# Patient Record
Sex: MALE | Race: WHITE | Employment: OTHER | URBAN - METROPOLITAN AREA
[De-identification: names, ages, dates, MRNs, and addresses within clinical notes are randomized per-mention and may not be internally consistent; named-entity substitution may affect disease eponyms.]

---

## 2017-06-15 ENCOUNTER — HOSPITAL ENCOUNTER (EMERGENCY)
Age: 72
Discharge: HOME | End: 2017-06-15
Attending: EMERGENCY MEDICINE
Payer: MEDICARE

## 2017-06-15 VITALS
RESPIRATION RATE: 16 BRPM | WEIGHT: 166 LBS | DIASTOLIC BLOOD PRESSURE: 71 MMHG | OXYGEN SATURATION: 96 % | HEIGHT: 68 IN | BODY MASS INDEX: 25.16 KG/M2 | TEMPERATURE: 98.7 F | SYSTOLIC BLOOD PRESSURE: 140 MMHG

## 2017-06-15 DIAGNOSIS — L03.115 CELLULITIS OF RIGHT LOWER EXTREMITY: ICD-10-CM

## 2017-06-15 DIAGNOSIS — T30.0 BURN: Primary | ICD-10-CM

## 2017-06-15 PROCEDURE — 75810000057 HC BURN CR DRS/DEB SM<5%TBSA

## 2017-06-15 PROCEDURE — 99283 EMERGENCY DEPT VISIT LOW MDM: CPT

## 2017-06-15 PROCEDURE — 74011250637 HC RX REV CODE- 250/637: Performed by: EMERGENCY MEDICINE

## 2017-06-15 RX ORDER — CEPHALEXIN 500 MG/1
500 CAPSULE ORAL 4 TIMES DAILY
Qty: 28 CAP | Refills: 0 | Status: SHIPPED | OUTPATIENT
Start: 2017-06-15 | End: 2017-06-22

## 2017-06-15 RX ORDER — LORATADINE AND PSEUDOEPHEDRINE 10; 240 MG/1; MG/1
1 TABLET, EXTENDED RELEASE ORAL DAILY
COMMUNITY
End: 2022-03-28

## 2017-06-15 RX ORDER — LEVOTHYROXINE SODIUM 75 UG/1
75 TABLET ORAL
COMMUNITY

## 2017-06-15 RX ORDER — CEPHALEXIN 250 MG/1
500 CAPSULE ORAL
Status: COMPLETED | OUTPATIENT
Start: 2017-06-15 | End: 2017-06-15

## 2017-06-15 RX ADMIN — CEPHALEXIN 500 MG: 250 CAPSULE ORAL at 05:33
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
## 2017-06-15 NOTE — DISCHARGE INSTRUCTIONS
Leiva: Care Instructions  Your Care Instructions    Leiva--even minor ones--can be very painful. A minor burn may heal within several days, while a more serious burn may take weeks or even months to heal completely. You may notice that the burned area feels tight and hard while it is healing. It is important to continue to move the area as the burn heals to prevent loss of motion or loss of function in the area. When your skin is damaged by a burn, you have a greater risk of infection. Keep the wound clean and change the bandages regularly to prevent infection and help the burn heal.  Burns can leave permanent scars. Taking good care of the burn as it heals may help prevent bad scars. The doctor has checked you carefully, but problems can develop later. If you notice any problems or new symptoms, get medical treatment right away. Follow-up care is a key part of your treatment and safety. Be sure to make and go to all appointments, and call your doctor if you are having problems. It's also a good idea to know your test results and keep a list of the medicines you take. How can you care for yourself at home? · If your doctor told you how to care for your burn, follow your doctor's instructions. If you did not get instructions, follow this general advice:  ¨ Wash the burn with clean water 2 times a day. Don't use hydrogen peroxide or alcohol, which can slow healing. ¨ Gently pat the burn dry after you wash it. ¨ You may cover the burn with a thin layer of petroleum jelly, such as Vaseline, and a nonstick bandage. ¨ Apply more petroleum jelly and replace the bandage as needed. · Protect your burn while it is healing. Cover your burn if you are going out in the cold or the sun. ¨ Wear long sleeves if the burn is on your hands or arms. ¨ Wear a hat if the burn is on your face. ¨ Wear socks and shoes if the burn is on your feet. · Do not break blisters open. This increases the chance of infection.  If a blister breaks open by itself, blot up the liquid, and leave the skin that covered the blister. This helps protect the new skin. · If your doctor prescribed antibiotics, take them as directed. Do not stop taking them just because you feel better. You need to take the full course of antibiotics. For pain and itching  · Take pain medicines exactly as directed. ¨ If the doctor gave you a prescription medicine for pain, take it as prescribed. ¨ If you are not taking a prescription pain medicine, ask your doctor if you can take an over-the-counter medicine. · If the burn itches, try not to scratch it. Try an over-the-counter antihistamine such as diphenhydramine (Benadryl) or loratadine (Claritin). Read and follow all instructions on the label. When should you call for help? Call your doctor now or seek immediate medical care if:  · Your pain gets worse. · You have symptoms of infection, such as:  ¨ Increased pain, swelling, warmth, or redness near the burn. ¨ Red streaks leading from the burn. ¨ Pus draining from the burn. ¨ A fever. Watch closely for changes in your health, and be sure to contact your doctor if:  · You do not get better as expected. Where can you learn more? Go to http://gloria-cindy.info/. Enter K432 in the search box to learn more about \"Burns: Care Instructions. \"  Current as of: May 27, 2016  Content Version: 11.2  © 1066-3083 [employer]. Care instructions adapted under license by [employer] (which disclaims liability or warranty for this information). If you have questions about a medical condition or this instruction, always ask your healthcare professional. [place] [address] any warranty or liability for your use of this information. Cellulitis: Care Instructions  Your Care Instructions    Cellulitis is a skin infection.  It often occurs after a break in the skin from a scrape, cut, bite, or puncture, or after a rash.  The doctor has checked you carefully, but problems can develop later. If you notice any problems or new symptoms, get medical treatment right away. Follow-up care is a key part of your treatment and safety. Be sure to make and go to all appointments, and call your doctor if you are having problems. It's also a good idea to know your test results and keep a list of the medicines you take. How can you care for yourself at home? · Take your antibiotics as directed. Do not stop taking them just because you feel better. You need to take the full course of antibiotics. · Prop up the infected area on pillows to reduce pain and swelling. Try to keep the area above the level of your heart as often as you can. · If your doctor told you how to care for your wound, follow your doctor's instructions. If you did not get instructions, follow this general advice:  ¨ Wash the wound with clean water 2 times a day. Don't use hydrogen peroxide or alcohol, which can slow healing. ¨ You may cover the wound with a thin layer of petroleum jelly, such as Vaseline, and a nonstick bandage. ¨ Apply more petroleum jelly and replace the bandage as needed. · Be safe with medicines. Take pain medicines exactly as directed. ¨ If the doctor gave you a prescription medicine for pain, take it as prescribed. ¨ If you are not taking a prescription pain medicine, ask your doctor if you can take an over-the-counter medicine. To prevent cellulitis in the future  · Try to prevent cuts, scrapes, or other injuries to your skin. Cellulitis most often occurs where there is a break in the skin. · If you get a scrape, cut, mild burn, or bite, wash the wound with clean water as soon as you can to help avoid infection. Don't use hydrogen peroxide or alcohol, which can slow healing. · If you have swelling in your legs (edema), support stockings and good skin care may help prevent leg sores and cellulitis.   · Take care of your feet, especially if you have diabetes or other conditions that increase the risk of infection. Wear shoes and socks. Do not go barefoot. If you have athlete's foot or other skin problems on your feet, talk to your doctor about how to treat them. When should you call for help? Call your doctor now or seek immediate medical care if:  · You have signs that your infection is getting worse, such as:  ¨ Increased pain, swelling, warmth, or redness. ¨ Red streaks leading from the area. ¨ Pus draining from the area. ¨ A fever. · You get a rash. Watch closely for changes in your health, and be sure to contact your doctor if:  · You are not getting better after 1 day (24 hours). · You do not get better as expected. Where can you learn more? Go to http://gloria-cindy.info/. [place] in the search box to learn more about \"Cellulitis: Care Instructions. \"  Current as of: October 13, 2016  Content Version: 11.2  © 3339-3213 [employer]. Care instructions adapted under license by [employer] (which disclaims liability or warranty for this information). If you have questions about a medical condition or this instruction, always ask your healthcare professional. [place] [address] any warranty or liability for your use of this information. We hope that we have addressed all of your medical concerns. The examination and treatment you received in the Emergency Department were for an emergent problem and were not intended as complete care. It is important that you follow up with your healthcare provider(s) for ongoing care. If your symptoms worsen or do not improve as expected, and you are unable to reach your usual health care provider(s), you should return to the Emergency Department. Today's healthcare is undergoing tremendous change, and patient satisfaction surveys are one of the many tools to assess the quality of medical care.   You may receive a survey from the [facility] regarding your experience in the Emergency Department. I hope that your experience has been completely positive, particularly the medical care that I provided. As such, please participate in the survey; anything less than excellent does not meet my expectations or intentions. [facility] and [facility] participate in nationally recognized quality of care measures. If your blood pressure is greater than 120/80, as reported below, we urge that you seek medical care to address the potential of high blood pressure, commonly known as hypertension. Hypertension can be hereditary or can be caused by certain medical conditions, pain, stress, or \"white coat syndrome. \"       Please make an appointment with your health care provider(s) for follow up of your Emergency Department visit. VITALS:   Patient Vitals for the past 8 hrs:   Temp Resp BP SpO2   06/15/17 0514 98.7 °F (37.1 °C) 16 140/71 96 %          Thank you for allowing us to provide you with medical care today. We realize that you have many choices for your emergency care needs. Please choose us in the future for any continued health care needs. Ghulam Caceres [facility], [address]: [phone]            No results found for this or any previous visit (from the past 24 hour(s)). No results found.

## 2022-03-28 ENCOUNTER — HOSPITAL ENCOUNTER (INPATIENT)
Age: 77
LOS: 1 days | Discharge: ANOTHER HOSPITAL | DRG: 281 | End: 2022-03-28
Attending: STUDENT IN AN ORGANIZED HEALTH CARE EDUCATION/TRAINING PROGRAM | Admitting: INTERNAL MEDICINE
Payer: MEDICARE

## 2022-03-28 ENCOUNTER — APPOINTMENT (OUTPATIENT)
Dept: GENERAL RADIOLOGY | Age: 77
DRG: 281 | End: 2022-03-28
Attending: STUDENT IN AN ORGANIZED HEALTH CARE EDUCATION/TRAINING PROGRAM
Payer: MEDICARE

## 2022-03-28 ENCOUNTER — APPOINTMENT (OUTPATIENT)
Dept: NON INVASIVE DIAGNOSTICS | Age: 77
DRG: 281 | End: 2022-03-28
Attending: INTERNAL MEDICINE
Payer: MEDICARE

## 2022-03-28 VITALS
HEART RATE: 81 BPM | BODY MASS INDEX: 25.31 KG/M2 | WEIGHT: 167 LBS | RESPIRATION RATE: 16 BRPM | DIASTOLIC BLOOD PRESSURE: 93 MMHG | OXYGEN SATURATION: 94 % | HEIGHT: 68 IN | TEMPERATURE: 98.1 F | SYSTOLIC BLOOD PRESSURE: 173 MMHG

## 2022-03-28 DIAGNOSIS — R77.8 ELEVATED TROPONIN: Primary | ICD-10-CM

## 2022-03-28 DIAGNOSIS — R07.9 CHEST PAIN: ICD-10-CM

## 2022-03-28 DIAGNOSIS — I10 HTN (HYPERTENSION), BENIGN: ICD-10-CM

## 2022-03-28 DIAGNOSIS — I21.4 NSTEMI (NON-ST ELEVATED MYOCARDIAL INFARCTION) (HCC): ICD-10-CM

## 2022-03-28 PROBLEM — E03.9 HYPOTHYROIDISM: Status: ACTIVE | Noted: 2022-03-28

## 2022-03-28 PROBLEM — E87.6 HYPOKALEMIA: Status: ACTIVE | Noted: 2022-03-28

## 2022-03-28 LAB
ALBUMIN SERPL-MCNC: 3.7 G/DL (ref 3.5–5)
ALBUMIN/GLOB SERPL: 1.4 {RATIO} (ref 1.1–2.2)
ALP SERPL-CCNC: 96 U/L (ref 45–117)
ALT SERPL-CCNC: 28 U/L (ref 12–78)
ANION GAP SERPL CALC-SCNC: 8 MMOL/L (ref 5–15)
AST SERPL-CCNC: 22 U/L (ref 15–37)
BASOPHILS # BLD: 0.1 K/UL (ref 0–0.1)
BASOPHILS NFR BLD: 1 % (ref 0–1)
BILIRUB SERPL-MCNC: 0.6 MG/DL (ref 0.2–1)
BNP SERPL-MCNC: 158 PG/ML
BUN SERPL-MCNC: 25 MG/DL (ref 6–20)
BUN/CREAT SERPL: 21 (ref 12–20)
CALCIUM SERPL-MCNC: 8.9 MG/DL (ref 8.5–10.1)
CHLORIDE SERPL-SCNC: 104 MMOL/L (ref 97–108)
CHOLEST SERPL-MCNC: 206 MG/DL
CO2 SERPL-SCNC: 27 MMOL/L (ref 21–32)
COMMENT, HOLDF: NORMAL
CREAT SERPL-MCNC: 1.19 MG/DL (ref 0.7–1.3)
DIFFERENTIAL METHOD BLD: ABNORMAL
ECHO AO ASC DIAM: 3.6 CM
ECHO AO ASCENDING AORTA INDEX: 1.9 CM/M2
ECHO AV AREA PEAK VELOCITY: 1.4 CM2
ECHO AV AREA VTI: 1.5 CM2
ECHO AV AREA/BSA PEAK VELOCITY: 0.7 CM2/M2
ECHO AV AREA/BSA VTI: 0.8 CM2/M2
ECHO AV MEAN GRADIENT: 16 MMHG
ECHO AV MEAN VELOCITY: 1.9 M/S
ECHO AV PEAK GRADIENT: 30 MMHG
ECHO AV PEAK VELOCITY: 2.7 M/S
ECHO AV VELOCITY RATIO: 0.41
ECHO AV VTI: 58.8 CM
ECHO LA DIAMETER INDEX: 2.28 CM/M2
ECHO LA DIAMETER: 4.3 CM
ECHO LA VOL 2C: 66 ML (ref 18–58)
ECHO LA VOL 4C: 65 ML (ref 18–58)
ECHO LA VOL BP: 67 ML (ref 18–58)
ECHO LA VOL/BSA BIPLANE: 35 ML/M2 (ref 16–34)
ECHO LA VOLUME AREA LENGTH: 71 ML
ECHO LA VOLUME INDEX A2C: 35 ML/M2 (ref 16–34)
ECHO LA VOLUME INDEX A4C: 34 ML/M2 (ref 16–34)
ECHO LA VOLUME INDEX AREA LENGTH: 38 ML/M2 (ref 16–34)
ECHO LV E' LATERAL VELOCITY: 10 CM/S
ECHO LV E' SEPTAL VELOCITY: 5 CM/S
ECHO LV FRACTIONAL SHORTENING: 40 % (ref 28–44)
ECHO LV INTERNAL DIMENSION DIASTOLE INDEX: 2.8 CM/M2
ECHO LV INTERNAL DIMENSION DIASTOLIC: 5.3 CM (ref 4.2–5.9)
ECHO LV INTERNAL DIMENSION SYSTOLIC INDEX: 1.69 CM/M2
ECHO LV INTERNAL DIMENSION SYSTOLIC: 3.2 CM
ECHO LV IVSD: 1.3 CM (ref 0.6–1)
ECHO LV MASS 2D: 286.9 G (ref 88–224)
ECHO LV MASS INDEX 2D: 151.8 G/M2 (ref 49–115)
ECHO LV POSTERIOR WALL DIASTOLIC: 1.3 CM (ref 0.6–1)
ECHO LV RELATIVE WALL THICKNESS RATIO: 0.49
ECHO LVOT AREA: 3.5 CM2
ECHO LVOT AV VTI INDEX: 0.43
ECHO LVOT DIAM: 2.1 CM
ECHO LVOT MEAN GRADIENT: 3 MMHG
ECHO LVOT PEAK GRADIENT: 5 MMHG
ECHO LVOT PEAK VELOCITY: 1.1 M/S
ECHO LVOT STROKE VOLUME INDEX: 46.5 ML/M2
ECHO LVOT SV: 87.9 ML
ECHO LVOT VTI: 25.4 CM
ECHO MV A VELOCITY: 0.87 M/S
ECHO MV E DECELERATION TIME (DT): 190.3 MS
ECHO MV E VELOCITY: 1 M/S
ECHO MV E/A RATIO: 1.15
ECHO MV E/E' LATERAL: 10
ECHO MV E/E' RATIO (AVERAGED): 15
ECHO MV E/E' SEPTAL: 20
ECHO PV MAX VELOCITY: 1 M/S
ECHO PV PEAK GRADIENT: 4 MMHG
ECHO RV INTERNAL DIMENSION: 4 CM
ECHO RV TAPSE: 1.8 CM (ref 1.5–2)
ECHO RVOT PEAK GRADIENT: 2 MMHG
ECHO RVOT PEAK VELOCITY: 0.6 M/S
ECHO TV REGURGITANT MAX VELOCITY: 3.02 M/S
ECHO TV REGURGITANT PEAK GRADIENT: 37 MMHG
EOSINOPHIL # BLD: 0.1 K/UL (ref 0–0.4)
EOSINOPHIL NFR BLD: 1 % (ref 0–7)
ERYTHROCYTE [DISTWIDTH] IN BLOOD BY AUTOMATED COUNT: 13.6 % (ref 11.5–14.5)
GLOBULIN SER CALC-MCNC: 2.6 G/DL (ref 2–4)
GLUCOSE SERPL-MCNC: 186 MG/DL (ref 65–100)
HCT VFR BLD AUTO: 39.4 % (ref 36.6–50.3)
HDLC SERPL-MCNC: 44 MG/DL
HDLC SERPL: 4.7 {RATIO} (ref 0–5)
HGB BLD-MCNC: 13.3 G/DL (ref 12.1–17)
IMM GRANULOCYTES # BLD AUTO: 0 K/UL (ref 0–0.04)
IMM GRANULOCYTES NFR BLD AUTO: 0 % (ref 0–0.5)
LDLC SERPL CALC-MCNC: 138.2 MG/DL (ref 0–100)
LYMPHOCYTES # BLD: 1.2 K/UL (ref 0.8–3.5)
LYMPHOCYTES NFR BLD: 11 % (ref 12–49)
MAGNESIUM SERPL-MCNC: 2 MG/DL (ref 1.6–2.4)
MCH RBC QN AUTO: 31.1 PG (ref 26–34)
MCHC RBC AUTO-ENTMCNC: 33.8 G/DL (ref 30–36.5)
MCV RBC AUTO: 92.1 FL (ref 80–99)
MONOCYTES # BLD: 0.8 K/UL (ref 0–1)
MONOCYTES NFR BLD: 8 % (ref 5–13)
NEUTS SEG # BLD: 8.8 K/UL (ref 1.8–8)
NEUTS SEG NFR BLD: 79 % (ref 32–75)
NRBC # BLD: 0 K/UL (ref 0–0.01)
NRBC BLD-RTO: 0 PER 100 WBC
PLATELET # BLD AUTO: 194 K/UL (ref 150–400)
PMV BLD AUTO: 11.5 FL (ref 8.9–12.9)
POTASSIUM SERPL-SCNC: 3.3 MMOL/L (ref 3.5–5.1)
PROT SERPL-MCNC: 6.3 G/DL (ref 6.4–8.2)
RBC # BLD AUTO: 4.28 M/UL (ref 4.1–5.7)
SAMPLES BEING HELD,HOLD: NORMAL
SODIUM SERPL-SCNC: 139 MMOL/L (ref 136–145)
TRIGL SERPL-MCNC: 119 MG/DL (ref ?–150)
TROPONIN-HIGH SENSITIVITY: 1734 NG/L (ref 0–76)
TROPONIN-HIGH SENSITIVITY: 243 NG/L (ref 0–76)
TSH SERPL DL<=0.05 MIU/L-ACNC: 4.25 UIU/ML (ref 0.36–3.74)
VLDLC SERPL CALC-MCNC: 23.8 MG/DL
WBC # BLD AUTO: 11 K/UL (ref 4.1–11.1)

## 2022-03-28 PROCEDURE — C1769 GUIDE WIRE: HCPCS | Performed by: INTERNAL MEDICINE

## 2022-03-28 PROCEDURE — 96374 THER/PROPH/DIAG INJ IV PUSH: CPT

## 2022-03-28 PROCEDURE — 93306 TTE W/DOPPLER COMPLETE: CPT

## 2022-03-28 PROCEDURE — 71045 X-RAY EXAM CHEST 1 VIEW: CPT

## 2022-03-28 PROCEDURE — 74011250636 HC RX REV CODE- 250/636: Performed by: INTERNAL MEDICINE

## 2022-03-28 PROCEDURE — 74011000250 HC RX REV CODE- 250: Performed by: INTERNAL MEDICINE

## 2022-03-28 PROCEDURE — C1894 INTRO/SHEATH, NON-LASER: HCPCS | Performed by: INTERNAL MEDICINE

## 2022-03-28 PROCEDURE — 99285 EMERGENCY DEPT VISIT HI MDM: CPT

## 2022-03-28 PROCEDURE — 99152 MOD SED SAME PHYS/QHP 5/>YRS: CPT | Performed by: INTERNAL MEDICINE

## 2022-03-28 PROCEDURE — U0005 INFEC AGEN DETEC AMPLI PROBE: HCPCS

## 2022-03-28 PROCEDURE — 93454 CORONARY ARTERY ANGIO S&I: CPT | Performed by: INTERNAL MEDICINE

## 2022-03-28 PROCEDURE — APPSS30 APP SPLIT SHARED TIME 16-30 MINUTES: Performed by: NURSE PRACTITIONER

## 2022-03-28 PROCEDURE — 77030042151 HC TBNG SPECIAL -B: Performed by: INTERNAL MEDICINE

## 2022-03-28 PROCEDURE — 2709999900 HC NON-CHARGEABLE SUPPLY: Performed by: INTERNAL MEDICINE

## 2022-03-28 PROCEDURE — 36415 COLL VENOUS BLD VENIPUNCTURE: CPT

## 2022-03-28 PROCEDURE — 93005 ELECTROCARDIOGRAM TRACING: CPT

## 2022-03-28 PROCEDURE — 93306 TTE W/DOPPLER COMPLETE: CPT | Performed by: INTERNAL MEDICINE

## 2022-03-28 PROCEDURE — 77030029065 HC DRSG HEMO QCLOT ZMED -B

## 2022-03-28 PROCEDURE — 99153 MOD SED SAME PHYS/QHP EA: CPT | Performed by: INTERNAL MEDICINE

## 2022-03-28 PROCEDURE — 84443 ASSAY THYROID STIM HORMONE: CPT

## 2022-03-28 PROCEDURE — 80053 COMPREHEN METABOLIC PANEL: CPT

## 2022-03-28 PROCEDURE — 83880 ASSAY OF NATRIURETIC PEPTIDE: CPT

## 2022-03-28 PROCEDURE — 77030019569 HC BND COMPR RAD TERU -B: Performed by: INTERNAL MEDICINE

## 2022-03-28 PROCEDURE — 77010033678 HC OXYGEN DAILY

## 2022-03-28 PROCEDURE — B2111ZZ FLUOROSCOPY OF MULTIPLE CORONARY ARTERIES USING LOW OSMOLAR CONTRAST: ICD-10-PCS | Performed by: INTERNAL MEDICINE

## 2022-03-28 PROCEDURE — 80061 LIPID PANEL: CPT

## 2022-03-28 PROCEDURE — 65660000000 HC RM CCU STEPDOWN

## 2022-03-28 PROCEDURE — 85025 COMPLETE CBC W/AUTO DIFF WBC: CPT

## 2022-03-28 PROCEDURE — 84484 ASSAY OF TROPONIN QUANT: CPT

## 2022-03-28 PROCEDURE — 94761 N-INVAS EAR/PLS OXIMETRY MLT: CPT

## 2022-03-28 PROCEDURE — 99999 PR OFFICE/OUTPT VISIT,PROCEDURE ONLY: CPT | Performed by: INTERNAL MEDICINE

## 2022-03-28 PROCEDURE — 74011000636 HC RX REV CODE- 636: Performed by: INTERNAL MEDICINE

## 2022-03-28 PROCEDURE — 4A023N7 MEASUREMENT OF CARDIAC SAMPLING AND PRESSURE, LEFT HEART, PERCUTANEOUS APPROACH: ICD-10-PCS | Performed by: INTERNAL MEDICINE

## 2022-03-28 PROCEDURE — 74011250637 HC RX REV CODE- 250/637: Performed by: INTERNAL MEDICINE

## 2022-03-28 PROCEDURE — 74011250636 HC RX REV CODE- 250/636: Performed by: STUDENT IN AN ORGANIZED HEALTH CARE EDUCATION/TRAINING PROGRAM

## 2022-03-28 PROCEDURE — 74011250637 HC RX REV CODE- 250/637: Performed by: STUDENT IN AN ORGANIZED HEALTH CARE EDUCATION/TRAINING PROGRAM

## 2022-03-28 PROCEDURE — 77030004522 HC CATH ANGI DX EXPO BSC -A: Performed by: INTERNAL MEDICINE

## 2022-03-28 PROCEDURE — 96365 THER/PROPH/DIAG IV INF INIT: CPT

## 2022-03-28 PROCEDURE — 99221 1ST HOSP IP/OBS SF/LOW 40: CPT | Performed by: THORACIC SURGERY (CARDIOTHORACIC VASCULAR SURGERY)

## 2022-03-28 PROCEDURE — 99223 1ST HOSP IP/OBS HIGH 75: CPT | Performed by: INTERNAL MEDICINE

## 2022-03-28 PROCEDURE — 77030004532 HC CATH ANGI DX IMP BSC -A: Performed by: INTERNAL MEDICINE

## 2022-03-28 PROCEDURE — 83735 ASSAY OF MAGNESIUM: CPT

## 2022-03-28 RX ORDER — POTASSIUM CHLORIDE 750 MG/1
40 TABLET, FILM COATED, EXTENDED RELEASE ORAL EVERY 4 HOURS
Status: DISPENSED | OUTPATIENT
Start: 2022-03-28 | End: 2022-03-28

## 2022-03-28 RX ORDER — MIDAZOLAM HYDROCHLORIDE 1 MG/ML
INJECTION, SOLUTION INTRAMUSCULAR; INTRAVENOUS AS NEEDED
Status: DISCONTINUED | OUTPATIENT
Start: 2022-03-28 | End: 2022-03-28 | Stop reason: HOSPADM

## 2022-03-28 RX ORDER — POLYETHYLENE GLYCOL 3350 17 G/17G
17 POWDER, FOR SOLUTION ORAL DAILY PRN
Status: DISCONTINUED | OUTPATIENT
Start: 2022-03-28 | End: 2022-03-29 | Stop reason: HOSPADM

## 2022-03-28 RX ORDER — LIDOCAINE HYDROCHLORIDE 10 MG/ML
INJECTION INFILTRATION; PERINEURAL AS NEEDED
Status: DISCONTINUED | OUTPATIENT
Start: 2022-03-28 | End: 2022-03-28 | Stop reason: HOSPADM

## 2022-03-28 RX ORDER — ONDANSETRON 2 MG/ML
4 INJECTION INTRAMUSCULAR; INTRAVENOUS
Status: COMPLETED | OUTPATIENT
Start: 2022-03-28 | End: 2022-03-28

## 2022-03-28 RX ORDER — FENTANYL CITRATE 50 UG/ML
INJECTION, SOLUTION INTRAMUSCULAR; INTRAVENOUS AS NEEDED
Status: DISCONTINUED | OUTPATIENT
Start: 2022-03-28 | End: 2022-03-28 | Stop reason: HOSPADM

## 2022-03-28 RX ORDER — SODIUM CHLORIDE 0.9 % (FLUSH) 0.9 %
5-40 SYRINGE (ML) INJECTION EVERY 8 HOURS
Status: DISCONTINUED | OUTPATIENT
Start: 2022-03-28 | End: 2022-03-29 | Stop reason: HOSPADM

## 2022-03-28 RX ORDER — MORPHINE SULFATE 2 MG/ML
2 INJECTION, SOLUTION INTRAMUSCULAR; INTRAVENOUS
Status: COMPLETED | OUTPATIENT
Start: 2022-03-28 | End: 2022-03-28

## 2022-03-28 RX ORDER — SENNOSIDES 8.6 MG/1
1 TABLET ORAL DAILY PRN
Status: DISCONTINUED | OUTPATIENT
Start: 2022-03-28 | End: 2022-03-29 | Stop reason: HOSPADM

## 2022-03-28 RX ORDER — SODIUM CHLORIDE 9 MG/ML
75 INJECTION, SOLUTION INTRAVENOUS CONTINUOUS
Status: DISPENSED | OUTPATIENT
Start: 2022-03-28 | End: 2022-03-28

## 2022-03-28 RX ORDER — ACETAMINOPHEN 325 MG/1
650 TABLET ORAL
Status: DISCONTINUED | OUTPATIENT
Start: 2022-03-28 | End: 2022-03-29 | Stop reason: HOSPADM

## 2022-03-28 RX ORDER — ACETAMINOPHEN 650 MG/1
650 SUPPOSITORY RECTAL
Status: DISCONTINUED | OUTPATIENT
Start: 2022-03-28 | End: 2022-03-29 | Stop reason: HOSPADM

## 2022-03-28 RX ORDER — NITROGLYCERIN 0.4 MG/1
0.4 TABLET SUBLINGUAL
Status: DISCONTINUED | OUTPATIENT
Start: 2022-03-28 | End: 2022-03-28

## 2022-03-28 RX ORDER — NITROGLYCERIN 0.4 MG/1
0.4 TABLET SUBLINGUAL
Status: DISCONTINUED | OUTPATIENT
Start: 2022-03-28 | End: 2022-03-29 | Stop reason: HOSPADM

## 2022-03-28 RX ORDER — CARVEDILOL 3.12 MG/1
3.12 TABLET ORAL 2 TIMES DAILY WITH MEALS
Status: DISCONTINUED | OUTPATIENT
Start: 2022-03-28 | End: 2022-03-29 | Stop reason: HOSPADM

## 2022-03-28 RX ORDER — PROMETHAZINE HYDROCHLORIDE 25 MG/1
12.5 TABLET ORAL
Status: DISCONTINUED | OUTPATIENT
Start: 2022-03-28 | End: 2022-03-29 | Stop reason: HOSPADM

## 2022-03-28 RX ORDER — ENOXAPARIN SODIUM 100 MG/ML
1 INJECTION SUBCUTANEOUS EVERY 12 HOURS
Status: DISCONTINUED | OUTPATIENT
Start: 2022-03-28 | End: 2022-03-29 | Stop reason: HOSPADM

## 2022-03-28 RX ORDER — GUAIFENESIN 100 MG/5ML
81 LIQUID (ML) ORAL DAILY
Status: DISCONTINUED | OUTPATIENT
Start: 2022-03-28 | End: 2022-03-29 | Stop reason: HOSPADM

## 2022-03-28 RX ORDER — HEPARIN SODIUM 200 [USP'U]/100ML
INJECTION, SOLUTION INTRAVENOUS
Status: COMPLETED | OUTPATIENT
Start: 2022-03-28 | End: 2022-03-28

## 2022-03-28 RX ORDER — ONDANSETRON 2 MG/ML
4 INJECTION INTRAMUSCULAR; INTRAVENOUS
Status: DISCONTINUED | OUTPATIENT
Start: 2022-03-28 | End: 2022-03-29 | Stop reason: HOSPADM

## 2022-03-28 RX ORDER — SODIUM CHLORIDE 9 MG/ML
125 INJECTION, SOLUTION INTRAVENOUS CONTINUOUS
Status: DISCONTINUED | OUTPATIENT
Start: 2022-03-28 | End: 2022-03-28

## 2022-03-28 RX ORDER — GUAIFENESIN 100 MG/5ML
324 LIQUID (ML) ORAL
Status: ACTIVE | OUTPATIENT
Start: 2022-03-28 | End: 2022-03-28

## 2022-03-28 RX ORDER — HEPARIN SODIUM 1000 [USP'U]/ML
INJECTION, SOLUTION INTRAVENOUS; SUBCUTANEOUS AS NEEDED
Status: DISCONTINUED | OUTPATIENT
Start: 2022-03-28 | End: 2022-03-28 | Stop reason: HOSPADM

## 2022-03-28 RX ORDER — SODIUM CHLORIDE 0.9 % (FLUSH) 0.9 %
5-40 SYRINGE (ML) INJECTION AS NEEDED
Status: DISCONTINUED | OUTPATIENT
Start: 2022-03-28 | End: 2022-03-29 | Stop reason: HOSPADM

## 2022-03-28 RX ORDER — VERAPAMIL HYDROCHLORIDE 2.5 MG/ML
INJECTION, SOLUTION INTRAVENOUS AS NEEDED
Status: DISCONTINUED | OUTPATIENT
Start: 2022-03-28 | End: 2022-03-28 | Stop reason: HOSPADM

## 2022-03-28 RX ORDER — ATORVASTATIN CALCIUM 20 MG/1
40 TABLET, FILM COATED ORAL
Status: DISCONTINUED | OUTPATIENT
Start: 2022-03-28 | End: 2022-03-29 | Stop reason: HOSPADM

## 2022-03-28 RX ORDER — LEVOTHYROXINE SODIUM 75 UG/1
75 TABLET ORAL
Status: DISCONTINUED | OUTPATIENT
Start: 2022-03-29 | End: 2022-03-29 | Stop reason: HOSPADM

## 2022-03-28 RX ORDER — ONDANSETRON 2 MG/ML
INJECTION INTRAMUSCULAR; INTRAVENOUS AS NEEDED
Status: DISCONTINUED | OUTPATIENT
Start: 2022-03-28 | End: 2022-03-28 | Stop reason: HOSPADM

## 2022-03-28 RX ADMIN — POTASSIUM CHLORIDE 40 MEQ: 750 TABLET, EXTENDED RELEASE ORAL at 04:51

## 2022-03-28 RX ADMIN — SODIUM CHLORIDE 500 ML: 9 INJECTION, SOLUTION INTRAVENOUS at 05:07

## 2022-03-28 RX ADMIN — NITROGLYCERIN 0.4 MG: 0.4 TABLET, ORALLY DISINTEGRATING SUBLINGUAL at 20:13

## 2022-03-28 RX ADMIN — ATORVASTATIN CALCIUM 40 MG: 20 TABLET, FILM COATED ORAL at 21:34

## 2022-03-28 RX ADMIN — SODIUM CHLORIDE 125 ML/HR: 9 INJECTION, SOLUTION INTRAVENOUS at 05:30

## 2022-03-28 RX ADMIN — SODIUM CHLORIDE 75 ML/HR: 9 INJECTION, SOLUTION INTRAVENOUS at 13:32

## 2022-03-28 RX ADMIN — NITROGLYCERIN 0.4 MG: 0.4 TABLET, ORALLY DISINTEGRATING SUBLINGUAL at 04:21

## 2022-03-28 RX ADMIN — NITROGLYCERIN 0.4 MG: 0.4 TABLET, ORALLY DISINTEGRATING SUBLINGUAL at 20:07

## 2022-03-28 RX ADMIN — SODIUM CHLORIDE, PRESERVATIVE FREE 10 ML: 5 INJECTION INTRAVENOUS at 13:32

## 2022-03-28 RX ADMIN — SODIUM CHLORIDE, PRESERVATIVE FREE 10 ML: 5 INJECTION INTRAVENOUS at 22:18

## 2022-03-28 RX ADMIN — ONDANSETRON 4 MG: 2 INJECTION INTRAMUSCULAR; INTRAVENOUS at 03:25

## 2022-03-28 RX ADMIN — SODIUM CHLORIDE, PRESERVATIVE FREE 10 ML: 5 INJECTION INTRAVENOUS at 05:49

## 2022-03-28 RX ADMIN — ENOXAPARIN SODIUM 80 MG: 100 INJECTION SUBCUTANEOUS at 04:21

## 2022-03-28 RX ADMIN — NITROGLYCERIN 0.4 MG: 0.4 TABLET, ORALLY DISINTEGRATING SUBLINGUAL at 04:52

## 2022-03-28 RX ADMIN — NITROGLYCERIN 0.4 MG: 0.4 TABLET, ORALLY DISINTEGRATING SUBLINGUAL at 14:48

## 2022-03-28 RX ADMIN — ACETAMINOPHEN 650 MG: 325 TABLET ORAL at 11:27

## 2022-03-28 RX ADMIN — ENOXAPARIN SODIUM 80 MG: 100 INJECTION SUBCUTANEOUS at 16:20

## 2022-03-28 RX ADMIN — SODIUM CHLORIDE, PRESERVATIVE FREE 10 ML: 5 INJECTION INTRAVENOUS at 12:50

## 2022-03-28 RX ADMIN — NITROGLYCERIN 0.4 MG: 0.4 TABLET, ORALLY DISINTEGRATING SUBLINGUAL at 14:19

## 2022-03-28 RX ADMIN — MORPHINE SULFATE 2 MG: 2 INJECTION, SOLUTION INTRAMUSCULAR; INTRAVENOUS at 06:49

## 2022-03-28 RX ADMIN — CARVEDILOL 3.12 MG: 3.12 TABLET, FILM COATED ORAL at 16:20

## 2022-03-28 RX ADMIN — ASPIRIN 81 MG: 81 TABLET, CHEWABLE ORAL at 21:34

## 2022-03-28 RX ADMIN — SODIUM CHLORIDE, PRESERVATIVE FREE 10 ML: 5 INJECTION INTRAVENOUS at 22:19

## 2022-03-28 RX ADMIN — ONDANSETRON 4 MG: 2 INJECTION INTRAMUSCULAR; INTRAVENOUS at 17:55

## 2022-03-28 RX ADMIN — SODIUM CHLORIDE 125 ML/HR: 9 INJECTION, SOLUTION INTRAVENOUS at 10:14

## 2022-03-28 NOTE — Clinical Note
TRANSFER - IN REPORT:     Verbal report received from: [facility]. Report consisted of patient's Situation, Background, Assessment and   Recommendations(SBAR). Opportunity for questions and clarification was provided. Assessment completed upon patient's arrival to unit and care assumed. Patient transported with a Registered Nurse, [facility] / Patient Care Tech and Monitor.

## 2022-03-28 NOTE — Clinical Note
Diagnostic catheter removed over the wire. Catheter used: [facility].

## 2022-03-28 NOTE — ED NOTES
ED visit d/t chest pain, central with no radiation - onset of sxs, 7pm then reappeared and worsened at 1pm with more apparent chest pain - active nause and vomiting in arrival - 324 mg ASA self medicated at home - EMS administered nitro x2 - last nitro received at 0232 - 18 G (L) AC placed by EMS;;    0317 - Portable CXR at bedside for imaging;;    Logan Zhu MD at bedside for admission eval - pt noted to be hypotensive / bradycardia during assessment - MD made aware and will order medications as warranted    MD also made aware of active chest pain, 3-4/10 pain scale - MD will review and will order further medications as warranted;;    0520 - pt resting in bed at this time - remains with chest pain, 2/10 pain scale - sleeping yet easily aroused -     Denies sob / nausea / active vomiting / change in vision blurry vision / weakness, general nor focal / coughing / numbness;;      0438 - pt with worsening CP at this time Latrice Oconnor MD made aware - will contact [facility] STAT for recommendation;; Alisson Smith MD will order IV morphine for pain management;;    4380 Ronnie Madsen MD made aware of up trending trop as expected - MD reports being unable to speak with on call [facility] yet left a \" text \" regarding the pt's case;;    0780 Ronnie Madsen MD called and updated this RN - [facility] made aware and spoken with, will come to eval pt shortly for recommendations and eval;;      0720 - Bedside shift change report given to Anisha Puri RN (oncoming nurse) by Sanjuana Smith (offgoing nurse). Report included the following information SBAR, Kardex, ED Summary, Intake/Output and MAR.

## 2022-03-28 NOTE — PROGRESS NOTES
1900 - Spoke with Bandar Turner w/ the access center. Pt to be transported to [address] [place] [address] 401 report to be called to #[phone]. Transport not set up at this time, primary RN to be notified when complete. 18 - Spoke with Luh white/ the access center.  [facility] to transport pt at 8:15pm.

## 2022-03-28 NOTE — Clinical Note
Dr. Giraldo Cue consulting with Dr. Romina Frias via phone. Films pushed to Dr. Romina Frias for review.

## 2022-03-28 NOTE — PROGRESS NOTES
Cardiology Initial Care Encounter    Patient: Serena Rivera MRN: [id number]     YOB: 1945  Age: 64 y.o. Sex: male      Admit Date: 3/28/2022       Assessment/Plan     1. NSTEMI: for urgent cath this morning since ongoing CP, on lovenox bid. Cont ASA, will start statin. Bradycardic some in ED, re eval to start BB post cath     2. HTN: will start BP meds following cath    3. Murmur: will check TTE    4. HLD: new diagnosis, start statin therapy    5. Hypothyroid: on synthroid    6. Hypokalemia: replete per orders     Pt personally seen and examined. Chart reviewed. Agree with advanced NP's history, exam and  A/P with changes/additons. C/o mild to mod chest tightness    Blood pressure (!) 147/86, pulse 66, temperature 98.3 °F (36.8 °C), resp. rate 19, height 5' 8\" (1.727 m), weight 167 lb 9.6 oz (76 kg), SpO2 98 %. Neck-no JVD  CVS-S1-S2 present, 2/6 systolic murmur present  RS-   CTAB        Abdomen-  soft/NT  LE-   no edema    A/P :  NSTEMI - LHC today. RIBA of procedure explained to pt/son Reyna Cavazos by tel. Pt consents to proceed    Discussed with patient/nursing/family    Padilla Michaels MD, [facility]     Serena Rivera is a 64 y.o.  male  with PMH significant for HTN, hypothyroidism. Pt developed acute onset of CP while at home yesterday evening, associated with N/V. Pain did not radiate, no SOB. Pain was initially better after ASA and nitro but now recurring and worsening. Denies any dizziness, orthopnea, PND or edema. He saw a cardiologist several years ago but unsure what for. Troponin elevated for NSTEMI. ECHO will order    The patient has been referred to cardiology for on going management of NSTEMI.      Review of Symptoms:  Constitutional: negative  ENT: negative   Respiratory: negative  Gastrointestinal: negative  Genitourinary: dysuria, hematuria, frequency   Musculoskeletal:negative  Neurological: negative  Other systems reviewed and negative except as above. Previous cardiac hx  No specialty comments available. Risk factors:     Social History     Tobacco Use    Smoking status: Never Smoker    Smokeless tobacco: Never Used   Substance Use Topics    Alcohol use: Yes     Family History   Family history unknown: Yes       Current Facility-Administered Medications   Medication Dose Route Frequency    aspirin chewable tablet 324 mg  324 mg Oral NOW    enoxaparin (LOVENOX) injection 80 mg  1 mg/kg SubCUTAneous Q12H    sodium chloride (NS) flush 5-40 mL  5-40 mL IntraVENous Q8H    sodium chloride (NS) flush 5-40 mL  5-40 mL IntraVENous PRN    acetaminophen (TYLENOL) tablet 650 mg  650 mg Oral Q6H PRN    Or    acetaminophen (TYLENOL) suppository 650 mg  650 mg Rectal Q6H PRN    polyethylene glycol (MIRALAX) packet 17 g  17 g Oral DAILY PRN    senna (SENOKOT) tablet 8.6 mg  1 Tablet Oral DAILY PRN    promethazine (PHENERGAN) tablet 12.5 mg  12.5 mg Oral Q6H PRN    Or    ondansetron (ZOFRAN) injection 4 mg  4 mg IntraVENous Q6H PRN    aspirin chewable tablet 81 mg  81 mg Oral DAILY    potassium chloride SR (KLOR-CON 10) tablet 40 mEq  40 mEq Oral Q4H    0.9% sodium chloride infusion  125 mL/hr IntraVENous CONTINUOUS     Current Outpatient Medications   Medication Sig    levothyroxine (SYNTHROID) 75 mcg tablet Take 75 mcg by mouth Daily (before breakfast). Objective:     Vitals:    03/28/22 0531 03/28/22 0541 03/28/22 0601 03/28/22 0706   BP: 131/76 (!) 137/93 (!) 143/86 (!) 147/86   Pulse: (!) 59 65 61 66   Resp: 22 16 19 19   Temp:   97.9 °F (36.6 °C) 98.3 °F (36.8 °C)   SpO2: 96% (!) 89% 97% 98%   Weight:       Height:            Intake and Output:  Current Shift: No intake/output data recorded.   Last three shifts: 03/26 1901 - 03/28 0700  In: 500 [I.V.:500]  Out: -           Gen: Well-developed, well-nourished, in no acute distress  Neck: Supple,No JVD, No Carotid Bruit,   Resp: No accessory muscle use, Clear breath sounds, No rales or rhonchi  Card: Regular Rate,Rythm,Normal S1, S2, 3/6 murmurs, no rubs or gallop. No thrills. Abd:  Soft, non-tender, non-distended,BS+,   MSK: No cyanosis  Skin: No rashes    Neuro: moving all four extremities , follows commands appropriately  Psych:  Good insight, oriented to person, place , alert, Nml Affect  LE: No edema    EKG: sinus bradycardia. TELEMETRY SR/SB    Lab/Data Review: All lab results for the last 24 hours reviewed.      Signed By: Nicolas Garcia NP     March 28, 2022

## 2022-03-28 NOTE — CONSULTS
Noe Shafer MD., [facility]    [address] [place], [address] [facility]    Office [phone],[facility] [phone]           3/28/2022     Admit Date: 3/28/2022      Samantha Workman is a 64 y.o. male admitted for NSTEMI (non-ST elevated myocardial infarction) ([facility] [address] [I21.4]. Consult requested by Hanna Jean-Baptiste MD       Assessment/Plan:    NSTEMI  HTN  Hypothyroidism      Plan:  ASA/Heparin/Coreg/Statin  Correct K  [facility] - RIBA of procedure d/w pt and son Maribeth Kendall [place]). Pt consents to proceeding with [facility]               Please do not hesitate to contact us with questions or concerns. See note below for details. Noe Shafer MD      Cardiac Testing/Procedures: A. Cardiac Cath/PCI:    B.ECHO/LAWSON:    C.StressNuclear/Stress ECHO/Stress test:    D.Vascular:    E. EP:    F. Miscellaneous:    History:     Patient  is a 64 y.o. male admitted for chest pain patient started with chest pain yesterday evening at around 6:00. It occurred at rest.  Associated with nausea/vomiting. No diaphoresis/dyspnea. He called EMS. He has been having intermittent chest pain since then. In the ED nitroglycerin was given which dropped  his blood pressure. He continues to have mild to moderate retrosternal chest tightness. History of heart murmur. No history of CAD. History of hypertension. Has not been taking his medications because he lost his wife recently and has been under  significant amount of stress. Troponin  143, 1734    EKG - SR/IMI au/ Ant lat MI    PMH/PSH/FH/Soc Hx:     Past Medical History:   Diagnosis Date    Hypertension     Ill-defined condition     hypothyroid      Past Surgical History:   Procedure Laterality Date    HX APPENDECTOMY       No Known Allergies  Family History   Family history unknown: Yes      Social History     Tobacco Use    Smoking status: Never Smoker    Smokeless tobacco: Never Used   Substance Use Topics    Alcohol use: Yes    Drug use:  No Medications:       Current Facility-Administered Medications   Medication Dose Route Frequency    aspirin chewable tablet 324 mg  324 mg Oral NOW    enoxaparin (LOVENOX) injection 80 mg  1 mg/kg SubCUTAneous Q12H    sodium chloride (NS) flush 5-40 mL  5-40 mL IntraVENous Q8H    sodium chloride (NS) flush 5-40 mL  5-40 mL IntraVENous PRN    acetaminophen (TYLENOL) tablet 650 mg  650 mg Oral Q6H PRN    Or    acetaminophen (TYLENOL) suppository 650 mg  650 mg Rectal Q6H PRN    polyethylene glycol (MIRALAX) packet 17 g  17 g Oral DAILY PRN    senna (SENOKOT) tablet 8.6 mg  1 Tablet Oral DAILY PRN    promethazine (PHENERGAN) tablet 12.5 mg  12.5 mg Oral Q6H PRN    Or    ondansetron (ZOFRAN) injection 4 mg  4 mg IntraVENous Q6H PRN    aspirin chewable tablet 81 mg  81 mg Oral DAILY    potassium chloride SR (KLOR-CON 10) tablet 40 mEq  40 mEq Oral Q4H    0.9% sodium chloride infusion  125 mL/hr IntraVENous CONTINUOUS     Current Outpatient Medications   Medication Sig    levothyroxine (SYNTHROID) 75 mcg tablet Take 75 mcg by mouth Daily (before breakfast).        Review of Systems:     As in HPI - all other ROS negative    Physical Exam:       Visit Vitals  BP (!) 147/86 (BP 1 Location: Right arm, BP Patient Position: At rest)   Pulse 66   Temp 98.3 °F (36.8 °C)   Resp 19   Ht 5' 8\" (1.727 m)   Wt 167 lb 9.6 oz (76 kg)   SpO2 98%   BMI 25.48 kg/m²         Telemetry: normal sinus rhythm    Gen: Well-developed, well-nourished, in no acute distress  Neck: Supple,No JVD, No Carotid Bruit,   Resp: No accessory muscle use, Clear breath sounds, No rales or rhonchi  Card: Regular Rate,Rythm,Normal S1, S2, 2/6 ESM murmur+   Abd:  Soft,BS+,   MSK: No cyanosis  Skin: No rashes    Neuro: moving all four extremities , follows commands appropriately  Psych:  Good insight, oriented to person, place , alert, Nml Affect  LE: No edema    EKG:        Cxray: Reviewed     LABS: Reviewed      Care Plan discussed with: Patient and Nursing Staff      Total time:      mins     Mike Vargas MD

## 2022-03-28 NOTE — ED PROVIDER NOTES
Patient is a 64-year-old male presenting ED with acute chest pain started at 6 PM.  Patient called EMS for continued pain at 1 AM.  Patient took 325 of aspirin. EMS gave 2 nitros with drop in patient's pain to 1/10 but also got patient's blood pressure somewhat. Patient had some nausea with nitro. Patient has history of hypertension but no coronary history. The history is provided by the patient, medical records and the EMS personnel. Chest Pain (Angina)   This is a new problem. The current episode started 6 to 12 hours ago. The problem has been gradually improving. The problem occurs constantly. The pain is associated with normal activity. The pain is present in the substernal region. The pain is at a severity of 8/10. The pain is moderate. The quality of the pain is described as pressure-like and sharp. The pain does not radiate. Exacerbated by: Nothing. Associated symptoms include malaise/fatigue and nausea. Pertinent negatives include no diaphoresis. He has tried aspirin and nitroglycerin for the symptoms. The treatment provided moderate relief. Risk factors include male gender and hypertension. His past medical history is significant for HTN. Pertinent negatives include no cardiac catheterization and no cardiac stents. Past Medical History:   Diagnosis Date    Hypertension     Ill-defined condition     hypothyroid       Past Surgical History:   Procedure Laterality Date    HX APPENDECTOMY           No family history on file. Social History     Socioeconomic History    Marital status:      Spouse name: Not on file    Number of children: Not on file    Years of education: Not on file    Highest education level: Not on file   Occupational History    Not on file   Tobacco Use    Smoking status: Never Smoker    Smokeless tobacco: Not on file   Substance and Sexual Activity    Alcohol use:  Yes    Drug use: No    Sexual activity: Not on file   Other Topics Concern    Not on file Problem: Nutrition/Hydration-ADULT  Goal: Nutrient/Hydration intake appropriate for improving, restoring or maintaining nutritional needs  Monitor and assess patient's nutrition/hydration status for malnutrition (ex- brittle hair, bruises, dry skin, pale skin and conjunctiva, muscle wasting, smooth red tongue, and disorientation)  Collaborate with interdisciplinary team and initiate plan and interventions as ordered  Monitor patient's weight and dietary intake as ordered or per policy  Utilize nutrition screening tool and intervene per policy  Determine patient's food preferences and provide high-protein, high-caloric foods as appropriate       INTERVENTIONS:  - Monitor oral intake, urinary output, labs, and treatment plans  - Assess nutrition and hydration status and recommend course of action  - Evaluate amount of meals eaten  - Assist patient with eating if necessary   - Allow adequate time for meals  - Recommend/ encourage appropriate diets, oral nutritional supplements, and vitamin/mineral supplements  - Order, calculate, and assess calorie counts as needed  - Recommend, monitor, and adjust tube feedings and TPN/PPN based on assessed needs  - Assess need for intravenous fluids  - Provide specific nutrition/hydration education as appropriate  - Include patient/family/caregiver in decisions related to nutrition   Outcome: Completed Date Met: 12/14/18 Social History Narrative    Not on file     Social Determinants of Health     Financial Resource Strain:     Difficulty of Paying Living Expenses: Not on file   Food Insecurity:     Worried About Running Out of Food in the Last Year: Not on file    [place] of Food in the Last Year: Not on file   Transportation Needs:     Lack of Transportation (Medical): Not on file    Lack of Transportation (Non-Medical): Not on file   Physical Activity:     Days of Exercise per Week: Not on file    Minutes of Exercise per Session: Not on file   Stress:     Feeling of Stress : Not on file   Social Connections:     Frequency of Communication with Friends and Family: Not on file    Frequency of Social Gatherings with Friends and Family: Not on file    Attends [employer] Services: Not on file    Active Member of [address] [place] [address] [employer] or Organizations: Not on file    Attends Club or Organization Meetings: Not on file    Marital Status: Not on file   Intimate Partner Violence:     Fear of Current or Ex-Partner: Not on file    Emotionally Abused: Not on file    Physically Abused: Not on file    Sexually Abused: Not on file   Housing Stability:     Unable to Pay for Housing in the Last Year: Not on file    Number of [place] in the Last Year: Not on file    Unstable Housing in the Last Year: Not on file         ALLERGIES: Patient has no known allergies. Review of Systems   Constitutional: Positive for activity change, appetite change and malaise/fatigue. Negative for diaphoresis. HENT: Negative. Eyes: Negative. Respiratory: Negative. Cardiovascular: Positive for chest pain. Gastrointestinal: Positive for nausea. Endocrine: Negative. Genitourinary: Negative. Musculoskeletal: Negative. Skin: Negative. Allergic/Immunologic: Negative. Neurological: Negative. Hematological: Negative. Psychiatric/Behavioral: Negative.         Vitals:    03/28/22 0253 03/28/22 0307 03/28/22 0326   BP: 117/85 Pulse: 71 61 67   Resp: 18 18 18   Temp: 98.7 °F (37.1 °C)     SpO2: 91% 94% 97%   Weight: 76 kg (167 lb 9.6 oz)     Height: 5' 8\" (1.727 m)              Physical Exam  Vitals and nursing note reviewed. Constitutional:       General: He is not in acute distress. Appearance: Normal appearance. HENT:      Head: Normocephalic and atraumatic. Right Ear: External ear normal.      Left Ear: External ear normal.      Nose: Nose normal.   Eyes:      Extraocular Movements: Extraocular movements intact. Conjunctiva/sclera: Conjunctivae normal.   Cardiovascular:      Rate and Rhythm: Normal rate. Pulses: Normal pulses. Radial pulses are 2+ on the right side and 2+ on the left side. Heart sounds: Normal heart sounds. Pulmonary:      Effort: Pulmonary effort is normal.      Breath sounds: Normal breath sounds. Chest:      Chest wall: No deformity or tenderness. Abdominal:      General: Abdomen is flat. There is no distension. Tenderness: There is no abdominal tenderness. Musculoskeletal:         General: No deformity or signs of injury. Normal range of motion. Cervical back: Normal range of motion and neck supple. No tenderness. Skin:     General: Skin is warm and dry. Capillary Refill: Capillary refill takes less than 2 seconds. Neurological:      General: No focal deficit present. Mental Status: He is alert and oriented to person, place, and time. Motor: Tremor present. Psychiatric:         Attention and Perception: Attention normal.         Mood and Affect: Mood normal.         Behavior: Behavior normal.          [facility]  ED Course as of 03/28/22 0854   Mon Mar 28, 2022   0258 EKG interpretation:   Rhythm: normal sinus rhythm and PAC's; and regular . Rate (approx.): 63; Axis: left axis deviation; Intervals: normal ; ST/T wave: non-specific changes; EKG documented and interpreted by Bonnie Valdez.  Rupa Mckenzie MD, Emergency Medicine.   [AL]   6495 EKG interpretation:   Rhythm: sinus bradycardia; and regular . Rate (approx.): 53; Axis: left axis deviation; Intervals: normal ; ST/T wave: non-specific changes; EKG documented and interpreted by Rupal Berumen. Julio Espinal MD, Emergency Medicine.   [AL]      ED Course User Index  [AL] Jonas Mehta MD     LABORATORY RESULTS:  Labs Reviewed   METABOLIC PANEL, COMPREHENSIVE - Abnormal; Notable for the following components:       Result Value    Potassium 3.3 (*)     Glucose 186 (*)     BUN 25 (*)     BUN/Creatinine ratio 21 (*)     GFR est non-AA 59 (*)     Protein, total 6.3 (*)     All other components within normal limits   TROPONIN-HIGH SENSITIVITY - Abnormal; Notable for the following components:    Troponin-High Sensitivity 243 (*)     All other components within normal limits   CBC WITH AUTOMATED DIFF - Abnormal; Notable for the following components:    NEUTROPHILS 79 (*)     LYMPHOCYTES 11 (*)     ABS. NEUTROPHILS 8.8 (*)     All other components within normal limits   NT-PRO BNP   MAGNESIUM   SAMPLES BEING HELD   TROPONIN-HIGH SENSITIVITY       IMAGING RESULTS:  XR CHEST PORT   Final Result   No acute process. MEDICATIONS GIVEN:  Medications   aspirin chewable tablet 324 mg (0 mg Oral Held 3/28/22 0248)   ondansetron (ZOFRAN) injection 4 mg (4 mg IntraVENous Given 3/28/22 0325)       Differential diagnosis: ACS, noncardiac chest pain, tremor, hypertensive emergency    ED physician interpretation of imaging: Chest x-ray without widened mediastinum  ED physician interpretation of EKG: No STEMI. See my interpretation EKG and ED course above. ED physician interpretation of laboratory results: Initial Trope elevated at 243 concerning for NSTEMI versus hypertensive emergency/urgency.   However patient's blood pressure much better in the ED after nitroglycerin by EMS making hypertensive urgency unlikely    MDM: Patient is a 73-year-old male presenting to the ED with chest pain found to have elevated troponin concerning for NSTEMI requiring hospitalization for further treatment evaluation. Patient already give himself aspirin. Nitroglycerin. Patient's pain provided by EMS. Lovenox given. Patient's pain 1/10 when patient admitted to hospital service for further treatment evaluation. Patient was the ED and pain worsened and was given nitroglycerin however after 2 doses he had mild hypotension and fluid boluses started. Repeat EKG shows no STEMI. DISPOSITION: Admitted    [address] [place] [address] for Admission  4:03 AM    ED Room Number: TE66/27  Patient Name and age:  Cam See 64 y.o.  male  Working Diagnosis:   1. Elevated troponin    2. NSTEMI (non-ST elevated myocardial infarction) ([facility] [address]        COVID-19 Suspicion:  no  Sepsis present:  no  Reassessment needed: no  Code Status:  Full Code  Readmission: no  Isolation Requirements:  no  Recommended Level of Care:  telemetry  Department:  [facility] ED - [phone]    Other: Chest pain started at 6 PM.  Elevated troponin 243. Would you like heparin or Lovenox? Pain now 1/10. Pablo Hood.  Gail Russo MD        Procedures

## 2022-03-28 NOTE — PROGRESS NOTES
1930 Bedside shift change report given to Bairon (oncoming nurse) by Tierra Pierce (offgoing nurse). Report included the following information SBAR, Kardex, Intake/Output, MAR, Recent Results, Med Rec Status and Cardiac Rhythm NSR.

## 2022-03-28 NOTE — PROGRESS NOTES
Progress Note  Date:3/28/2022       Room:317/01  \"Mr. Ruthy Guzman is a 64 y.o. male who is being admitted for a suspected NSTEMI (non-ST elevated myocardial infarction). Mr. Ruthy Guzman presented to our Emergency Department today complaining of moderately severe, mid chest pressure pain, no radiation and associated with nausea and vomiting. Better after Asprin and NTG to a 1/10 but seems to be recurring now at a 4/10. No cough or fever. No SOB. Initial troponin was 243. EKG was non ischemic. He will be admitted for further management. \"      Subjective    Subjective:  Symptoms:  Stable. Diet:  NPO. Pain:  He complains of pain that is mild. He reports pain is unchanged. Pain is well controlled. Review of Systems  Objective         Vitals Last 24 Hours:  TEMPERATURE:  Temp  Av.3 °F (36.8 °C)  Min: 97.9 °F (36.6 °C)  Max: 98.7 °F (37.1 °C)  RESPIRATIONS RANGE: Resp  Av.6  Min: 16  Max: 22  PULSE OXIMETRY RANGE: SpO2  Av.9 %  Min: 89 %  Max: 100 %  PULSE RANGE: Pulse  Av.1  Min: 49  Max: 84  BLOOD PRESSURE RANGE: Systolic (73OHN), STQ:667 , Min:65 , SWY:811   ; Diastolic (31VFQ), XJE:98, Min:52, Max:109    I/O (24Hr): Intake/Output Summary (Last 24 hours) at 3/28/2022 1254  Last data filed at 3/28/2022 1013  Gross per 24 hour   Intake 500 ml   Output 350 ml   Net 150 ml     Objective:  Vital signs: (most recent): Blood pressure (!) 168/95, pulse 81, temperature 98 °F (36.7 °C), resp. rate 16, height 5' 8\" (1.727 m), weight 75.8 kg (167 lb), SpO2 97 %. Labs/Imaging/Diagnostics    Labs:  CBC:  Recent Labs     22   WBC 11.0   RBC 4.28   HGB 13.3   HCT 39.4   MCV 92.1   RDW 13.6        CHEMISTRIES:  Recent Labs     22      K 3.3*      CO2 27   BUN 25*   CA 8.9   MG 2.0   PT/INR:No results for input(s): INR, INREXT in the last 72 hours. No lab exists for component: PROTIME  APTT:No results for input(s):  APTT in the last 72 hours. LIVER PROFILE:  Recent Labs     03/28/22  0302   AST 22   ALT 28     Lab Results   Component Value Date/Time    ALT (SGPT) 28 03/28/2022 03:02 AM    AST (SGOT) 22 03/28/2022 03:02 AM    Alk. phosphatase 96 03/28/2022 03:02 AM    Bilirubin, direct 0.3 07/20/2009 02:10 PM    Bilirubin, total 0.6 03/28/2022 03:02 AM       Imaging Last 24 Hours:  XR CHEST PORT    Result Date: 3/28/2022  INDICATION: chest pain EXAM:  AP CHEST RADIOGRAPH COMPARISON: July 24, 2009 FINDINGS: AP portable view of the chest demonstrates a normal cardiomediastinal silhouette. There is no edema, effusion, consolidation, or pneumothorax. Severe degenerative changes right shoulder. No acute process. ECHO ADULT COMPLETE    Result Date: 3/28/2022    Left Ventricle: Left ventricle size is normal. Moderately increased wall thickness. See diagram for wall motion findings. Moderately reduced left ventricular systolic function with a visually estimated EF of 35 - 40%.   Aortic Valve: Valve structure is normal. Moderately calcified cusp. Sclerosis of the aortic valve cusp.   Mitral Valve: Mild annular calcification of the mitral valve.   Left Atrium: Left atrium is mildly dilated.   Aorta: Normal sized ascending aorta. Mildly dilated aortic root. CARDIAC PROCEDURE    Result Date: 3/28/2022  R Radial - 6 F sheath LCA - JL4; RCA - 3DRC Sig R SCV tortuousity   L Main: Large; Distal 40%   LAD: Med; Mid 100% - L to L collaterals present; D1 - small - ostial/prx 80%; D2 - Small ostial/prox 80%   LCflex: Med; Mid 40%; OM1 - med; Prox 30%   RCA: Subselective inj - Prox 100% ; L to R collaterals filling distal vessel noted   LVEDP: valve not crossed   LVEF: not assessed   PCI: none       Specimens Removed : None   Devices implanted : None   Complications: None   Closure Device: R Radial - TR band    Assessment//Plan   · NSTEMI  · Chest pain  · Hypothyroidism  · Hypokalemia  · Hypertension     Mr. Jeffrey Plata presented to our Emergency Department today complaining of moderately severe, mid chest pressure pain, no radiation and associated with nausea and vomiting. Better after Asprin and NTG to a 1/10 but seems to be recurring now at a 4/10. No cough or fever. No SOB. Initial troponin was 243. EKG was non ischemic    Patient was admitted to telemetry, started on antiplatelets and statins, cardiology was consulted, patient was taken to cardiac cath and cath was started by Tania Lane MD (please refer to operative findings) patient was deemed to require either cardiac bypass or  of LAD, as per Dr. Marcos Manzano you discussed the case with Dr Frarah Torres who recommended for patient to be transferred to [address] [place] [address] under hospitalist team and Dr. Farrah Torres consulted. [address] [place] [address] and talk to hospitalist Dr. Neda Ahumada who graciously accepted the patient.       Electronically signed by Ramses Santiago MD on 3/28/2022 at 12:54 PM

## 2022-03-28 NOTE — H&P
[facility]  [address]  [phone]    Admission History and Physical      NAME:       Vignesh Doss   :       1945   MRN:      [id number]     PCP:      None     Date of service:   3/28/2022     Chief  Complaint: Chest pain    History Of Presenting Illness:       Mr. Eldon Nieves is a 64 y.o. male who is being admitted for a suspected NSTEMI (non-ST elevated myocardial infarction). Mr. Eldon Nieves presented to our Emergency Department today complaining of moderately severe, mid chest pressure pain, no radiation and associated with nausea and vomiting. Better after Asprin and NTG to a 1/10 but seems to be recurring now at a 4/10. No cough or fever. No SOB. Initial troponin was 243. EKG was non ischemic. He will be admitted for further management. No Known Allergies    Prior to Admission medications    Medication Sig Start Date End Date Taking? Authorizing Provider   levothyroxine (SYNTHROID) 75 mcg tablet Take 75 mcg by mouth Daily (before breakfast). Other, MD Ana   loratadine-pseudoephedrine (CLARITIN-D 24 HOUR)  mg per tablet Take 1 Tab by mouth daily. Other, MD Ana   mupirocin (BACTROBAN) 2 % ointment Apply  to affected area three (3) times daily. Apply to L nostril for 10 days 14   Roselyn Langford PA-C   HYDROcodone-acetaminophen [facility]) 5-325 mg per tablet Take 1 tablet by mouth every six (6) hours as needed for Pain. 14   Dev Medrano PA-C   ibuprofen (MOTRIN) 800 mg tablet Take 1 tablet by mouth every eight (8) hours as needed for Pain.  14   Roselyn Langford PA-C       Past Medical History:   Diagnosis Date    Hypertension     Ill-defined condition     hypothyroid        Past Surgical History:   Procedure Laterality Date    HX APPENDECTOMY         Social History     Tobacco Use    Smoking status: Never Smoker    Smokeless tobacco: Never Used   Substance Use Topics    Alcohol use: Yes        Family History   Family history: CAD in the father        Review of Systems:    Constitutional ROS: no fever, chills, rigors or night sweats  Respiratory ROS: no cough, sputum, hemoptysis, dyspnea or pleuritic pain. Cardiovascular ROS: no palpitations, orthopnea, PND or syncope  Endocrine ROS: no polydispsia, polyuria, heat or cold intolerance or major weight change. Gastrointestinal ROS: no dysphagia, abdominal pain, nausea, vomiting or diarrhea    Genito-Urinary ROS: no dysuria, frequency, hematuria, retention or flank pain  Musculoskeletal ROS: no joint pain, swelling but muscular cramping   Neurological ROS: no headache, confusion, focal weakness or any other neurological symptoms  Psychiatric ROS: no depression, anxiety, mood swings  Dermatological ROS: no rash, pruritis, or urticaria  Heme-Lymph ROS: no swollen glands, bleeding    Examination:    Constitutional:    Visit Vitals  /63 (BP 1 Location: Right arm, BP Patient Position: At rest)   Pulse 63   Temp 98.7 °F (37.1 °C)   Resp 18   Ht 5' 8\" (1.727 m)   Wt 76 kg (167 lb 9.6 oz)   SpO2 96%   BMI 25.48 kg/m²         General:  Weak and ill looking patient in no acute distress  Eyes: Pink conjunctivae, PERRLA with no discharge. Normal eye movements  Ear, Nose, Mouth & Throat: No ottorrhea, rhinorrhea, non tender sinuses, dry mucous membranes  Respiratory:  No accessory muscle use, clear breath sounds without crackles or wheezes  Cardiovascular:  No JVD or murmurs, regular and normal S1, S2 without thrills, bruits or peripheral edema. GI & :  Soft abdomen, non-tender, non-distended, normoactive bowel sounds   Heme:  No cervical or axillary adenopathy.    Musculoskeletal:  No cyanosis, clubbing, atrophy or deformities  Skin:  No rashes, bruising or ulcers   Neurological: Awake and alert, speech is clear, CNs 2-12 are grossly intact and otherwise non focal  Psychiatric:  Has little insight to his illness ________________________________________________________________________    Data Review:    Labs:    Recent Labs     03/28/22  0302   WBC 11.0   HGB 13.3   HCT 39.4        Recent Labs     03/28/22  0302      K 3.3*      CO2 27   *   BUN 25*   CREA 1.19   CA 8.9   MG 2.0   ALB 3.7   ALT 28     No components found for: GLPOC  No results for input(s): PH, PCO2, PO2, HCO3, FIO2 in the last 72 hours. No results for input(s): INR, INREXT, INREXT in the last 72 hours. Imaging Studies:      Chest X-ray - normal. No mediastinal enlargement    Personally reviewed 12 lead EKG - sinus. No acute ischemic changes     I have also reviewed available old medical records. Assessment & Impression:     Mr. Ruthy Guzman is a 64 y.o. male being evaluated for:     Principal Problem:    NSTEMI (non-ST elevated myocardial infarction) ([facility] [address] (3/28/2022)    Active Problems:    Chest pain (3/28/2022)      Hypothyroidism (3/28/2022)      Hypokalemia (3/28/2022)      HTN (hypertension), benign (3/28/2022)         Plan of management:    ?NSTEMI (non-ST elevated myocardial infarction) ([facility] [address] (3/28/2022) / Chest pain (3/28/2022) POA: concerning for ACS. Admit to hospital. Serial troponin. Echocardiogram. Lipid panel. Start Asprin, therapeutic enoxaparin. IV fluids. Keep NPO and Consult cardiology for a possible cardiac cath. Repeat EKG    Hypothyroidism (3/28/2022) POA: check TSH. Resume Levothyroxine    Hypokalemia (3/28/2022) POA: Mag is OK. replete K+    HTN (hypertension), benign (3/28/2022) POA: BP stable. Monitor for now.  Will need a BB    Code Status:  Full    Surrogate decision maker: Family    Risk of deterioration: high      Total time spent for the care of the patient: [address] discussed with: Patient, Nursing Staff and ED physician    Discussed:  Code Status, Care Plan and D/C Planning    Prophylaxis: Lovenox    Probable Disposition:  Home w/Family           ___________________________________________________    Attending Physician: Светлана Prabhakar MD

## 2022-03-28 NOTE — PROGRESS NOTES
CM follow up:    Patient transferring to [facility] for possible CABG.     Rand Alejandro, RN, MSN/Care manager  [phone]

## 2022-03-28 NOTE — Clinical Note
TRANSFER - OUT REPORT:     Verbal report given to: [facility]. Report consisted of patient's Situation, Background, Assessment and   Recommendations(SBAR). Opportunity for questions and clarification was provided. Patient transported with a Registered Nurse and [address] [place] [address] / Patient [facility]. Patient transported to: [place] [address].

## 2022-03-28 NOTE — CARDIO/PULMONARY
[facility]: 63 yo male admitted with Chest Pain & elevated troponin. Per Dr Marcos Manzano, dx includes: NSTEMI. S/P cath with % & %; has collateralization (3/28/22). LVEF 35-40% by echo (3/28/22). Patient resides in [place]. CAD risk factors: HTN, HLD, age & gender. He is a nonsmoker. Per Dr Marcos Manzano, tx plan to be determined, CABG vs  of LAD. Plan to transfer pt to [facility] for further treatment. [facility] Cardiac [facility] staff will follow up with patient following discharge from [facility]. However, cardiac rehab program in [place] may be closer to pt's home in [place]. ,

## 2022-03-28 NOTE — ROUTINE PROCESS
0920  TRANSFER - IN REPORT:    Verbal report received from ed, rn.(name) on Hanh Roberts  being received from cath lab(unit) for routine progression of care      Report consisted of patients Situation, Background, Assessment and   Recommendations(SBAR). Information from the following report(s) Procedure Summary was reviewed with the receiving nurse. Opportunity for questions and clarification was provided. Assessment completed upon patients arrival to unit and care assumed. 10:23 AM  2 ml air released from TR Band. No bleeding or hematoma noted. Radial and Ulnar pulse on r wrist palpable. Pt tolerated well. Will continue to monitor. 10:29 AM  3 ml air released from TR Band. No bleeding or hematoma noted. Radial and Ulnar pulse on r wrist palpable. Pt tolerated well. Will continue to monitor. 10:34 AM  4 ml air released from TR Band. No bleeding or hematoma noted. Radial and Ulnar pulse on r wrist palpable. Pt tolerated well. Will continue to monitor. Air release completed. TR Band removed from r wrist. No bleeding or  Hematoma. Dressing applied. Wrist immobilizer in place. Radial and ulnar pulse remain palpable on affected extremity. Pt tolerated well. Instructions given to pt regarding movement and activity restrictions. Pt voiced understanding. 12:07 PM  Called 30 min report to [facility].    12:18 PM  Report given via phone to [place], rn.    12:54 PM  Bedside transfer of care done with patient. Radial cath site visualized with nurse and I. No hematoma, CDI.

## 2022-03-28 NOTE — PROCEDURES
BRIEF PROCEDURE NOTE    Date of Procedure: 3/28/2022   Preoperative Diagnosis: NSTEMI  Postoperative Diagnosis:Multivessel CAD    Procedure: Left heart cath, LV angiography, coronary angiography  Interventional Cardiologist: Shimon Spain MD  Assistant : none  Anesthesia: local + IV sedation  Estimated Blood Loss: Minimal  Findings:   R Radial - 6 F sheath  LCA - JL4; RCA - 3DRC  Sig R SCV tortuousity    L Main: Large; Distal 40%    LAD: Med; Mid 100% - L to L collaterals present    LCflex: Med; Mid 40%; OM1 - med; MLI    RCA: Subselective inj - Prox 100% ; L to R collaterals filling distal vessel noted    LVEDP: valve not crossed    LVEF: not assessed    PCI: none        Specimens Removed : None    Devices implanted : None    Complications: None    Closure Device: R Radial - TR band    Rec: CABG vs  of LAD. D/w Dr Ferrell Boeck - transfer to [facility] to hospitalist service and he will consult and decide on revascularisation options      See full cath note.     Complications: none    Shimon Spain MD     D/w son Ravi fischer and daughter Lb Julio

## 2022-03-28 NOTE — PROGRESS NOTES
TRANSFER - IN REPORT:    Verbal report received from [address] RN(name) on Christiano Landers  being received from Cath Lab(unit) for routine progression of care      Report consisted of patients Situation, Background, Assessment and   Recommendations(SBAR). Information from the following report(s) SBAR, Kardex, ED Summary, Intake/Output, MAR and Recent Results was reviewed with the receiving nurse. Opportunity for questions and clarification was provided. Assessment completed upon patients arrival to unit and care assumed. 1323 Asked MD if he wanted IV fluids restarted at 100 ml/hr. MD tucker stated to start pt on NS at 75ml/hr. Bedside shift change report given to Rochelle5 S Kaitlynn Lebron (oncoming nurse) by Deya Shafer RN (offgoing nurse). Report included the following information SBAR, Kardex, ED Summary, Intake/Output, MAR and Recent Results.

## 2022-03-29 ENCOUNTER — APPOINTMENT (OUTPATIENT)
Dept: GENERAL RADIOLOGY | Age: 77
DRG: 235 | End: 2022-03-29
Attending: THORACIC SURGERY (CARDIOTHORACIC VASCULAR SURGERY)
Payer: MEDICARE

## 2022-03-29 ENCOUNTER — APPOINTMENT (OUTPATIENT)
Dept: VASCULAR SURGERY | Age: 77
DRG: 235 | End: 2022-03-29
Attending: THORACIC SURGERY (CARDIOTHORACIC VASCULAR SURGERY)
Payer: MEDICARE

## 2022-03-29 ENCOUNTER — HOSPITAL ENCOUNTER (INPATIENT)
Age: 77
LOS: 22 days | Discharge: HOME | DRG: 235 | End: 2022-04-20
Attending: STUDENT IN AN ORGANIZED HEALTH CARE EDUCATION/TRAINING PROGRAM | Admitting: THORACIC SURGERY (CARDIOTHORACIC VASCULAR SURGERY)
Payer: MEDICARE

## 2022-03-29 DIAGNOSIS — I24.0 RCA OCCLUSION (HCC): ICD-10-CM

## 2022-03-29 DIAGNOSIS — I21.3 ST ELEVATION MYOCARDIAL INFARCTION (STEMI), UNSPECIFIED ARTERY (HCC): ICD-10-CM

## 2022-03-29 DIAGNOSIS — I21.09 ST ELEVATION MYOCARDIAL INFARCTION (STEMI) OF ANTERIOR WALL (HCC): Primary | ICD-10-CM

## 2022-03-29 DIAGNOSIS — I25.10 STENOSIS OF LEFT ANTERIOR DESCENDING (LAD) ARTERY: ICD-10-CM

## 2022-03-29 DIAGNOSIS — R73.9 TRANSIENT HYPERGLYCEMIA POST PROCEDURE: ICD-10-CM

## 2022-03-29 DIAGNOSIS — Z95.1 S/P CABG X 3: ICD-10-CM

## 2022-03-29 LAB
ALBUMIN SERPL-MCNC: 3.2 G/DL (ref 3.5–5)
ALBUMIN SERPL-MCNC: 3.4 G/DL (ref 3.5–5)
ALBUMIN/GLOB SERPL: 1.1 {RATIO} (ref 1.1–2.2)
ALBUMIN/GLOB SERPL: 1.1 {RATIO} (ref 1.1–2.2)
ALP SERPL-CCNC: 103 U/L (ref 45–117)
ALP SERPL-CCNC: 90 U/L (ref 45–117)
ALT SERPL-CCNC: 67 U/L (ref 12–78)
ALT SERPL-CCNC: 69 U/L (ref 12–78)
ANION GAP SERPL CALC-SCNC: 4 MMOL/L (ref 5–15)
ANION GAP SERPL CALC-SCNC: 5 MMOL/L (ref 5–15)
APPEARANCE UR: CLEAR
APTT PPP: 27.5 SEC (ref 22.1–31)
APTT PPP: 30.7 SEC (ref 22.1–31)
APTT PPP: 51.2 SEC (ref 22.1–31)
ARTERIAL PATENCY WRIST A: POSITIVE
AST SERPL-CCNC: 307 U/L (ref 15–37)
AST SERPL-CCNC: 369 U/L (ref 15–37)
ATRIAL RATE: 53 BPM
ATRIAL RATE: 63 BPM
ATRIAL RATE: 71 BPM
ATRIAL RATE: 82 BPM
BACTERIA URNS QL MICRO: NEGATIVE /HPF
BASE EXCESS BLD CALC-SCNC: 0 MMOL/L
BASOPHILS # BLD: 0 K/UL (ref 0–0.1)
BASOPHILS # BLD: 0 K/UL (ref 0–0.1)
BASOPHILS NFR BLD: 0 % (ref 0–1)
BASOPHILS NFR BLD: 0 % (ref 0–1)
BDY SITE: ABNORMAL
BILIRUB SERPL-MCNC: 0.6 MG/DL (ref 0.2–1)
BILIRUB SERPL-MCNC: 0.6 MG/DL (ref 0.2–1)
BILIRUB UR QL: NEGATIVE
BUN SERPL-MCNC: 20 MG/DL (ref 6–20)
BUN SERPL-MCNC: 21 MG/DL (ref 6–20)
BUN/CREAT SERPL: 21 (ref 12–20)
BUN/CREAT SERPL: 24 (ref 12–20)
CALCIUM SERPL-MCNC: 8.6 MG/DL (ref 8.5–10.1)
CALCIUM SERPL-MCNC: 8.7 MG/DL (ref 8.5–10.1)
CALCULATED P AXIS, ECG09: 14 DEGREES
CALCULATED P AXIS, ECG09: 46 DEGREES
CALCULATED P AXIS, ECG09: 50 DEGREES
CALCULATED P AXIS, ECG09: 52 DEGREES
CALCULATED R AXIS, ECG10: -39 DEGREES
CALCULATED R AXIS, ECG10: -44 DEGREES
CALCULATED R AXIS, ECG10: -47 DEGREES
CALCULATED R AXIS, ECG10: -48 DEGREES
CALCULATED T AXIS, ECG11: 103 DEGREES
CALCULATED T AXIS, ECG11: 108 DEGREES
CALCULATED T AXIS, ECG11: 84 DEGREES
CALCULATED T AXIS, ECG11: 98 DEGREES
CHLORIDE SERPL-SCNC: 105 MMOL/L (ref 97–108)
CHLORIDE SERPL-SCNC: 105 MMOL/L (ref 97–108)
CO2 SERPL-SCNC: 23 MMOL/L (ref 21–32)
CO2 SERPL-SCNC: 26 MMOL/L (ref 21–32)
COLOR UR: ABNORMAL
COMMENT, HOLDF: NORMAL
COMMENT, HOLDF: NORMAL
CREAT SERPL-MCNC: 0.86 MG/DL (ref 0.7–1.3)
CREAT SERPL-MCNC: 0.97 MG/DL (ref 0.7–1.3)
DIAGNOSIS, 93000: NORMAL
DIFFERENTIAL METHOD BLD: ABNORMAL
DIFFERENTIAL METHOD BLD: ABNORMAL
EOSINOPHIL # BLD: 0 K/UL (ref 0–0.4)
EOSINOPHIL # BLD: 0.1 K/UL (ref 0–0.4)
EOSINOPHIL NFR BLD: 0 % (ref 0–7)
EOSINOPHIL NFR BLD: 1 % (ref 0–7)
EPITH CASTS URNS QL MICRO: ABNORMAL /LPF
ERYTHROCYTE [DISTWIDTH] IN BLOOD BY AUTOMATED COUNT: 13.9 % (ref 11.5–14.5)
ERYTHROCYTE [DISTWIDTH] IN BLOOD BY AUTOMATED COUNT: 13.9 % (ref 11.5–14.5)
EST. AVERAGE GLUCOSE BLD GHB EST-MCNC: 108 MG/DL
GAS FLOW.O2 O2 DELIVERY SYS: ABNORMAL L/MIN
GLOBULIN SER CALC-MCNC: 2.8 G/DL (ref 2–4)
GLOBULIN SER CALC-MCNC: 3.2 G/DL (ref 2–4)
GLUCOSE SERPL-MCNC: 114 MG/DL (ref 65–100)
GLUCOSE SERPL-MCNC: 129 MG/DL (ref 65–100)
GLUCOSE UR STRIP.AUTO-MCNC: NEGATIVE MG/DL
HBA1C MFR BLD: 5.4 % (ref 4–5.6)
HCO3 BLD-SCNC: 23.9 MMOL/L (ref 22–26)
HCT VFR BLD AUTO: 36.9 % (ref 36.6–50.3)
HCT VFR BLD AUTO: 37.4 % (ref 36.6–50.3)
HGB BLD-MCNC: 12.3 G/DL (ref 12.1–17)
HGB BLD-MCNC: 12.6 G/DL (ref 12.1–17)
HGB UR QL STRIP: NEGATIVE
HISTORY CHECKED?,CKHIST: NORMAL
HYALINE CASTS URNS QL MICRO: ABNORMAL /LPF (ref 0–5)
IMM GRANULOCYTES # BLD AUTO: 0 K/UL
IMM GRANULOCYTES # BLD AUTO: 0.1 K/UL (ref 0–0.04)
IMM GRANULOCYTES NFR BLD AUTO: 0 %
IMM GRANULOCYTES NFR BLD AUTO: 1 % (ref 0–0.5)
INR PPP: 1.1 (ref 0.9–1.1)
KETONES UR QL STRIP.AUTO: NEGATIVE MG/DL
LEUKOCYTE ESTERASE UR QL STRIP.AUTO: NEGATIVE
LYMPHOCYTES # BLD: 0.7 K/UL (ref 0.8–3.5)
LYMPHOCYTES # BLD: 0.9 K/UL (ref 0.8–3.5)
LYMPHOCYTES NFR BLD: 6 % (ref 12–49)
LYMPHOCYTES NFR BLD: 7 % (ref 12–49)
MAGNESIUM SERPL-MCNC: 2 MG/DL (ref 1.6–2.4)
MCH RBC QN AUTO: 30.4 PG (ref 26–34)
MCH RBC QN AUTO: 30.6 PG (ref 26–34)
MCHC RBC AUTO-ENTMCNC: 33.3 G/DL (ref 30–36.5)
MCHC RBC AUTO-ENTMCNC: 33.7 G/DL (ref 30–36.5)
MCV RBC AUTO: 90.8 FL (ref 80–99)
MCV RBC AUTO: 91.3 FL (ref 80–99)
MONOCYTES # BLD: 1.3 K/UL (ref 0–1)
MONOCYTES # BLD: 1.4 K/UL (ref 0–1)
MONOCYTES NFR BLD: 11 % (ref 5–13)
MONOCYTES NFR BLD: 12 % (ref 5–13)
NEUTS BAND NFR BLD MANUAL: 4 % (ref 0–6)
NEUTS SEG # BLD: 10.6 K/UL (ref 1.8–8)
NEUTS SEG # BLD: 8.8 K/UL (ref 1.8–8)
NEUTS SEG NFR BLD: 77 % (ref 32–75)
NEUTS SEG NFR BLD: 81 % (ref 32–75)
NITRITE UR QL STRIP.AUTO: NEGATIVE
NRBC # BLD: 0 K/UL (ref 0–0.01)
NRBC # BLD: 0 K/UL (ref 0–0.01)
NRBC BLD-RTO: 0 PER 100 WBC
NRBC BLD-RTO: 0 PER 100 WBC
O2/TOTAL GAS SETTING VFR VENT: 1 %
P-R INTERVAL, ECG05: 144 MS
P-R INTERVAL, ECG05: 150 MS
P-R INTERVAL, ECG05: 154 MS
P-R INTERVAL, ECG05: 160 MS
PCO2 BLD: 35.8 MMHG (ref 35–45)
PH BLD: 7.43 [PH] (ref 7.35–7.45)
PH UR STRIP: 6 [PH] (ref 5–8)
PLATELET # BLD AUTO: 131 K/UL (ref 150–400)
PLATELET # BLD AUTO: 162 K/UL (ref 150–400)
PMV BLD AUTO: 11.3 FL (ref 8.9–12.9)
PMV BLD AUTO: 12.3 FL (ref 8.9–12.9)
PO2 BLD: 64 MMHG (ref 80–100)
POTASSIUM SERPL-SCNC: 3.6 MMOL/L (ref 3.5–5.1)
POTASSIUM SERPL-SCNC: 3.7 MMOL/L (ref 3.5–5.1)
PROT SERPL-MCNC: 6 G/DL (ref 6.4–8.2)
PROT SERPL-MCNC: 6.6 G/DL (ref 6.4–8.2)
PROT UR STRIP-MCNC: ABNORMAL MG/DL
PROTHROMBIN TIME: 11.2 SEC (ref 9–11.1)
Q-T INTERVAL, ECG07: 366 MS
Q-T INTERVAL, ECG07: 400 MS
Q-T INTERVAL, ECG07: 446 MS
Q-T INTERVAL, ECG07: 478 MS
QRS DURATION, ECG06: 94 MS
QRS DURATION, ECG06: 96 MS
QRS DURATION, ECG06: 98 MS
QRS DURATION, ECG06: 98 MS
QTC CALCULATION (BEZET), ECG08: 397 MS
QTC CALCULATION (BEZET), ECG08: 448 MS
QTC CALCULATION (BEZET), ECG08: 456 MS
QTC CALCULATION (BEZET), ECG08: 467 MS
RBC # BLD AUTO: 4.04 M/UL (ref 4.1–5.7)
RBC # BLD AUTO: 4.12 M/UL (ref 4.1–5.7)
RBC #/AREA URNS HPF: ABNORMAL /HPF (ref 0–5)
RBC MORPH BLD: ABNORMAL
RBC MORPH BLD: ABNORMAL
SAMPLES BEING HELD,HOLD: NORMAL
SAMPLES BEING HELD,HOLD: NORMAL
SAO2 % BLD: 93 % (ref 92–97)
SARS-COV-2, XPLCVT: NOT DETECTED
SODIUM SERPL-SCNC: 133 MMOL/L (ref 136–145)
SODIUM SERPL-SCNC: 135 MMOL/L (ref 136–145)
SOURCE, COVRS: NORMAL
SP GR UR REFRACTOMETRY: 1.01
SPECIMEN TYPE: ABNORMAL
THERAPEUTIC RANGE,PTTT: ABNORMAL SECS (ref 58–77)
THERAPEUTIC RANGE,PTTT: NORMAL SECS (ref 58–77)
THERAPEUTIC RANGE,PTTT: NORMAL SECS (ref 58–77)
TROPONIN-HIGH SENSITIVITY: ABNORMAL NG/L (ref 0–76)
TROPONIN-HIGH SENSITIVITY: ABNORMAL NG/L (ref 0–76)
TSH SERPL DL<=0.05 MIU/L-ACNC: 1.1 UIU/ML (ref 0.36–3.74)
UA: UC IF INDICATED,UAUC: ABNORMAL
UROBILINOGEN UR QL STRIP.AUTO: 1 EU/DL (ref 0.2–1)
VENTRICULAR RATE, ECG03: 53 BPM
VENTRICULAR RATE, ECG03: 63 BPM
VENTRICULAR RATE, ECG03: 71 BPM
VENTRICULAR RATE, ECG03: 82 BPM
WBC # BLD AUTO: 10.9 K/UL (ref 4.1–11.1)
WBC # BLD AUTO: 13 K/UL (ref 4.1–11.1)
WBC MORPH BLD: ABNORMAL
WBC URNS QL MICRO: ABNORMAL /HPF (ref 0–4)

## 2022-03-29 PROCEDURE — 93970 EXTREMITY STUDY: CPT

## 2022-03-29 PROCEDURE — 74011000250 HC RX REV CODE- 250: Performed by: SPECIALIST

## 2022-03-29 PROCEDURE — L1830 KO IMMOB CANVAS LONG PRE OTS: HCPCS

## 2022-03-29 PROCEDURE — 93880 EXTRACRANIAL BILAT STUDY: CPT

## 2022-03-29 PROCEDURE — 77030012468 HC VLV BLEEDBK CNTRL ABBT -B: Performed by: SPECIALIST

## 2022-03-29 PROCEDURE — 93454 CORONARY ARTERY ANGIO S&I: CPT | Performed by: SPECIALIST

## 2022-03-29 PROCEDURE — 36600 WITHDRAWAL OF ARTERIAL BLOOD: CPT

## 2022-03-29 PROCEDURE — 83036 HEMOGLOBIN GLYCOSYLATED A1C: CPT

## 2022-03-29 PROCEDURE — 84484 ASSAY OF TROPONIN QUANT: CPT

## 2022-03-29 PROCEDURE — 86923 COMPATIBILITY TEST ELECTRIC: CPT

## 2022-03-29 PROCEDURE — 77030013715 HC INFL SYS MRTM -B: Performed by: SPECIALIST

## 2022-03-29 PROCEDURE — C1887 CATHETER, GUIDING: HCPCS | Performed by: SPECIALIST

## 2022-03-29 PROCEDURE — 77030013744: Performed by: SPECIALIST

## 2022-03-29 PROCEDURE — 74011250637 HC RX REV CODE- 250/637: Performed by: NURSE PRACTITIONER

## 2022-03-29 PROCEDURE — 80053 COMPREHEN METABOLIC PANEL: CPT

## 2022-03-29 PROCEDURE — 74011000250 HC RX REV CODE- 250: Performed by: STUDENT IN AN ORGANIZED HEALTH CARE EDUCATION/TRAINING PROGRAM

## 2022-03-29 PROCEDURE — 85730 THROMBOPLASTIN TIME PARTIAL: CPT

## 2022-03-29 PROCEDURE — 99152 MOD SED SAME PHYS/QHP 5/>YRS: CPT | Performed by: SPECIALIST

## 2022-03-29 PROCEDURE — APPSS30 APP SPLIT SHARED TIME 16-30 MINUTES: Performed by: NURSE PRACTITIONER

## 2022-03-29 PROCEDURE — 74011000250 HC RX REV CODE- 250: Performed by: THORACIC SURGERY (CARDIOTHORACIC VASCULAR SURGERY)

## 2022-03-29 PROCEDURE — 85025 COMPLETE CBC W/AUTO DIFF WBC: CPT

## 2022-03-29 PROCEDURE — 77030040934 HC CATH DIAG DXTERITY MEDT -A: Performed by: SPECIALIST

## 2022-03-29 PROCEDURE — 65620000000 HC RM CCU GENERAL

## 2022-03-29 PROCEDURE — 71045 X-RAY EXAM CHEST 1 VIEW: CPT

## 2022-03-29 PROCEDURE — 74011250636 HC RX REV CODE- 250/636: Performed by: THORACIC SURGERY (CARDIOTHORACIC VASCULAR SURGERY)

## 2022-03-29 PROCEDURE — 84443 ASSAY THYROID STIM HORMONE: CPT

## 2022-03-29 PROCEDURE — 77030041063 HC CATH DX ANGI DXTRTY MEDT -A: Performed by: SPECIALIST

## 2022-03-29 PROCEDURE — 93922 UPR/L XTREMITY ART 2 LEVELS: CPT

## 2022-03-29 PROCEDURE — 93005 ELECTROCARDIOGRAM TRACING: CPT

## 2022-03-29 PROCEDURE — 74011000636 HC RX REV CODE- 636: Performed by: SPECIALIST

## 2022-03-29 PROCEDURE — 74011250636 HC RX REV CODE- 250/636: Performed by: SPECIALIST

## 2022-03-29 PROCEDURE — 86901 BLOOD TYPING SEROLOGIC RH(D): CPT

## 2022-03-29 PROCEDURE — 77030027138 HC INCENT SPIROMETER -A

## 2022-03-29 PROCEDURE — 36415 COLL VENOUS BLD VENIPUNCTURE: CPT

## 2022-03-29 PROCEDURE — 74011000250 HC RX REV CODE- 250: Performed by: NURSE PRACTITIONER

## 2022-03-29 PROCEDURE — 74011250637 HC RX REV CODE- 250/637: Performed by: THORACIC SURGERY (CARDIOTHORACIC VASCULAR SURGERY)

## 2022-03-29 PROCEDURE — 74011250636 HC RX REV CODE- 250/636: Performed by: NURSE PRACTITIONER

## 2022-03-29 PROCEDURE — 81001 URINALYSIS AUTO W/SCOPE: CPT

## 2022-03-29 PROCEDURE — 85610 PROTHROMBIN TIME: CPT

## 2022-03-29 PROCEDURE — 82803 BLOOD GASES ANY COMBINATION: CPT

## 2022-03-29 PROCEDURE — C1894 INTRO/SHEATH, NON-LASER: HCPCS | Performed by: SPECIALIST

## 2022-03-29 PROCEDURE — 77030013797 HC KT TRNSDUC PRSSR EDWD -A: Performed by: SPECIALIST

## 2022-03-29 PROCEDURE — 83735 ASSAY OF MAGNESIUM: CPT

## 2022-03-29 RX ORDER — GUAIFENESIN 100 MG/5ML
81 LIQUID (ML) ORAL DAILY
Status: DISCONTINUED | OUTPATIENT
Start: 2022-03-29 | End: 2022-03-30

## 2022-03-29 RX ORDER — QUETIAPINE FUMARATE 25 MG/1
25 TABLET, FILM COATED ORAL
Status: DISCONTINUED | OUTPATIENT
Start: 2022-03-29 | End: 2022-03-31

## 2022-03-29 RX ORDER — MORPHINE SULFATE 4 MG/ML
4 INJECTION INTRAVENOUS
Status: ACTIVE | OUTPATIENT
Start: 2022-03-29 | End: 2022-03-29

## 2022-03-29 RX ORDER — ATORVASTATIN CALCIUM 40 MG/1
40 TABLET, FILM COATED ORAL
Status: DISCONTINUED | OUTPATIENT
Start: 2022-03-29 | End: 2022-04-20 | Stop reason: HOSPADM

## 2022-03-29 RX ORDER — HEPARIN SODIUM 10000 [USP'U]/100ML
12-25 INJECTION, SOLUTION INTRAVENOUS
Status: DISCONTINUED | OUTPATIENT
Start: 2022-03-29 | End: 2022-03-29 | Stop reason: CLARIF

## 2022-03-29 RX ORDER — HEPARIN SODIUM 200 [USP'U]/100ML
INJECTION, SOLUTION INTRAVENOUS
Status: COMPLETED | OUTPATIENT
Start: 2022-03-29 | End: 2022-03-29

## 2022-03-29 RX ORDER — LIDOCAINE HYDROCHLORIDE 10 MG/ML
INJECTION INFILTRATION; PERINEURAL AS NEEDED
Status: DISCONTINUED | OUTPATIENT
Start: 2022-03-29 | End: 2022-03-29 | Stop reason: HOSPADM

## 2022-03-29 RX ORDER — AMIODARONE HYDROCHLORIDE 200 MG/1
400 TABLET ORAL EVERY 12 HOURS
Status: DISCONTINUED | OUTPATIENT
Start: 2022-03-29 | End: 2022-03-30 | Stop reason: HOSPADM

## 2022-03-29 RX ORDER — HYDROCORTISONE SODIUM SUCCINATE 100 MG/2ML
100 INJECTION, POWDER, FOR SOLUTION INTRAMUSCULAR; INTRAVENOUS
Status: DISCONTINUED | OUTPATIENT
Start: 2022-03-29 | End: 2022-03-29

## 2022-03-29 RX ORDER — LANOLIN ALCOHOL/MO/W.PET/CERES
6 CREAM (GRAM) TOPICAL
Status: DISCONTINUED | OUTPATIENT
Start: 2022-03-29 | End: 2022-03-30

## 2022-03-29 RX ORDER — FENTANYL CITRATE 50 UG/ML
INJECTION, SOLUTION INTRAMUSCULAR; INTRAVENOUS AS NEEDED
Status: DISCONTINUED | OUTPATIENT
Start: 2022-03-29 | End: 2022-03-29 | Stop reason: HOSPADM

## 2022-03-29 RX ORDER — CHLORHEXIDINE GLUCONATE 1.2 MG/ML
15 RINSE ORAL EVERY 12 HOURS
Status: DISCONTINUED | OUTPATIENT
Start: 2022-03-29 | End: 2022-03-30

## 2022-03-29 RX ORDER — MIDAZOLAM HYDROCHLORIDE 1 MG/ML
INJECTION, SOLUTION INTRAMUSCULAR; INTRAVENOUS AS NEEDED
Status: DISCONTINUED | OUTPATIENT
Start: 2022-03-29 | End: 2022-03-29 | Stop reason: HOSPADM

## 2022-03-29 RX ORDER — MUPIROCIN 20 MG/G
OINTMENT TOPICAL 2 TIMES DAILY
Status: DISCONTINUED | OUTPATIENT
Start: 2022-03-29 | End: 2022-03-30

## 2022-03-29 RX ORDER — HALOPERIDOL 5 MG/ML
3 INJECTION INTRAMUSCULAR
Status: DISCONTINUED | OUTPATIENT
Start: 2022-03-29 | End: 2022-03-30

## 2022-03-29 RX ORDER — ACETAMINOPHEN 325 MG/1
650 TABLET ORAL
Status: DISCONTINUED | OUTPATIENT
Start: 2022-03-29 | End: 2022-03-30

## 2022-03-29 RX ORDER — SODIUM CHLORIDE 9 MG/ML
75 INJECTION, SOLUTION INTRAVENOUS CONTINUOUS
Status: DISPENSED | OUTPATIENT
Start: 2022-03-29 | End: 2022-03-29

## 2022-03-29 RX ORDER — METOPROLOL TARTRATE 25 MG/1
12.5 TABLET, FILM COATED ORAL EVERY 12 HOURS
Status: DISCONTINUED | OUTPATIENT
Start: 2022-03-29 | End: 2022-03-30

## 2022-03-29 RX ORDER — SODIUM CHLORIDE 0.9 % (FLUSH) 0.9 %
5-40 SYRINGE (ML) INJECTION AS NEEDED
Status: DISCONTINUED | OUTPATIENT
Start: 2022-03-29 | End: 2022-03-30

## 2022-03-29 RX ORDER — HEPARIN SODIUM 1000 [USP'U]/ML
4000 INJECTION, SOLUTION INTRAVENOUS; SUBCUTANEOUS ONCE
Status: COMPLETED | OUTPATIENT
Start: 2022-03-29 | End: 2022-03-29

## 2022-03-29 RX ORDER — SODIUM CHLORIDE 0.9 % (FLUSH) 0.9 %
5-40 SYRINGE (ML) INJECTION AS NEEDED
Status: DISCONTINUED | OUTPATIENT
Start: 2022-03-29 | End: 2022-03-30 | Stop reason: HOSPADM

## 2022-03-29 RX ORDER — NITROGLYCERIN 20 MG/100ML
0-200 INJECTION INTRAVENOUS
Status: DISCONTINUED | OUTPATIENT
Start: 2022-03-29 | End: 2022-03-30

## 2022-03-29 RX ORDER — SODIUM CHLORIDE 0.9 % (FLUSH) 0.9 %
5-40 SYRINGE (ML) INJECTION EVERY 8 HOURS
Status: DISCONTINUED | OUTPATIENT
Start: 2022-03-29 | End: 2022-03-30 | Stop reason: HOSPADM

## 2022-03-29 RX ORDER — SODIUM CHLORIDE 9 MG/ML
INJECTION, SOLUTION INTRAVENOUS
Status: COMPLETED | OUTPATIENT
Start: 2022-03-29 | End: 2022-03-29

## 2022-03-29 RX ORDER — DIPHENHYDRAMINE HYDROCHLORIDE 50 MG/ML
25 INJECTION, SOLUTION INTRAMUSCULAR; INTRAVENOUS
Status: DISCONTINUED | OUTPATIENT
Start: 2022-03-29 | End: 2022-03-29

## 2022-03-29 RX ORDER — SODIUM CHLORIDE 0.9 % (FLUSH) 0.9 %
5-40 SYRINGE (ML) INJECTION EVERY 8 HOURS
Status: DISCONTINUED | OUTPATIENT
Start: 2022-03-29 | End: 2022-03-30

## 2022-03-29 RX ORDER — DEXMEDETOMIDINE HYDROCHLORIDE 4 UG/ML
.1-1.5 INJECTION, SOLUTION INTRAVENOUS
Status: DISCONTINUED | OUTPATIENT
Start: 2022-03-29 | End: 2022-03-30

## 2022-03-29 RX ADMIN — HALOPERIDOL LACTATE 3 MG: 5 INJECTION, SOLUTION INTRAMUSCULAR at 19:30

## 2022-03-29 RX ADMIN — SODIUM CHLORIDE, PRESERVATIVE FREE 10 ML: 5 INJECTION INTRAVENOUS at 13:23

## 2022-03-29 RX ADMIN — METOPROLOL TARTRATE 12.5 MG: 25 TABLET, FILM COATED ORAL at 11:15

## 2022-03-29 RX ADMIN — HALOPERIDOL LACTATE 3 MG: 5 INJECTION, SOLUTION INTRAMUSCULAR at 21:58

## 2022-03-29 RX ADMIN — DEXMEDETOMIDINE HYDROCHLORIDE 0.4 MCG/KG/HR: 4 INJECTION, SOLUTION INTRAVENOUS at 20:30

## 2022-03-29 RX ADMIN — QUETIAPINE FUMARATE 25 MG: 25 TABLET ORAL at 19:12

## 2022-03-29 RX ADMIN — NITROGLYCERIN 10 MCG/MIN: 20 INJECTION INTRAVENOUS at 00:45

## 2022-03-29 RX ADMIN — SODIUM CHLORIDE, PRESERVATIVE FREE 10 ML: 5 INJECTION INTRAVENOUS at 22:00

## 2022-03-29 RX ADMIN — CHLORHEXIDINE GLUCONATE 15 ML: 1.2 RINSE ORAL at 13:21

## 2022-03-29 RX ADMIN — AMIODARONE HYDROCHLORIDE 400 MG: 200 TABLET ORAL at 11:15

## 2022-03-29 RX ADMIN — MUPIROCIN: 20 OINTMENT TOPICAL at 11:15

## 2022-03-29 RX ADMIN — ASPIRIN 81 MG 81 MG: 81 TABLET ORAL at 11:15

## 2022-03-29 RX ADMIN — SODIUM CHLORIDE 75 ML/HR: 9 INJECTION, SOLUTION INTRAVENOUS at 01:54

## 2022-03-29 RX ADMIN — HEPARIN SODIUM 4000 UNITS: 1000 INJECTION INTRAVENOUS; SUBCUTANEOUS at 12:55

## 2022-03-29 RX ADMIN — HEPARIN SODIUM 12 UNITS/KG/HR: 1000 INJECTION, SOLUTION INTRAVENOUS; SUBCUTANEOUS at 05:27

## 2022-03-29 NOTE — CARDIO/PULMONARY
Cardiac Rehab:  CABG educational folder to the bedside of Faith Alonso. Met with the pt. To discuss the location of Cardiac Rehab after discharge. Michael Echavarria RN Cardiac Surgery coordinator is with the pt as well. Educated using teach back method. Assessed patient's understanding of diagnosis and upcoming surgery. .  Encouraged patient's consideration of participation in a Cardiac Rehab Program, after discharge, to assist with their risk modification and management. Faith Alonso verbalized understanding with questions answered. He lives out of the area and at this time is undecided about where to do Cardiac Rehab- [facility] vs [address] [place] [address]. Faith Alonso is concerned about the recent death of his wife and being able to plan her .For now will need to forgo a decision on the location as he will discuss with his family. Will continue to follow.  Aj Villaseñor RN

## 2022-03-29 NOTE — PROGRESS NOTES
TRANSFER - OUT REPORT:    Verbal report given to Marcos Mullins RN(name) on Mile Most  being transferred to CCU (unit) for routine progression of care       Report consisted of patients Situation, Background, Assessment and   Recommendations(SBAR). Information from the following report(s) SBAR, Procedure Summary and MAR was reviewed with the receiving nurse. Lines:   Peripheral IV 03/28/22 Left Antecubital (Active)        Opportunity for questions and clarification was provided.       Patient transported with:   Monitor  Registered Nurse

## 2022-03-29 NOTE — PROCEDURES
Cath:  Obstructive 2VD:     LM 40     LAD m100; D1 ost90     LCx p30     RCA p100  RFA manual    Likely needs CABG.   NTG, heparin

## 2022-03-29 NOTE — DISCHARGE SUMMARY
Date:3/28/2022       Room:317/01  \"Mr. Kaplan is a 64 y. o. male who is being admitted for a suspected NSTEMI (non-ST elevated myocardial infarction). Mr. Kaplan presented to our Emergency Department today complaining of moderately severe, mid chest pressure pain, no radiation and associated with nausea and vomiting. Better after Asprin and NTG to a 1/10 but seems to be recurring now at a 4/10. No cough or fever. No SOB. Initial troponin was 243. EKG was non ischemic. He will be admitted for further management. \"        Subjective     Subjective:  Symptoms:  Stable. Diet:  NPO. Pain:  He complains of pain that is mild. He reports pain is unchanged. Pain is well controlled. Review of Systems  Objective          Vitals Last 24 Hours:  TEMPERATURE:  Temp  Av.3 °F (36.8 °C)  Min: 97.9 °F (36.6 °C)  Max: 98.7 °F (37.1 °C)  RESPIRATIONS RANGE: Resp  Av.6  Min: 16  Max: 22  PULSE OXIMETRY RANGE: SpO2  Av.9 %  Min: 89 %  Max: 100 %  PULSE RANGE: Pulse  Av.1  Min: 49  Max: 84  BLOOD PRESSURE RANGE: Systolic (24CWB), EYS:775 , Min:65 , YBC:228   ; Diastolic (53PHJ), CKC:79, Min:52, Max:109     I/O (24Hr):     Intake/Output Summary (Last 24 hours) at 3/28/2022 1254  Last data filed at 3/28/2022 1013      Gross per 24 hour   Intake 500 ml   Output 350 ml   Net 150 ml      Objective:  Vital signs: (most recent): Blood pressure (!) 168/95, pulse 81, temperature 98 °F (36.7 °C), resp. rate 16, height 5' 8\" (1.727 m), weight 75.8 kg (167 lb), SpO2 97 %.       Labs/Imaging/Diagnostics     Labs:  CBC:      Recent Labs     22   WBC 11.0   RBC 4.28   HGB 13.3   HCT 39.4   MCV 92.1   RDW 13.6         CHEMISTRIES:      Recent Labs     22      K 3.3*      CO2 27   BUN 25*   CA 8.9   MG 2.0   PT/INR:No results for input(s): INR, INREXT in the last 72 hours.     No lab exists for component: PROTIME  APTT:No results for input(s): APTT in the last 72 hours.   LIVER PROFILE:      Recent Labs     03/28/22  0302   AST 22   ALT 28            Lab Results   Component Value Date/Time     ALT (SGPT) 28 03/28/2022 03:02 AM     AST (SGOT) 22 03/28/2022 03:02 AM     Alk. phosphatase 96 03/28/2022 03:02 AM     Bilirubin, direct 0.3 07/20/2009 02:10 PM     Bilirubin, total 0.6 03/28/2022 03:02 AM         Imaging Last 24 Hours:  XR CHEST PORT     Result Date: 3/28/2022  INDICATION: chest pain EXAM:  AP CHEST RADIOGRAPH COMPARISON: July 24, 2009 FINDINGS: AP portable view of the chest demonstrates a normal cardiomediastinal silhouette. There is no edema, effusion, consolidation, or pneumothorax. Severe degenerative changes right shoulder.      No acute process.     ECHO ADULT COMPLETE     Result Date: 3/28/2022    Left Ventricle: Left ventricle size is normal. Moderately increased wall thickness. See diagram for wall motion findings. Moderately reduced left ventricular systolic function with a visually estimated EF of 35 - 40%.   Aortic Valve: Valve structure is normal. Moderately calcified cusp. Sclerosis of the aortic valve cusp.   Mitral Valve: Mild annular calcification of the mitral valve.   Left Atrium: Left atrium is mildly dilated.   Aorta: Normal sized ascending aorta. Mildly dilated aortic root.      CARDIAC PROCEDURE     Result Date: 3/28/2022  R Radial - 6 F sheath LCA - JL4; RCA - 3DRC Sig R SCV tortuousity   L Main: Large; Distal 40%   LAD: Med; Mid 100% - L to L collaterals present; D1 - small - ostial/prx 80%; D2 - Small ostial/prox 80%   LCflex: Med; Mid 40%;  OM1 - med; Prox 30%   RCA: Subselective inj - Prox 100% ; L to R collaterals filling distal vessel noted   LVEDP: valve not crossed   LVEF: not assessed   PCI: none       Specimens Removed : None   Devices implanted : None   Complications: None   Closure Device: R Radial - TR band     Assessment//Plan ·   NSTEMI  · Chest pain  · Hypothyroidism  · Hypokalemia  · Hypertension      Mr. Kaplan presented to our Emergency Department today complaining of moderately severe, mid chest pressure pain, no radiation and associated with nausea and vomiting. Better after Asprin and NTG to a 1/10 but seems to be recurring now at a 4/10. No cough or fever. No SOB. Initial troponin was 243.  EKG was non ischemic     Patient was admitted to telemetry, started on antiplatelets and statins, cardiology was consulted, patient was taken to cardiac cath and cath was started by Mary Pereyra MD (please refer to operative findings) patient was deemed to require either cardiac bypass or  of LAD, as per Dr. Manley Section you discussed the case with Dr Astrid Nascimento who recommended for patient to be transferred to [address] [place] [address] under hospitalist team and Dr. Astrid Nascimento consulted.  Teri Anguiano and talk to hospitalist Dr. Femi Reynolds who graciously accepted the patient.        Electronically signed by Alva Torre MD on 3/28/2022 at 12:54 PM

## 2022-03-29 NOTE — PROGRESS NOTES
Transitions of Care Plan   RUR: 12% - low   Clinical Update: preop CABG - scheduled for tomorrow   Consults: Multiple   Baseline: independent without DME; [place] property as son, Isidro Mortimer Barriers to Discharge: medical   Disposition: anticipate home health after CABG recovery   Estimated Discharge Date: 4/5/22    Reason for Admission:  CABG                   RUR Score:          12% - low           Plan for utilizing home health:     Yes - will follow up after surgery for referrals - anticipate [facility] based on patient's location     PCP: First and Last name:  Bertha Nath MD     Name of Practice: [address] [place] [address]   Are you a current patient: Yes/No: Yes   Approximate date of last visit: \"recently\"   Can you participate in a virtual visit with your PCP: No                    Current Advanced Directive/Advance Care Plan: Full Code      Healthcare Decision Maker:   Click here to complete [address] including selection of the [address] Relationship (ie \"Primary\")           Patient has 4 adult children - Peyton Terry, Bailee Garcia, and Beatriz Moeist; Mehrdad or Bailee Radha will be points of contact                  Transition of Care Plan:              CM spoke with patient regarding patient's initial baseline and disposition plan:    Baseline Assessment:  ADLs: independent without DME  Self-Care: independent without DME  Social Background: retired; enjoys \"staying busy\" and very active lifestyle; 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 Patient states he is always \"up and active\"  Pharmacy: [facility]: Confirmed  HF Patient for Dispatch Health: N/A    Disposition Plan:  [facility] [place] - will follow up after surgery; anticipate [facility] due to patient's location    CM will continue to follow.     Blake Nicole, MPH  Care Manager l [employer] [facility]  Available via [address] [place] [address] or      Care Management Interventions  PCP Verified by CM: Yes  Palliative Care Criteria Met (RRAT>21 & CHF Dx)?: No  Mode of Transport at Discharge:  Other (see comment) (Family)  Transition of Care Consult (CM Consult): Discharge [address]: No  Reason Outside [place]: Out of service area  [address]: No  Discharge Durable Medical Equipment: No  Health Maintenance Reviewed: Yes  Physical Therapy Consult: Yes  Occupational Therapy Consult: Yes  Speech Therapy Consult: No  Support Systems: Child(jeffery)  Confirm Follow Up Transport: Family  The Plan for Transition of Care is Related to the Following Treatment Goals : Home Health  The Patient and/or Patient Representative was Provided with a Choice of Provider and Agrees with the Discharge Plan?: Yes  Name of the Patient Representative Who was Provided with a Choice of Provider and Agrees with the Discharge Plan: Estelle Bailey  Discharge Location  Patient Expects to be Discharged to[de-identified] Home with home health

## 2022-03-29 NOTE — PROGRESS NOTES
Cardiac Cath Lab Procedure Area Arrival Note:    Osvaldo Ma arrived to Cardiac Cath Lab, Procedure Area. Patient identifiers verified with NAME and DATE OF BIRTH. Procedure verified with patient. Consent forms verified. Allergies verified. Patient informed of procedure and plan of care. Questions answered with review. Patient voiced understanding of procedure and plan of care. Patient on cardiac monitor, non-invasive blood pressure, SPO2 monitor. On O2 @ 2 lpm via  NC.  IV of NS on pump at 45 ml/hr. Patient is CODE STEMI. Patient is A&Ox 3. Patient reports CP 1/10 and no SOB. Patient medicated during procedure with orders obtained and verified by Dr. Neville Shows. Refer to patients Cardiac Cath Lab PROCEDURE REPORT for vital signs, assessment, status, and response during procedure, printed at end of case. Printed report on chart or scanned into chart.

## 2022-03-29 NOTE — Clinical Note
Defibrillation pads were applied. Cheek-To-Nose Interpolation Flap Text: A decision was made to reconstruct the defect utilizing an interpolation axial flap and a staged reconstruction.  A telfa template was made of the defect.  This telfa template was then used to outline the Cheek-To-Nose Interpolation flap.  The donor area for the pedicle flap was then injected with anesthesia.  The flap was excised through the skin and subcutaneous tissue down to the layer of the underlying musculature.  The interpolation flap was carefully excised within this deep plane to maintain its blood supply.  The edges of the donor site were undermined.   The donor site was closed in a primary fashion.  The pedicle was then rotated into position and sutured.  Once the tube was sutured into place, adequate blood supply was confirmed with blanching and refill.  The pedicle was then wrapped with xeroform gauze and dressed appropriately with a telfa and gauze bandage to ensure continued blood supply and protect the attached pedicle.

## 2022-03-29 NOTE — ROUTINE PROCESS
1900 - Bedside shift change report given to OSKAR Marroquin (oncoming nurse) by Shemar Del Rosario RN (offgoing nurse). Report included the following information SBAR, Kardex, Intake/Output, MAR, Accordion and Recent Results. 65 - RN attempted to call [facility] to give report on patient transferring to room #401. [facility] to call RN back. 2007 - Patient complains of chest pain. Nitroglycerin tablet administered after taking patients BP.    2012 - RN reassessed pain and administered another nitro tablet for continued complaints of chest pain. 2019 - RN reassessed chest pain. Patient denies needing another nitroglycerin tablet. 2135 - TRANSFER - OUT REPORT:    Verbal report given to Cleopatra Mehta RN (name) on Richard Galvin  being transferred to [facility] Bed #401 (unit) for change in patient condition(Possible CABG)       Report consisted of patients Situation, Background, Assessment and   Recommendations(SBAR). Information from the following report(s) SBAR, Kardex, Intake/Output, MAR, Accordion and Recent Results was reviewed with the receiving nurse. Lines:   Peripheral IV 03/28/22 Left Antecubital (Active)   Site Assessment Clean, dry, & intact 03/28/22 1249   Phlebitis Assessment 0 03/28/22 1249   Infiltration Assessment 0 03/28/22 1249   Dressing Status Clean, dry, & intact 03/28/22 1249   Dressing Type Tape;Transparent 03/28/22 1249   Hub Color/Line Status Pink;Flushed;Capped 03/28/22 1249   Action Taken Open ports on tubing capped 03/28/22 1249   Alcohol Cap Used Yes 03/28/22 1249        Opportunity for questions and clarification was provided.       Patient transported with:   RANDY

## 2022-03-29 NOTE — CONSULTS
CSS   History and Physical    Subjective:      Osvaldo Ma is a 64 y.o. male who was referred for cardiac evaluation of coronary artery disease, referred by Dr. Kelin Bobo. Pt's PMH includes hypothyroidism and HTN. Pt presented to ER yesterday with chief complaint of mid-sternal chest pressure. Denies associative symptoms or radiation of pain. He reports he has felt this similar pain before years ago but wasn't as severe and resolved quickly. He reports this pain was more severe and do not go away. He called EMS and was brought to ER at [facility]. Ulysses Mormon. Initial EKG did not show any ST changes but troponin was ++. LHC was completed, results below. He was unable to be admitted to [address] [place] [address] so he was transferred to [facility]. During transport to [facility] pt started having active chest pain and EKG showed new changes consistent with STEMI. Pt was given nitroglycerin and CODE STEMI was called at [facility]. He was taken back to cath lab for PCI, but unable to perform PCI. Cardiac Surgery is consulted for further surgical evaluation and treatment. 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 He wife recently passed away (in last month). Pt's son lives nearby. He is former tobacco smoker, quit many years ago. He drinks alcohol occasionally, and denies drug use. He participates in martial arts, bicycling. He denies use of assistive devices. No known family history of heart problems. Of note, on further discussion w/ pt's daughter Christophe Li) and son Taryn Wyman) they have concerns about reports of forgetfulness, varying moods including some outbursts that were violent. Cardiac Testing    Cardiac catheterization: 3/29/22    LM 40     LAD m100; D1 ost90     LCx p30     RCA p100  RFA manual    ECHO:3/28/22   Left Ventricle Left ventricle size is normal. Moderately increased wall thickness. See diagram for wall motion findings.  Moderately reduced left ventricular systolic function with a visually estimated EF of 35 - 40%. Indeterminate diastolic function. Left Atrium Left atrium is mildly dilated. Interatrial Septum No interatrial shunt visualized on color Doppler. Right Ventricle Right ventricle size is normal. Normal wall thickness. Normal systolic function. Right Atrium Right atrium size is normal.   Aortic Valve Valve structure is normal. Moderately calcified cusp. Sclerosis of the aortic valve cusp. No transvalvular regurgitation. Mild stenosis of the aortic valve. AV mean gradient is 16 mmHg. Mitral Valve Mild annular calcification of the mitral valve. No transvalvular regurgitation. No stenosis noted. Tricuspid Valve Valve structure is normal. Mild transvalvular regurgitation. No stenosis noted. Pulmonic Valve The pulmonic valve was not well visualized. No transvalvular regurgitation. No stenosis noted. Aorta Normal sized ascending aorta. Mildly dilated aortic root. IVC/Hepatic Veins IVC diameter is less than or equal to 21 mm and decreases greater than 50% during inspiration; therefore the estimated right atrial pressure is normal (~3 mmHg). IVC size is normal.   Pericardium No pericardial effusion. Past Medical History:   Diagnosis Date    Hypertension     Ill-defined condition     hypothyroid     Past Surgical History:   Procedure Laterality Date    HX APPENDECTOMY        Social History     Tobacco Use    Smoking status: Never Smoker    Smokeless tobacco: Never Used   Substance Use Topics    Alcohol use: Yes      Family History   Family history unknown: Yes     Prior to Admission medications    Medication Sig Start Date End Date Taking? Authorizing Provider   levothyroxine (SYNTHROID) 75 mcg tablet Take 75 mcg by mouth Daily (before breakfast). Other, MD Ana       No Known Allergies      Review of Systems:  Pertinent positives in HPI  Consititutional: Denies fever or chills. Eyes:  Denies use of glasses or vision problems(cataracts).   ENT:  Denies hearing or swallowing difficulty. CV: Denies CP, claudication, HTN. Resp: Denies dyspnea, productive cough. : Denies dialysis or kidney problems. GI: Denies ulcers, esophageal strictures, liver problems. M/S: Denies joint or bone problems, or implanted artificial hardware. Skin: Denies varicose veins, edema. Neuro: Denies strokes, or TIAs. Psych: Denies anxiety or depression. Endocrine: Denies thyroid problems or diabetes. Heme/Lymphatic: Denies easy bruising or lymphedema. Objective:     VS:   Visit Vitals  /83   Pulse 75   Temp 99.1 °F (37.3 °C)   Resp 25   Ht 5' 8\" (1.727 m)   Wt 167 lb 1.7 oz (75.8 kg)   SpO2 92%   BMI 25.41 kg/m²         Physical Exam:    General appearance: alert, cooperative, no distress  Head: normocephalic, without obvious abnormality; atraumatic  Eyes: conjunctivae/corneas clear; EOM's intact. Nose: nares normal; no drainage. Neck: no carotid bruit and no JVD  Lungs: clear to auscultation bilaterally  Heart: regular rate and rhythm; no murmur  Abdomen: soft, non-tender; bowel sounds normal  Extremities: moves all extremities; no weakness. Skin: Skin color normal; + varicose veins on R   Neurologic: Grossly normal      Labs:   Recent Labs     03/29/22  0005   WBC 13.0*   HGB 12.3   HCT 36.9      *   K 3.7   BUN 20   CREA 0.97   *       Diagnostics:   CXR:   CXR Results  (Last 48 hours)               03/28/22 0320  XR CHEST PORT Final result    Impression:  No acute process. Narrative:  INDICATION: chest pain       EXAM:  AP CHEST RADIOGRAPH       COMPARISON: July 24, 2009       FINDINGS:       AP portable view of the chest demonstrates a normal cardiomediastinal   silhouette. There is no edema, effusion, consolidation, or pneumothorax. Severe   degenerative changes right shoulder.                Carotid doppler:  Pending    ABIs: pending    LE mapping:  Pending     PFTS-FEV1:  N/a     EKG: SR   Assessment:     Active Problems:    CAD (coronary artery disease) (3/29/2022)        Plan:   The risk and benefit of surgery were reviewed with patient and family and all questions answered and the patient wishes to proceed. Risk include infection, bleeding, stroke, heart attack, irregular heart rhythm, kidney failure and death. .      STS Risk Calculator V2.81 - Discussed by surgeon with patient. Treatment Plan:    1. CAD/STEMI:  Needs, CABG planned for 3/30 w/ Dr. Brett Holm. Will follow 1st case. On asa, statin, BB, preop amio. Preop workup and education started. On heparin gtt per protocol (stop at 2am on 3/30 per Blade), on nitroglycerin for CP. 2. HTN:  Add BB, on ntiroglycerin for CP. 3. Hypothyroid: On synthroid  4. Acute systolic CHF: LV EF 61% on LHC. Unknown prior baseline. GDMT as tolerated. 5. Chronic dementia:  Per reports from pt's children, some agitation/outbursts noted at times. Untreated. Did discuss at length, this could complicate postop recovery. Discussed w/ Blade, no further intervention at this time.      Signed By: Selvin Shannon NP     March 29, 2022

## 2022-03-29 NOTE — PROGRESS NOTES
1930: Bedside report received from [facility] and Cat RN. 1940: Pt agitated, yelling and threatening sitter stating he needs to go outside, PRN haldol given. 2015: Pt attempting to throw chairs and kick staff, precedex drip ordered per Debby Bird NP.    2145: Pt agitated, kicking, yelling, threatening staff. Pt kicked sitter across room and into table, supervisors notified. Dr. Adilson Islas at bedside, orders for restraints, to give extra does of haldol and to max precedex received. 2200: Pt violent and refusing medications, educated on the importanced of pre-op med to no avail. 0000: Pt hypotensive and bradycardic, precedex stopped and  notified. 0100: MAP remaining in 50's,  notified, levo started. 0300: Pt agitated, precedex restarted. 0600: Levo stopped. 0730: Bedside shift change report given to Lev Casillas (oncoming nurse) by Anton Paz (offgoing nurse). Report included the following information SBAR, Kardex, ED Summary, Procedure Summary, Intake/Output and Recent Results.

## 2022-03-29 NOTE — CONSULTS
[address]    Name:  Patricia Madden  MR#:  [id number]  :  1945  ACCOUNT #:  [de-identified]  DATE OF SERVICE:  2022    HISTORY OF PRESENT ILLNESS:  I was asked to evaluate the patient, is a candidate for coronary bypass surgery by Dr. June Marin. The patient had no prior history of coronary disease when he presented with a chief complaint of chest pain and underwent cardiac catheterization at [facility].  Because of the extent of disease, he was required to be transferred to [facility], and while en route to [facility] Emergency Room, a code STEMI was called and he was taken emergently to the cath lab by Dr. June Marin which again demonstrated severe multivessel disease. His chest pain abated and he has been comfortable since that catheterization on nitroglycerin and heparin. The results of his cardiac catheterization are detailed below. He is currently an active gentleman lives independently. His wife passed away recently. He has no risk factors for coronary disease other than a family history. His cardiac catheterization reveals moderate left main disease of multivessel diffuse coronary disease including subtotal occlusion of the left anterior descending and high-grade lesion of the circumflex. The right coronary artery is a nondominant vessel. Review of his echocardiogram reveals moderate reduction of left ventricular systolic function. The left atrium is dilated. There is a mean gradient across his aortic valve of 16 with mild sclerosis and no other valvular abnormalities. PAST MEDICAL HISTORY:  Significant for,  1. Hypothyroidism. 2.  Hypertension. PREVIOUS SURGERY:  Includes an appendectomy. MEDICATIONS:  Reviewed. REVIEW OF SYSTEMS:  He denies any fever, chills. No difficulty swallowing, currently pain free without any shortness of breath or palpitations. He denies any abdominal pain.   No history of peripheral vascular disease. No previous TIA or stroke. PHYSICAL EXAMINATION:  GENERAL:  He is alert and comfortable. VITAL SIGNS:  His blood pressure is 120/80, his pulse is 70 and regular. He is afebrile. HEAD AND NECK:  Exam is unremarkable. There are no cervical bruits or jugular venous distention. LUNGS:  His lung fields are clear bilaterally. HEART:  In a regular rhythm with no murmur. ABDOMEN:  Soft and nontender. EXTREMITIES:  His distal extremities are without edema. There are right varicosities superficially on the right leg. LABORATORY DATA:  His laboratory values were reviewed. IMPRESSION:  1. Coronary artery disease status post ST-elevation myocardial infarction with troponin increase. 2.  Hypertension. 3.  Hypothyroidism. 4.  Systolic congestive heart failure with depressed ejection fraction. I discussed the findings with him in detail, reviewed the risks, indications and alternatives to coronary artery bypass surgery and discussed the postoperative course. He agrees to proceed with surgical revascularization.       Sary Larkin MD      [id number]  D:  03/29/2022 11:58  T:  03/29/2022 15:36  JOB #:  [id number]

## 2022-03-29 NOTE — PROGRESS NOTES
Cardiac Surgery Care Coordinator-  Met with Brittney Carlton, Introduced role of the Cardiac Surgery Care Coordinator. Reviewed plan of care and began pre-op education. Discussed day of surgery expectations for the pt and family. Instructed pt on the proper use of the incentive spirometer, he is able to pull 1500cc with good effort. Reviewed material in the CABG educational folder including Cardiac Surgery Pathway. Mr Jose David Worthy closing his eyes during education. Reinforced sternal precautions and keeping your move in the tube. Lev Ronald, Mr Kaplan's daughter at the bedside, discussed plan of care with her as well. Encouraged Brittney Carlton and his family to verbalize and offered emotional support.  [place] RN

## 2022-03-29 NOTE — H&P
ICU TEAM History and Physical    Name: Selene Barlow   : 1945   MRN: [id number]   Date: 3/29/2022      Assessment:   Reason for ICU Admission: STEMI    HPI: 63 yo M with h/o CAD, HTN, presented to the ED for continued evaluation of chest pain with STEMI. Pt was previously seen at [facility] yesterday for chest pain - cath showed 100% occlusion of LAD and RCA. Transferred to [facility] for evaluation and management. Cath with CAD lesions not amenable to PCI. Decision made to proceed with CABG tomorrow (3/30/2022). POD:  Day of Surgery    S/P:   Procedure(s):  LEFT HEART CATH / CORONARY ANGIOGRAPHY    ECHO:3/28/22   Left Ventricle Left ventricle size is normal. Moderately increased wall thickness. See diagram for wall motion findings. Moderately reduced left ventricular systolic function with a visually estimated EF of 35 - 40%. Indeterminate diastolic function. Left Atrium Left atrium is mildly dilated. Interatrial Septum No interatrial shunt visualized on color Doppler. Right Ventricle Right ventricle size is normal. Normal wall thickness. Normal systolic function. Right Atrium Right atrium size is normal.   Aortic Valve Valve structure is normal. Moderately calcified cusp. Sclerosis of the aortic valve cusp. No transvalvular regurgitation. Mild stenosis of the aortic valve. AV mean gradient is 16 mmHg. Mitral Valve Mild annular calcification of the mitral valve. No transvalvular regurgitation. No stenosis noted. Tricuspid Valve Valve structure is normal. Mild transvalvular regurgitation. No stenosis noted. Pulmonic Valve The pulmonic valve was not well visualized. No transvalvular regurgitation. No stenosis noted. Aorta Normal sized ascending aorta. Mildly dilated aortic root. IVC/Hepatic Veins IVC diameter is less than or equal to 21 mm and decreases greater than 50% during inspiration; therefore the estimated right atrial pressure is normal (~3 mmHg).  IVC size is normal. Pericardium No pericardial effusion. Plan:     Neuro: A&O.   - Pain management PRN. Pulm:   - Titrate supplemental O2 to keep SpO2>93%. Cardiac:   - CABG tomorrow (3/30/22)  - Con't heparin infusion  - NTG infusion PRN for CP and SBP <160. - Beta-blocker.  - GDMT. Renal:   - Monitor UOP. - Replace e-lytes PRN. GI:   - NPO after midnight. ID:   - Trend WBC. Heme:   - Check H/H.  - Transfuse to keep Hb>8.0    Endo:   - Hypothyroidism on Synthroid    MSK:  - OOB. F - Feeding:  Yes   A - Analgesia: Oxycodone and Acetaminophen  S - Sedation: None  T - DVT Prophylaxis: SCD's or Sequential Compression Device and Heparin   C - Code Status: Full Code  H - Head of Bed: > 30 Degrees  U - Ulcer Prophylaxis: Not at this time   G - Glycemic Control: Insulin  S - Spontaneous Breathing Trial: N/A  B - Bowel Regimen: None needed at this time  I - Indwelling Catheter:   Tubes: None  Lines: Peripheral IV  Drains: None  D - De-escalation of Antibiotics:       Active Problem List:     Problem List  Never Reviewed          Codes Class    CAD (coronary artery disease) ICD-10-CM: I25.10  ICD-9-CM: 414.00         NSTEMI (non-ST elevated myocardial infarction) (HCC) ICD-10-CM: I21.4  ICD-9-CM: 410.70         Chest pain ICD-10-CM: R07.9  ICD-9-CM: 786.50         Hypothyroidism ICD-10-CM: E03.9  ICD-9-CM: 244.9         Hypokalemia ICD-10-CM: E87.6  ICD-9-CM: 276.8         HTN (hypertension), benign ICD-10-CM: I10  ICD-9-CM: 401.1               Past Medical History:    has a past medical history of Hypertension and Ill-defined condition. Past Surgical History:    has a past surgical history that includes hx appendectomy. Home Medications:     Prior to Admission medications    Medication Sig Start Date End Date Taking? Authorizing Provider   levothyroxine (SYNTHROID) 75 mcg tablet Take 75 mcg by mouth Daily (before breakfast).     Other, MD Ana       Allergies/Social/Family History:   No Known Allergies Social History     Tobacco Use    Smoking status: Never Smoker    Smokeless tobacco: Never Used   Substance Use Topics    Alcohol use: Yes      Family History   Family history unknown: Yes       Review of Systems:   A comprehensive review of systems was negative except for that written in the HPI. Objective:     Visit Vitals  BP (!) 142/84 (BP 1 Location: Right arm)   Pulse 87   Temp 99.1 °F (37.3 °C)   Resp 20   Ht 5' 8\" (1.727 m)   Wt 75.8 kg (167 lb 1.7 oz)   SpO2 92%   BMI 25.41 kg/m²    O2 Flow Rate (L/min): 2 l/min O2 Device: Nasal cannula Temp (24hrs), Av.8 °F (36.6 °C), Min:96.9 °F (36.1 °C), Max:99.1 °F (37.3 °C)         Intake/Output:     Intake/Output Summary (Last 24 hours) at 3/29/2022 1103  Last data filed at 3/29/2022 0800  Gross per 24 hour   Intake 1008.96 ml   Output 400 ml   Net 608.96 ml       Physical Exam:  General:  Alert, cooperative, well noursished, well developed, appears stated age   Eyes:  Sclera anicteric. Pupils equally round and reactive to light. Mouth/Throat: Mucous membranes normal, oral pharynx clear   Neck: Supple   Lungs:   Clear to auscultation bilaterally, good effort   CV:  Regular rate and rhythm,no murmur, click, rub or gallop   Abdomen:   Soft, non-tender. bowel sounds normal. non-distended   Extremities: No cyanosis or edema   Skin: Skin color, texture, turgor normal. no acute rash or lesions   Lymph nodes: Cervical and supraclavicular normal   Musculoskeletal: No swelling or deformity   Lines/Devices:  Intact, no erythema, drainage or tenderness   Psych: Alert and oriented, normal mood affect given the setting       Labs & Data: Reviewed    Medications: Reviewed    Chest X-Ray:    TTE:    Multidisciplinary Rounds Completed:   Yes    ABCDEF Bundle/Checklist Completed: Yes    SPECIAL EQUIPMENT: None    DISPOSITION: Stay in ICU    CRITICAL CARE CONSULTANT NOTE  I had a face to face encounter with the patient, reviewed and interpreted patient data including clinical events, labs, images, vital signs, I/O's, and examined patient. I have discussed the case and the plan and management of the patient's care with the consulting services, the bedside nurses and the respiratory therapist.      NOTE OF PERSONAL INVOLVEMENT IN CARE   This patient has a high probability of imminent, clinically significant deterioration, which requires the highest level of preparedness to intervene urgently. I participated in the decision-making and personally managed or directed the management of the following life and organ supporting interventions that required my frequent assessment to treat or prevent imminent deterioration. I personally spent 35 minutes of critical care time. This is time spent at this critically ill patient's bedside actively involved in patient care as well as the coordination of care and discussions with the patient's family. This does not include any procedural time which has been billed separately.     Florencio Araujo MD  Staff Intensivist/Anesthesiologist  [facility]  3/29/2022

## 2022-03-29 NOTE — Clinical Note
TRANSFER - OUT REPORT:     Verbal report given to: Anitra Karimi. Report consisted of patient's Situation, Background, Assessment and   Recommendations(SBAR). Opportunity for questions and clarification was provided. Patient transported with a Registered Nurse. Patient transported to: CCU, 20.

## 2022-03-29 NOTE — PROGRESS NOTES
0130: TRANSFER - IN REPORT:    Verbal report received from Eric Anderson RN(name) on Rossana Coats  being received from Cath lab(unit) for routine post - op      Report consisted of patients Situation, Background, Assessment and   Recommendations(SBAR). Information from the following report(s) SBAR, Kardex, ED Summary, Procedure Summary, Intake/Output, MAR and Recent Results was reviewed with the receiving nurse. Opportunity for questions and clarification was provided. Assessment completed upon patients arrival to unit and care assumed. Primary Nurse Maria Eugenia Chow RN and Verna Blankenship RN performed a dual skin assessment on this patient No impairment noted    Current Bed:     WANG Vincent    Asim score is 16    0145: Sheath in place on pt arival to the floor, on call cardiologist paged. RN spoke with Dr. Kenney Neely, cardiology, orders to remove sheath and start heparin gtt 2 hours post removal received. 0200: Pt /87, nitro titrated per order. Will pull sheath when SBP <160.      0325: sheath pulled from R groin, manual pressure held, hemostasis achieved, pulses palpable. 0530: Heparin gtt started    0700: Nitro stopped    0730: Bedside shift change report given to Merlinda Bogaert and Mary RNs (oncoming nurse) by Betzaida Singer RN (offgoing nurse). Report included the following information SBAR, Kardex, ED Summary, Procedure Summary, Intake/Output, MAR and Recent Results.

## 2022-03-29 NOTE — ED TRIAGE NOTES
Pt arrives as a transfer from [facility] for chest pain. Pt was admitted to cardiology here for NSTEMI, but enroute to hospital the transfer crew (RANDY) performed 12-lead and noted septal ST elevation. EKG repeated on arrival. Pt c/o mild, 1/10 chest pain and no difficulty breathing.  A&Ox4 on arrival.

## 2022-03-29 NOTE — ED PROVIDER NOTES
Patient is a 73-year-old male presenting to emergency department for chest pain, STEMI. Patient was seen at [address] [place] [address] yesterday morning for chest pain had a cardiac catheterization performed showing a Rich percent occlusion of the proximal RCA, mid LAD had a percent occlusion. Patient was unable to have stents placed was admitted to [address] [place] [address] transferred to [facility] to be evaluated for CABG, . [facility] was transporting patient from [address] [place] [address] to [facility] when repeat EKG showed STEMI patient having active chest pain rating it 6-8 had received nitroglycerin at [address] [place] [address]. Code STEMI called here due to new EKG changes. Past Medical History:   Diagnosis Date    Hypertension     Ill-defined condition     hypothyroid       Past Surgical History:   Procedure Laterality Date    HX APPENDECTOMY           Family History:   Family history unknown: Yes       Social History     Socioeconomic History    Marital status:      Spouse name: Not on file    Number of children: Not on file    Years of education: Not on file    Highest education level: Not on file   Occupational History    Not on file   Tobacco Use    Smoking status: Never Smoker    Smokeless tobacco: Never Used   Substance and Sexual Activity    Alcohol use: Yes    Drug use: No    Sexual activity: Not on file   Other Topics Concern    Not on file   Social History Narrative    Not on file     Social Determinants of Health     Financial Resource Strain:     Difficulty of Paying Living Expenses: Not on file   Food Insecurity:     Worried About Running Out of Food in the Last Year: Not on file    [place] of Food in the Last Year: Not on file   Transportation Needs:     Lack of Transportation (Medical): Not on file    Lack of Transportation (Non-Medical):  Not on file   Physical Activity:     Days of Exercise per Week: Not on file    Minutes of Exercise per Session: Not on file   Stress:     Feeling of Stress : Not on file   Social Connections:     Frequency of Communication with Friends and Family: Not on file    Frequency of Social Gatherings with Friends and Family: Not on file    Attends [employer] Services: Not on file    Active Member of Clubs or Organizations: Not on file    Attends Club or Organization Meetings: Not on file    Marital Status: Not on file   Intimate Partner Violence:     Fear of Current or Ex-Partner: Not on file    Emotionally Abused: Not on file    Physically Abused: Not on file    Sexually Abused: Not on file   Housing Stability:     Unable to Pay for Housing in the Last Year: Not on file    Number of [place] in the Last Year: Not on file    Unstable Housing in the Last Year: Not on file         ALLERGIES: Patient has no known allergies. Review of Systems   Respiratory: Positive for chest tightness. Cardiovascular: Positive for chest pain. All other systems reviewed and are negative. Vitals:    03/29/22 0005 03/29/22 0013 03/29/22 0018 03/29/22 0045   BP: (!) 170/91  (!) 167/99 (!) 161/94   Pulse: 72  74 69   Resp: 18  15    Temp: 98.2 °F (36.8 °C)      SpO2: 95% 94% 93%             Physical Exam  Vitals and nursing note reviewed. Constitutional:       Appearance: He is well-developed. HENT:      Head: Normocephalic and atraumatic. Eyes:      Extraocular Movements: Extraocular movements intact. Pupils: Pupils are equal, round, and reactive to light. Cardiovascular:      Rate and Rhythm: Normal rate and regular rhythm. Heart sounds: Normal heart sounds. Pulmonary:      Effort: Pulmonary effort is normal.      Breath sounds: Normal breath sounds. Abdominal:      General: Bowel sounds are normal.      Palpations: Abdomen is soft. Musculoskeletal:         General: Normal range of motion. Skin:     General: Skin is warm and dry. Neurological:      General: No focal deficit present.       Mental Status: He is alert and oriented to person, place, and time. Psychiatric:         Mood and Affect: Mood normal.         Behavior: Behavior normal.          MDM  Number of Diagnoses or Management Options  RCA occlusion (HCC)  ST elevation myocardial infarction (STEMI) of anterior wall (HCC)  Stenosis of left anterior descending (LAD) artery  Diagnosis management comments: A/P: STEMI. 47-year-old male presented emergency department after having chest pain, cardiac catheterization performed at [facility] and 9 AM admitted on transfer patient developed chest pain EKG showed STEMI STEMI was called spoke to Dr. Haydee Ruelas, eventual cardiology to come back to the Cath Lab for PCI. Procedures      EKG shows a sinus rhythm with a rate of 71 ST elevation in V1, V2, V3, V4, aVF. Acute MI/STEMI. Total critical care time spent exclusive of procedures:  44 min.

## 2022-03-29 NOTE — PROGRESS NOTES
730 Bedside shift change report given to Mary Alexis and Anabelle Anderson (oncoming nurse) by Manuela Massey RNs (offgoing nurse). Report included the following information SBAR, Kardex, ED Summary, Intake/Output, MAR, Recent Results and Cardiac Rhythm NSR.     1000 Lorri ARANDA at bedside. Orders received. Plan on second case CABG tomorrow. Family and patient updated. [address] and sent. RT at bedside. ABG drawn. [address] NP at bedside. Family and patient educated. Procedural consent obtained. 1500 Pt becoming increasingly confused and needs continuous reinforcement to stay in the bed. Karolny Zuñiga NP notified. 1515 Xray at bedside. [address] NP at bedside to evaluate patient's mentation. Orders received for Seroquel and Melatonin at bedtime and PRN Haldol for agitation. 1645 Ultrasound at bedside to perform ankle-brachial index exam.    1745 Vascular at bedside for carotid doppler exam.    1900 PTT drawn. 1930 Bedside shift change report given to Hyacinth Sanford and [employer] RNs (oncoming nurse) by Mary Alexis and Cat RNs (offgoing nurse). Report included the following information SBAR, Kardex, ED Summary, Intake/Output, MAR, Med Rec Status and Cardiac Rhythm NSR.

## 2022-03-30 ENCOUNTER — HOSPITAL ENCOUNTER (OUTPATIENT)
Dept: NON INVASIVE DIAGNOSTICS | Age: 77
Discharge: HOME | End: 2022-03-30
Attending: THORACIC SURGERY (CARDIOTHORACIC VASCULAR SURGERY)

## 2022-03-30 ENCOUNTER — APPOINTMENT (OUTPATIENT)
Dept: GENERAL RADIOLOGY | Age: 77
DRG: 235 | End: 2022-03-30
Attending: PHYSICIAN ASSISTANT
Payer: MEDICARE

## 2022-03-30 ENCOUNTER — ANESTHESIA EVENT (OUTPATIENT)
Dept: CARDIOTHORACIC SURGERY | Age: 77
DRG: 235 | End: 2022-03-30
Payer: MEDICARE

## 2022-03-30 ENCOUNTER — ANESTHESIA (OUTPATIENT)
Dept: CARDIOTHORACIC SURGERY | Age: 77
DRG: 235 | End: 2022-03-30
Payer: MEDICARE

## 2022-03-30 ENCOUNTER — TELEPHONE (OUTPATIENT)
Dept: CARDIOLOGY CLINIC | Age: 77
End: 2022-03-30

## 2022-03-30 PROBLEM — Z95.1 S/P CABG X 3: Status: ACTIVE | Noted: 2022-03-30

## 2022-03-30 LAB
ABO + RH BLD: NORMAL
ADMINISTERED INITIALS, ADMINIT: NORMAL
ALBUMIN SERPL-MCNC: 3.2 G/DL (ref 3.5–5)
ALBUMIN SERPL-MCNC: 3.4 G/DL (ref 3.5–5)
ALBUMIN SERPL-MCNC: 3.5 G/DL (ref 3.5–5)
ALBUMIN/GLOB SERPL: 1.4 {RATIO} (ref 1.1–2.2)
ALBUMIN/GLOB SERPL: 1.4 {RATIO} (ref 1.1–2.2)
ALBUMIN/GLOB SERPL: 1.8 {RATIO} (ref 1.1–2.2)
ALP SERPL-CCNC: 52 U/L (ref 45–117)
ALP SERPL-CCNC: 64 U/L (ref 45–117)
ALP SERPL-CCNC: 92 U/L (ref 45–117)
ALT SERPL-CCNC: 41 U/L (ref 12–78)
ALT SERPL-CCNC: 46 U/L (ref 12–78)
ALT SERPL-CCNC: 62 U/L (ref 12–78)
ANION GAP SERPL CALC-SCNC: 10 MMOL/L (ref 5–15)
ANION GAP SERPL CALC-SCNC: 5 MMOL/L (ref 5–15)
ANION GAP SERPL CALC-SCNC: 7 MMOL/L (ref 5–15)
APTT PPP: 30.7 SEC (ref 22.1–31)
APTT PPP: 34.7 SEC (ref 22.1–31)
ARTERIAL PATENCY WRIST A: NORMAL
AST SERPL-CCNC: 109 U/L (ref 15–37)
AST SERPL-CCNC: 134 U/L (ref 15–37)
AST SERPL-CCNC: 188 U/L (ref 15–37)
BASE DEFICIT BLD-SCNC: 3.1 MMOL/L
BASE DEFICIT BLDA-SCNC: 3 MMOL/L
BASE DEFICIT BLDV-SCNC: 3.4 MMOL/L
BASOPHILS # BLD: 0 K/UL (ref 0–0.1)
BASOPHILS # BLD: 0 K/UL (ref 0–0.1)
BASOPHILS NFR BLD: 0 % (ref 0–1)
BASOPHILS NFR BLD: 0 % (ref 0–1)
BDY SITE: ABNORMAL
BDY SITE: ABNORMAL
BDY SITE: NORMAL
BILIRUB SERPL-MCNC: 0.4 MG/DL (ref 0.2–1)
BILIRUB SERPL-MCNC: 0.5 MG/DL (ref 0.2–1)
BILIRUB SERPL-MCNC: 0.7 MG/DL (ref 0.2–1)
BLD PROD TYP BPU: NORMAL
BLD PROD TYP BPU: NORMAL
BLOOD GROUP ANTIBODIES SERPL: NORMAL
BPU ID: NORMAL
BPU ID: NORMAL
BUN SERPL-MCNC: 31 MG/DL (ref 6–20)
BUN SERPL-MCNC: 36 MG/DL (ref 6–20)
BUN SERPL-MCNC: 36 MG/DL (ref 6–20)
BUN/CREAT SERPL: 23 (ref 12–20)
BUN/CREAT SERPL: 26 (ref 12–20)
BUN/CREAT SERPL: 28 (ref 12–20)
CA-I BLD-SCNC: 1.23 MMOL/L (ref 1.13–1.32)
CALCIUM SERPL-MCNC: 8.4 MG/DL (ref 8.5–10.1)
CALCIUM SERPL-MCNC: 8.7 MG/DL (ref 8.5–10.1)
CALCIUM SERPL-MCNC: 9 MG/DL (ref 8.5–10.1)
CHLORIDE SERPL-SCNC: 105 MMOL/L (ref 97–108)
CHLORIDE SERPL-SCNC: 110 MMOL/L (ref 97–108)
CHLORIDE SERPL-SCNC: 112 MMOL/L (ref 97–108)
CO2 SERPL-SCNC: 22 MMOL/L (ref 21–32)
CO2 SERPL-SCNC: 23 MMOL/L (ref 21–32)
CO2 SERPL-SCNC: 23 MMOL/L (ref 21–32)
CREAT SERPL-MCNC: 1.28 MG/DL (ref 0.7–1.3)
CREAT SERPL-MCNC: 1.35 MG/DL (ref 0.7–1.3)
CREAT SERPL-MCNC: 1.4 MG/DL (ref 0.7–1.3)
CROSSMATCH RESULT,%XM: NORMAL
CROSSMATCH RESULT,%XM: NORMAL
D50 ADMINISTERED, D50ADM: 0 ML
D50 ORDER, D50ORD: 0 ML
DIFFERENTIAL METHOD BLD: ABNORMAL
DIFFERENTIAL METHOD BLD: ABNORMAL
EOSINOPHIL # BLD: 0 K/UL (ref 0–0.4)
EOSINOPHIL # BLD: 0 K/UL (ref 0–0.4)
EOSINOPHIL NFR BLD: 0 % (ref 0–7)
EOSINOPHIL NFR BLD: 0 % (ref 0–7)
ERYTHROCYTE [DISTWIDTH] IN BLOOD BY AUTOMATED COUNT: 14.1 % (ref 11.5–14.5)
ERYTHROCYTE [DISTWIDTH] IN BLOOD BY AUTOMATED COUNT: 14.1 % (ref 11.5–14.5)
FIO2 ON VENT: 80 %
FIO2 ON VENT: 80 %
GAS FLOW.O2 O2 DELIVERY SYS: ABNORMAL L/MIN
GAS FLOW.O2 SETTING OXYMISER: 16 L/MIN
GAS FLOW.O2 SETTING OXYMISER: 16 L/MIN
GLOBULIN SER CALC-MCNC: 2 G/DL (ref 2–4)
GLOBULIN SER CALC-MCNC: 2.3 G/DL (ref 2–4)
GLOBULIN SER CALC-MCNC: 2.5 G/DL (ref 2–4)
GLSCOM COMMENTS: NORMAL
GLUCOSE BLD STRIP.AUTO-MCNC: 100 MG/DL (ref 65–117)
GLUCOSE BLD STRIP.AUTO-MCNC: 116 MG/DL (ref 65–117)
GLUCOSE BLD STRIP.AUTO-MCNC: 117 MG/DL (ref 65–117)
GLUCOSE BLD STRIP.AUTO-MCNC: 89 MG/DL (ref 65–117)
GLUCOSE BLD STRIP.AUTO-MCNC: 90 MG/DL (ref 65–117)
GLUCOSE BLD STRIP.AUTO-MCNC: 94 MG/DL (ref 65–117)
GLUCOSE BLD STRIP.AUTO-MCNC: 97 MG/DL (ref 65–117)
GLUCOSE SERPL-MCNC: 101 MG/DL (ref 65–100)
GLUCOSE SERPL-MCNC: 126 MG/DL (ref 65–100)
GLUCOSE SERPL-MCNC: 92 MG/DL (ref 65–100)
GLUCOSE, GLC: 100 MG/DL
GLUCOSE, GLC: 116 MG/DL
GLUCOSE, GLC: 116 MG/DL
GLUCOSE, GLC: 117 MG/DL
GLUCOSE, GLC: 122 MG/DL
GLUCOSE, GLC: 122 MG/DL
GLUCOSE, GLC: 89 MG/DL
GLUCOSE, GLC: 90 MG/DL
GLUCOSE, GLC: 94 MG/DL
GLUCOSE, GLC: 97 MG/DL
HCO3 BLD-SCNC: 21.9 MMOL/L (ref 22–26)
HCO3 BLDA-SCNC: 23 MMOL/L (ref 22–26)
HCO3 BLDV-SCNC: 22 MMOL/L (ref 23–28)
HCT VFR BLD AUTO: 30.8 % (ref 36.6–50.3)
HCT VFR BLD AUTO: 35.8 % (ref 36.6–50.3)
HCT VFR BLD AUTO: 38.3 % (ref 36.6–50.3)
HGB BLD-MCNC: 10.1 G/DL (ref 12.1–17)
HGB BLD-MCNC: 11.8 G/DL (ref 12.1–17)
HGB BLD-MCNC: 12.9 G/DL (ref 12.1–17)
HIGH TARGET, HITG: 130 MG/DL
HIGH TARGET, HITG: 140 MG/DL
IMM GRANULOCYTES # BLD AUTO: 0.1 K/UL (ref 0–0.04)
IMM GRANULOCYTES # BLD AUTO: 0.2 K/UL (ref 0–0.04)
IMM GRANULOCYTES NFR BLD AUTO: 1 % (ref 0–0.5)
IMM GRANULOCYTES NFR BLD AUTO: 1 % (ref 0–0.5)
INR PPP: 1.1 (ref 0.9–1.1)
INSULIN ADMINSTERED, INSADM: 0 UNITS/HOUR
INSULIN ADMINSTERED, INSADM: 0 UNITS/HOUR
INSULIN ADMINSTERED, INSADM: 0.3 UNITS/HOUR
INSULIN ADMINSTERED, INSADM: 0.7 UNITS/HOUR
INSULIN ADMINSTERED, INSADM: 0.7 UNITS/HOUR
INSULIN ADMINSTERED, INSADM: 1.7 UNITS/HOUR
INSULIN ADMINSTERED, INSADM: 1.9 UNITS/HOUR
INSULIN ADMINSTERED, INSADM: 1.9 UNITS/HOUR
INSULIN ORDER, INSORD: 0 UNITS/HOUR
INSULIN ORDER, INSORD: 0 UNITS/HOUR
INSULIN ORDER, INSORD: 0.3 UNITS/HOUR
INSULIN ORDER, INSORD: 0.7 UNITS/HOUR
INSULIN ORDER, INSORD: 0.7 UNITS/HOUR
INSULIN ORDER, INSORD: 1.7 UNITS/HOUR
INSULIN ORDER, INSORD: 1.9 UNITS/HOUR
INSULIN ORDER, INSORD: 1.9 UNITS/HOUR
LEFT ABI: 1.08
LEFT ARM BP: 131 MMHG
LEFT CCA DIST DIAS: 18.3 CM/S
LEFT CCA DIST SYS: 66.2 CM/S
LEFT CCA PROX DIAS: 14.6 CM/S
LEFT CCA PROX SYS: 77.3 CM/S
LEFT ECA DIAS: 7.1 CM/S
LEFT ECA SYS: 93 CM/S
LEFT GSV ANKLE DIAM: 0.13 CM
LEFT GSV AT KNEE DIAM: 0.25 CM
LEFT GSV BK PROX DIAM: 0.21 CM
LEFT GSV THIGH DIST DIAM: 0.27 CM
LEFT GSV THIGH MID DIAM: 0.29 CM
LEFT GSV THIGH PROX DIAM: 0.35 CM
LEFT ICA DIST DIAS: 14.5 CM/S
LEFT ICA DIST SYS: 32.8 CM/S
LEFT ICA MID DIAS: 20.2 CM/S
LEFT ICA MID SYS: 54.5 CM/S
LEFT ICA PROX DIAS: 18.2 CM/S
LEFT ICA PROX SYS: 50.3 CM/S
LEFT ICA/CCA SYS: 0.8 NO UNITS
LEFT POSTERIOR TIBIAL: 145 MMHG
LEFT SSV DIST DIAM: 0.28 CM
LEFT SSV PROX DIAM: 0.25 CM
LEFT VERTEBRAL DIAS: 23 CM/S
LEFT VERTEBRAL SYS: 60.3 CM/S
LOW TARGET, LOT: 100 MG/DL
LOW TARGET, LOT: 95 MG/DL
LYMPHOCYTES # BLD: 0.3 K/UL (ref 0.8–3.5)
LYMPHOCYTES # BLD: 0.9 K/UL (ref 0.8–3.5)
LYMPHOCYTES NFR BLD: 2 % (ref 12–49)
LYMPHOCYTES NFR BLD: 8 % (ref 12–49)
MAGNESIUM SERPL-MCNC: 2.5 MG/DL (ref 1.6–2.4)
MAGNESIUM SERPL-MCNC: 2.5 MG/DL (ref 1.6–2.4)
MCH RBC QN AUTO: 30.8 PG (ref 26–34)
MCH RBC QN AUTO: 31.1 PG (ref 26–34)
MCHC RBC AUTO-ENTMCNC: 33 G/DL (ref 30–36.5)
MCHC RBC AUTO-ENTMCNC: 33.7 G/DL (ref 30–36.5)
MCV RBC AUTO: 91.4 FL (ref 80–99)
MCV RBC AUTO: 94.2 FL (ref 80–99)
MINUTES UNTIL NEXT BG, NBG: 60 MIN
MONOCYTES # BLD: 1.1 K/UL (ref 0–1)
MONOCYTES # BLD: 1.5 K/UL (ref 0–1)
MONOCYTES NFR BLD: 14 % (ref 5–13)
MONOCYTES NFR BLD: 7 % (ref 5–13)
MULTIPLIER, MUL: 0
MULTIPLIER, MUL: 0
MULTIPLIER, MUL: 0.01
MULTIPLIER, MUL: 0.02
MULTIPLIER, MUL: 0.02
MULTIPLIER, MUL: 0.03
NEUTS SEG # BLD: 13.7 K/UL (ref 1.8–8)
NEUTS SEG # BLD: 8.2 K/UL (ref 1.8–8)
NEUTS SEG NFR BLD: 77 % (ref 32–75)
NEUTS SEG NFR BLD: 90 % (ref 32–75)
NRBC # BLD: 0 K/UL (ref 0–0.01)
NRBC # BLD: 0 K/UL (ref 0–0.01)
NRBC BLD-RTO: 0 PER 100 WBC
NRBC BLD-RTO: 0 PER 100 WBC
O2/TOTAL GAS SETTING VFR VENT: 40 %
ORDER INITIALS, ORDINIT: NORMAL
PCO2 BLD: 38.2 MMHG (ref 35–45)
PCO2 BLDA: 42 MMHG (ref 35–45)
PCO2 BLDV: 43.3 MMHG (ref 41–51)
PEEP RESPIRATORY: 5 CMH2O
PEEP RESPIRATORY: 5 CM[H2O]
PEEP RESPIRATORY: 5 CM[H2O]
PH BLD: 7.37 [PH] (ref 7.35–7.45)
PH BLDA: 7.35 [PH] (ref 7.35–7.45)
PH BLDV: 7.33 [PH] (ref 7.32–7.42)
PLATELET # BLD AUTO: 130 K/UL (ref 150–400)
PLATELET # BLD AUTO: 135 K/UL (ref 150–400)
PMV BLD AUTO: 12 FL (ref 8.9–12.9)
PMV BLD AUTO: 12.7 FL (ref 8.9–12.9)
PO2 BLD: 72 MMHG (ref 80–100)
PO2 BLDA: 98 MMHG (ref 80–100)
PO2 BLDV: 37 MMHG (ref 25–40)
POTASSIUM SERPL-SCNC: 3.7 MMOL/L (ref 3.5–5.1)
POTASSIUM SERPL-SCNC: 3.8 MMOL/L (ref 3.5–5.1)
POTASSIUM SERPL-SCNC: 4.7 MMOL/L (ref 3.5–5.1)
PRESSURE SUPPORT SETTING VENT: 5 CMH2O
PRESSURE SUPPORT SETTING VENT: 5 CM[H2O]
PRESSURE SUPPORT SETTING VENT: 5 CM[H2O]
PROT SERPL-MCNC: 5.5 G/DL (ref 6.4–8.2)
PROT SERPL-MCNC: 5.5 G/DL (ref 6.4–8.2)
PROT SERPL-MCNC: 5.9 G/DL (ref 6.4–8.2)
PROTHROMBIN TIME: 11.9 SEC (ref 9–11.1)
RBC # BLD AUTO: 3.8 M/UL (ref 4.1–5.7)
RBC # BLD AUTO: 4.19 M/UL (ref 4.1–5.7)
RBC MORPH BLD: ABNORMAL
RIGHT ABI: 0.99
RIGHT ARM BP: 145 MMHG
RIGHT CCA DIST DIAS: 13.1 CM/S
RIGHT CCA DIST SYS: 49.3 CM/S
RIGHT CCA PROX DIAS: 10.9 CM/S
RIGHT CCA PROX SYS: 58.1 CM/S
RIGHT ECA DIAS: 9.7 CM/S
RIGHT ECA SYS: 95.7 CM/S
RIGHT GSV ANKLE DIAM: 0.15 CM
RIGHT GSV AT KNEE DIAM: 0.33 CM
RIGHT GSV BK PROX DIAM: 0.14 CM
RIGHT GSV THIGH DIST DIAM: 0.33 CM
RIGHT GSV THIGH MID DIAM: 0.42 CM
RIGHT GSV THIGH PROX DIAM: 0.37 CM
RIGHT ICA DIST DIAS: 17 CM/S
RIGHT ICA DIST SYS: 44.6 CM/S
RIGHT ICA MID DIAS: 14.5 CM/S
RIGHT ICA MID SYS: 42.8 CM/S
RIGHT ICA PROX DIAS: 25.7 CM/S
RIGHT ICA PROX SYS: 60.1 CM/S
RIGHT ICA/CCA SYS: 1.2 NO UNITS
RIGHT POSTERIOR TIBIAL: 143 MMHG
RIGHT SSV DIST DIAM: 0.12 CM
RIGHT SSV PROX DIAM: 0.17 CM
RIGHT VERTEBRAL DIAS: 8.6 CM/S
RIGHT VERTEBRAL SYS: 31.3 CM/S
SAO2 % BLD: 93.7 % (ref 92–97)
SAO2 % BLD: 97 % (ref 92–97)
SAO2% DEVICE SAO2% SENSOR NAME: ABNORMAL
SAO2% DEVICE SAO2% SENSOR NAME: NORMAL
SERVICE CMNT-IMP: NORMAL
SODIUM SERPL-SCNC: 137 MMOL/L (ref 136–145)
SODIUM SERPL-SCNC: 140 MMOL/L (ref 136–145)
SODIUM SERPL-SCNC: 140 MMOL/L (ref 136–145)
SPECIMEN EXP DATE BLD: NORMAL
SPECIMEN SITE: ABNORMAL
SPECIMEN SITE: NORMAL
SPECIMEN TYPE: ABNORMAL
STATUS OF UNIT,%ST: NORMAL
STATUS OF UNIT,%ST: NORMAL
THERAPEUTIC RANGE,PTTT: ABNORMAL SECS (ref 58–77)
THERAPEUTIC RANGE,PTTT: NORMAL SECS (ref 58–77)
UNIT DIVISION, %UDIV: 0
UNIT DIVISION, %UDIV: 0
VAS LEFT DORSALIS PEDIS BP: 156 MMHG
VAS RIGHT DORSALIS PEDIS BP: 140 MMHG
VENTILATION MODE VENT: ABNORMAL
VENTILATION MODE VENT: NORMAL
VT SETTING VENT: 490 ML
VT SETTING VENT: 490 ML
WBC # BLD AUTO: 10.6 K/UL (ref 4.1–11.1)
WBC # BLD AUTO: 15.3 K/UL (ref 4.1–11.1)

## 2022-03-30 PROCEDURE — 74011000258 HC RX REV CODE- 258: Performed by: NURSE ANESTHETIST, CERTIFIED REGISTERED

## 2022-03-30 PROCEDURE — 77030005513 HC CATH URETH FOL11 MDII -B: Performed by: THORACIC SURGERY (CARDIOTHORACIC VASCULAR SURGERY)

## 2022-03-30 PROCEDURE — 77030012390 HC DRN CHST BTL GTNG -B: Performed by: THORACIC SURGERY (CARDIOTHORACIC VASCULAR SURGERY)

## 2022-03-30 PROCEDURE — 74011250636 HC RX REV CODE- 250/636: Performed by: THORACIC SURGERY (CARDIOTHORACIC VASCULAR SURGERY)

## 2022-03-30 PROCEDURE — 93355 ECHO TRANSESOPHAGEAL (TEE): CPT | Performed by: THORACIC SURGERY (CARDIOTHORACIC VASCULAR SURGERY)

## 2022-03-30 PROCEDURE — 33518 CABG ARTERY-VEIN TWO: CPT | Performed by: PHYSICIAN ASSISTANT

## 2022-03-30 PROCEDURE — 77030002986 HC SUT PROL J&J -A: Performed by: THORACIC SURGERY (CARDIOTHORACIC VASCULAR SURGERY)

## 2022-03-30 PROCEDURE — 77030008684 HC TU ET CUF COVD -B: Performed by: ANESTHESIOLOGY

## 2022-03-30 PROCEDURE — 77030041238 HC TBNG INSUF MDII -A: Performed by: THORACIC SURGERY (CARDIOTHORACIC VASCULAR SURGERY)

## 2022-03-30 PROCEDURE — 77030022199 HC SYS HARV VESL GTNG -G1: Performed by: THORACIC SURGERY (CARDIOTHORACIC VASCULAR SURGERY)

## 2022-03-30 PROCEDURE — 74011000250 HC RX REV CODE- 250: Performed by: NURSE ANESTHETIST, CERTIFIED REGISTERED

## 2022-03-30 PROCEDURE — 74011250636 HC RX REV CODE- 250/636: Performed by: NURSE ANESTHETIST, CERTIFIED REGISTERED

## 2022-03-30 PROCEDURE — 85018 HEMOGLOBIN: CPT

## 2022-03-30 PROCEDURE — 5A1221Z PERFORMANCE OF CARDIAC OUTPUT, CONTINUOUS: ICD-10-PCS | Performed by: THORACIC SURGERY (CARDIOTHORACIC VASCULAR SURGERY)

## 2022-03-30 PROCEDURE — 65610000003 HC RM ICU SURGICAL

## 2022-03-30 PROCEDURE — 82962 GLUCOSE BLOOD TEST: CPT

## 2022-03-30 PROCEDURE — 74011000250 HC RX REV CODE- 250: Performed by: PHYSICIAN ASSISTANT

## 2022-03-30 PROCEDURE — 33533 CABG ARTERIAL SINGLE: CPT | Performed by: PHYSICIAN ASSISTANT

## 2022-03-30 PROCEDURE — 77030026438 HC STYL ET INTUB CARD -A: Performed by: ANESTHESIOLOGY

## 2022-03-30 PROCEDURE — 94010 BREATHING CAPACITY TEST: CPT

## 2022-03-30 PROCEDURE — 77030018316 HC SEAL FBRN TISSL 4ML BAXT -C: Performed by: THORACIC SURGERY (CARDIOTHORACIC VASCULAR SURGERY)

## 2022-03-30 PROCEDURE — 77030003029 HC SUT VCRL J&J -B: Performed by: THORACIC SURGERY (CARDIOTHORACIC VASCULAR SURGERY)

## 2022-03-30 PROCEDURE — 80053 COMPREHEN METABOLIC PANEL: CPT

## 2022-03-30 PROCEDURE — 77030041244 HC CBL PACE EXT TEMP REMG -B: Performed by: THORACIC SURGERY (CARDIOTHORACIC VASCULAR SURGERY)

## 2022-03-30 PROCEDURE — 82803 BLOOD GASES ANY COMBINATION: CPT

## 2022-03-30 PROCEDURE — 85610 PROTHROMBIN TIME: CPT

## 2022-03-30 PROCEDURE — 85730 THROMBOPLASTIN TIME PARTIAL: CPT

## 2022-03-30 PROCEDURE — 83735 ASSAY OF MAGNESIUM: CPT

## 2022-03-30 PROCEDURE — 77030002987 HC SUT PROL J&J -B: Performed by: THORACIC SURGERY (CARDIOTHORACIC VASCULAR SURGERY)

## 2022-03-30 PROCEDURE — 06BP4ZZ EXCISION OF RIGHT SAPHENOUS VEIN, PERCUTANEOUS ENDOSCOPIC APPROACH: ICD-10-PCS | Performed by: THORACIC SURGERY (CARDIOTHORACIC VASCULAR SURGERY)

## 2022-03-30 PROCEDURE — 77030020061 HC IV BLD WRMR ADMIN SET 3M -B: Performed by: ANESTHESIOLOGY

## 2022-03-30 PROCEDURE — 36600 WITHDRAWAL OF ARTERIAL BLOOD: CPT

## 2022-03-30 PROCEDURE — 77030002996 HC SUT SLK J&J -A: Performed by: THORACIC SURGERY (CARDIOTHORACIC VASCULAR SURGERY)

## 2022-03-30 PROCEDURE — 74011000250 HC RX REV CODE- 250: Performed by: THORACIC SURGERY (CARDIOTHORACIC VASCULAR SURGERY)

## 2022-03-30 PROCEDURE — 2709999900 HC NON-CHARGEABLE SUPPLY: Performed by: THORACIC SURGERY (CARDIOTHORACIC VASCULAR SURGERY)

## 2022-03-30 PROCEDURE — 021109W BYPASS CORONARY ARTERY, TWO ARTERIES FROM AORTA WITH AUTOLOGOUS VENOUS TISSUE, OPEN APPROACH: ICD-10-PCS | Performed by: THORACIC SURGERY (CARDIOTHORACIC VASCULAR SURGERY)

## 2022-03-30 PROCEDURE — P9045 ALBUMIN (HUMAN), 5%, 250 ML: HCPCS | Performed by: PHYSICIAN ASSISTANT

## 2022-03-30 PROCEDURE — 71045 X-RAY EXAM CHEST 1 VIEW: CPT

## 2022-03-30 PROCEDURE — 74011636637 HC RX REV CODE- 636/637: Performed by: NURSE ANESTHETIST, CERTIFIED REGISTERED

## 2022-03-30 PROCEDURE — 74011250637 HC RX REV CODE- 250/637: Performed by: PHYSICIAN ASSISTANT

## 2022-03-30 PROCEDURE — C1751 CATH, INF, PER/CENT/MIDLINE: HCPCS | Performed by: ANESTHESIOLOGY

## 2022-03-30 PROCEDURE — 74011000258 HC RX REV CODE- 258: Performed by: STUDENT IN AN ORGANIZED HEALTH CARE EDUCATION/TRAINING PROGRAM

## 2022-03-30 PROCEDURE — 33508 ENDOSCOPIC VEIN HARVEST: CPT | Performed by: THORACIC SURGERY (CARDIOTHORACIC VASCULAR SURGERY)

## 2022-03-30 PROCEDURE — 77030010605 HC BLWR MR MAL MEDT -B: Performed by: THORACIC SURGERY (CARDIOTHORACIC VASCULAR SURGERY)

## 2022-03-30 PROCEDURE — 74011250636 HC RX REV CODE- 250/636: Performed by: PHYSICIAN ASSISTANT

## 2022-03-30 PROCEDURE — 85025 COMPLETE CBC W/AUTO DIFF WBC: CPT

## 2022-03-30 PROCEDURE — 94002 VENT MGMT INPAT INIT DAY: CPT

## 2022-03-30 PROCEDURE — 76060000041 HC ANESTHESIA 5 TO 5.5 HR: Performed by: THORACIC SURGERY (CARDIOTHORACIC VASCULAR SURGERY)

## 2022-03-30 PROCEDURE — 77030037878 HC DRSG MEPILEX >48IN BORD MOLN -B: Performed by: THORACIC SURGERY (CARDIOTHORACIC VASCULAR SURGERY)

## 2022-03-30 PROCEDURE — 77030013079 HC BLNKT BAIR HGGR 3M -A: Performed by: ANESTHESIOLOGY

## 2022-03-30 PROCEDURE — P9045 ALBUMIN (HUMAN), 5%, 250 ML: HCPCS | Performed by: NURSE ANESTHETIST, CERTIFIED REGISTERED

## 2022-03-30 PROCEDURE — 77030006689 HC BLD OPHTH BVR BD -A: Performed by: THORACIC SURGERY (CARDIOTHORACIC VASCULAR SURGERY)

## 2022-03-30 PROCEDURE — 74011000250 HC RX REV CODE- 250: Performed by: STUDENT IN AN ORGANIZED HEALTH CARE EDUCATION/TRAINING PROGRAM

## 2022-03-30 PROCEDURE — 33533 CABG ARTERIAL SINGLE: CPT | Performed by: THORACIC SURGERY (CARDIOTHORACIC VASCULAR SURGERY)

## 2022-03-30 PROCEDURE — 77030010507 HC ADH SKN DERMBND J&J -B: Performed by: THORACIC SURGERY (CARDIOTHORACIC VASCULAR SURGERY)

## 2022-03-30 PROCEDURE — 77030008771 HC TU NG SALEM SUMP -A: Performed by: ANESTHESIOLOGY

## 2022-03-30 PROCEDURE — 76010000116 HC CV SURG 5 TO 5.5 HR: Performed by: THORACIC SURGERY (CARDIOTHORACIC VASCULAR SURGERY)

## 2022-03-30 PROCEDURE — 77030041076 HC DRSG AG OPTICELL MDII -A: Performed by: THORACIC SURGERY (CARDIOTHORACIC VASCULAR SURGERY)

## 2022-03-30 PROCEDURE — 36415 COLL VENOUS BLD VENIPUNCTURE: CPT

## 2022-03-30 PROCEDURE — 77030006994: Performed by: THORACIC SURGERY (CARDIOTHORACIC VASCULAR SURGERY)

## 2022-03-30 PROCEDURE — 77030003010 HC SUT SURG STL J&J -B: Performed by: THORACIC SURGERY (CARDIOTHORACIC VASCULAR SURGERY)

## 2022-03-30 PROCEDURE — 02100Z9 BYPASS CORONARY ARTERY, ONE ARTERY FROM LEFT INTERNAL MAMMARY, OPEN APPROACH: ICD-10-PCS | Performed by: THORACIC SURGERY (CARDIOTHORACIC VASCULAR SURGERY)

## 2022-03-30 PROCEDURE — 77030040504 HC DRN WND MDII -B: Performed by: THORACIC SURGERY (CARDIOTHORACIC VASCULAR SURGERY)

## 2022-03-30 PROCEDURE — 74011636637 HC RX REV CODE- 636/637: Performed by: THORACIC SURGERY (CARDIOTHORACIC VASCULAR SURGERY)

## 2022-03-30 PROCEDURE — 77030002933 HC SUT MCRYL J&J -A: Performed by: THORACIC SURGERY (CARDIOTHORACIC VASCULAR SURGERY)

## 2022-03-30 PROCEDURE — 77030021177: Performed by: THORACIC SURGERY (CARDIOTHORACIC VASCULAR SURGERY)

## 2022-03-30 PROCEDURE — 77030002973 HC SUT PLEDG CV SFT OVL TELE -B: Performed by: THORACIC SURGERY (CARDIOTHORACIC VASCULAR SURGERY)

## 2022-03-30 PROCEDURE — 77030013798 HC KT TRNSDUC PRSSR EDWD -B: Performed by: THORACIC SURGERY (CARDIOTHORACIC VASCULAR SURGERY)

## 2022-03-30 PROCEDURE — 77030005401 HC CATH RAD ARRO -A: Performed by: ANESTHESIOLOGY

## 2022-03-30 PROCEDURE — 77030019702 HC WRP THER MENM -C: Performed by: THORACIC SURGERY (CARDIOTHORACIC VASCULAR SURGERY)

## 2022-03-30 PROCEDURE — 77030019908 HC STETH ESOPH SIMS -A: Performed by: ANESTHESIOLOGY

## 2022-03-30 PROCEDURE — 33508 ENDOSCOPIC VEIN HARVEST: CPT | Performed by: PHYSICIAN ASSISTANT

## 2022-03-30 PROCEDURE — 33518 CABG ARTERY-VEIN TWO: CPT | Performed by: THORACIC SURGERY (CARDIOTHORACIC VASCULAR SURGERY)

## 2022-03-30 PROCEDURE — B24BZZ4 ULTRASONOGRAPHY OF HEART WITH AORTA, TRANSESOPHAGEAL: ICD-10-PCS | Performed by: ANESTHESIOLOGY

## 2022-03-30 RX ORDER — ALBUTEROL SULFATE 0.83 MG/ML
2.5 SOLUTION RESPIRATORY (INHALATION)
Status: DISCONTINUED | OUTPATIENT
Start: 2022-03-30 | End: 2022-04-20 | Stop reason: HOSPADM

## 2022-03-30 RX ORDER — VECURONIUM BROMIDE FOR INJECTION 1 MG/ML
INJECTION, POWDER, LYOPHILIZED, FOR SOLUTION INTRAVENOUS AS NEEDED
Status: DISCONTINUED | OUTPATIENT
Start: 2022-03-30 | End: 2022-03-30 | Stop reason: HOSPADM

## 2022-03-30 RX ORDER — MORPHINE SULFATE 2 MG/ML
2-4 INJECTION, SOLUTION INTRAMUSCULAR; INTRAVENOUS
Status: DISCONTINUED | OUTPATIENT
Start: 2022-03-30 | End: 2022-04-01

## 2022-03-30 RX ORDER — NITROGLYCERIN 20 MG/100ML
16.5 INJECTION INTRAVENOUS CONTINUOUS
Status: DISCONTINUED | OUTPATIENT
Start: 2022-03-30 | End: 2022-03-30

## 2022-03-30 RX ORDER — NITROGLYCERIN 20 MG/100ML
16.5 INJECTION INTRAVENOUS CONTINUOUS
Status: CANCELLED | OUTPATIENT
Start: 2022-03-30

## 2022-03-30 RX ORDER — ALBUMIN HUMAN 50 G/1000ML
SOLUTION INTRAVENOUS AS NEEDED
Status: DISCONTINUED | OUTPATIENT
Start: 2022-03-30 | End: 2022-03-30 | Stop reason: HOSPADM

## 2022-03-30 RX ORDER — FAMOTIDINE 20 MG/1
20 TABLET, FILM COATED ORAL EVERY 24 HOURS
Status: DISCONTINUED | OUTPATIENT
Start: 2022-03-31 | End: 2022-04-03

## 2022-03-30 RX ORDER — OXYCODONE HYDROCHLORIDE 5 MG/1
10 TABLET ORAL
Status: DISCONTINUED | OUTPATIENT
Start: 2022-03-30 | End: 2022-04-05

## 2022-03-30 RX ORDER — SUFENTANIL CITRATE 50 UG/ML
INJECTION EPIDURAL; INTRAVENOUS AS NEEDED
Status: DISCONTINUED | OUTPATIENT
Start: 2022-03-30 | End: 2022-03-30 | Stop reason: HOSPADM

## 2022-03-30 RX ORDER — SODIUM CHLORIDE 450 MG/100ML
10 INJECTION, SOLUTION INTRAVENOUS CONTINUOUS
Status: DISCONTINUED | OUTPATIENT
Start: 2022-03-30 | End: 2022-04-20 | Stop reason: HOSPADM

## 2022-03-30 RX ORDER — SODIUM CHLORIDE 9 MG/ML
9 INJECTION, SOLUTION INTRAVENOUS CONTINUOUS
Status: DISCONTINUED | OUTPATIENT
Start: 2022-03-30 | End: 2022-04-20 | Stop reason: HOSPADM

## 2022-03-30 RX ORDER — ALBUMIN HUMAN 50 G/1000ML
25 SOLUTION INTRAVENOUS ONCE
Status: CANCELLED | OUTPATIENT
Start: 2022-03-30 | End: 2022-03-30

## 2022-03-30 RX ORDER — MIDAZOLAM HYDROCHLORIDE 1 MG/ML
1 INJECTION, SOLUTION INTRAMUSCULAR; INTRAVENOUS
Status: DISCONTINUED | OUTPATIENT
Start: 2022-03-30 | End: 2022-03-31

## 2022-03-30 RX ORDER — ACETAMINOPHEN 500 MG
1000 TABLET ORAL EVERY 6 HOURS
Status: DISPENSED | OUTPATIENT
Start: 2022-03-30 | End: 2022-04-01

## 2022-03-30 RX ORDER — PROTAMINE SULFATE 10 MG/ML
500 INJECTION, SOLUTION INTRAVENOUS ONCE
Status: CANCELLED | OUTPATIENT
Start: 2022-03-30 | End: 2022-03-30

## 2022-03-30 RX ORDER — OXYCODONE HYDROCHLORIDE 5 MG/1
5 TABLET ORAL
Status: DISCONTINUED | OUTPATIENT
Start: 2022-03-30 | End: 2022-04-05

## 2022-03-30 RX ORDER — MAGNESIUM SULFATE 1 G/100ML
1 INJECTION INTRAVENOUS AS NEEDED
Status: DISCONTINUED | OUTPATIENT
Start: 2022-03-30 | End: 2022-04-20 | Stop reason: HOSPADM

## 2022-03-30 RX ORDER — ALBUMIN HUMAN 50 G/1000ML
12.5 SOLUTION INTRAVENOUS
Status: COMPLETED | OUTPATIENT
Start: 2022-03-30 | End: 2022-03-30

## 2022-03-30 RX ORDER — ESMOLOL HYDROCHLORIDE 10 MG/ML
INJECTION INTRAVENOUS AS NEEDED
Status: DISCONTINUED | OUTPATIENT
Start: 2022-03-30 | End: 2022-03-30 | Stop reason: HOSPADM

## 2022-03-30 RX ORDER — POTASSIUM CHLORIDE 29.8 MG/ML
20 INJECTION INTRAVENOUS ONCE
Status: CANCELLED | OUTPATIENT
Start: 2022-03-30 | End: 2022-03-30

## 2022-03-30 RX ORDER — PROTAMINE SULFATE 10 MG/ML
500 INJECTION, SOLUTION INTRAVENOUS ONCE
Status: CANCELLED | OUTPATIENT
Start: 2022-03-31 | End: 2022-03-31

## 2022-03-30 RX ORDER — INSULIN LISPRO 100 [IU]/ML
INJECTION, SOLUTION INTRAVENOUS; SUBCUTANEOUS
Status: DISCONTINUED | OUTPATIENT
Start: 2022-03-30 | End: 2022-04-02

## 2022-03-30 RX ORDER — GUAIFENESIN 100 MG/5ML
81 LIQUID (ML) ORAL DAILY
Status: DISCONTINUED | OUTPATIENT
Start: 2022-03-31 | End: 2022-04-20 | Stop reason: HOSPADM

## 2022-03-30 RX ORDER — MUPIROCIN 20 MG/G
OINTMENT TOPICAL 2 TIMES DAILY
Status: COMPLETED | OUTPATIENT
Start: 2022-03-30 | End: 2022-04-04

## 2022-03-30 RX ORDER — POLYETHYLENE GLYCOL 3350 17 G/17G
17 POWDER, FOR SOLUTION ORAL DAILY
Status: DISCONTINUED | OUTPATIENT
Start: 2022-03-31 | End: 2022-04-20 | Stop reason: HOSPADM

## 2022-03-30 RX ORDER — HEPARIN SOD,PORCINE/0.9 % NACL 30K/1000ML
50-1000 INTRAVENOUS SOLUTION INTRAVENOUS AS NEEDED
Status: DISCONTINUED | OUTPATIENT
Start: 2022-03-30 | End: 2022-03-30 | Stop reason: HOSPADM

## 2022-03-30 RX ORDER — PROPOFOL 10 MG/ML
INJECTION, EMULSION INTRAVENOUS AS NEEDED
Status: DISCONTINUED | OUTPATIENT
Start: 2022-03-30 | End: 2022-03-30 | Stop reason: HOSPADM

## 2022-03-30 RX ORDER — HEPARIN SODIUM 1000 [USP'U]/ML
INJECTION, SOLUTION INTRAVENOUS; SUBCUTANEOUS AS NEEDED
Status: DISCONTINUED | OUTPATIENT
Start: 2022-03-30 | End: 2022-03-30 | Stop reason: HOSPADM

## 2022-03-30 RX ORDER — DEXMEDETOMIDINE HYDROCHLORIDE 4 UG/ML
.1-1.5 INJECTION, SOLUTION INTRAVENOUS
Status: DISCONTINUED | OUTPATIENT
Start: 2022-03-30 | End: 2022-04-02

## 2022-03-30 RX ORDER — DEXMEDETOMIDINE HYDROCHLORIDE 4 UG/ML
.1-1.5 INJECTION, SOLUTION INTRAVENOUS
Status: CANCELLED | OUTPATIENT
Start: 2022-03-30

## 2022-03-30 RX ORDER — MAGNESIUM SULFATE 100 %
4 CRYSTALS MISCELLANEOUS AS NEEDED
Status: DISCONTINUED | OUTPATIENT
Start: 2022-03-30 | End: 2022-04-20 | Stop reason: HOSPADM

## 2022-03-30 RX ORDER — GLYCOPYRROLATE 0.2 MG/ML
INJECTION INTRAMUSCULAR; INTRAVENOUS AS NEEDED
Status: DISCONTINUED | OUTPATIENT
Start: 2022-03-30 | End: 2022-03-30 | Stop reason: HOSPADM

## 2022-03-30 RX ORDER — ACETAMINOPHEN 325 MG/1
650 TABLET ORAL
Status: DISCONTINUED | OUTPATIENT
Start: 2022-03-30 | End: 2022-04-20 | Stop reason: HOSPADM

## 2022-03-30 RX ORDER — SODIUM CHLORIDE 9 MG/ML
INJECTION, SOLUTION INTRAVENOUS
Status: DISCONTINUED | OUTPATIENT
Start: 2022-03-30 | End: 2022-03-30 | Stop reason: HOSPADM

## 2022-03-30 RX ORDER — LANOLIN ALCOHOL/MO/W.PET/CERES
400 CREAM (GRAM) TOPICAL 2 TIMES DAILY
Status: DISCONTINUED | OUTPATIENT
Start: 2022-03-31 | End: 2022-04-20 | Stop reason: HOSPADM

## 2022-03-30 RX ORDER — NALOXONE HYDROCHLORIDE 0.4 MG/ML
0.4 INJECTION, SOLUTION INTRAMUSCULAR; INTRAVENOUS; SUBCUTANEOUS AS NEEDED
Status: DISCONTINUED | OUTPATIENT
Start: 2022-03-30 | End: 2022-04-20 | Stop reason: HOSPADM

## 2022-03-30 RX ORDER — PHENYLEPHRINE HCL IN 0.9% NACL 0.4MG/10ML
SYRINGE (ML) INTRAVENOUS AS NEEDED
Status: DISCONTINUED | OUTPATIENT
Start: 2022-03-30 | End: 2022-03-30 | Stop reason: HOSPADM

## 2022-03-30 RX ORDER — PROTAMINE SULFATE 10 MG/ML
INJECTION, SOLUTION INTRAVENOUS AS NEEDED
Status: DISCONTINUED | OUTPATIENT
Start: 2022-03-30 | End: 2022-03-30 | Stop reason: HOSPADM

## 2022-03-30 RX ORDER — ONDANSETRON 2 MG/ML
4 INJECTION INTRAMUSCULAR; INTRAVENOUS
Status: DISCONTINUED | OUTPATIENT
Start: 2022-03-30 | End: 2022-04-20 | Stop reason: HOSPADM

## 2022-03-30 RX ORDER — FACIAL-BODY WIPES
10 EACH TOPICAL DAILY PRN
Status: DISCONTINUED | OUTPATIENT
Start: 2022-03-30 | End: 2022-04-20 | Stop reason: HOSPADM

## 2022-03-30 RX ORDER — AMOXICILLIN 250 MG
1 CAPSULE ORAL 2 TIMES DAILY
Status: DISCONTINUED | OUTPATIENT
Start: 2022-03-31 | End: 2022-04-20 | Stop reason: HOSPADM

## 2022-03-30 RX ORDER — NOREPINEPHRINE BITARTRATE/D5W 8 MG/250ML
.5-16 PLASTIC BAG, INJECTION (ML) INTRAVENOUS
Status: DISCONTINUED | OUTPATIENT
Start: 2022-03-30 | End: 2022-03-30

## 2022-03-30 RX ORDER — DOBUTAMINE HYDROCHLORIDE 200 MG/100ML
INJECTION INTRAVENOUS
Status: DISCONTINUED | OUTPATIENT
Start: 2022-03-30 | End: 2022-03-30 | Stop reason: HOSPADM

## 2022-03-30 RX ORDER — AMIODARONE HYDROCHLORIDE 200 MG/1
400 TABLET ORAL EVERY 12 HOURS
Status: DISCONTINUED | OUTPATIENT
Start: 2022-03-31 | End: 2022-04-11

## 2022-03-30 RX ORDER — LIDOCAINE HYDROCHLORIDE 20 MG/ML
INJECTION, SOLUTION EPIDURAL; INFILTRATION; INTRACAUDAL; PERINEURAL AS NEEDED
Status: DISCONTINUED | OUTPATIENT
Start: 2022-03-30 | End: 2022-03-30 | Stop reason: HOSPADM

## 2022-03-30 RX ORDER — DOBUTAMINE HYDROCHLORIDE 200 MG/100ML
0-10 INJECTION INTRAVENOUS
Status: CANCELLED | OUTPATIENT
Start: 2022-03-30

## 2022-03-30 RX ORDER — SUFENTANIL CITRATE 50 UG/ML
INJECTION EPIDURAL; INTRAVENOUS
Status: DISCONTINUED | OUTPATIENT
Start: 2022-03-30 | End: 2022-03-30 | Stop reason: HOSPADM

## 2022-03-30 RX ORDER — HEPARIN SOD,PORCINE/0.9 % NACL 30K/1000ML
50-1000 INTRAVENOUS SOLUTION INTRAVENOUS AS NEEDED
Status: CANCELLED | OUTPATIENT
Start: 2022-03-30

## 2022-03-30 RX ORDER — EPHEDRINE SULFATE/0.9% NACL/PF 50 MG/5 ML
SYRINGE (ML) INTRAVENOUS AS NEEDED
Status: DISCONTINUED | OUTPATIENT
Start: 2022-03-30 | End: 2022-03-30 | Stop reason: HOSPADM

## 2022-03-30 RX ORDER — MAGNESIUM SULFATE HEPTAHYDRATE 40 MG/ML
2 INJECTION, SOLUTION INTRAVENOUS ONCE
Status: CANCELLED | OUTPATIENT
Start: 2022-03-30 | End: 2022-03-30

## 2022-03-30 RX ORDER — MAGNESIUM SULFATE HEPTAHYDRATE 40 MG/ML
2 INJECTION, SOLUTION INTRAVENOUS ONCE
Status: DISCONTINUED | OUTPATIENT
Start: 2022-03-30 | End: 2022-03-30 | Stop reason: HOSPADM

## 2022-03-30 RX ORDER — PROTAMINE SULFATE 10 MG/ML
500 INJECTION, SOLUTION INTRAVENOUS ONCE
Status: DISCONTINUED | OUTPATIENT
Start: 2022-03-30 | End: 2022-03-30 | Stop reason: HOSPADM

## 2022-03-30 RX ORDER — CHLORHEXIDINE GLUCONATE 1.2 MG/ML
10 RINSE ORAL EVERY 12 HOURS
Status: DISCONTINUED | OUTPATIENT
Start: 2022-03-30 | End: 2022-04-20 | Stop reason: HOSPADM

## 2022-03-30 RX ORDER — POTASSIUM CHLORIDE 29.8 MG/ML
20 INJECTION INTRAVENOUS ONCE
Status: DISCONTINUED | OUTPATIENT
Start: 2022-03-30 | End: 2022-03-30 | Stop reason: HOSPADM

## 2022-03-30 RX ORDER — CEFAZOLIN SODIUM 1 G/3ML
INJECTION, POWDER, FOR SOLUTION INTRAMUSCULAR; INTRAVENOUS AS NEEDED
Status: DISCONTINUED | OUTPATIENT
Start: 2022-03-30 | End: 2022-03-30 | Stop reason: HOSPADM

## 2022-03-30 RX ORDER — INSULIN GLARGINE 100 [IU]/ML
1-50 INJECTION, SOLUTION SUBCUTANEOUS
Status: DISCONTINUED | OUTPATIENT
Start: 2022-03-30 | End: 2022-04-02

## 2022-03-30 RX ORDER — PAPAVERINE HYDROCHLORIDE 30 MG/ML
INJECTION INTRAMUSCULAR; INTRAVENOUS AS NEEDED
Status: DISCONTINUED | OUTPATIENT
Start: 2022-03-30 | End: 2022-03-30 | Stop reason: HOSPADM

## 2022-03-30 RX ORDER — SODIUM CHLORIDE, SODIUM LACTATE, POTASSIUM CHLORIDE, CALCIUM CHLORIDE 600; 310; 30; 20 MG/100ML; MG/100ML; MG/100ML; MG/100ML
INJECTION, SOLUTION INTRAVENOUS
Status: DISCONTINUED | OUTPATIENT
Start: 2022-03-30 | End: 2022-03-30 | Stop reason: HOSPADM

## 2022-03-30 RX ORDER — ALBUMIN HUMAN 50 G/1000ML
25 SOLUTION INTRAVENOUS ONCE
Status: DISCONTINUED | OUTPATIENT
Start: 2022-03-30 | End: 2022-03-30 | Stop reason: HOSPADM

## 2022-03-30 RX ORDER — SODIUM CHLORIDE 0.9 % (FLUSH) 0.9 %
5-40 SYRINGE (ML) INJECTION AS NEEDED
Status: DISCONTINUED | OUTPATIENT
Start: 2022-03-30 | End: 2022-04-20 | Stop reason: HOSPADM

## 2022-03-30 RX ORDER — SODIUM CHLORIDE 0.9 % (FLUSH) 0.9 %
5-40 SYRINGE (ML) INJECTION EVERY 8 HOURS
Status: DISCONTINUED | OUTPATIENT
Start: 2022-03-30 | End: 2022-04-20 | Stop reason: HOSPADM

## 2022-03-30 RX ORDER — DOBUTAMINE HYDROCHLORIDE 200 MG/100ML
0-10 INJECTION INTRAVENOUS
Status: DISCONTINUED | OUTPATIENT
Start: 2022-03-30 | End: 2022-03-31

## 2022-03-30 RX ORDER — POTASSIUM CHLORIDE 29.8 MG/ML
20 INJECTION INTRAVENOUS
Status: COMPLETED | OUTPATIENT
Start: 2022-03-30 | End: 2022-03-30

## 2022-03-30 RX ADMIN — Medication 400 MCG: at 11:35

## 2022-03-30 RX ADMIN — ALBUMIN (HUMAN) 12.5 G: 12.5 INJECTION, SOLUTION INTRAVENOUS at 18:33

## 2022-03-30 RX ADMIN — Medication 80 MCG: at 11:21

## 2022-03-30 RX ADMIN — DOBUTAMINE IN DEXTROSE 5 MCG/KG/MIN: 200 INJECTION, SOLUTION INTRAVENOUS at 14:08

## 2022-03-30 RX ADMIN — POTASSIUM CHLORIDE 20 MEQ: 400 INJECTION, SOLUTION INTRAVENOUS at 19:13

## 2022-03-30 RX ADMIN — ALBUMIN (HUMAN) 12.5 G: 12.5 INJECTION, SOLUTION INTRAVENOUS at 17:43

## 2022-03-30 RX ADMIN — MUPIROCIN: 20 OINTMENT TOPICAL at 18:07

## 2022-03-30 RX ADMIN — CEFAZOLIN 2 G: 330 INJECTION, POWDER, FOR SOLUTION INTRAMUSCULAR; INTRAVENOUS at 11:35

## 2022-03-30 RX ADMIN — SODIUM CHLORIDE 40 MCG/MIN: 9 INJECTION, SOLUTION INTRAVENOUS at 11:29

## 2022-03-30 RX ADMIN — POTASSIUM CHLORIDE 20 MEQ: 400 INJECTION, SOLUTION INTRAVENOUS at 18:07

## 2022-03-30 RX ADMIN — SODIUM CHLORIDE 10 ML/HR: 4.5 INJECTION, SOLUTION INTRAVENOUS at 17:00

## 2022-03-30 RX ADMIN — Medication 1.9 UNITS/HR: at 14:04

## 2022-03-30 RX ADMIN — VECURONIUM BROMIDE 14 MG: 10 INJECTION, POWDER, LYOPHILIZED, FOR SOLUTION INTRAVENOUS at 11:21

## 2022-03-30 RX ADMIN — HEPARIN SODIUM 25000 UNITS: 1000 INJECTION, SOLUTION INTRAVENOUS; SUBCUTANEOUS at 12:37

## 2022-03-30 RX ADMIN — DEXMEDETOMIDINE HYDROCHLORIDE 0.4 MCG/KG/HR: 100 INJECTION, SOLUTION, CONCENTRATE INTRAVENOUS at 14:25

## 2022-03-30 RX ADMIN — DESMOPRESSIN ACETATE 21.88 MCG: 4 SOLUTION INTRAVENOUS at 14:27

## 2022-03-30 RX ADMIN — CEFAZOLIN SODIUM 2 G: 1 INJECTION, POWDER, FOR SOLUTION INTRAMUSCULAR; INTRAVENOUS at 19:56

## 2022-03-30 RX ADMIN — DEXMEDETOMIDINE HYDROCHLORIDE 0.6 MCG/KG/HR: 4 INJECTION, SOLUTION INTRAVENOUS at 20:14

## 2022-03-30 RX ADMIN — GLYCOPYRROLATE 0.2 MG: 0.2 INJECTION, SOLUTION INTRAMUSCULAR; INTRAVENOUS at 11:35

## 2022-03-30 RX ADMIN — CHLORHEXIDINE GLUCONATE 15 ML: 1.2 RINSE ORAL at 08:05

## 2022-03-30 RX ADMIN — ACETAMINOPHEN 1000 MG: 500 TABLET ORAL at 23:56

## 2022-03-30 RX ADMIN — SODIUM CHLORIDE, POTASSIUM CHLORIDE, SODIUM LACTATE AND CALCIUM CHLORIDE: 600; 310; 30; 20 INJECTION, SOLUTION INTRAVENOUS at 11:11

## 2022-03-30 RX ADMIN — CEFAZOLIN 2 G: 330 INJECTION, POWDER, FOR SOLUTION INTRAMUSCULAR; INTRAVENOUS at 14:25

## 2022-03-30 RX ADMIN — PROPOFOL 80 MG: 10 INJECTION, EMULSION INTRAVENOUS at 11:21

## 2022-03-30 RX ADMIN — AMINOCAPROIC ACID 10 G: 250 INJECTION, SOLUTION INTRAVENOUS at 12:00

## 2022-03-30 RX ADMIN — LIDOCAINE HYDROCHLORIDE 100 MG: 20 INJECTION, SOLUTION EPIDURAL; INFILTRATION; INTRACAUDAL; PERINEURAL at 11:21

## 2022-03-30 RX ADMIN — ESMOLOL HYDROCHLORIDE 10 MG: 10 INJECTION, SOLUTION INTRAVENOUS at 11:17

## 2022-03-30 RX ADMIN — MORPHINE SULFATE 2 MG: 2 INJECTION, SOLUTION INTRAMUSCULAR; INTRAVENOUS at 17:26

## 2022-03-30 RX ADMIN — Medication 80 MCG: at 11:30

## 2022-03-30 RX ADMIN — SODIUM CHLORIDE 10 MG/HR: 9 INJECTION, SOLUTION INTRAVENOUS at 14:13

## 2022-03-30 RX ADMIN — SODIUM CHLORIDE 1 G/HR: 9 INJECTION, SOLUTION INTRAVENOUS at 13:01

## 2022-03-30 RX ADMIN — DEXMEDETOMIDINE HYDROCHLORIDE 0.2 MCG/KG/HR: 4 INJECTION, SOLUTION INTRAVENOUS at 05:55

## 2022-03-30 RX ADMIN — SODIUM CHLORIDE: 900 INJECTION, SOLUTION INTRAVENOUS at 11:11

## 2022-03-30 RX ADMIN — EPINEPHRINE 1 MCG/MIN: 1 INJECTION INTRAMUSCULAR; INTRAVENOUS; SUBCUTANEOUS at 17:42

## 2022-03-30 RX ADMIN — FAMOTIDINE 20 MG: 10 INJECTION INTRAVENOUS at 20:10

## 2022-03-30 RX ADMIN — SODIUM CHLORIDE 1 MCG/MIN: 9 INJECTION, SOLUTION INTRAVENOUS at 14:08

## 2022-03-30 RX ADMIN — MIDAZOLAM 1 MG: 1 INJECTION INTRAMUSCULAR; INTRAVENOUS at 16:38

## 2022-03-30 RX ADMIN — SUFENTANIL CITRATE 0.2 MCG/KG/HR: 50 INJECTION EPIDURAL; INTRAVENOUS at 11:54

## 2022-03-30 RX ADMIN — ALBUMIN (HUMAN) 12.5 G: 12.5 INJECTION, SOLUTION INTRAVENOUS at 19:01

## 2022-03-30 RX ADMIN — HEPARIN SODIUM 1000 UNITS: 1000 INJECTION, SOLUTION INTRAVENOUS; SUBCUTANEOUS at 12:03

## 2022-03-30 RX ADMIN — SUFENTANIL CITRATE 10 MCG: 50 INJECTION EPIDURAL; INTRAVENOUS at 12:01

## 2022-03-30 RX ADMIN — SUGAMMADEX 200 MG: 100 INJECTION, SOLUTION INTRAVENOUS at 16:35

## 2022-03-30 RX ADMIN — MUPIROCIN: 20 OINTMENT TOPICAL at 08:05

## 2022-03-30 RX ADMIN — CHLORHEXIDINE GLUCONATE 10 ML: 1.2 RINSE ORAL at 20:10

## 2022-03-30 RX ADMIN — ALBUMIN (HUMAN) 12.5 G: 12.5 INJECTION, SOLUTION INTRAVENOUS at 15:58

## 2022-03-30 RX ADMIN — NOREPINEPHRINE BITARTRATE 4 MCG/MIN: 1 INJECTION, SOLUTION, CONCENTRATE INTRAVENOUS at 00:30

## 2022-03-30 RX ADMIN — SUFENTANIL CITRATE 10 MCG: 50 INJECTION EPIDURAL; INTRAVENOUS at 12:12

## 2022-03-30 RX ADMIN — SUFENTANIL CITRATE 20 MCG: 50 INJECTION EPIDURAL; INTRAVENOUS at 11:21

## 2022-03-30 RX ADMIN — PROTAMINE SULFATE 150 MG: 10 INJECTION, SOLUTION INTRAVENOUS at 14:14

## 2022-03-30 RX ADMIN — OXYCODONE 5 MG: 5 TABLET ORAL at 23:56

## 2022-03-30 NOTE — PERIOP NOTES
Report called to Ashwin Arroyo RN. Information given to includes patient's name, age, allergies, height, weight, PMH, invasive lines, Procedure, Anesthesia drips.

## 2022-03-30 NOTE — BRIEF OP NOTE
BRIEF OP NOTE  Pre-Op Diagnosis: CAD, AS    Post-Op Diagnosis: CAD, AS    Procedure:    On pump CORONARY ARTERY BYPASS GRAFT (CABG) X3, LIMA to LAD, RSVH to PDA, RSVG to Ramus  R GSV EVH    Surgeon: Elsy Batista MD    Assistant(s): Kristie Santana PA-C    Anesthesia: General     Infusions: precedex, insulin, epi    Estimated Blood Loss:  1005ml    Cell Saver:  725ml    Drains and pacing wires: 2 atrial wires, 1 bipolar ventricular wire, 3 khalida drains    Complications: None    Findings: CAD, AS

## 2022-03-30 NOTE — ANESTHESIA PROCEDURE NOTES
Central Line and Pulmonary Artery Catheter Placement    Start time: 3/30/2022 11:27 AM  End time: 3/30/2022 11:36 AM  Performed by: Peace Fernandes MD  Authorized by: Peace Fernandes MD     Indications: vascular access, central pressure monitoring and need for vasopressors  Preanesthetic Checklist: patient identified, risks and benefits discussed, anesthesia consent, site marked, patient being monitored and timeout performed      Pre-procedure: All elements of maximal sterile barrier technique followed?  Yes    2% Chlorhexidine for cutaneous antisepsis, Hand hygiene performed prior to catheter insertion and Ultrasound guidance    Sterile Ultrasound Technique followed?: Yes            Procedure:   Prep:  Chlorhexidine  Location: internal jugular  Orientation:  Right  Patient position:  Trendelenburg  Catheter type:  Double lumen  Catheter size:  9 Fr  Catheter length:  12 cm  Number of attempts:  1  Successful placement: Yes      Assessment:   Post-procedure:  Catheter secured and sterile dressing with CHG applied  Assessment:  Blood return through all ports  Insertion:  Uncomplicated  Patient tolerance:  Patient tolerated the procedure well with no immediate complications  9 Fr MAC and 8 Fr CCO PA Catheter

## 2022-03-30 NOTE — PROGRESS NOTES
Cardiac Surgery Care Coordinator- Met with Lemuel Lira and his family at the bedside. Reviewed plan of care and encouraged them to verbalize. Mr Brenna Dumont is drowsy, but awakens to voice. He is calm and conversing easily. Will continue to follow. 1100- Received yellow colored ring from [address] [place] [address] Road,  Delivered ring in specimen cup to Kemar Garciakell the daughter of Lemuel Lira in the main lobby. Provided update and encouraged her to verbalize. Will continue to follow. 1230- Met with the family of Lemuel Lira, provided update. They will be going out for lunch. 1430- Placed update call to Kemar Neville, no answer. Left voicemail. 1530- Met with Angelika Cameron Eusebio's family and Dr. Steven Nguyen. Update given, Encouraged family to verbalize and offered emotional support. Provided tour of the CVICU along with visiting instructions  Family will be going home to rest, will call later in the evening. Exchanged contact information and provided them with the CVICU brochure.  Reyes Friends, RN

## 2022-03-30 NOTE — PROGRESS NOTES
ICU TEAM History and Physical    Name: Michelle Nixon   : 1945   MRN: [id number]   Date: 3/30/2022      Assessment:   Reason for ICU Admission: STEMI    HPI: 65 yo M with h/o CAD, HTN, presented to the ED for continued evaluation of chest pain with STEMI. Pt was previously seen at [facility] yesterday for chest pain - cath showed 100% occlusion of LAD and RCA. Transferred to [facility] for evaluation and management. Cath with CAD lesions not amenable to PCI. Decision made to proceed with CABG (3/30/2022). ECHO:3/28/22   Left Ventricle Left ventricle size is normal. Moderately increased wall thickness. See diagram for wall motion findings. Moderately reduced left ventricular systolic function with a visually estimated EF of 35 - 40%. Indeterminate diastolic function. Left Atrium Left atrium is mildly dilated. Interatrial Septum No interatrial shunt visualized on color Doppler. Right Ventricle Right ventricle size is normal. Normal wall thickness. Normal systolic function. Right Atrium Right atrium size is normal.   Aortic Valve Valve structure is normal. Moderately calcified cusp. Sclerosis of the aortic valve cusp. No transvalvular regurgitation. Mild stenosis of the aortic valve. AV mean gradient is 16 mmHg. Mitral Valve Mild annular calcification of the mitral valve. No transvalvular regurgitation. No stenosis noted. Tricuspid Valve Valve structure is normal. Mild transvalvular regurgitation. No stenosis noted. Pulmonic Valve The pulmonic valve was not well visualized. No transvalvular regurgitation. No stenosis noted. Aorta Normal sized ascending aorta. Mildly dilated aortic root. IVC/Hepatic Veins IVC diameter is less than or equal to 21 mm and decreases greater than 50% during inspiration; therefore the estimated right atrial pressure is normal (~3 mmHg). IVC size is normal.   Pericardium No pericardial effusion. Plan:     Neuro: A&O.   - Pain management PRN.   - Delirium with agitation: Melatonin, Precedex, Haldol, Seroquel    Pulm:   - Titrate supplemental O2 to keep SpO2>93%. Cardiac:   - CABG today (3/30/22)  - Con't heparin infusion  - NTG infusion PRN for CP and SBP <160. - Beta-blocker.  - GDMT. Renal:   - Monitor UOP. - Replace e-lytes PRN. GI:   - NPO. ID:   - Trend WBC. Heme:   - Check H/H.  - Transfuse to keep Hb>8.0    Endo:   - Hypothyroidism on Synthroid    MSK:  - OOB. F - Feeding:  No   A - Analgesia: Oxycodone and Acetaminophen  S - Sedation: None  T - DVT Prophylaxis: SCD's or Sequential Compression Device and Heparin   C - Code Status: Full Code  H - Head of Bed: > 30 Degrees  U - Ulcer Prophylaxis: Not at this time   G - Glycemic Control: Insulin  S - Spontaneous Breathing Trial: N/A  B - Bowel Regimen: None needed at this time  I - Indwelling Catheter:   Tubes: None  Lines: Peripheral IV  Drains: None  D - De-escalation of Antibiotics:       Active Problem List:     Problem List  Never Reviewed          Codes Class    CAD (coronary artery disease) ICD-10-CM: I25.10  ICD-9-CM: 414.00         NSTEMI (non-ST elevated myocardial infarction) (HCC) ICD-10-CM: I21.4  ICD-9-CM: 410.70         Chest pain ICD-10-CM: R07.9  ICD-9-CM: 786.50         Hypothyroidism ICD-10-CM: E03.9  ICD-9-CM: 244.9         Hypokalemia ICD-10-CM: E87.6  ICD-9-CM: 276.8         HTN (hypertension), benign ICD-10-CM: I10  ICD-9-CM: 401.1               Past Medical History:    has a past medical history of Hypertension and Ill-defined condition. Past Surgical History:    has a past surgical history that includes hx appendectomy. Home Medications:     Prior to Admission medications    Medication Sig Start Date End Date Taking? Authorizing Provider   levothyroxine (SYNTHROID) 75 mcg tablet Take 75 mcg by mouth Daily (before breakfast).     Other, MD Ana       Allergies/Social/Family History:   No Known Allergies   Social History     Tobacco Use    Smoking status: Never Smoker    Smokeless tobacco: Never Used   Substance Use Topics    Alcohol use: Yes      Family History   Family history unknown: Yes       Review of Systems:   A comprehensive review of systems was negative except for that written in the HPI. Objective:     Visit Vitals  /62   Pulse (!) 59   Temp 98.2 °F (36.8 °C)   Resp 17   Ht 5' 8\" (1.727 m)   Wt 72.9 kg (160 lb 11.5 oz)   SpO2 98%   BMI 24.44 kg/m²    O2 Flow Rate (L/min): 2 l/min O2 Device: None (Room air) Temp (24hrs), Av.2 °F (36.8 °C), Min:98 °F (36.7 °C), Max:98.3 °F (36.8 °C)         Intake/Output:     Intake/Output Summary (Last 24 hours) at 3/30/2022 1115  Last data filed at 3/30/2022 1000  Gross per 24 hour   Intake 278.6 ml   Output 1025 ml   Net -746.4 ml       Physical Exam:  General:  Alert, cooperative, well noursished, well developed, appears stated age   Eyes:  Sclera anicteric. Pupils equally round and reactive to light. Mouth/Throat: Mucous membranes normal, oral pharynx clear   Neck: Supple   Lungs:   Clear to auscultation bilaterally, good effort   CV:  Regular rate and rhythm,no murmur, click, rub or gallop   Abdomen:   Soft, non-tender. bowel sounds normal. non-distended   Extremities: No cyanosis or edema   Skin: Skin color, texture, turgor normal. no acute rash or lesions   Lymph nodes: Cervical and supraclavicular normal   Musculoskeletal: No swelling or deformity   Lines/Devices:  Intact, no erythema, drainage or tenderness   Psych: Alert and oriented, normal mood affect given the setting       Labs & Data: Reviewed    Medications: Reviewed    Chest X-Ray:    TTE:    Multidisciplinary Rounds Completed: Yes    ABCDEF Bundle/Checklist Completed: Yes    SPECIAL EQUIPMENT: None    DISPOSITION: Stay in ICU    CRITICAL CARE CONSULTANT NOTE  I had a face to face encounter with the patient, reviewed and interpreted patient data including clinical events, labs, images, vital signs, I/O's, and examined patient.   I have discussed the case and the plan and management of the patient's care with the consulting services, the bedside nurses and the respiratory therapist.      NOTE OF PERSONAL INVOLVEMENT IN CARE   This patient has a high probability of imminent, clinically significant deterioration, which requires the highest level of preparedness to intervene urgently. I participated in the decision-making and personally managed or directed the management of the following life and organ supporting interventions that required my frequent assessment to treat or prevent imminent deterioration. I personally spent 45 minutes of critical care time. This is time spent at this critically ill patient's bedside actively involved in patient care as well as the coordination of care and discussions with the patient's family. This does not include any procedural time which has been billed separately.     Lexi Hernandez MD  Staff Intensivist/Anesthesiologist  [facility] Critical Care  3/30/2022

## 2022-03-30 NOTE — ANESTHESIA PREPROCEDURE EVALUATION
Relevant Problems   CARDIOVASCULAR   (+) CAD (coronary artery disease)   (+) HTN (hypertension), benign   (+) NSTEMI (non-ST elevated myocardial infarction) (HCC)      ENDOCRINE   (+) Hypothyroidism       Anesthetic History   No history of anesthetic complications            Review of Systems / Medical History  Patient summary reviewed, nursing notes reviewed and pertinent labs reviewed    Pulmonary  Within defined limits                 Neuro/Psych         Dementia     Cardiovascular    Hypertension    Angina      Past MI and CAD    Exercise tolerance: <4 METS  Comments: NSTEMI, EF 35-40, mild AS   GI/Hepatic/Renal         Renal disease: CRI       Endo/Other      Hypothyroidism: well controlled       Other Findings              Physical Exam    Airway  Mallampati: II  TM Distance: 4 - 6 cm  Neck ROM: normal range of motion   Mouth opening: Normal     Cardiovascular    Rhythm: regular  Rate: normal         Dental    Dentition: Poor dentition and Implants     Pulmonary  Breath sounds clear to auscultation               Abdominal  GI exam deferred       Other Findings            Anesthetic Plan    ASA: 4  Anesthesia type: general    Monitoring Plan: Arterial line, BIS, CVP, [place] and [place]    Post procedure ventilation   Induction: Intravenous  Anesthetic plan and risks discussed with: Patient

## 2022-03-30 NOTE — TELEPHONE ENCOUNTER
Called to let family know he is now in CVICU. Daughter is at home and not waiting at hospital.  She will call before heading in to check on status of her father.

## 2022-03-30 NOTE — PROGRESS NOTES
Spiritual Care Assessment/Progress Note  [facility]      NAME: Juana Mancia      MRN: [id number]  AGE: 64 y.o. SEX: male  Christian Affiliation: Other   Language: English     3/30/2022     Total Time (in minutes): 26     Spiritual Assessment begun in [facility] through conversation with:         []Patient        [x] Family    [] Friend(s)        Reason for Consult: Initial/Spiritual assessment, critical care     Spiritual beliefs: (Please include comment if needed)     [] Identifies with a anita tradition:         [] Supported by a anita community:            [x] Claims no spiritual orientation:           [] Seeking spiritual identity:                [] Adheres to an individual form of spirituality:           [] Not able to assess:                           Identified resources for coping:      [] Prayer                               [] Music                  [] Guided Imagery     [x] Family/friends                 [] Pet visits     [] Devotional reading                         [] Unknown     [] Other:                                              Interventions offered during this visit: (See comments for more details)          Family/Friend(s):  Affirmation of emotions/emotional suffering,Coping skills reviewed/reinforced,Initial Assessment     Plan of Care:     [x] Support spiritual and/or cultural needs    [] Support AMD and/or advance care planning process      [] Support grieving process   [] Coordinate Rites and/or Rituals    [] Coordination with community clergy   [] No spiritual needs identified at this time   [] Detailed Plan of Care below (See Comments)  [] Make referral to Music Therapy  [] Make referral to Pet Therapy     [] Make referral to Addiction services  [] Make referral to [facility]  [] Make referral to Spiritual Care Partner  [] No future visits requested        [] Contact Spiritual Care for further referrals     Visit for spiritual assessment in Room 8762.  Patient appeared to be resting. Being prepped for surgery. His daughter Madeleine Gant and her spouse were present during the visit. Madeleine Ugarter shared that they were not affiliated with any anita group. She was struggling about her father pending surgery and appeared very tearful and emotional. Provided anxiety containment through relaxation breathing, empathic listening, prayer as requested and cultivated a relationship of trust, care, and support. Advised of  availability. Visited by: Beata Jaramillo.  Uday Garza, [address] [place] [address] paging Service [phone])

## 2022-03-30 NOTE — ANESTHESIA PROCEDURE NOTES
Arterial Line Placement    Start time: 3/30/2022 9:44 AM  End time: 3/30/2022 9:51 AM  Performed by: Theresa Saeed MD  Authorized by:  Theresa Saeed MD     Pre-Procedure  Indications:  Arterial pressure monitoring  Preanesthetic Checklist: patient identified, risks and benefits discussed, anesthesia consent, site marked, patient being monitored, timeout performed and patient being monitored      Procedure:   Prep:  Chlorhexidine  Seldinger Technique?: Yes    Orientation:  Left  Location:  Radial artery  Catheter size:  20 G  Number of attempts:  1  Cont Cardiac Output Sensor: No      Assessment:   Post-procedure:  Line secured and sterile dressing applied  Patient Tolerance:  Patient tolerated the procedure well with no immediate complications

## 2022-03-30 NOTE — ANESTHESIA PROCEDURE NOTES
[facility]  Date/Time: 3/30/2022 2:42 PM      Procedure Details: probe placement, image aquisition & interpretation    Risks and benefits discussed with the patient and plans are to proceed    Procedure Note    Performed by: Diann Novak MD  Authorized by:  Diann Novak MD       Indications: assessment of ascending aorta and assessment of surgical repair  Modalities: 2D, CF, CWD, PWD  Probe Type: multiplane and epiaortic  Insertion: atraumatic  Patient Status: intubated and sedated    Echocardiographic and Doppler Measurements   Aorta  Size  Diam(cm)  Dissection PlaqueThick(mm)  Plaque Mobile    Ascending normal  No 0-3 No    Arch normal  No >3 No    Descending normal  No >3 No          Valves  Annulus  Stenosis  Area/Grad  Regurg  Leaflet   Morph  Leaflet   Motion    Aortic calcified moderate JUAN=1, 41/25, calcified restricted NCC 1+ calcified restricted    Mitral normal none  1+ thickened normal    Tricuspid normal none  1+ normal normal          Atria  Size  SEC (smoke)  Thrombus  Tumor  Device    Rt Atrium normal No No No No    Lt Atrium dilated No No No No     Interatrial Septum Morphology: normal    Interventricular Septum Morphology: normal    Ventricle  Cavity Size  Cavity Dimension Hypertrophy  Thrombus  Gloal FXN  EF    RV dilated  No no normal     LV normal   No mildly impaired 45-50       Regional Function  (1 = normal, 2 = mildly hypokinetic, 3 = severely hypokinetic, 4 = akinetic, 5 = dyskinetic) LAV - Long [place]   ME LAV = 0  ME LAV = 90  ME LAV = 130   Basal Sept:1 Basal Ant:1 Basal Post:1   Mid Sept:1 Mid Ant:1 Mid Post:1   Apical Sept:2 Apical Ant:3 Basal Ant Sept:1   Basal Lat:1 Basal Inf:1 Mid Ant Sept:3   Mid Lat:1 Mid Inf:1    Apical Lat:2 Apical Inf:2        Pericardium: normal    Post Intervention Follow-up Study  Ventricular Global Function: unchanged  Ventricular Regional Function: unchanged     Valve  Function  Regurgitation  Area    Aortic no change      Mitral  2+     Tricuspid no change      Prosthetic        Complications: None  Comments: Epiaortic: cannulation and clamp sites free from plaque

## 2022-03-30 NOTE — PROGRESS NOTES
1625- arrived from [facility] on monitor, vent, IV medications (insulin, precedex, epi, doutamine, remington). Report from [address] and Sharita/Uriel EVANS. Orders from [facility] 4 no amiodarone for bradycardia 40's. 1636- patient woke abruptly and not following commands. Precedex increased and versed given. 1642- remington stopped; MAP 97.    1725- patient again waking after attempting to wean precedex. He will squeeze hands and thrash feet, but no other command following at this time. RR 40's, TV 0-600. Medicated with morphine and left precedex at 0.7 mcg. 1730- epi stopped; MAP 82, .    1742- epi restarted; MAP 61    1825- patient waking up and not command following. Continuously thrashing extremities and shaking his head back and forward. Precedex increased to 0.9 mcg (he was on this pre-op while on room air). RR 18-24.    1847- RT at bedside to half rate vent. Set at 8, patient maintaining RR 15-16, -560.    1905- dobutamine decreased to 1 mcg; CI 3.4    1912- RT at bedside to place patient on SPONT. Patient is calm on precedex 0.9 mcg, maintaining RR 16, -552.    1937- patient suctioned and extubated to 4 L nasal cannula. Patient able to cough and grunt. Following commands to squeeze hands, move feet, open eyes, and lift head. Precedex decreased at this time, will leave restraints on for safety. Dobutamine and epi stopped during this time for MAP's in high 90's. 2008- tried to call marisel Garcia back to update her. Phone ringing busy and does not prompt to leave a voicemail. 2014- precedex decreased to 0.6 mcg. 2030- patient loudly telling staff to stop moving him and touching him. He then asked \"get the (intelligble)\". 2115- marisel Garcia called for update. Questions and concerns addressed. 2215- Bedside and Verbal shift change report given to Ofe Juárez RN (oncoming nurse) by Oliver Rodriguez RN (offgoing nurse).  Report included the following information SBAR, Recent Results and Cardiac Rhythm SR.

## 2022-03-30 NOTE — PROGRESS NOTES
0730 Bedside and Verbal shift change report given to OSKAR Merrill (oncoming nurse) by Yuniel Saravia (offgoing nurse). Report included the following information SBAR, Kardex, Intake/Output, MAR and Cardiac Rhythm Sinus [place]one Appl. 0800 Dr. Celestino Clark and Maday Ayala NP at bedside. Plan for 2nd case CABG.    0900 Precedex turned off. Patient drowsy but pleasant. Report given to Pre-Op Holding, Rafaela Chatman, [facility] Per Kalen Mei RN in [address] [place] [address], anesthesia to come to bedside to place a-line rather than transfer to pre-op holding. 1000 Dr. Skylar Velasco at bedside for [facility] placement.

## 2022-03-31 ENCOUNTER — APPOINTMENT (OUTPATIENT)
Dept: GENERAL RADIOLOGY | Age: 77
DRG: 235 | End: 2022-03-31
Attending: PHYSICIAN ASSISTANT
Payer: MEDICARE

## 2022-03-31 ENCOUNTER — TRANSCRIBE ORDER (OUTPATIENT)
Dept: CARDIAC REHAB | Age: 77
End: 2022-03-31

## 2022-03-31 DIAGNOSIS — Z95.1 POSTSURGICAL AORTOCORONARY BYPASS STATUS: Primary | ICD-10-CM

## 2022-03-31 LAB
ADMINISTERED INITIALS, ADMINIT: NORMAL
ALBUMIN SERPL-MCNC: 3.1 G/DL (ref 3.5–5)
ALBUMIN/GLOB SERPL: 1.4 {RATIO} (ref 1.1–2.2)
ALP SERPL-CCNC: 51 U/L (ref 45–117)
ALT SERPL-CCNC: 33 U/L (ref 12–78)
ANION GAP SERPL CALC-SCNC: 6 MMOL/L (ref 5–15)
AST SERPL-CCNC: 92 U/L (ref 15–37)
ATRIAL RATE: 63 BPM
BILIRUB SERPL-MCNC: 0.5 MG/DL (ref 0.2–1)
BUN SERPL-MCNC: 43 MG/DL (ref 6–20)
BUN/CREAT SERPL: 32 (ref 12–20)
CALCIUM SERPL-MCNC: 8.3 MG/DL (ref 8.5–10.1)
CALCULATED P AXIS, ECG09: 49 DEGREES
CALCULATED R AXIS, ECG10: -34 DEGREES
CALCULATED T AXIS, ECG11: 39 DEGREES
CHLORIDE SERPL-SCNC: 111 MMOL/L (ref 97–108)
CO2 SERPL-SCNC: 21 MMOL/L (ref 21–32)
CREAT SERPL-MCNC: 1.33 MG/DL (ref 0.7–1.3)
D50 ADMINISTERED, D50ADM: 0 ML
D50 ORDER, D50ORD: 0 ML
DIAGNOSIS, 93000: NORMAL
ERYTHROCYTE [DISTWIDTH] IN BLOOD BY AUTOMATED COUNT: 14.2 % (ref 11.5–14.5)
GLOBULIN SER CALC-MCNC: 2.2 G/DL (ref 2–4)
GLSCOM COMMENTS: NORMAL
GLUCOSE BLD STRIP.AUTO-MCNC: 101 MG/DL (ref 65–117)
GLUCOSE BLD STRIP.AUTO-MCNC: 106 MG/DL (ref 65–117)
GLUCOSE BLD STRIP.AUTO-MCNC: 110 MG/DL (ref 65–117)
GLUCOSE BLD STRIP.AUTO-MCNC: 113 MG/DL (ref 65–117)
GLUCOSE BLD STRIP.AUTO-MCNC: 115 MG/DL (ref 65–117)
GLUCOSE BLD STRIP.AUTO-MCNC: 141 MG/DL (ref 65–117)
GLUCOSE BLD STRIP.AUTO-MCNC: 98 MG/DL (ref 65–117)
GLUCOSE SERPL-MCNC: 104 MG/DL (ref 65–100)
GLUCOSE, GLC: 101 MG/DL
GLUCOSE, GLC: 106 MG/DL
GLUCOSE, GLC: 110 MG/DL
GLUCOSE, GLC: 113 MG/DL
GLUCOSE, GLC: 115 MG/DL
GLUCOSE, GLC: 98 MG/DL
HCT VFR BLD AUTO: 29.8 % (ref 36.6–50.3)
HGB BLD-MCNC: 9.8 G/DL (ref 12.1–17)
HIGH TARGET, HITG: 130 MG/DL
INSULIN ADMINSTERED, INSADM: 0 UNITS/HOUR
INSULIN ORDER, INSORD: 0 UNITS/HOUR
LOW TARGET, LOT: 95 MG/DL
MAGNESIUM SERPL-MCNC: 2.5 MG/DL (ref 1.6–2.4)
MCH RBC QN AUTO: 31 PG (ref 26–34)
MCHC RBC AUTO-ENTMCNC: 32.9 G/DL (ref 30–36.5)
MCV RBC AUTO: 94.3 FL (ref 80–99)
MINUTES UNTIL NEXT BG, NBG: 120 MIN
MINUTES UNTIL NEXT BG, NBG: 60 MIN
MULTIPLIER, MUL: 0
NRBC # BLD: 0 K/UL (ref 0–0.01)
NRBC BLD-RTO: 0 PER 100 WBC
ORDER INITIALS, ORDINIT: NORMAL
P-R INTERVAL, ECG05: 170 MS
PLATELET # BLD AUTO: 86 K/UL (ref 150–400)
PMV BLD AUTO: 12.5 FL (ref 8.9–12.9)
POTASSIUM SERPL-SCNC: 4.4 MMOL/L (ref 3.5–5.1)
PROT SERPL-MCNC: 5.3 G/DL (ref 6.4–8.2)
Q-T INTERVAL, ECG07: 420 MS
QRS DURATION, ECG06: 98 MS
QTC CALCULATION (BEZET), ECG08: 429 MS
RBC # BLD AUTO: 3.16 M/UL (ref 4.1–5.7)
SERVICE CMNT-IMP: ABNORMAL
SERVICE CMNT-IMP: NORMAL
SODIUM SERPL-SCNC: 138 MMOL/L (ref 136–145)
VENTRICULAR RATE, ECG03: 63 BPM
WBC # BLD AUTO: 8.7 K/UL (ref 4.1–11.1)

## 2022-03-31 PROCEDURE — 93005 ELECTROCARDIOGRAM TRACING: CPT

## 2022-03-31 PROCEDURE — C1751 CATH, INF, PER/CENT/MIDLINE: HCPCS

## 2022-03-31 PROCEDURE — 36415 COLL VENOUS BLD VENIPUNCTURE: CPT

## 2022-03-31 PROCEDURE — APPNB30 APP NON BILLABLE TIME 0-30 MINS: Performed by: NURSE PRACTITIONER

## 2022-03-31 PROCEDURE — 74011250636 HC RX REV CODE- 250/636: Performed by: PHYSICIAN ASSISTANT

## 2022-03-31 PROCEDURE — 97165 OT EVAL LOW COMPLEX 30 MIN: CPT

## 2022-03-31 PROCEDURE — 65610000003 HC RM ICU SURGICAL

## 2022-03-31 PROCEDURE — 82962 GLUCOSE BLOOD TEST: CPT

## 2022-03-31 PROCEDURE — 74011250637 HC RX REV CODE- 250/637: Performed by: NURSE PRACTITIONER

## 2022-03-31 PROCEDURE — 97530 THERAPEUTIC ACTIVITIES: CPT

## 2022-03-31 PROCEDURE — 97161 PT EVAL LOW COMPLEX 20 MIN: CPT

## 2022-03-31 PROCEDURE — 80053 COMPREHEN METABOLIC PANEL: CPT

## 2022-03-31 PROCEDURE — 74011250637 HC RX REV CODE- 250/637: Performed by: PHYSICIAN ASSISTANT

## 2022-03-31 PROCEDURE — 83735 ASSAY OF MAGNESIUM: CPT

## 2022-03-31 PROCEDURE — 74011250636 HC RX REV CODE- 250/636: Performed by: THORACIC SURGERY (CARDIOTHORACIC VASCULAR SURGERY)

## 2022-03-31 PROCEDURE — P9045 ALBUMIN (HUMAN), 5%, 250 ML: HCPCS | Performed by: NURSE PRACTITIONER

## 2022-03-31 PROCEDURE — 74011000250 HC RX REV CODE- 250: Performed by: PHYSICIAN ASSISTANT

## 2022-03-31 PROCEDURE — 74011250636 HC RX REV CODE- 250/636: Performed by: NURSE PRACTITIONER

## 2022-03-31 PROCEDURE — 85027 COMPLETE CBC AUTOMATED: CPT

## 2022-03-31 PROCEDURE — 77010033678 HC OXYGEN DAILY

## 2022-03-31 PROCEDURE — 99231 SBSQ HOSP IP/OBS SF/LOW 25: CPT | Performed by: CLINICAL NURSE SPECIALIST

## 2022-03-31 PROCEDURE — 97535 SELF CARE MNGMENT TRAINING: CPT

## 2022-03-31 PROCEDURE — 71045 X-RAY EXAM CHEST 1 VIEW: CPT

## 2022-03-31 RX ORDER — ALBUMIN HUMAN 50 G/1000ML
12.5 SOLUTION INTRAVENOUS ONCE
Status: COMPLETED | OUTPATIENT
Start: 2022-03-31 | End: 2022-03-31

## 2022-03-31 RX ORDER — LANOLIN ALCOHOL/MO/W.PET/CERES
6 CREAM (GRAM) TOPICAL
Status: DISCONTINUED | OUTPATIENT
Start: 2022-03-31 | End: 2022-04-04

## 2022-03-31 RX ORDER — SODIUM CHLORIDE, SODIUM LACTATE, POTASSIUM CHLORIDE, CALCIUM CHLORIDE 600; 310; 30; 20 MG/100ML; MG/100ML; MG/100ML; MG/100ML
50 INJECTION, SOLUTION INTRAVENOUS CONTINUOUS
Status: DISCONTINUED | OUTPATIENT
Start: 2022-03-31 | End: 2022-04-04

## 2022-03-31 RX ORDER — QUETIAPINE FUMARATE 25 MG/1
25 TABLET, FILM COATED ORAL 2 TIMES DAILY
Status: DISCONTINUED | OUTPATIENT
Start: 2022-03-31 | End: 2022-03-31

## 2022-03-31 RX ORDER — HALOPERIDOL 5 MG/ML
2 INJECTION INTRAMUSCULAR ONCE
Status: COMPLETED | OUTPATIENT
Start: 2022-03-31 | End: 2022-03-31

## 2022-03-31 RX ORDER — HALOPERIDOL 5 MG/ML
3 INJECTION INTRAMUSCULAR
Status: DISCONTINUED | OUTPATIENT
Start: 2022-03-31 | End: 2022-04-20 | Stop reason: HOSPADM

## 2022-03-31 RX ORDER — QUETIAPINE FUMARATE 25 MG/1
50 TABLET, FILM COATED ORAL 2 TIMES DAILY
Status: DISCONTINUED | OUTPATIENT
Start: 2022-03-31 | End: 2022-04-03

## 2022-03-31 RX ORDER — HALOPERIDOL 5 MG/ML
3 INJECTION INTRAMUSCULAR
Status: DISCONTINUED | OUTPATIENT
Start: 2022-03-31 | End: 2022-03-31

## 2022-03-31 RX ORDER — QUETIAPINE FUMARATE 25 MG/1
25 TABLET, FILM COATED ORAL DAILY
Status: DISCONTINUED | OUTPATIENT
Start: 2022-04-01 | End: 2022-04-01

## 2022-03-31 RX ADMIN — ACETAMINOPHEN 1000 MG: 500 TABLET ORAL at 12:15

## 2022-03-31 RX ADMIN — SODIUM CHLORIDE, POTASSIUM CHLORIDE, SODIUM LACTATE AND CALCIUM CHLORIDE 75 ML/HR: 600; 310; 30; 20 INJECTION, SOLUTION INTRAVENOUS at 17:14

## 2022-03-31 RX ADMIN — CEFAZOLIN SODIUM 2 G: 1 INJECTION, POWDER, FOR SOLUTION INTRAMUSCULAR; INTRAVENOUS at 02:19

## 2022-03-31 RX ADMIN — CEFAZOLIN SODIUM 2 G: 1 INJECTION, POWDER, FOR SOLUTION INTRAMUSCULAR; INTRAVENOUS at 16:43

## 2022-03-31 RX ADMIN — MUPIROCIN: 20 OINTMENT TOPICAL at 17:38

## 2022-03-31 RX ADMIN — HALOPERIDOL LACTATE 2 MG: 5 INJECTION, SOLUTION INTRAMUSCULAR at 16:21

## 2022-03-31 RX ADMIN — DEXMEDETOMIDINE HYDROCHLORIDE 0.4 MCG/KG/HR: 4 INJECTION, SOLUTION INTRAVENOUS at 16:57

## 2022-03-31 RX ADMIN — DEXMEDETOMIDINE HYDROCHLORIDE 0.7 MCG/KG/HR: 4 INJECTION, SOLUTION INTRAVENOUS at 22:07

## 2022-03-31 RX ADMIN — CHLORHEXIDINE GLUCONATE 10 ML: 1.2 RINSE ORAL at 08:57

## 2022-03-31 RX ADMIN — QUETIAPINE FUMARATE 50 MG: 25 TABLET ORAL at 17:41

## 2022-03-31 RX ADMIN — AMIODARONE HYDROCHLORIDE 400 MG: 200 TABLET ORAL at 17:44

## 2022-03-31 RX ADMIN — QUETIAPINE FUMARATE 25 MG: 25 TABLET ORAL at 12:15

## 2022-03-31 RX ADMIN — HALOPERIDOL LACTATE 3 MG: 5 INJECTION, SOLUTION INTRAMUSCULAR at 23:24

## 2022-03-31 RX ADMIN — HALOPERIDOL LACTATE 3 MG: 5 INJECTION, SOLUTION INTRAMUSCULAR at 13:34

## 2022-03-31 RX ADMIN — ATORVASTATIN CALCIUM 40 MG: 40 TABLET, FILM COATED ORAL at 21:52

## 2022-03-31 RX ADMIN — ACETAMINOPHEN 1000 MG: 500 TABLET ORAL at 07:05

## 2022-03-31 RX ADMIN — FAMOTIDINE 20 MG: 20 TABLET, FILM COATED ORAL at 21:52

## 2022-03-31 RX ADMIN — ALBUMIN (HUMAN) 12.5 G: 12.5 INJECTION, SOLUTION INTRAVENOUS at 15:00

## 2022-03-31 RX ADMIN — MUPIROCIN: 20 OINTMENT TOPICAL at 08:57

## 2022-03-31 RX ADMIN — Medication 6 MG: at 21:54

## 2022-03-31 RX ADMIN — POLYETHYLENE GLYCOL 3350 17 G: 17 POWDER, FOR SOLUTION ORAL at 08:56

## 2022-03-31 RX ADMIN — CHLORHEXIDINE GLUCONATE 10 ML: 1.2 RINSE ORAL at 22:03

## 2022-03-31 RX ADMIN — SODIUM CHLORIDE, PRESERVATIVE FREE 10 ML: 5 INJECTION INTRAVENOUS at 07:01

## 2022-03-31 RX ADMIN — SENNOSIDES AND DOCUSATE SODIUM 1 TABLET: 50; 8.6 TABLET ORAL at 08:57

## 2022-03-31 RX ADMIN — SODIUM CHLORIDE, PRESERVATIVE FREE 10 ML: 5 INJECTION INTRAVENOUS at 22:03

## 2022-03-31 RX ADMIN — OXYCODONE 10 MG: 5 TABLET ORAL at 04:24

## 2022-03-31 RX ADMIN — DEXMEDETOMIDINE HYDROCHLORIDE 0.8 MCG/KG/HR: 4 INJECTION, SOLUTION INTRAVENOUS at 17:37

## 2022-03-31 RX ADMIN — ASPIRIN 81 MG CHEWABLE TABLET 81 MG: 81 TABLET CHEWABLE at 08:57

## 2022-03-31 RX ADMIN — CEFAZOLIN SODIUM 2 G: 1 INJECTION, POWDER, FOR SOLUTION INTRAMUSCULAR; INTRAVENOUS at 07:40

## 2022-03-31 NOTE — PROGRESS NOTES
Bedside shift change report given to Owen Noonan (oncoming nurse) by Reginaldo Bentley (offgoing nurse). Report included the following information SBAR, Intake/Output, MAR, Recent Results and Cardiac Rhythm NSR.     1030- Pt asking where his cell phone is. Called pt's Son Diana Rodrigues who states his sister Clifford Cortes has his phone and wedding band. Both will be coming to visit pt today. Provided pt with an update. 1310- Pt trying to get out of the chair and attempting to pull his chest tubes out. Pt pulled his PIV and was not following nursing staff commands. Pt attempted to bite nursing staff. PRN Haldol given. 5- MD Lorri at bedside to evaluate the pt. No orders received. 1411- Pt now hypotensive sys 60's. Pt in recliner, laid pt back in chair and elevated his feet. BP improving. Message sent to ELI Murrieta. Will wait for orders. 1420- NP Juventino Mcdowell at bedside to evaluate pt. Pt's BP returned to normal range at this time. No orders received. Will continue to monitor the pt. 1430- Lift team,PT/OT and nursing staff transferred pt from recliner to bed. Verbals to give pt an Albumin. 1620- RN came to pt's room and found pt trying to pull monitoring equipment and chest tubes out. Tried to redirect pt without success. ELI Mcdowell gave verbal order for one time 2mg haldol. 1630- NP Lucy Murrieta at bedside to evaluate the pt. States if pt becomes agitated again to start Precedex. 1657- Precedex started at 0.4. Pt still very agitated. 1810- Verbal order from Lucy Murrieta NP for a sitter. Placed and charge RN notified. 1900- Pt became hypotensive. Elevated pt's feet and turned dex down to 0.8. Message sent ICU MD. No verbal orders given    2000- Bedside shift change report given to [address] [facility] Stella Pruett (oncoming nurse) by Owen Noonan (offgoing nurse). Report included the following information SBAR, Procedure Summary, Intake/Output, MAR, Recent Results and Cardiac Rhythm NSR.

## 2022-03-31 NOTE — DIABETES MGMT
[facility]    CLINICAL NURSE SPECIALIST CONSULT     Initial Presentation   Michelle Nixon is a 64 y.o. male admitted from [facility] s/p cath which showed 100% occlusion of LAD and RCA. Had presented to [facility] ED with persistent chest pain. Transferred to [facility] 3/30/22 for surgical intervention and CABG. HX:   Past Medical History:   Diagnosis Date    Hypertension     Ill-defined condition     hypothyroid        INITIAL DX:   CAD (coronary artery disease) [I25.10]     Current Treatment     TX: CABG x 3    Consulted by Provider for advanced diabetes nursing assessment and care for:   [x] Transitioning off Wilene Pen   [x] Inpatient management strategy    Hospital Course   Clinical progress has been uncomplicated by thus far with exception of noted confusion to place/time/situation post op. Diabetes History   A1C 5.4%- NO diabetes     Diabetes-related Medical History  Macrovascular disease  CAD  Other associated conditions     HTN/hypothyroid    Diabetes Medication History  Key Antihyperglycemic Medications     Patient is on no antihyperglycemic meds. Subjective   Nursing working with patient - appears confused and need re-directing from staff to re-orient. Objective   Physical exam  General Normal weight male in no acute distress. Conversant and cooperative  Neuro  Alert, to self/ had to be re-oriented to place/time  Vital Signs   Visit Vitals  /61   Pulse 91   Temp 98.5 °F (36.9 °C)   Resp 25   Ht 5' 8\" (1.727 m)   Wt 74.5 kg (164 lb 3.9 oz)   SpO2 98%   BMI 24.97 kg/m²     Skin  Warm and dry. Acanthosis noted along neckline. No lipohypertrophy or lipoatrophy noted at injection sites   Heart   Regular rate and rhythm.  No murmurs, rubs or gallops  Lungs  Clear to auscultation without rales or rhonchi  Extremities No foot wounds        Laboratory  Recent Labs     03/31/22  0452 03/30/22  2049 03/30/22  1646 03/30/22  0307 03/30/22  0307   * 92 126*   < > 101*   AGAP 6 5 7   < > 10   WBC 8.7  --  15.3*  --  10.6   CREA 1.33* 1.28 1.40*   < > 1.35*   GFRNA 52* 54* 49*   < > 51*   AST 92* 109* 134*   < > 188*   ALT 33 41 46   < > 62    < > = values in this interval not displayed. Factors impacting BG management  Factor Dose Comments   Nutrition:  Standard meals- clears post op         Drugs:  Vasopressor load     Weaned off pressors POD1     Affects insulin delivery     Pain Post op    Infection NA    Other:   Kidney function  Liver function     GFR 52  /134/109/92   ALT WNL      Blood glucose pattern      Significant diabetes-related events over the past 24-72 hours  NO diabetes, A1C 5.4%  Requiring <10 unit s from insulin infusion since post op ,   Noted pre op lab     Assessment and Plan   Nursing Diagnosis Risk for unstable blood glucose pattern   Nursing Intervention Domain 5250 Decision-making Support   Nursing Interventions Examined current inpatient diabetes/blood glucose control   Explored factors facilitating and impeding inpatient management  Explored corrective strategies with patient and responsible inpatient provider   Informed patient of rational for insulin strategy while hospitalized     Nursing Diagnosis 00687 Ineffective Health Management   Nursing Intervention Domain 5250 Decision-makingSupport   Nursing Interventions Identified diabetes self-management practices impeding diabetes control  Discussed diabetes survival skills related to  1. Healthy Plate eating plan; given handouts  2. Role of physical activity in improving insulin sensitivity and action  3. Procedure for blood glucose monitoring & options for low-cost products available from [facility]   4. Medications plan at discharge     Evaluation   Carmen Cea is a 66yo  male who is s/p CABG x3 after presenting with persistent chest pain , subsequent STEMI with cath revealing 100% occlusion to LAD and RCA. No PMH of diabetes and A1C 5.4%.   Since patient is NOT diabetic/ pre-diabetic. Anticipate patient will not require additional insulin other than correctional after insulin infusion discontinued. Recommendations   1. Insulin infusion per post-operative protocol    2. Give Lantus 2 hrs prior transitioning off insulin gtt    3. Blood glucose monitoring ACHS once off insulin infusion. If glucose within goal, ok to trend on am labs and d/c correctional insulin. Billing Code(s)   13873    Before making these care recommendations, I personally reviewed the hospitalization record, including notes, laboratory & diagnostic data and current medications, and examined the patient at the bedside (circumstances permitting) before making care recommendations. More than fifty (50) percent of the time was spent in patient counseling and/or care coordination.   Total minutes: 7400 JEEVAN Dodd  Diabetes Clinical Nurse Specialist  [facility]  Access via [employer]

## 2022-03-31 NOTE — PROGRESS NOTES
34 Problem: Self Care Deficits Care Plan (Adult)  Goal: *Acute Goals and Plan of Care (Insert Text)  Description: FUNCTIONAL STATUS PRIOR TO ADMISSION: Patient was independent and active without use of DME. 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 Patient with mild cognitive impairments and unclear if he was still doing these activities. 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 Recently patient's wife passed. Occupational Therapy Goals  Initiated 3/31/2022  1. Patient will perform ADLs standing 5 mins without fatigue or LOB with supervision/set-up within 5 day(s). 2.  Patient will perform lower body ADLs with supervision/set-up within 5 day(s). 3.  Patient will perform gathering ADL items high and low 2/2 with supervision/set-up within 5 day(s). 4.  Patient will perform toilet transfers with supervision/set-up within 5 day(s). 5.  Patient will perform all aspects of toileting with supervision/set-up within 5 day(s). 6.  Patient will participate in cardiac/sternal upper extremity therapeutic exercise/activities to increase independence with ADLs with supervision/set-up for 5 minutes within 5 day(s). Outcome: Not Met   OCCUPATIONAL THERAPY EVALUATION  Patient: Dutch Bello (23 y.o. male)  Date: 3/31/2022  Primary Diagnosis: CAD (coronary artery disease) [I25.10]  Procedure(s) (LRB):  CORONARY ARTERY BYPASS GRAFT (CABG) X3, LIMA. RSVH VIA EVH. ECC. LAWSON AND EPI AORTIC US BY DR Jovon Meza (N/A) 1 Day Post-Op   Precautions:  Fall,Sternal,Other (comment) (move in the tube)    ASSESSMENT  Based on the objective data described below, the patient presents with impaired strength, cognition, and ability to complete ADL/iADL at baseline. Patient received reclined in chair, amenable to session. Patient orientedx3 however insisting he had surgery yesterday and will have one today.  Educated on condition, patient verbalized understanding however continued to benefit from orientation to situation throughout session. Patient has fair strength throughout, able to complete sit to stand transfer without UE support or assist. Reported having prior knee issues however unable to provide further information (noted knee immobilizer at bedside, per son later after session was likely given at [facility] as he did not have it at home). Able to complete half cardiac UE exercises seated and half standing with VSS throughout. Patient left seated in bedside chair, all needs in reach, NAD. Patient will likely benefit from [place] upon discharge pending further progress. Patient is not verbalizing and is not demonstrating understanding of mindful-based movements (\"move in the tube\") principles of keeping UEs proximal to ribcage to prevent lateral pull on the sternum during load-bearing activities with verbal cues required for compliance. Current Level of Function Impacting Discharge (ADLs/self-care): up to mod A ADL    Functional Outcome Measure: The patient scored 65/100 on the Barthel Index outcome measure. Other factors to consider for discharge: below baseline, new sternotomy, ? historian     Patient will benefit from skilled therapy intervention to address the above noted impairments. PLAN :  Recommendations and Planned Interventions: self care training, functional mobility training, therapeutic exercise, balance training, therapeutic activities, cognitive retraining, endurance activities, patient education, home safety training, and family training/education    Frequency/Duration: Patient will be followed by occupational therapy 5 times a week to address goals.     Recommendation for discharge: (in order for the patient to meet his/her long term goals)  To be determined:  vs rehab pending progress    This discharge recommendation:  Has not yet been discussed the attending provider and/or case management    IF patient discharges home will need the following DME: TBD pending progress       SUBJECTIVE:   Patient stated Elsie Montana had one of these belts, it was for martial arts though.     OBJECTIVE DATA SUMMARY:   HISTORY:   Past Medical History:   Diagnosis Date    Hypertension     Ill-defined condition     hypothyroid     Past Surgical History:   Procedure Laterality Date    HX APPENDECTOMY         Expanded or extensive additional review of patient history:     Home Situation  Home Environment: Private residence  # Steps to Enter: 10  Rails to Enter: Yes  One/Two Story Residence: Two story  # of Interior Steps: 16  Living Alone: Yes (son lives close/ same property)  Support Systems: Child(jeffery)  Patient Expects to be Discharged to[de-identified] Home with home health  Current DME Used/Available at Home: None    Hand dominance: Right    EXAMINATION OF PERFORMANCE DEFICITS:  Cognitive/Behavioral Status:  Neurologic State: Alert  Orientation Level: Disoriented to situation;Oriented to time;Oriented to person;Oriented to place  Cognition: Follows commands; Impulsive  Perception: Appears intact  Perseveration: No perseveration noted  Safety/Judgement: Decreased insight into deficits; Decreased awareness of need for assistance;Decreased awareness of need for safety    Skin: Surgical dressing C/D/I    Edema: none noted    Hearing: Auditory  Auditory Impairment: None    Vision/Perceptual:                           Acuity: Impaired near vision; Impaired far vision    Corrective Lenses: Glasses    Range of Motion:  AROM: Generally decreased, functional                         Strength:  Strength: Generally decreased, functional                Coordination:  Coordination: Generally decreased, functional  Fine Motor Skills-Upper: Left Intact; Right Intact    Gross Motor Skills-Upper: Left Intact; Right Intact    Tone & Sensation:  Tone: Normal                         Balance:  Sitting: Intact; Without support  Standing: Impaired; Without support  Standing - Static: Fair;Occasional  Standing - Dynamic : Fair;Occasional    Functional Mobility and Transfers for ADLs:  Bed Mobility:       Transfers:  Sit to Stand: Contact guard assistance;Assist x1;Minimum assistance  Stand to Sit: Contact guard assistance;Assist x1;Minimum assistance    ADL Assessment:  Feeding: Independent    Oral Facial Hygiene/Grooming: Supervision    Bathing: Minimum assistance    Upper Body Dressing: Minimum assistance    Lower Body Dressing: Moderate assistance    Toileting: Moderate assistance                ADL Intervention and task modifications:  Feeding  Food to Mouth: Independent  Drink to Mouth: Independent                   Upper [address]: Minimum  assistance    Lower Body Dressing Assistance  Socks: Minimum assistance  Leg Crossed Method Used: Yes  Position Performed: Seated in chair         Cognitive Retraining  Safety/Judgement: Decreased insight into deficits; Decreased awareness of need for assistance;Decreased awareness of need for safety    Patient instructed and educated on mindful movement principles based on Move in The Tube concept to include maintaining bilateral elbows close to rib cage when performing any load-bearing activity such as getting in/out of bed, pushing up from a chair, opening a door, or lifting a box. Patient was given a handout with diagrams of each correct/incorrect method of performing each of the above tasks. Patient instructed on the ability to utilize upper extremities outside the tube when doing any non-load bearing activity such as washing hair/body, brushing teeth, retrieving clothing items, or scratching your back. Patient encouraged to also perform upper extremity exercises \"outside of the tube\" to prevent scar tissue formation around sternal incision site. Patient instructed in detail about activities to heed with caution, allowing pain to be the guide.  These activities include but are not limited to: mowing the lawn, riding a bike, walking a dog, lifting a child, workshop hobbies, golfing, sexual activity, vacuuming, fishing, scrubbing the floors, and moving furniture. Patient was given the [address] in the [place] handout to describe each of these activities in detail. Patient instructed no asymmetrical reaching over head to ensure B UEs when shoulders >90* i.e. reaching in cabinets and dressing. Instruction on upper body dressing techniques of over head, then arms through to decrease pain and unilateral shoulder flexion >90*. Instruction on the benefits of utilizing B UEs during functional tasks i.e. opening the fridge, stepping into the tub. Instruction if continued pain at home with shoulder IR for BM hygiene can use wet wipes and toilet tongs PRN. Avoid valsalva maneuvers. Increase activity tolerance for home, work, and sexual intercourse by pacing self with increasing the arm exercises, sitting duration, frequency OOB, walking, standing, and ADLs. Instructed and indicated understanding of s/s of too much activity, how to respond to s/s safely. Therapeutic Exercises:   Patient instructed on the benefits and demonstrated cardiac exercises while seated/standing with Minimum assistance. Instructed and indicated understanding on how to progress reps, sets against gravity, pacing through progressive muscle strengthening standing based on surgeon clearance for more weight in prep for basic and instrumental ADLs. Instruction on the use of household items in place of weights as needed.     CARDIAC   EXERCISE    Sets    Reps    Active  Active Assist    Passive  Self ROM    Comments    Shoulder flexion  1  5   [x]                            []                             []                             []                                Shoulder abduction  1  5  [x]                             []                             []                             []                                Scapular elevation  1  5  [x] []                              []                             []                                Scapular retraction  1  5  [x]                             []                             []                             []                                Trunk rotation  1  5  [x]                             []                             []                             []                                Trunk sidebending  1  5  [x]                             []                              []                             []                                        Functional Measure:    Barthel Index:  Bathin  Bladder: 10  Bowels: 10  Groomin  Dressin  Feedin  Mobility: 10  Stairs: 5  Toilet Use: 5  Transfer (Bed to Chair and Back): 10  Total: 65/100      The Barthel ADL Index: Guidelines  1. The index should be used as a record of what a patient does, not as a record of what a patient could do. 2. The main aim is to establish degree of independence from any help, physical or verbal, however minor and for whatever reason. 3. The need for supervision renders the patient not independent. 4. A patient's performance should be established using the best available evidence. Asking the patient, friends/relatives and nurses are the usual sources, but direct observation and common sense are also important. However direct testing is not needed. 5. Usually the patient's performance over the preceding 24-48 hours is important, but occasionally longer periods will be relevant. 6. Middle categories imply that the patient supplies over 50 per cent of the effort. 7. Use of aids to be independent is allowed. Score Interpretation (from [address] [address])    Independent   60-79 Minimally independent   40-59 Partially dependent   20-39 Very dependent   <20 Totally dependent     -Astrid Roblero., Barthel, D.W. (1965). Functional evaluation: the Barthel Index.  [address] [facility] [facility] [address], TIMOTHY (1997). The Barthel activities of daily living index: self-reporting versus actual performance in the old (> or = 75 years). Journal [address]), [facility], GAVIN, Padilla Núñez., Tati Cano. (1999). Measuring the change in disability after inpatient rehabilitation; comparison of the responsiveness of the Barthel Index and Functional [place] Measure. Journal of Neurology, Neurosurgery, and Psychiatry, 66(4), 248-187. SHASTA Austin, KIP Srinivasan, & Jeniffer Maguire M.A. (2004) Assessment of post-stroke quality of life in cost-effectiveness studies: The usefulness of the Barthel Index and the EuroQoL-5D. Quality of Life Research, 15, 146-76       Occupational Therapy Evaluation Charge Determination   History Examination Decision-Making   LOW Complexity : Brief history review  LOW Complexity : 1-3 performance deficits relating to physical, cognitive , or psychosocial skils that result in activity limitations and / or participation restrictions  LOW Complexity : No comorbidities that affect functional and no verbal or physical assistance needed to complete eval tasks       Based on the above components, the patient evaluation is determined to be of the following complexity level: LOW   Pain Rating:  None reported    Activity Tolerance:   Fair and requires rest breaks    After treatment patient left in no apparent distress:    Sitting in chair and Call bell within reach    COMMUNICATION/EDUCATION:   The patients plan of care was discussed with: Physical therapist and Registered nurse. Home safety education was provided and the patient/caregiver indicated understanding., Patient/family have participated as able in goal setting and plan of care. , and Patient/family agree to work toward stated goals and plan of care. This patients plan of care is appropriate for delegation to [facility].     Thank you for this referral.  May Patient, OT  Time Calculation: 24 mins

## 2022-03-31 NOTE — PROGRESS NOTES
ICU TEAM History and Physical    Name: Lino Fox   : 1945   MRN: [id number]   Date: 3/31/2022      Assessment:   Reason for ICU Admission: STEMI    HPI: 63 yo M with h/o CAD, HTN, presented to the ED for continued evaluation of chest pain with STEMI. Pt was previously [facility] at [facility] yesterday for chest pain - cath showed 100% occlusion of LAD and RCA. Transferred to [address] [place] [place] [address] for evaluation and management. Cath with CAD lesions not amenable to PCI. Decision made to proceed with CABG (3/30/2022). ECHO:3/28/22   Left Ventricle Left ventricle size is normal. Moderately increased wall thickness. See diagram for wall motion findings. Moderately reduced left ventricular systolic function with a visually estimated EF of 35 - 40%. Indeterminate diastolic function. Left Atrium Left atrium is mildly dilated. Interatrial Septum No interatrial shunt visualized on color Doppler. Right Ventricle Right ventricle size is normal. Normal wall thickness. Normal systolic function. Right Atrium Right atrium size is normal.   Aortic Valve Valve structure is normal. Moderately calcified cusp. Sclerosis of the aortic valve cusp. No transvalvular regurgitation. Mild stenosis of the aortic valve. AV mean gradient is 16 mmHg. Mitral Valve Mild annular calcification of the mitral valve. No transvalvular regurgitation. No stenosis noted. Tricuspid Valve Valve structure is normal. Mild transvalvular regurgitation. No stenosis noted. Pulmonic Valve The pulmonic valve was not well visualized. No transvalvular regurgitation. No stenosis noted. Aorta Normal sized ascending aorta. Mildly dilated aortic root. IVC/Hepatic Veins IVC diameter is less than or equal to 21 mm and decreases greater than 50% during inspiration; therefore the estimated right atrial pressure is normal (~3 mmHg). IVC size is normal.   Pericardium No pericardial effusion. Plan:     Neuro: A&O.   - Pain management PRN.   - Delirium with agitation: Melatonin, Precedex, Haldol, Seroquel    Pulm:   - Titrate supplemental O2 to keep SpO2>93%. Cardiac:   - CABG today (3/30/22)  - Con't heparin infusion  - NTG infusion PRN for CP and SBP <160. - Beta-blocker.  - GDMT. Renal:   - Monitor UOP. - Replace e-lytes PRN. GI:   - NPO. ID:   - Trend WBC. Heme:   - Check H/H.  - Transfuse to keep Hb>8.0    Endo:   - Hypothyroidism on Synthroid    MSK:  - OOB. F - Feeding:  No   A - Analgesia: Oxycodone and Acetaminophen  S - Sedation: None  T - DVT Prophylaxis: SCD's or Sequential Compression Device and Heparin   C - Code Status: Full Code  H - Head of Bed: > 30 Degrees  U - Ulcer Prophylaxis: Not at this time   G - Glycemic Control: Insulin  S - Spontaneous Breathing Trial: N/A  B - Bowel Regimen: None needed at this time  I - Indwelling Catheter:   Tubes: None  Lines: Peripheral IV  Drains: None  D - De-escalation of Antibiotics:       Active Problem List:     Problem List  Never Reviewed          Codes Class    S/P CABG x 3 ICD-10-CM: Z95.1  ICD-9-CM: V45.81     Overview Signed 3/30/2022  3:45 PM by LUIS Gonzalez     On pump CORONARY ARTERY BYPASS GRAFT (CABG) X3, LIMA to LAD, RSVH to PDA, RSVG to Ramus  R GSV EVH               CAD (coronary artery disease) ICD-10-CM: I25.10  ICD-9-CM: 414.00         NSTEMI (non-ST elevated myocardial infarction) ([facility] [address] ICD-10-CM: I21.4  ICD-9-CM: 410.70         Chest pain ICD-10-CM: R07.9  ICD-9-CM: 786.50         Hypothyroidism ICD-10-CM: E03.9  ICD-9-CM: 244.9         Hypokalemia ICD-10-CM: E87.6  ICD-9-CM: 276.8         HTN (hypertension), benign ICD-10-CM: I10  ICD-9-CM: 401.1               Past Medical History:    has a past medical history of Hypertension and Ill-defined condition. Past Surgical History:    has a past surgical history that includes hx appendectomy. Home Medications:     Prior to Admission medications    Medication Sig Start Date End Date Taking?  Authorizing Provider   levothyroxine (SYNTHROID) 75 mcg tablet Take 75 mcg by mouth Daily (before breakfast). Other, MD Ana       Allergies/Social/Family History:   No Known Allergies   Social History     Tobacco Use    Smoking status: Never Smoker    Smokeless tobacco: Never Used   Substance Use Topics    Alcohol use: Yes      Family History   Family history unknown: Yes       Review of Systems:   A comprehensive review of systems was negative except for that written in the HPI. Objective:     Visit Vitals  /73   Pulse 66   Temp 98.7 °F (37.1 °C)   Resp 16   Ht 5' 8\" (1.727 m)   Wt 74.5 kg (164 lb 3.9 oz)   SpO2 96%   BMI 24.97 kg/m²    O2 Flow Rate (L/min): 4 l/min O2 Device: Nasal cannula Temp (24hrs), Av.6 °F (37.6 °C), Min:97.8 °F (36.6 °C), Max:100.6 °F (38.1 °C)    CVP (mmHg): 14 mmHg (22 0600)    Intake/Output:     Intake/Output Summary (Last 24 hours) at 3/31/2022 0803  Last data filed at 3/31/2022 0700  Gross per 24 hour   Intake 1744.12 ml   Output 2650 ml   Net -905.88 ml       Physical Exam:  General:  Alert, cooperative, well noursished, well developed, appears stated age   Eyes:  Sclera anicteric. Pupils equally round and reactive to light. Mouth/Throat: Mucous membranes normal, oral pharynx clear   Neck: Supple   Lungs:   Clear to auscultation bilaterally, good effort   CV:  Regular rate and rhythm,no murmur, click, rub or gallop   Abdomen:   Soft, non-tender. bowel sounds normal. non-distended   Extremities: No cyanosis or edema   Skin: Skin color, texture, turgor normal. no acute rash or lesions   Lymph nodes: Cervical and supraclavicular normal   Musculoskeletal: No swelling or deformity   Lines/Devices:  Intact, no erythema, drainage or tenderness   Psych: Alert and oriented, normal mood affect given the setting       Labs & Data: Reviewed    Medications: Reviewed    Chest X-Ray:    TTE:    Multidisciplinary Rounds Completed:   Yes    ABCDEF Bundle/Checklist Completed: Yes    SPECIAL EQUIPMENT: None    DISPOSITION: Stay in ICU    CRITICAL CARE CONSULTANT NOTE  I had a face to face encounter with the patient, reviewed and interpreted patient data including clinical events, labs, images, vital signs, I/O's, and examined patient. I have discussed the case and the plan and management of the patient's care with the consulting services, the bedside nurses and the respiratory therapist.      NOTE OF PERSONAL INVOLVEMENT IN CARE   This patient has a high probability of imminent, clinically significant deterioration, which requires the highest level of preparedness to intervene urgently. I participated in the decision-making and personally managed or directed the management of the following life and organ supporting interventions that required my frequent assessment to treat or prevent imminent deterioration. I personally spent 45 minutes of critical care time. This is time spent at this critically ill patient's bedside actively involved in patient care as well as the coordination of care and discussions with the patient's family. This does not include any procedural time which has been billed separately.     Lelo Stokes DO  Staff Intensivist/Anesthesiologist  [facility]  3/31/2022

## 2022-03-31 NOTE — CARDIO/PULMONARY
Cardiac Rehab: Met with Cinthia Morocho and his son. Discussed recent events and he is still shocked at his recent health issues. Discussed the cardiac rehab program, format, and benefits. Encouraged enrollment. His son works at [employer] so it took some convincing that cardiac rehab would be the first step, before exercising at a gym. He did confirm that [facility] cardiac rehab would be his preference so their contact information is on the AVS. Referral initiated. Red reminder bracelet placed on right wrist. Discussed purpose and duration of wear. Cinthia Morocho has no additional questions at this time.  Igor Powell RN

## 2022-03-31 NOTE — PROGRESS NOTES
Physician Progress Note      Malissa Culp  CSN #:                  [id number]  :                       1945  ADMIT DATE:       3/29/2022 12:07 AM  DISCH DATE:  RESPONDING  PROVIDER #:        Yasemin Lang NP          QUERY TEXT:    Pt admitted with stemi  Pt noted to have AMS If possible, please document in the progress notes and discharge summary if you are evaluating and / or treating any of the following: The medical record reflects the following:  Risk Factors: post op  Clinical Indicators:  3/29 nurse note  : Pt agitated, yelling and threatening sitter stating he needs to go outside, PRN haldol given. : Pt attempting to throw chairs and kick staff, precedex drip ordered per Zaida Garcia NP.  : Pt agitated, kicking, yelling, threatening staff. Pt kicked sitter across room and into table, supervisors notified. Dr. Te Figueroa at bedside, orders for restraints, to give extra does of haldol and to max precedex received. : Pt violent and refusing medications, educated on the importanced of pre-op med to no avail. 0000: Pt hypotensive and bradycardic, precedex stopped and  notified. 0100: MAP remaining in 50's,  notified, levo started. 0300: Pt agitated, precedex restarted. 0600: Levo stopped. Treatment: haldol, restraights, high fall risk precautions  Thank you,  Mulu Garza RN, CDI, [facility], operated by [facility], [address]  Certified Clinical   [phone]  Options provided:  -- Drug-induced encephalopathy due to anesthesia superimposed upon dementia  -- Metabolic encephalopathy  superimposed upon dementia  -- Delirium due to, Please specify cause.   -- dementia only  -- Other - I will add my own diagnosis  -- Disagree - Not applicable / Not valid  -- Disagree - Clinically unable to determine / Unknown  -- Refer to Clinical Documentation Reviewer    PROVIDER RESPONSE TEXT:    Patient has delirium due to Acute delirium on presumed chronic dementia    Query created by: Edwar Espinal on 3/31/2022 12:20 PM      Electronically signed by:  Waldo Villaseñor NP 3/31/2022 12:53 PM

## 2022-03-31 NOTE — ANESTHESIA POSTPROCEDURE EVALUATION
Procedure(s):  CORONARY ARTERY BYPASS GRAFT (CABG) X3, LIMA. RSVH VIA EVH. ECC. LAWSON AND EPI AORTIC US BY DR Katrina Olivas. general    Anesthesia Post Evaluation        Patient location during evaluation: PACU  Note status: Adequate. Level of consciousness: responsive to verbal stimuli and sleepy but conscious  Pain management: satisfactory to patient  Airway patency: patent  Anesthetic complications: no  Cardiovascular status: acceptable  Respiratory status: acceptable  Hydration status: acceptable  Comments: +Post-Anesthesia Evaluation and Assessment    Patient: Malik Baires MRN: [id number]  SSN: [id number]   YOB: 1945  Age: 64 y.o. Sex: male          Cardiovascular Function/Vital Signs    /61   Pulse 73   Temp 37 °C (98.6 °F)   Resp 19   Ht 5' 8\" (1.727 m)   Wt 74.5 kg (164 lb 3.9 oz)   SpO2 98%   BMI 24.97 kg/m²     Patient is status post Procedure(s):  CORONARY ARTERY BYPASS GRAFT (CABG) X3, LIMA. RSVH VIA EVH. ECC. LAWSON AND EPI AORTIC US BY DR Katrina Olivas. Nausea/Vomiting: Controlled. Postoperative hydration reviewed and adequate. Pain:  Pain Scale 1: Numeric (0 - 10) (03/31/22 0800)  Pain Intensity 1: 2 (03/31/22 0800)   Managed. Neurological Status: At baseline. Mental Status and Level of Consciousness: Arousable. Pulmonary Status:   O2 Device: Nasal cannula (03/31/22 0400)   Adequate oxygenation and airway patent. Complications related to anesthesia: None    Post-anesthesia assessment completed. No concerns. I have evaluated the patient and the patient is stable and ready to be discharged from PACU . Signed By: Chung Thakkar MD    3/31/2022        INITIAL Post-op Vital signs:   Vitals Value Taken Time   /61 03/31/22 0900   Temp 37 °C (98.6 °F) 03/31/22 0800   Pulse 89 03/31/22 1025   Resp 27 03/31/22 1025   SpO2 99 % 03/31/22 0945   Vitals shown include unvalidated device data.

## 2022-03-31 NOTE — PROGRESS NOTES
Follow up visit in Room 4337. Patient was lying in bed awake, but appeared to be slightly confused. His son Mehrdad was present during the visit. Mehrdad shared his happiness that surgery went well. No spiritual needs noted. Charted review. Provided empathic listening, encourage self-care, and cultivated a relationship of support. Advised of  availability. Visited by: Elizabeth Roque.  Sharon Perez, [address] [place] [address] paging Service [phone] ([phone])

## 2022-03-31 NOTE — PROGRESS NOTES
[facility] ICU Progress Note    Admit Date: 3/29/2022  POD:  1 Day Post-Op    Procedure:  Procedure(s):  CORONARY ARTERY BYPASS GRAFT (CABG) X3, LIMA. RSVH VIA EVH. ECC. LAWSON AND EPI AORTIC US BY DR Bart Novak        Subjective:   Pt seen with Dr. Darcy Abbott. Off gtts except Precedex,  On 4L NC. Pleasant but not oriented to events     Objective:   Vitals:  Blood pressure 113/73, pulse 66, temperature 98.7 °F (37.1 °C), resp. rate 16, height 5' 8\" (1.727 m), weight 164 lb 3.9 oz (74.5 kg), SpO2 96 %. Temp (24hrs), Av.6 °F (37.6 °C), Min:97.8 °F (36.6 °C), Max:100.6 °F (38.1 °C)    Hemodynamics:   CO: CO (l/min): 5 l/min   CI: CI (l/min/m2): 2.7 l/min/m2   CVP: CVP (mmHg): 14 mmHg (22 06)   SVR: SVR (dyne*sec)/cm5: 945 (dyne*sec)/cm5 (22 7179)   PAP Systolic: PAP Systolic: 42 (67/73/46 7060)   PAP Diastolic: PAP Diastolic: 16 (24/ 0630)   PVR:     SV02: SVO2 (%): 61 % (22 06)   SCV02:      EKG/Rhythm:   SR    Extubation Date / Time: 3/30 at 1937    CT Output: 410 ml     Ventilator:  Ventilator Volumes  Vt Set (ml): 490 ml (22 162)  Vt Exhaled (Machine Breath) (ml): 527 ml (22)  Ve Observed (l/min): 7.03 l/min (22)        Oxygen Therapy:  Oxygen Therapy  O2 Sat (%): 96 % (22 0700)  Pulse via Oximetry: 91 beats per minute (22)  O2 Device: Nasal cannula (22)  Skin Assessment: Clean, dry, & intact (03/31/22 0400)  Skin Protection for O2 Device: No (22 0400)  O2 Flow Rate (L/min): 4 l/min (22 0400)  O2 Temperature: 80 °F (26.7 °C) (22 1631)  FIO2 (%): 80 % (22 1629)    CXR:   CXR Results  (Last 48 hours)               22 0457  XR CHEST PORT Final result    Impression:      Slightly increased mild pulmonary edema. Stable left basilar atelectasis. Narrative:  EXAM:  XR CHEST PORT       INDICATION: Left basilar atelectasis.        COMPARISON: 3/30/2022 at 1638 hours       TECHNIQUE: Portable AP semiupright chest view at 0442 hours       FINDINGS: The endotracheal tube and enteric tube have been removed. The right IJ   pulmonary artery catheter, mediastinal drains, and left chest tube are stable. The cardiomediastinal contours are stable. Mild diffuse interstitial opacities are slightly increased. Left basilar opacity   is unchanged. There is no pleural effusion or thorax. The bones and upper   abdomen are stable. 03/30/22 1643  XR CHEST PORT Final result    Impression:  Postsurgical changes with life support lines and tubes as described. Left   basilar atelectasis. No pneumothorax. Narrative:  INDICATION: postop heart Postop, upon arrival       EXAMINATION:  AP CHEST, PORTABLE       COMPARISON: 3/29/2022       FINDINGS: Single AP portable view of the chest demonstrates interval median   sternotomy. Endotracheal tube tip in satisfactory position at the martinez. Nasogastric tube tip over the stomach. There are pleural and mediastinal drains. Right IJ [place] catheter tip over the pulmonary outflow tract. The   cardiomediastinal silhouette is unchanged. Left basilar atelectasis. No   pneumothorax.           03/29/22 1510  XR CHEST PORT Final result    Impression:  No acute abnormality. Narrative: Indication: Preoperative examination prior to CABG, history of coronary artery   disease, hypertension       Comparison to 3/28/2022. Portable exam obtained at [id number] demonstrates little   change in the lung fields compared to prior examination. No acute ab normality   is identified. Chronic degenerative changes are again seen in the shoulder   joints bilaterally. Admission Weight: Last Weight   Weight: 167 lb 1.7 oz (75.8 kg) Weight: 164 lb 3.9 oz (74.5 kg)     Intake / Output / Drain:  Current Shift: No intake/output data recorded.   Last 24 hrs.:     Intake/Output Summary (Last 24 hours) at 3/31/2022 0857  Last data filed at 3/31/2022 0700  Gross per 24 hour   Intake 1744.12 ml Output 2650 ml   Net -905.88 ml       EXAM:  General:   No obvious distress                                                                                           Lungs:   Clear to auscultation bilaterally. Incision:  No erythema, drainage or swelling. Heart:  Regular rate and rhythm, S1, S2 normal, no murmur, click, rub or gallop. Abdomen:   Soft, non-tender. Bowel sounds hypoactive. No masses,  No organomegaly. Extremities:  No edema. PPP. Neurologic:  Gross motor and sensory apparatus intact. Oriented to self, date, but not events      Labs:   Recent Labs     22  0658 22  0452 22  0421 22  1748 22  1646   WBC  --  8.7  --    < > 15.3*   HGB  --  9.8*  --    < > 11.8*   HCT  --  29.8*  --    < > 35.8*   PLT  --  86*  --    < > 130*   NA  --  138  --    < > 140   K  --  4.4  --    < > 3.7   BUN  --  43*  --    < > 36*   CREA  --  1.33*  --    < > 1.40*   GLU  --  104*  --    < > 126*   GLUCPOC 98  --    < >   < > 116   INR  --   --   --   --  1.1    < > = values in this interval not displayed. Assessment:     Active Problems:    CAD (coronary artery disease) (3/29/2022)      S/P CABG x 3 (3/30/2022)      Overview: On pump CORONARY ARTERY BYPASS GRAFT (CABG) X3, LIMA to LAD, RSVH to PDA,       RSVG to Ramus      R GSV EVH               Plan/Recommendations/Medical Decision Makin. CAD/STEMI s/p CABG:  On asa, statin,  Start BB once BP will tolerate. 2. HTN:  resume meds as BP tolerates. 3. Hypothyroid: On synthroid  4. Acute systolic CHF: LV EF 09% on LHC, intraop LV EF 45-50%. Unknown prior baseline. GDMT as tolerated. Will need TTE prior to discharge. 5. Probable Chronic dementia:  Per reports from pt's children, some agitation/outbursts noted at times. Untreated. Did discuss at length, this could complicate postop recovery. Add qhs melatonin, seroquel BID. PRN haldol for severe agitation and precedex.     6. Postop anemia s/t acute blood loss: monitor H/H and CT output  7. Thrombocytopenia:  Down to 86k, cont asa. Monitor   8. Renal insuff: Cr 1.33, within baseline. Monitor UOP  9. Mod Aortic stenosis/2+ MR: noted intraop. Monitor   10. Nutrition: advance PO as tolerated  11. GI/DVT px:  Pepcid, SCDS for today. Dispo: PT/OT. Remain in CVI for today.   dispo TBD    Signed By: Verna Badillo NP

## 2022-03-31 NOTE — PROGRESS NOTES
Renal Dosing/Monitoring  Medication: cefazolin   Current regimen:  2 gm IV every 6 hr  Recent Labs     03/31/22  0452 03/30/22  2049 03/30/22  1646   CREA 1.33* 1.28 1.40*   BUN 43* 36* 36*     Estimated CrCl:  44 ml/min  Plan: Dose adjusted to 2 gm IV q8h for crcl 35-54 per renal dosing protocol.

## 2022-03-31 NOTE — PROGRESS NOTES
Problem: Mobility Impaired (Adult and Pediatric)  Goal: *Acute Goals and Plan of Care (Insert Text)  Description: FUNCTIONAL STATUS PRIOR TO ADMISSION: Patient was independent and active without use of DME.     HOME SUPPORT PRIOR TO ADMISSION: The patient lived alone with son (lives on the property) available to provide assistance. Pt reports his wife recently passed away. Physical Therapy Goals  Initiated 3/31/2022  1. Patient will move from supine to sit and sit to supine  in bed with modified independence within 5 days. 2.  Patient will perform sit to/from stand with modified independence within 5 days. 3.  Patient will ambulate 300 feet with least restrictive assistive device and modified independence within 5 days. 4.  Patient will ascend/descend 12 stairs with handrail(s) with independence within 5 days. 5.  Patient will perform cardiac exercises per protocol with modified independence within 5 days. 6.  Patient will verbally recall and functionally demonstrate mindful-based movements (\"move in the tube\") principles without cues within 5 days. Outcome: Not Met     PHYSICAL THERAPY EVALUATION  Patient: Rossana Artis (66 y.o. male)  Date: 3/31/2022  Primary Diagnosis: CAD (coronary artery disease) [I25.10]  Procedure(s) (LRB):  CORONARY ARTERY BYPASS GRAFT (CABG) X3, LIMA. RSVH VIA EVH. ECC. LAWSON AND EPI AORTIC US BY DR Felicitas Valerio (N/A) 1 Day Post-Op   Precautions:  Fall,Sternal,Other (comment) (move in the tube)      ASSESSMENT  Based on the objective data described below, the patient presents with impaired functional independence s/p CABG x3, POD1. Currently pt's functional mobility is limited by newly enforced sternal precautions (move in the tube), increased post op sternal pain, confusion, impaired pulmonary tolerance (on 4L NCO2), and generally decreased strength, ROM, and activity tolerance.      Reviewed mindful-based movement principles for load-bearing tasks, use of chest splinting when coughing, role of acute PT and mobility progression. Patient is not verbalizing understanding of mindful-based movements (\"move in the tube\") principles of keeping UEs proximal to ribcage to prevent lateral pull on the sternum during load-bearing activities with visual, verbal, and tactile cues required for compliance. He corrects when cued. Received pt sitting up in the chair. He utilized rocking momentum for sit<->stand w/ cues required for mindful based movement principles with each attempt though able to stand and return to sit w/o relying on UE's to push up or control descent. Pt ambulated a short distance with CGA, no loss of balance, mildly antalgic gait. Observed knee immobilizer in pt's room. Pt reporting he did not use the immobilizer prior to admission. Son arrived after this session, believes the immobilizer was put on pt at [facility] (likely after cardiac cath), pt did not have at home. Anticipate steady functional gains and home with family assisting and HHPT. Current Level of Function Impacting Discharge (mobility/balance): Up to Min A primarily for safety and mindful based movement during transfers. Ambulated 30 ft with CGA. Functional Outcome Measure: The patient scored 18/28 on the Tinetti outcome measure which is indicative of high fall risk. Other factors to consider for discharge: lives alone, children available to assist     Patient will benefit from skilled therapy intervention to address the above noted impairments. PLAN :  Recommendations and Planned Interventions: bed mobility training, transfer training, gait training, therapeutic exercises, patient and family training/education, and therapeutic activities      Frequency/Duration: Patient will be followed by physical therapy:  daily to address goals.     Recommendation for discharge: (in order for the patient to meet his/her long term goals)  Physical therapy at least 2 days/week in the home AND ensure assist and/or supervision for safety with mindful based movement w/ functional mobility    This discharge recommendation:  Has not yet been discussed the attending provider and/or case management    IF patient discharges home will need the following DME: none anticipated         SUBJECTIVE:   Patient stated that's not how I do it\"  Referring to sit<->stand w/o pushing up from the chair. OBJECTIVE DATA SUMMARY:   Patient mobilized on continuous portable monitor/telemetry. HISTORY:    Past Medical History:   Diagnosis Date    Hypertension     Ill-defined condition     hypothyroid     Past Surgical History:   Procedure Laterality Date    HX APPENDECTOMY         Personal factors and/or comorbidities impacting plan of care: PMH    Home Situation  Home Environment: Private residence  # Steps to Enter: 10  Rails to Enter: Yes  One/Two Story Residence: Two story  # of Interior Steps: 16  Living Alone: Yes (son lives close/ same property)  Support Systems: Child(jeffery)  Patient Expects to be Discharged to[de-identified] Home with home health  Current DME Used/Available at Home: None    EXAMINATION/PRESENTATION/DECISION MAKING:     Critical Behavior:  Neurologic State: Alert  Orientation Level: Oriented to person,Oriented to place  Cognition: Follows commands,Impulsive,Impaired decision making     Hearing: Auditory  Auditory Impairment: None  Skin:  Sternal incision  Edema:   Range Of Motion:  AROM: Generally decreased, functional                       Strength:    Strength: Generally decreased, functional                    Tone & Sensation:   Tone: Normal                              Coordination:  Coordination: Generally decreased, functional    Functional Mobility:  Bed Mobility:  N/t* received/ remained sitting up in the chair           Transfers:  Sit to Stand: Contact guard assistance/ Minimum assistance;Assist x1, attempts to push up using arm rests. Able to achieve standing with three rocks and w/o UE push off.    Stand to Sit: Contact guard assistance/ Minimum assistance;Assist x1, attempts to use arm rests - corrects with cues and able to descend to chair w/o UE support  Tx: Instructed in mindful based movement with sit<->stand. Cues required for compliance. Balance:   Sitting: Intact; Without support  Standing: Impaired; Without support  Standing - Static: Fair;Occasional  Standing - Dynamic : Fair;Occasional  Ambulation/Gait Training:  Distance (ft): 30 Feet (ft)  Assistive Device: Gait belt  Ambulation - Level of Assistance: Contact guard assistance;Assist x1;Additional time     Gait Description (WDL): Exceptions to WDL                 Speed/Davina: Slow;Shuffled  Step Length: Right shortened;Left shortened        Stairs:              Cardiac diagnosis intervention:  Patient instructed and educated on mindful movement principles based on Move in The Tube concept to include maintaining bilateral elbows close to rib cage when performing any load-bearing activity such as pushing up from a chair. Patient has the handout with diagrams of each correct/incorrect method of performing each of the above tasks. Therapeutic Exercises:   Patient instructed on the benefits and demonstrated cardiac exercises while sitting and standing with Contact guard assistance during standing exs. Instructed in exercise technique and reps against gravity.       CARDIAC   EXERCISE    Sets    Reps    Active  Active Assist    Passive  Self ROM    Comments    Shoulder flexion  1  5   [x]                            []                             []                             []                             sitting   Shoulder abduction  1  5  [x]                             []                             []                             []                             standing   Scapular elevation  1  5  [x]                             []                              []                             []                             sitting Scapular retraction  1  5  [x]                             []                             []                             []                             standing   Trunk rotation  1  5  [x]                             []                             []                             []                             standing   Trunk sidebending  1  5  [x]                             []                              []                             []                                   standing       Functional Measure:  Tinetti test:    Sitting Balance: 1  Arises: 1  Attempts to Rise: 1  Immediate Standing Balance: 2  Standing Balance: 1  Nudged: 2 (inferred)  Eyes Closed: 0 (inferred)  Turn 360 Degrees - Continuous/Discontinuous: 1  Turn 360 Degrees - Steady/Unsteady: 1  Sitting Down: 1  Balance Score: 11 Balance total score  Indication of Gait: 0  R Step Length/Height: 1  L Step Length/Height: 1  R Foot Clearance: 1  L Foot Clearance: 1  Step Symmetry: 1  Step Continuity: 1  Path: 1  Trunk: 0  Walking Time: 0  Gait Score: 7 Gait total score  Total Score: 18/28 Overall total score         Tinetti Tool Score Risk of Falls  <19 = High Fall Risk  19-24 = Moderate Fall Risk  25-28 = Low Fall Risk  Tinetti ME. Performance-Oriented Assessment of Mobility Problems in Elderly Patients. Blakely 64; [id number].  (Scoring Description: PT Bulletin Feb. 10, 1993)    Older adults: Aimee Cameron et al, 2009; n = 1000 [facility] elderly evaluated with ABC, TOMAS, ADL, and IADL)  · Mean TOMAS score for males aged 69-68 years = 26.21(3.40)  · Mean TOMAS score for females age 69-68 years = 25.16(4.30)  · Mean TOMAS score for males over 80 years = 23.29(6.02)  · Mean TOMAS score for females over 80 years = 17.20(8.32)            Physical Therapy Evaluation Charge Determination   History Examination Presentation Decision-Making   LOW Complexity : Zero comorbidities / personal factors that will impact the outcome / POC LOW Complexity : 1-2 Standardized tests and measures addressing body structure, function, activity limitation and / or participation in recreation  LOW Complexity : Stable, uncomplicated  Other outcome measures Tinetti 18/28  HIGH       Based on the above components, the patient evaluation is determined to be of the following complexity level: LOW     Pain Rating:  None indicated    Activity Tolerance:   Fair and requires rest breaks    After treatment patient left in no apparent distress:   Sitting in chair, Call bell within reach, and eating breakfast    COMMUNICATION/EDUCATION:   The patients plan of care was discussed with: Occupational therapist and Registered nurse. Fall prevention education was provided and the patient/caregiver indicated understanding., Patient/family have participated as able in goal setting and plan of care. , and Patient/family agree to work toward stated goals and plan of care.     Thank you for this referral.  Carmen Burgos, PT   Time Calculation: 24 mins

## 2022-04-01 ENCOUNTER — APPOINTMENT (OUTPATIENT)
Dept: GENERAL RADIOLOGY | Age: 77
DRG: 235 | End: 2022-04-01
Attending: PHYSICIAN ASSISTANT
Payer: MEDICARE

## 2022-04-01 ENCOUNTER — TRANSCRIBE ORDER (OUTPATIENT)
Dept: CARDIAC REHAB | Age: 77
End: 2022-04-01

## 2022-04-01 DIAGNOSIS — I21.4 ACUTE MYOCARDIAL INFARCTION, SUBENDOCARDIAL INFARCTION, INITIAL EPISODE OF CARE (HCC): ICD-10-CM

## 2022-04-01 DIAGNOSIS — Z95.1 POSTSURGICAL AORTOCORONARY BYPASS STATUS: Primary | ICD-10-CM

## 2022-04-01 LAB
ALBUMIN SERPL-MCNC: 2.9 G/DL (ref 3.5–5)
ALBUMIN/GLOB SERPL: 1.1 {RATIO} (ref 1.1–2.2)
ALP SERPL-CCNC: 58 U/L (ref 45–117)
ALT SERPL-CCNC: 24 U/L (ref 12–78)
ANION GAP SERPL CALC-SCNC: 4 MMOL/L (ref 5–15)
ANION GAP SERPL CALC-SCNC: 6 MMOL/L (ref 5–15)
AST SERPL-CCNC: 51 U/L (ref 15–37)
BILIRUB SERPL-MCNC: 0.5 MG/DL (ref 0.2–1)
BUN SERPL-MCNC: 57 MG/DL (ref 6–20)
BUN SERPL-MCNC: 58 MG/DL (ref 6–20)
BUN/CREAT SERPL: 36 (ref 12–20)
BUN/CREAT SERPL: 42 (ref 12–20)
CALCIUM SERPL-MCNC: 8.5 MG/DL (ref 8.5–10.1)
CALCIUM SERPL-MCNC: 8.7 MG/DL (ref 8.5–10.1)
CHLORIDE SERPL-SCNC: 107 MMOL/L (ref 97–108)
CHLORIDE SERPL-SCNC: 107 MMOL/L (ref 97–108)
CO2 SERPL-SCNC: 23 MMOL/L (ref 21–32)
CO2 SERPL-SCNC: 26 MMOL/L (ref 21–32)
CREAT SERPL-MCNC: 1.37 MG/DL (ref 0.7–1.3)
CREAT SERPL-MCNC: 1.57 MG/DL (ref 0.7–1.3)
ERYTHROCYTE [DISTWIDTH] IN BLOOD BY AUTOMATED COUNT: 14.6 % (ref 11.5–14.5)
GLOBULIN SER CALC-MCNC: 2.7 G/DL (ref 2–4)
GLUCOSE BLD STRIP.AUTO-MCNC: 102 MG/DL (ref 65–117)
GLUCOSE BLD STRIP.AUTO-MCNC: 117 MG/DL (ref 65–117)
GLUCOSE BLD STRIP.AUTO-MCNC: 119 MG/DL (ref 65–117)
GLUCOSE BLD STRIP.AUTO-MCNC: 133 MG/DL (ref 65–117)
GLUCOSE SERPL-MCNC: 122 MG/DL (ref 65–100)
GLUCOSE SERPL-MCNC: 128 MG/DL (ref 65–100)
HCT VFR BLD AUTO: 31.8 % (ref 36.6–50.3)
HGB BLD-MCNC: 10.1 G/DL (ref 12.1–17)
MAGNESIUM SERPL-MCNC: 2.7 MG/DL (ref 1.6–2.4)
MCH RBC QN AUTO: 30.7 PG (ref 26–34)
MCHC RBC AUTO-ENTMCNC: 31.8 G/DL (ref 30–36.5)
MCV RBC AUTO: 96.7 FL (ref 80–99)
NRBC # BLD: 0 K/UL (ref 0–0.01)
NRBC BLD-RTO: 0 PER 100 WBC
PLATELET # BLD AUTO: 89 K/UL (ref 150–400)
PMV BLD AUTO: 12.2 FL (ref 8.9–12.9)
POTASSIUM SERPL-SCNC: 4.1 MMOL/L (ref 3.5–5.1)
POTASSIUM SERPL-SCNC: 4.3 MMOL/L (ref 3.5–5.1)
PROT SERPL-MCNC: 5.6 G/DL (ref 6.4–8.2)
RBC # BLD AUTO: 3.29 M/UL (ref 4.1–5.7)
SERVICE CMNT-IMP: ABNORMAL
SERVICE CMNT-IMP: ABNORMAL
SERVICE CMNT-IMP: NORMAL
SERVICE CMNT-IMP: NORMAL
SODIUM SERPL-SCNC: 136 MMOL/L (ref 136–145)
SODIUM SERPL-SCNC: 137 MMOL/L (ref 136–145)
WBC # BLD AUTO: 10.3 K/UL (ref 4.1–11.1)

## 2022-04-01 PROCEDURE — 36415 COLL VENOUS BLD VENIPUNCTURE: CPT

## 2022-04-01 PROCEDURE — 85027 COMPLETE CBC AUTOMATED: CPT

## 2022-04-01 PROCEDURE — 71045 X-RAY EXAM CHEST 1 VIEW: CPT

## 2022-04-01 PROCEDURE — 74011000250 HC RX REV CODE- 250: Performed by: PHYSICIAN ASSISTANT

## 2022-04-01 PROCEDURE — 74011250637 HC RX REV CODE- 250/637: Performed by: NURSE PRACTITIONER

## 2022-04-01 PROCEDURE — 51798 US URINE CAPACITY MEASURE: CPT

## 2022-04-01 PROCEDURE — 74011250636 HC RX REV CODE- 250/636: Performed by: NURSE PRACTITIONER

## 2022-04-01 PROCEDURE — 80053 COMPREHEN METABOLIC PANEL: CPT

## 2022-04-01 PROCEDURE — 74011250637 HC RX REV CODE- 250/637: Performed by: PHYSICIAN ASSISTANT

## 2022-04-01 PROCEDURE — 82962 GLUCOSE BLOOD TEST: CPT

## 2022-04-01 PROCEDURE — 74011250636 HC RX REV CODE- 250/636: Performed by: PHYSICIAN ASSISTANT

## 2022-04-01 PROCEDURE — 77010033678 HC OXYGEN DAILY

## 2022-04-01 PROCEDURE — 83735 ASSAY OF MAGNESIUM: CPT

## 2022-04-01 PROCEDURE — 65610000003 HC RM ICU SURGICAL

## 2022-04-01 RX ADMIN — ASPIRIN 81 MG CHEWABLE TABLET 81 MG: 81 TABLET CHEWABLE at 08:48

## 2022-04-01 RX ADMIN — DEXMEDETOMIDINE HYDROCHLORIDE 1 MCG/KG/HR: 4 INJECTION, SOLUTION INTRAVENOUS at 08:18

## 2022-04-01 RX ADMIN — MUPIROCIN: 20 OINTMENT TOPICAL at 08:50

## 2022-04-01 RX ADMIN — SODIUM CHLORIDE, POTASSIUM CHLORIDE, SODIUM LACTATE AND CALCIUM CHLORIDE 500 ML: 600; 310; 30; 20 INJECTION, SOLUTION INTRAVENOUS at 10:05

## 2022-04-01 RX ADMIN — FAMOTIDINE 20 MG: 20 TABLET, FILM COATED ORAL at 21:15

## 2022-04-01 RX ADMIN — OXYCODONE 10 MG: 5 TABLET ORAL at 20:39

## 2022-04-01 RX ADMIN — HALOPERIDOL LACTATE 3 MG: 5 INJECTION, SOLUTION INTRAMUSCULAR at 03:40

## 2022-04-01 RX ADMIN — QUETIAPINE FUMARATE 50 MG: 25 TABLET ORAL at 17:40

## 2022-04-01 RX ADMIN — ATORVASTATIN CALCIUM 40 MG: 40 TABLET, FILM COATED ORAL at 21:15

## 2022-04-01 RX ADMIN — CEFAZOLIN SODIUM 2 G: 1 INJECTION, POWDER, FOR SOLUTION INTRAMUSCULAR; INTRAVENOUS at 00:25

## 2022-04-01 RX ADMIN — SENNOSIDES AND DOCUSATE SODIUM 1 TABLET: 50; 8.6 TABLET ORAL at 08:48

## 2022-04-01 RX ADMIN — ACETAMINOPHEN 650 MG: 325 TABLET ORAL at 20:39

## 2022-04-01 RX ADMIN — QUETIAPINE FUMARATE 25 MG: 25 TABLET ORAL at 13:54

## 2022-04-01 RX ADMIN — Medication 6 MG: at 21:15

## 2022-04-01 RX ADMIN — SODIUM CHLORIDE, PRESERVATIVE FREE 10 ML: 5 INJECTION INTRAVENOUS at 21:24

## 2022-04-01 RX ADMIN — AMIODARONE HYDROCHLORIDE 400 MG: 200 TABLET ORAL at 21:15

## 2022-04-01 RX ADMIN — CHLORHEXIDINE GLUCONATE 10 ML: 1.2 RINSE ORAL at 08:49

## 2022-04-01 RX ADMIN — DEXMEDETOMIDINE HYDROCHLORIDE 1 MCG/KG/HR: 4 INJECTION, SOLUTION INTRAVENOUS at 03:15

## 2022-04-01 RX ADMIN — POLYETHYLENE GLYCOL 3350 17 G: 17 POWDER, FOR SOLUTION ORAL at 08:48

## 2022-04-01 RX ADMIN — SODIUM CHLORIDE, PRESERVATIVE FREE 10 ML: 5 INJECTION INTRAVENOUS at 13:58

## 2022-04-01 RX ADMIN — QUETIAPINE FUMARATE 50 MG: 25 TABLET ORAL at 08:49

## 2022-04-01 RX ADMIN — CHLORHEXIDINE GLUCONATE 10 ML: 1.2 RINSE ORAL at 21:16

## 2022-04-01 RX ADMIN — SENNOSIDES AND DOCUSATE SODIUM 1 TABLET: 50; 8.6 TABLET ORAL at 17:40

## 2022-04-01 RX ADMIN — SODIUM CHLORIDE, PRESERVATIVE FREE 10 ML: 5 INJECTION INTRAVENOUS at 07:38

## 2022-04-01 RX ADMIN — AMIODARONE HYDROCHLORIDE 400 MG: 200 TABLET ORAL at 08:48

## 2022-04-01 RX ADMIN — Medication 400 MG: at 17:40

## 2022-04-01 RX ADMIN — MUPIROCIN: 20 OINTMENT TOPICAL at 17:50

## 2022-04-01 RX ADMIN — Medication 400 MG: at 08:48

## 2022-04-01 RX ADMIN — SODIUM CHLORIDE, POTASSIUM CHLORIDE, SODIUM LACTATE AND CALCIUM CHLORIDE 100 ML/HR: 600; 310; 30; 20 INJECTION, SOLUTION INTRAVENOUS at 14:42

## 2022-04-01 RX ADMIN — OXYCODONE 5 MG: 5 TABLET ORAL at 15:05

## 2022-04-01 NOTE — OP NOTES
Coronary Artery Bypass Graft Procedure Note      Pre-operative Diagnosis:                                              Disease of the native coronary arteries                                               NSTEMI                                              Acute systolic CHF EF 19%                                              Moderate Aortic stenosis     Post-operative Diagnosis: same    Surgeon: Dennys Vitale MD    Assistant: LUIS Villareal     Anesthesia: General endotracheal anesthesia    EBL:  See perfusion record    Specimen none    Implants none    Complications none        Operation: Coronary Bypass Grafting Procedure(s):  CORONARY ARTERY BYPASS GRAFT (CABG) X3, LIMA. RSVH VIA EVH. ECC. LAWSON AND EPI AORTIC US BY DR Pippa Jamil    Operative Findings: At the time of the procedure there was moderate depression ov LV function . The aortic valve was calcified with moderate AS The coronary arteries were diffusely diseased The PAMELA was good quality with good flow. The vein was of good quality. Indications:  See pre op diagnosis     Procedure Details     After informed consent was obtained for the above mentioned procedure, the patient was then taken to the operating room. Appropriate monitoring lines were placed by the Anesthesia Department. A complete surgical timeout was completed. The LAWSON report was reviewed with anesthesia. After administration of general endotracheal anesthesia, the patient was prepped and draped in the usual sterile fashion. The chest was then entered through a standard mediastinotomy incision while simultaneously harvesting of peripheral conduit was undertaken using endoscopic technique following a bolus of heparin. After harvesting the conduit, it was inspected and noted to be adequate. The epiaortic ultrasound was performed and reviewed. The left internal mammary harvested was then undertaken in pedicle distal pedicle was incised and noted to bleed briskly.   PAMELA was prepared with papaverine. The pericardium was then opened and tacked up into a pericardial well. The patient was heparinized. Pursestring sutures were then placed into the aorta, the right atrial appendage. The patient was then cannulated through these pursestrings at which point, cardiopulmonary bypass was started. A retrograde coronary sinus canula was placed for cardioplegia administration. A cross-clamp was then applied. Cardioplegia was administered initially and then given intermittently throughout the case. Initial attention was directed at the circumflex vessels. SVG to the OM . Attention was then directed to the PDA. SVG to the PDA. The LIMA was used to graft the LAD. The proximal anastomosis of the vein graphs constructed to the ascending aorta. A warm dose of cardioplegia administered. The patient was weaned from bypass. Protamine was administered. The aortic cannula and venous cannula removed. The sites were reinforced. Ventricular and atrial pacing wires were then placed upon the heart, brought out through stab wounds inferiorly, and affixed to the skin. Mediastinal and L pleural chest tubes placed. . Hemostasis was assured, the sternum was reapproximated with heavy gauge stainless steel wire. The midline fascia was subsequently closed separately. Vicryl was then used in a running fashion for subcutaneous tissue and skin. Dressings were then applied to all wounds. The patient was then transported with monitors to the intensive care unit, intubated and ventilated. Physician Assistant  (PA) assistance was required due to the difficulty of the procedure. The PA assisted in the dissection of tissues, identification and exposure of pertinent anatomy including endoscopic harvest of the greater saphenous vein, and sternal closure in the operation to assure optimal patient outcome and safe conduct of the operation.                   Maki Velasco MD

## 2022-04-01 NOTE — PROGRESS NOTES
[facility] ICU Progress Note    Admit Date: 3/29/2022  POD:  2 Days Post-Op    Procedure:  Procedure(s):  CORONARY ARTERY BYPASS GRAFT (CABG) X3, LIMA. RSVH VIA EVH. ECC. LAWSON AND EPI AORTIC US BY DR Tina Marcus        Subjective:   Pt seen with Dr. Ayden Phillips weaned off. Sedated with Seroquel. Decreased urine output. Creatinine increased to 1.57. Bolus with 500 cc RL. Increased IVF to 100cc/hr. Reinsert Mcmahan. Repeat BMP this afternoon. Objective:   Vitals:  Blood pressure 107/71, pulse (!) 59, temperature 98.2 °F (36.8 °C), resp. rate 18, height 5' 8\" (1.727 m), weight 164 lb 3.9 oz (74.5 kg), SpO2 99 %. Temp (24hrs), Av.1 °F (36.7 °C), Min:97.6 °F (36.4 °C), Max:98.5 °F (36.9 °C)    Hemodynamics:   CO: CO (l/min): 5 l/min   CI: CI (l/min/m2): 2.7 l/min/m2   CVP: CVP (mmHg): 14 mmHg (22)   SVR: SVR (dyne*sec)/cm5: 945 (dyne*sec)/cm5 (22 1381)   PAP Systolic: PAP Systolic: 42 ( 3496)   PAP Diastolic: PAP Diastolic: 16 (59 9946)   PVR:     SV02: SVO2 (%): 61 % (22)   SCV02:      EKG/Rhythm:  SR-SB    CT Output: 520/150     Oxygen Therapy:  Oxygen Therapy  O2 Sat (%): 99 % (22)  Pulse via Oximetry: 91 beats per minute (22 1955)  O2 Device: Nasal cannula (22)  Skin Assessment: Clean, dry, & intact (22)  Skin Protection for O2 Device: No (22)  O2 Flow Rate (L/min): 4 l/min (22)  O2 Temperature: 80 °F (26.7 °C) (22 1631)  FIO2 (%): 80 % (22 1629)    CXR:The right IJ pulmonary artery catheter has been removed. The  mediastinal drains and left chest tube are stable. The cardiomediastinal  contours are stable.     There are decreased mild diffuse interstitial opacities. Left basilar opacity is  unchanged. There is no pleural effusion or pneumothorax. The bones and upper  abdomen are stable.     IMPRESSION     Decreased mild pulmonary edema. Stable left basilar atelectasis.      Admission Weight: Last Weight   Weight: 167 lb 1.7 oz (75.8 kg) Weight: 164 lb 3.9 oz (74.5 kg)     Intake / Output / Drain: inaccurate urine output. Current Shift: chest tubes: 150  Last 24 hrs.: chest tubes: 520     Intake/Output Summary (Last 24 hours) at 2022 0755  Last data filed at 2022 0600  Gross per 24 hour   Intake 1450.09 ml   Output 670 ml   Net 780.09 ml       EXAM:  General:   WDWN man in NAD lying in bed. Sedated on Dexmedetomidine. Lungs:   Unlabored at rest. o n 4LNC.  CTA. Incision:  No erythema, drainage or swelling. Heart:  Regular rate and rhythm. + murmur across precordium; loudest at left axillary line 4th ICS 4/6 SHREE. Abdomen:   Soft, non-tender. Bowel sounds normal. No masses,  No organomegaly. Extremities:  No edema. PPP. Neurologic:  Sleeping most of the day. Answers questions. Was agitated early this am when physical exam attempted. This afternoon no agitation. Labs:   Recent Labs     22  0741 22  0509 22  0329 22  2247 22  0452 22  1748 22  1646   WBC  --   --  10.3  --   --    < > 15.3*   HGB  --   --  10.1*  --   --    < > 11.8*   HCT  --   --  31.8*  --   --    < > 35.8*   PLT  --   --  89*  --   --    < > 130*   NA  --  136  --   --    < >   < > 140   K  --  4.1  --   --    < >   < > 3.7   BUN  --  57*  --   --    < >   < > 36*   CREA  --  1.57*  --   --    < >   < > 1.40*   GLU  --  122*  --   --    < >   < > 126*   GLUCPOC 133*  --   --    < >  --    < > 116   INR  --   --   --   --   --   --  1.1    < > = values in this interval not displayed. Assessment:     Active Problems:    CAD (coronary artery disease) (3/29/2022)      S/P CABG x 3 (3/30/2022)      Overview:  On pump CORONARY ARTERY BYPASS GRAFT (CABG) X3, LIMA to LAD, RSVH to PDA,       RSVG to Ramus      R GSV [facility]               Plan/Recommendations/Medical Decision Makin yo man with CAD, STEMI, hypothyroidism, HTN, dementia. He had violent outbursts and forgetfulness prior to surgery. He is POD # 2 and has been having agitation and violent outbursts postop as well. 1. CAD/STEMI:    On asa, statin. No bb due to bradycardia. 2. HTN:  inactive. When BP is robust, start ACE or ARB. No htn meds PTA. 3. Hypothyroid:  synthroid  4. Acute systolic CHF: LV EF 06% on LHC. EF improved by LAWSON in OR to 45%-50%. Repeat TTE prior to discharge. 5. Chronic dementia/acute delirium:  Per reports from pt's children, some agitation/outbursts noted at times. Untreated prior to surgery. Now on dex at 1 and intermittent Haldol. Seroquel bid. Was agitated and combative last night again. 6. Renal insufficiency: Creatinine 1.57, baseline 1.3. Decreased urine output. On IV fluids LR 75/h. Increase to 100. Pt is inct of urine. Thus I&O is not accurate. Reinsert Mcmahan to closely monitor I&O.   7. Nutrition: po diet. 8. Prolonged QTc: this am QTc is 440ms. Continue to monitor. No need to adjust medications. 9. Chest tubes drained large amount. Keep in. Keep epicardial wires. 10. Thrombocytopenia: Platelets 89. Continue asa. Monitor daily  11. Keep in ICU as pt is a danger to himself and others. Sitter at bedside. Continue sedation for safety.      Signed By: Sheyla Young NP

## 2022-04-01 NOTE — PROGRESS NOTES
Occupational Therapy:    Chart reviewed and attempted to see for OT session, however patient with bilateral wrist restraints and Precedex, patient minimally responsive to verbal, tactile, or painful stimuli. Session aborted and RN aware. Will continue to follow up and attempt as able.      Melisa Frazier, OT

## 2022-04-01 NOTE — PROGRESS NOTES
Physical Therapy   Chart reviewed, RN cleared pt for therapy. Received pt sleeping w/ a sitter in the room and unable to wake despite cues provided (verbal, tactile, painful stimuli). Session aborted. Will follow for therapy as pt is able to participate.

## 2022-04-01 NOTE — PROGRESS NOTES
2000: Bedside and Verbal shift change report given to Montse Tapia RN (oncoming nurse) by Eliana Darby RN (offgoing nurse). Report included the following information SBAR, Kardex, Intake/Output, MAR, Accordion, Recent Results, Med Rec Status, Cardiac Rhythm NSR and Alarm Parameters . 2152: Pt up following commands. Confused but cooperative. Able to take PO meds. 2354: Pt woke up extremely agitated; attempting to jump out of bed, pulling at Chest Tubes and IV lines, as well as kicking staff. Dr. Lisa Montanez notified. Bilateral restraints ordered. Dex increased to 1. PRN Haldol given. 0210: Pt up kicking and attempting to jump out of bed; able to redirect     0329: Labs drawn     0340: Pt becoming increasingly agitated while kicking staff. PRN haldol given    0400: Full Bath complete    0600: Bladder scan 211cc    0800: Bedside and Verbal shift change report given to Miles Khan RN (oncoming nurse) by Montse Tapia RN (offgoing nurse). Report included the following information SBAR, Kardex, Intake/Output, MAR, Accordion, Recent Results, Med Rec Status, Cardiac Rhythm SB/SR and Alarm Parameters .

## 2022-04-01 NOTE — PROGRESS NOTES
0730: Bedside and Verbal shift change report given to Adam Romero (oncoming nurse) by Britany Yeung RN (offgoing nurse). Report included the following information SBAR, Intake/Output, MAR, Recent Results and Cardiac Rhythm SB.     0835: Dr. Danae Moore and cardiac surgery team at bedside; updated by RN; orders received     21 : Pt bladder scanned 457 ml     1040: Mcmahan catheter placed per MD orders     91 21 06: PT/OT unable to awake Pt even with stimulation; Dex turned off    1245: Pt awake w/ stimulation; A&O x4     1250: Todd Albrecht NP at bedside; updated by RN    97 70 84: Pt asleep/resting quietly; Restraints taken off and discontinued     1930: Bedside and Verbal shift change report given to OSKAR Ramon (oncoming nurse) by Mikal Dumont RN (offgoing nurse). Report included the following information SBAR, Intake/Output, MAR, Recent Results and Cardiac Rhythm SB/NSR.

## 2022-04-01 NOTE — DIABETES MGMT
Diabetes Management Team to sign off at this point as patient's blood glucose remains stable after transitioned off insulin infusion. Please re-consult us if patient needs change. Thank you for including us in their care.     Signed By: Fidelina Pablo [facility]   Diabetes Health    April 1, 2022

## 2022-04-01 NOTE — PROGRESS NOTES
ICU TEAM History and Physical    Name: Alexandra Freed   : 1945   MRN: [id number]   Date: 2022      Assessment:   Reason for ICU Admission: STEMI    HPI: 65 yo M with h/o CAD, HTN, presented to the ED for continued evaluation of chest pain with STEMI. Pt was previously seen at [address] [place] [address] yesterday for chest pain - cath showed 100% occlusion of LAD and RCA. Transferred to [facility] for evaluation and management. Cath with CAD lesions not amenable to PCI. Decision made to proceed with CABG (3/30/2022). ECHO:3/28/22   Left Ventricle Left ventricle size is normal. Moderately increased wall thickness. See diagram for wall motion findings. Moderately reduced left ventricular systolic function with a visually estimated EF of 35 - 40%. Indeterminate diastolic function. Left Atrium Left atrium is mildly dilated. Interatrial Septum No interatrial shunt visualized on color Doppler. Right Ventricle Right ventricle size is normal. Normal wall thickness. Normal systolic function. Right Atrium Right atrium size is normal.   Aortic Valve Valve structure is normal. Moderately calcified cusp. Sclerosis of the aortic valve cusp. No transvalvular regurgitation. Mild stenosis of the aortic valve. AV mean gradient is 16 mmHg. Mitral Valve Mild annular calcification of the mitral valve. No transvalvular regurgitation. No stenosis noted. Tricuspid Valve Valve structure is normal. Mild transvalvular regurgitation. No stenosis noted. Pulmonic Valve The pulmonic valve was not well visualized. No transvalvular regurgitation. No stenosis noted. Aorta Normal sized ascending aorta. Mildly dilated aortic root. IVC/Hepatic Veins IVC diameter is less than or equal to 21 mm and decreases greater than 50% during inspiration; therefore the estimated right atrial pressure is normal (~3 mmHg). IVC size is normal.   Pericardium No pericardial effusion. Plan:     Neuro: A&O.   - Pain management PRN.   - Delirium with agitation: Melatonin, Precedex, Haldol, Seroquel    Pulm:   - Titrate supplemental O2 to keep SpO2>90%. - Incentive spirometry    Cardiac:   - CABG today (3/30/22)  - Con't heparin infusion  - NTG infusion PRN for CP and SBP <160. - Beta-blocker.  - GDMT. Renal:   - Monitor UOP. - Replace e-lytes PRN. - LR    GI:   - NPO. ID:   - Trend WBC. Heme:   - Check H/H.  - Transfuse to keep Hb>8.0    Endo:   - Hypothyroidism on Synthroid    MSK:  - OOB. F - Feeding:  No   A - Analgesia: Oxycodone and Acetaminophen  S - Sedation: None  T - DVT Prophylaxis: SCD's or Sequential Compression Device and Heparin   C - Code Status: Full Code  H - Head of Bed: > 30 Degrees  U - Ulcer Prophylaxis: Not at this time   G - Glycemic Control: Insulin  S - Spontaneous Breathing Trial: N/A  B - Bowel Regimen: Senna and Docusate (Colace)  I - Indwelling Catheter:   Tubes: None  Lines: Peripheral IV  Drains: None  D - De-escalation of Antibiotics:       Active Problem List:     Problem List  Never Reviewed          Codes Class    S/P CABG x 3 ICD-10-CM: Z95.1  ICD-9-CM: V45.81     Overview Signed 3/30/2022  3:45 PM by LUIS Oneal     On pump CORONARY ARTERY BYPASS GRAFT (CABG) X3, LIMA to LAD, RSVH to PDA, RSVG to Ramus  R GSV EVH               CAD (coronary artery disease) ICD-10-CM: I25.10  ICD-9-CM: 414.00         NSTEMI (non-ST elevated myocardial infarction) ([facility] [address] ICD-10-CM: I21.4  ICD-9-CM: 410.70         Chest pain ICD-10-CM: R07.9  ICD-9-CM: 786.50         Hypothyroidism ICD-10-CM: E03.9  ICD-9-CM: 244.9         Hypokalemia ICD-10-CM: E87.6  ICD-9-CM: 276.8         HTN (hypertension), benign ICD-10-CM: I10  ICD-9-CM: 401.1               Past Medical History:    has a past medical history of Hypertension and Ill-defined condition. Past Surgical History:    has a past surgical history that includes hx appendectomy.     Home Medications:     Prior to Admission medications    Medication Sig Start Date End Date Taking? Authorizing Provider   levothyroxine (SYNTHROID) 75 mcg tablet Take 75 mcg by mouth Daily (before breakfast). Other, MD Ana       Allergies/Social/Family History:   No Known Allergies   Social History     Tobacco Use    Smoking status: Never Smoker    Smokeless tobacco: Never Used   Substance Use Topics    Alcohol use: Yes      Family History   Family history unknown: Yes       Review of Systems:   A comprehensive review of systems was negative except for that written in the HPI. Objective:     Visit Vitals  /71   Pulse (!) 59   Temp 98.2 °F (36.8 °C)   Resp 18   Ht 5' 8\" (1.727 m)   Wt 74.5 kg (164 lb 3.9 oz)   SpO2 99%   BMI 24.97 kg/m²    O2 Flow Rate (L/min): 4 l/min O2 Device: Nasal cannula Temp (24hrs), Av.1 °F (36.7 °C), Min:97.6 °F (36.4 °C), Max:98.5 °F (36.9 °C)    CVP (mmHg): 14 mmHg (22 0600)    Intake/Output:     Intake/Output Summary (Last 24 hours) at 2022 0616  Last data filed at 2022 0500  Gross per 24 hour   Intake 1356.89 ml   Output 740 ml   Net 616.89 ml       Physical Exam:  General:  Alert, cooperative, well noursished, well developed, appears stated age   Eyes:  Sclera anicteric. Pupils equally round and reactive to light. Mouth/Throat: Mucous membranes normal, oral pharynx clear   Neck: Supple   Lungs:   Clear to auscultation bilaterally, good effort   CV:  Regular rate and rhythm,no murmur, click, rub or gallop   Abdomen:   Soft, non-tender. bowel sounds normal. non-distended   Extremities: No cyanosis or edema   Skin: Skin color, texture, turgor normal. no acute rash or lesions   Lymph nodes: Cervical and supraclavicular normal   Musculoskeletal: No swelling or deformity   Lines/Devices:  Intact, no erythema, drainage or tenderness   Psych: Alert and oriented, normal mood affect given the setting       Labs & Data: Reviewed    Medications: Reviewed    Chest X-Ray:    TTE:    Multidisciplinary Rounds Completed:   Yes    ABCDEF Bundle/Checklist Completed: Yes    SPECIAL EQUIPMENT: None    DISPOSITION: Stay in ICU    CRITICAL CARE CONSULTANT NOTE  I had a face to face encounter with the patient, reviewed and interpreted patient data including clinical events, labs, images, vital signs, I/O's, and examined patient. I have discussed the case and the plan and management of the patient's care with the consulting services, the bedside nurses and the respiratory therapist.      NOTE OF PERSONAL INVOLVEMENT IN CARE   This patient has a high probability of imminent, clinically significant deterioration, which requires the highest level of preparedness to intervene urgently. I participated in the decision-making and personally managed or directed the management of the following life and organ supporting interventions that required my frequent assessment to treat or prevent imminent deterioration. I personally spent 45 minutes of critical care time. This is time spent at this critically ill patient's bedside actively involved in patient care as well as the coordination of care and discussions with the patient's family. This does not include any procedural time which has been billed separately.     Adia Horta DO  Staff Intensivist/Anesthesiologist  [facility]  4/1/2022

## 2022-04-02 ENCOUNTER — APPOINTMENT (OUTPATIENT)
Dept: GENERAL RADIOLOGY | Age: 77
DRG: 235 | End: 2022-04-02
Attending: PHYSICIAN ASSISTANT
Payer: MEDICARE

## 2022-04-02 LAB
ALBUMIN SERPL-MCNC: 2.5 G/DL (ref 3.5–5)
ALBUMIN/GLOB SERPL: 0.9 {RATIO} (ref 1.1–2.2)
ALP SERPL-CCNC: 57 U/L (ref 45–117)
ALT SERPL-CCNC: 16 U/L (ref 12–78)
ANION GAP SERPL CALC-SCNC: 4 MMOL/L (ref 5–15)
AST SERPL-CCNC: 28 U/L (ref 15–37)
BILIRUB SERPL-MCNC: 0.4 MG/DL (ref 0.2–1)
BUN SERPL-MCNC: 56 MG/DL (ref 6–20)
BUN/CREAT SERPL: 44 (ref 12–20)
CALCIUM SERPL-MCNC: 8.4 MG/DL (ref 8.5–10.1)
CHLORIDE SERPL-SCNC: 108 MMOL/L (ref 97–108)
CO2 SERPL-SCNC: 24 MMOL/L (ref 21–32)
CREAT SERPL-MCNC: 1.26 MG/DL (ref 0.7–1.3)
ERYTHROCYTE [DISTWIDTH] IN BLOOD BY AUTOMATED COUNT: 14.4 % (ref 11.5–14.5)
GLOBULIN SER CALC-MCNC: 2.7 G/DL (ref 2–4)
GLUCOSE BLD STRIP.AUTO-MCNC: 108 MG/DL (ref 65–117)
GLUCOSE BLD STRIP.AUTO-MCNC: 97 MG/DL (ref 65–117)
GLUCOSE SERPL-MCNC: 120 MG/DL (ref 65–100)
HCT VFR BLD AUTO: 31.3 % (ref 36.6–50.3)
HGB BLD-MCNC: 10.1 G/DL (ref 12.1–17)
MAGNESIUM SERPL-MCNC: 2.5 MG/DL (ref 1.6–2.4)
MCH RBC QN AUTO: 30.7 PG (ref 26–34)
MCHC RBC AUTO-ENTMCNC: 32.3 G/DL (ref 30–36.5)
MCV RBC AUTO: 95.1 FL (ref 80–99)
NRBC # BLD: 0 K/UL (ref 0–0.01)
NRBC BLD-RTO: 0 PER 100 WBC
PLATELET # BLD AUTO: 105 K/UL (ref 150–400)
PMV BLD AUTO: 12.8 FL (ref 8.9–12.9)
POTASSIUM SERPL-SCNC: 4.2 MMOL/L (ref 3.5–5.1)
PROT SERPL-MCNC: 5.2 G/DL (ref 6.4–8.2)
RBC # BLD AUTO: 3.29 M/UL (ref 4.1–5.7)
SERVICE CMNT-IMP: NORMAL
SERVICE CMNT-IMP: NORMAL
SODIUM SERPL-SCNC: 136 MMOL/L (ref 136–145)
WBC # BLD AUTO: 9.1 K/UL (ref 4.1–11.1)

## 2022-04-02 PROCEDURE — 83735 ASSAY OF MAGNESIUM: CPT

## 2022-04-02 PROCEDURE — 74011250637 HC RX REV CODE- 250/637: Performed by: NURSE PRACTITIONER

## 2022-04-02 PROCEDURE — 74011250636 HC RX REV CODE- 250/636: Performed by: NURSE PRACTITIONER

## 2022-04-02 PROCEDURE — 93005 ELECTROCARDIOGRAM TRACING: CPT

## 2022-04-02 PROCEDURE — 65610000003 HC RM ICU SURGICAL

## 2022-04-02 PROCEDURE — 82962 GLUCOSE BLOOD TEST: CPT

## 2022-04-02 PROCEDURE — 74011250637 HC RX REV CODE- 250/637: Performed by: PHYSICIAN ASSISTANT

## 2022-04-02 PROCEDURE — 74011000250 HC RX REV CODE- 250: Performed by: PHYSICIAN ASSISTANT

## 2022-04-02 PROCEDURE — 71045 X-RAY EXAM CHEST 1 VIEW: CPT

## 2022-04-02 PROCEDURE — 77010033678 HC OXYGEN DAILY

## 2022-04-02 PROCEDURE — 80053 COMPREHEN METABOLIC PANEL: CPT

## 2022-04-02 PROCEDURE — 85027 COMPLETE CBC AUTOMATED: CPT

## 2022-04-02 PROCEDURE — 36415 COLL VENOUS BLD VENIPUNCTURE: CPT

## 2022-04-02 RX ADMIN — AMIODARONE HYDROCHLORIDE 400 MG: 200 TABLET ORAL at 09:16

## 2022-04-02 RX ADMIN — HALOPERIDOL LACTATE 3 MG: 5 INJECTION, SOLUTION INTRAMUSCULAR at 18:59

## 2022-04-02 RX ADMIN — OXYCODONE 5 MG: 5 TABLET ORAL at 21:09

## 2022-04-02 RX ADMIN — SODIUM CHLORIDE, PRESERVATIVE FREE 10 ML: 5 INJECTION INTRAVENOUS at 21:11

## 2022-04-02 RX ADMIN — Medication 400 MG: at 17:48

## 2022-04-02 RX ADMIN — CHLORHEXIDINE GLUCONATE 10 ML: 1.2 RINSE ORAL at 09:16

## 2022-04-02 RX ADMIN — SODIUM CHLORIDE, PRESERVATIVE FREE 10 ML: 5 INJECTION INTRAVENOUS at 06:02

## 2022-04-02 RX ADMIN — SENNOSIDES AND DOCUSATE SODIUM 1 TABLET: 50; 8.6 TABLET ORAL at 17:48

## 2022-04-02 RX ADMIN — ACETAMINOPHEN 650 MG: 325 TABLET ORAL at 12:42

## 2022-04-02 RX ADMIN — SENNOSIDES AND DOCUSATE SODIUM 1 TABLET: 50; 8.6 TABLET ORAL at 09:16

## 2022-04-02 RX ADMIN — CHLORHEXIDINE GLUCONATE 10 ML: 1.2 RINSE ORAL at 21:11

## 2022-04-02 RX ADMIN — QUETIAPINE FUMARATE 50 MG: 25 TABLET ORAL at 09:16

## 2022-04-02 RX ADMIN — Medication 6 MG: at 21:09

## 2022-04-02 RX ADMIN — SODIUM CHLORIDE, POTASSIUM CHLORIDE, SODIUM LACTATE AND CALCIUM CHLORIDE 100 ML/HR: 600; 310; 30; 20 INJECTION, SOLUTION INTRAVENOUS at 01:58

## 2022-04-02 RX ADMIN — POLYETHYLENE GLYCOL 3350 17 G: 17 POWDER, FOR SOLUTION ORAL at 09:16

## 2022-04-02 RX ADMIN — ASPIRIN 81 MG CHEWABLE TABLET 81 MG: 81 TABLET CHEWABLE at 09:16

## 2022-04-02 RX ADMIN — OXYCODONE 5 MG: 5 TABLET ORAL at 14:13

## 2022-04-02 RX ADMIN — QUETIAPINE FUMARATE 50 MG: 25 TABLET ORAL at 17:48

## 2022-04-02 RX ADMIN — ATORVASTATIN CALCIUM 40 MG: 40 TABLET, FILM COATED ORAL at 21:09

## 2022-04-02 RX ADMIN — MUPIROCIN: 20 OINTMENT TOPICAL at 09:16

## 2022-04-02 RX ADMIN — MUPIROCIN: 20 OINTMENT TOPICAL at 17:49

## 2022-04-02 RX ADMIN — OXYCODONE 5 MG: 5 TABLET ORAL at 01:59

## 2022-04-02 RX ADMIN — FAMOTIDINE 20 MG: 20 TABLET, FILM COATED ORAL at 21:09

## 2022-04-02 RX ADMIN — Medication 400 MG: at 09:16

## 2022-04-02 RX ADMIN — AMIODARONE HYDROCHLORIDE 400 MG: 200 TABLET ORAL at 21:09

## 2022-04-02 NOTE — CONSULTS
Patient seen at request of GUTIERREZ Alba. Patient known to critical care from previous admission for NSTEMI and postop CABG. Patient with episode of hyperactive delirium this evening which responded to PRN Haldol. Patient calm, cooperative at this time. Best treatment for patient's delirium would be to transfer out of intensive care unit. No other recommendations. Patient has no critical care needs.

## 2022-04-02 NOTE — PROGRESS NOTES
1945: [facility] BEDSIDE_VERBAL shift change report received from [facility] to [place]. Report included the following information SBAR, Intake/Output, MAR, Recent Results, Med Rec Status, Cardiac Rhythm NSR and Alarm Parameters . 0400: Pt bathed and weighed. Uneventful shift.     0600: Throughout shift, pt has remained oriented x 3 and pleasant overnight with occasional c/o has post op chest pain- relived with PRN oxycodone. 0745: [facility] BEDSIDE_VERBAL shift change report given to [address] Byrnes (oncoming nurse) by [place] (offgoing nurse). Report included the following information SBAR, Intake/Output, MAR, Recent Results, Med Rec Status, Cardiac Rhythm NSR with PAC/PVC's and Alarm Parameters .

## 2022-04-02 NOTE — PROGRESS NOTES
Problem: Pain  Goal: *Control of Pain  Outcome: Progressing Towards Goal  Goal: *PALLIATIVE CARE:  Alleviation of Pain  Outcome: Progressing Towards Goal     Problem: Patient Education: Go to Patient Education Activity  Goal: Patient/Family Education  Outcome: Progressing Towards Goal     Problem: Pressure Injury - Risk of  Goal: *Prevention of pressure injury  Description: Document Asim Scale and appropriate interventions in the flowsheet. Outcome: Progressing Towards Goal  Note: Pressure Injury Interventions:  Sensory Interventions: Assess changes in LOC,Assess need for specialty bed,Keep linens dry and wrinkle-free,Minimize linen layers    Moisture Interventions: Absorbent underpads,Assess need for specialty bed,Check for incontinence Q2 hours and as needed,Internal/External urinary devices    Activity Interventions: Pressure redistribution bed/mattress(bed type)    Mobility Interventions: HOB 30 degrees or less,Pressure redistribution bed/mattress (bed type)    Nutrition Interventions: Document food/fluid/supplement intake    Friction and Shear Interventions: HOB 30 degrees or less,Minimize layers                Problem: Patient Education: Go to Patient Education Activity  Goal: Patient/Family Education  Outcome: Progressing Towards Goal     Problem: Falls - Risk of  Goal: *Absence of Falls  Description: Document Maricruz Fall Risk and appropriate interventions in the flowsheet.   Outcome: Progressing Towards Goal  Note: Fall Risk Interventions:  Mobility Interventions: Bed/chair exit alarm    Mentation Interventions: Bed/chair exit alarm,Adequate sleep, hydration, pain control,Door open when patient unattended    Medication Interventions: Evaluate medications/consider consulting pharmacy    Elimination Interventions: Bed/chair exit alarm,Call light in reach,Toileting schedule/hourly rounds    History of Falls Interventions: Door open when patient unattended,Evaluate medications/consider consulting pharmacy         Problem: Patient Education: Go to Patient Education Activity  Goal: Patient/Family Education  Outcome: Progressing Towards Goal     Problem: Patient Education: Go to Patient Education Activity  Goal: Patient/Family Education  Outcome: Progressing Towards Goal     Problem: Cath Lab Procedures: Post-Cath Day of Procedure (Initiate SCIP Measures for Post-Op Care)  Goal: Off Pathway (Use only if patient is Off Pathway)  Outcome: Progressing Towards Goal  Goal: Activity/Safety  Outcome: Progressing Towards Goal  Goal: Consults, if ordered  Outcome: Progressing Towards Goal  Goal: Diagnostic Test/Procedures  Outcome: Progressing Towards Goal  Goal: Nutrition/Diet  Outcome: Progressing Towards Goal  Goal: Discharge Planning  Outcome: Progressing Towards Goal  Goal: Medications  Outcome: Progressing Towards Goal  Goal: Respiratory  Outcome: Progressing Towards Goal  Goal: Treatments/Interventions/Procedures  Outcome: Progressing Towards Goal  Goal: Psychosocial  Outcome: Progressing Towards Goal  Goal: *Procedure site is without bleeding and signs of infection six hours post sheath removal  Outcome: Progressing Towards Goal  Goal: *Hemodynamically stable  Outcome: Progressing Towards Goal  Goal: *Optimal pain control at patient's stated goal  Outcome: Progressing Towards Goal     Problem: Cath Lab Procedures: Post-Cath Day 1  Goal: Off Pathway (Use only if patient is Off Pathway)  Outcome: Progressing Towards Goal  Goal: Activity/Safety  Outcome: Progressing Towards Goal  Goal: Diagnostic Test/Procedures  Outcome: Progressing Towards Goal  Goal: Nutrition/Diet  Outcome: Progressing Towards Goal  Goal: Discharge Planning  Outcome: Progressing Towards Goal  Goal: Medications  Outcome: Progressing Towards Goal  Goal: Respiratory  Outcome: Progressing Towards Goal  Goal: Treatments/Interventions/Procedures  Outcome: Progressing Towards Goal  Goal: Psychosocial  Outcome: Progressing Towards Goal     Problem: Cath Lab Procedures: Discharge Outcomes  Goal: *Stable cardiac rhythm  Outcome: Progressing Towards Goal  Goal: *Hemodynamically stable  Outcome: Progressing Towards Goal  Goal: *Optimal pain control at patient's stated goal  Outcome: Progressing Towards Goal  Goal: *Pulses palpable, skin color within defined limits, skin temperature warm  Outcome: Progressing Towards Goal  Goal: *Lungs clear or at baseline  Outcome: Progressing Towards Goal  Goal: *Demonstrates ability to perform prescribed activity without shortness of breath or discomfort  Outcome: Progressing Towards Goal  Goal: *Verbalizes home exercise program, activity guidelines, cardiac precautions  Outcome: Progressing Towards Goal  Goal: *Verbalizes understanding and describes prescribed diet  Outcome: Progressing Towards Goal  Goal: *Verbalizes understanding and describes medication purposes and frequencies  Outcome: Progressing Towards Goal  Goal: *Identifies cardiac risk factors  Outcome: Progressing Towards Goal  Goal: *No signs and symptoms of infection or wound complications  Outcome: Progressing Towards Goal  Goal: *Anxiety reduced or absent  Outcome: Progressing Towards Goal  Goal: *Verbalizes and demonstrates incision care  Outcome: Progressing Towards Goal  Goal: *Understands and describes signs and symptoms to report to providers(Stroke Metric)  Outcome: Progressing Towards Goal  Goal: *Describes follow-up/return visits to physicians  Outcome: Progressing Towards Goal  Goal: *Describes available resources and support systems  Outcome: Progressing Towards Goal  Goal: *Influenza immunization  Outcome: Progressing Towards Goal  Goal: *Pneumococcal immunization  Outcome: Progressing Towards Goal     Problem: Patient Education: Go to Patient Education Activity  Goal: Patient/Family Education  Outcome: Progressing Towards Goal     Problem: AMI: Day 1  Goal: Off Pathway (Use only if patient is Off Pathway)  Outcome: Progressing Towards Goal  Goal: Activity/Safety  Outcome: Progressing Towards Goal  Goal: Consults, if ordered  Outcome: Progressing Towards Goal  Goal: Diagnostic Test/Procedures  Outcome: Progressing Towards Goal  Goal: Nutrition/Diet  Outcome: Progressing Towards Goal  Goal: Discharge Planning  Outcome: Progressing Towards Goal  Goal: Medications  Outcome: Progressing Towards Goal  Goal: Respiratory  Outcome: Progressing Towards Goal  Goal: Treatments/Interventions/Procedures  Outcome: Progressing Towards Goal  Goal: Psychosocial  Outcome: Progressing Towards Goal  Goal: *Eligible patient receives reperfusion therapy thrombolytics/PCI  Outcome: Progressing Towards Goal  Goal: *Optimal pain control at patient's stated goal  Outcome: Progressing Towards Goal  Goal: *Hemodynamically stable  Outcome: Progressing Towards Goal  Goal: *Received aspirin and beta-blocker within 24 hours of arrival unless contraindicated  Outcome: Progressing Towards Goal     Problem: AMI: Day 2  Goal: Off Pathway (Use only if patient is Off Pathway)  Outcome: Progressing Towards Goal  Goal: Activity/Safety  Outcome: Progressing Towards Goal  Goal: Consults, if ordered  Outcome: Progressing Towards Goal  Goal: Diagnostic Test/Procedures  Outcome: Progressing Towards Goal  Goal: Nutrition/Diet  Outcome: Progressing Towards Goal  Goal: Discharge Planning  Outcome: Progressing Towards Goal  Goal: Medications  Outcome: Progressing Towards Goal  Goal: Respiratory  Outcome: Progressing Towards Goal  Goal: Treatments/Interventions/Procedures  Outcome: Progressing Towards Goal  Goal: Psychosocial  Outcome: Progressing Towards Goal  Goal: *Optimal pain control at patient's stated goal  Outcome: Progressing Towards Goal  Goal: *Hemodynamically stable  Outcome: Progressing Towards Goal  Goal: *Demonstrates progressive activity  Outcome: Progressing Towards Goal  Goal: *Tolerating diet  Outcome: Progressing Towards Goal     Problem: AMI: Day 3  Goal: Off Pathway (Use only if patient is Off Pathway)  Outcome: Progressing Towards Goal  Goal: Activity/Safety  Outcome: Progressing Towards Goal  Goal: Consults, if ordered  Outcome: Progressing Towards Goal  Goal: Diagnostic Test/Procedures  Outcome: Progressing Towards Goal  Goal: Nutrition/Diet  Outcome: Progressing Towards Goal  Goal: Discharge Planning  Outcome: Progressing Towards Goal  Goal: Medications  Outcome: Progressing Towards Goal  Goal: Respiratory  Outcome: Progressing Towards Goal  Goal: Treatments/Interventions/Procedures  Outcome: Progressing Towards Goal  Goal: Psychosocial  Outcome: Progressing Towards Goal  Goal: *Optimal pain control at patient's stated goal  Outcome: Progressing Towards Goal  Goal: *Hemodynamically stable  Outcome: Progressing Towards Goal  Goal: *Demonstrates progressive activity  Outcome: Progressing Towards Goal  Goal: *Tolerating diet  Outcome: Progressing Towards Goal     Problem: AMI: Day 4  Goal: Off Pathway (Use only if patient is Off Pathway)  Outcome: Progressing Towards Goal  Goal: Activity/Safety  Outcome: Progressing Towards Goal  Goal: Diagnostic Test/Procedures  Outcome: Progressing Towards Goal  Goal: Nutrition/Diet  Outcome: Progressing Towards Goal  Goal: Discharge Planning  Outcome: Progressing Towards Goal  Goal: Medications  Outcome: Progressing Towards Goal  Goal: Respiratory  Outcome: Progressing Towards Goal  Goal: Treatments/Interventions/Procedures  Outcome: Progressing Towards Goal  Goal: Psychosocial  Outcome: Progressing Towards Goal  Goal: *Optimal pain control at patient's stated goal  Outcome: Progressing Towards Goal  Goal: *Hemodynamically stable  Outcome: Progressing Towards Goal  Goal: *Demonstrates progressive activity  Outcome: Progressing Towards Goal  Goal: *Tolerating diet  Outcome: Progressing Towards Goal     Problem: AMI: Day 5  Goal: Off Pathway (Use only if patient is Off Pathway)  Outcome: Progressing Towards Goal  Goal: Activity/Safety  Outcome: Progressing Towards Goal  Goal: Diagnostic Test/Procedures  Outcome: Progressing Towards Goal  Goal: Nutrition/Diet  Outcome: Progressing Towards Goal  Goal: Discharge Planning  Outcome: Progressing Towards Goal  Goal: Medications  Outcome: Progressing Towards Goal  Goal: Treatments/Interventions/Procedures  Outcome: Progressing Towards Goal  Goal: Psychosocial  Outcome: Progressing Towards Goal     Problem: AMI: Discharge Outcomes  Goal: *Demonstrates ability to perform prescribed activity without shortness of breath or discomfort  Outcome: Progressing Towards Goal  Goal: *Verbalizes understanding and describes prescribed diet  Outcome: Progressing Towards Goal  Goal: *Describes follow-up/return visits to physicians  Outcome: Progressing Towards Goal  Goal: *Describes home care/support arrangements established based on need  Outcome: Progressing Towards Goal  Goal: *Anxiety reduced or absent  Outcome: Progressing Towards Goal  Goal: *Understands and describes signs and symptoms to report to providers(Stroke Metric)  Outcome: Progressing Towards Goal  Goal: *Verbalizes name, dosage, time, side effects, and number of days to continue medications  Outcome: Progressing Towards Goal

## 2022-04-02 NOTE — PROGRESS NOTES
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 Bun still in 46s.      Other vitals stable    CT with elevated but serous drainage    Plan:  Keep tubes one more day  Cont IVF, monitor renal function  Wean O2, OOB and ambulate

## 2022-04-02 NOTE — PROGRESS NOTES
Bedside shift change report given to [address]nes (oncoming nurse) by Jyothi Ramos (offgoing nurse). Report included the following information SBAR, MAR, Recent Results and Cardiac Rhythm NSR with PAC's.     0815- MD Alton Reyna at bedside to evaluate the pt. No orders receivied. 200- RN walked by room and family called for assistance. Pt attempting to pull PIV out with hands and teeth. Pt yelling and swinging at staff. PRN Haldol given. [address] NP messaged regarding pt's behavior. Verbal orders given to consult ICU MD.    Reinaldo Vidal at bedside to evaluate to pt. No orders received. 2000- Bedside shift change report given to [facility] RN (oncoming nurse) by [address]nes (offgoing nurse). Report included the following information SBAR, Intake/Output, MAR, Recent Results and Cardiac Rhythm NSR.

## 2022-04-03 ENCOUNTER — APPOINTMENT (OUTPATIENT)
Dept: GENERAL RADIOLOGY | Age: 77
DRG: 235 | End: 2022-04-03
Attending: THORACIC SURGERY (CARDIOTHORACIC VASCULAR SURGERY)
Payer: MEDICARE

## 2022-04-03 LAB
ALBUMIN SERPL-MCNC: 2.4 G/DL (ref 3.5–5)
ALBUMIN/GLOB SERPL: 0.9 {RATIO} (ref 1.1–2.2)
ALP SERPL-CCNC: 61 U/L (ref 45–117)
ALT SERPL-CCNC: 17 U/L (ref 12–78)
ANION GAP SERPL CALC-SCNC: 5 MMOL/L (ref 5–15)
AST SERPL-CCNC: 26 U/L (ref 15–37)
ATRIAL RATE: 76 BPM
ATRIAL RATE: 81 BPM
BILIRUB SERPL-MCNC: 0.6 MG/DL (ref 0.2–1)
BUN SERPL-MCNC: 37 MG/DL (ref 6–20)
BUN/CREAT SERPL: 35 (ref 12–20)
CALCIUM SERPL-MCNC: 8.4 MG/DL (ref 8.5–10.1)
CALCULATED P AXIS, ECG09: 24 DEGREES
CALCULATED P AXIS, ECG09: 39 DEGREES
CALCULATED R AXIS, ECG10: -39 DEGREES
CALCULATED R AXIS, ECG10: -46 DEGREES
CALCULATED T AXIS, ECG11: 42 DEGREES
CALCULATED T AXIS, ECG11: 65 DEGREES
CHLORIDE SERPL-SCNC: 103 MMOL/L (ref 97–108)
CO2 SERPL-SCNC: 26 MMOL/L (ref 21–32)
CREAT SERPL-MCNC: 1.06 MG/DL (ref 0.7–1.3)
DIAGNOSIS, 93000: NORMAL
DIAGNOSIS, 93000: NORMAL
ERYTHROCYTE [DISTWIDTH] IN BLOOD BY AUTOMATED COUNT: 14.2 % (ref 11.5–14.5)
GLOBULIN SER CALC-MCNC: 2.8 G/DL (ref 2–4)
GLUCOSE SERPL-MCNC: 111 MG/DL (ref 65–100)
HCT VFR BLD AUTO: 31.5 % (ref 36.6–50.3)
HGB BLD-MCNC: 10.3 G/DL (ref 12.1–17)
MAGNESIUM SERPL-MCNC: 2.2 MG/DL (ref 1.6–2.4)
MCH RBC QN AUTO: 30.7 PG (ref 26–34)
MCHC RBC AUTO-ENTMCNC: 32.7 G/DL (ref 30–36.5)
MCV RBC AUTO: 93.8 FL (ref 80–99)
NRBC # BLD: 0 K/UL (ref 0–0.01)
NRBC BLD-RTO: 0 PER 100 WBC
P-R INTERVAL, ECG05: 148 MS
P-R INTERVAL, ECG05: 156 MS
PLATELET # BLD AUTO: 127 K/UL (ref 150–400)
PMV BLD AUTO: 11.9 FL (ref 8.9–12.9)
POTASSIUM SERPL-SCNC: 3.9 MMOL/L (ref 3.5–5.1)
PROT SERPL-MCNC: 5.2 G/DL (ref 6.4–8.2)
Q-T INTERVAL, ECG07: 382 MS
Q-T INTERVAL, ECG07: 384 MS
QRS DURATION, ECG06: 100 MS
QRS DURATION, ECG06: 104 MS
QTC CALCULATION (BEZET), ECG08: 429 MS
QTC CALCULATION (BEZET), ECG08: 446 MS
RBC # BLD AUTO: 3.36 M/UL (ref 4.1–5.7)
SODIUM SERPL-SCNC: 134 MMOL/L (ref 136–145)
VENTRICULAR RATE, ECG03: 76 BPM
VENTRICULAR RATE, ECG03: 81 BPM
WBC # BLD AUTO: 8.1 K/UL (ref 4.1–11.1)

## 2022-04-03 PROCEDURE — 74011250636 HC RX REV CODE- 250/636: Performed by: NURSE PRACTITIONER

## 2022-04-03 PROCEDURE — 74011250637 HC RX REV CODE- 250/637: Performed by: INTERNAL MEDICINE

## 2022-04-03 PROCEDURE — 80053 COMPREHEN METABOLIC PANEL: CPT

## 2022-04-03 PROCEDURE — 65610000003 HC RM ICU SURGICAL

## 2022-04-03 PROCEDURE — 85027 COMPLETE CBC AUTOMATED: CPT

## 2022-04-03 PROCEDURE — 36415 COLL VENOUS BLD VENIPUNCTURE: CPT

## 2022-04-03 PROCEDURE — 93005 ELECTROCARDIOGRAM TRACING: CPT

## 2022-04-03 PROCEDURE — 71045 X-RAY EXAM CHEST 1 VIEW: CPT

## 2022-04-03 PROCEDURE — 77010033678 HC OXYGEN DAILY

## 2022-04-03 PROCEDURE — 74011000250 HC RX REV CODE- 250: Performed by: PHYSICIAN ASSISTANT

## 2022-04-03 PROCEDURE — 74011250637 HC RX REV CODE- 250/637: Performed by: THORACIC SURGERY (CARDIOTHORACIC VASCULAR SURGERY)

## 2022-04-03 PROCEDURE — 74011250637 HC RX REV CODE- 250/637: Performed by: NURSE PRACTITIONER

## 2022-04-03 PROCEDURE — 74011250637 HC RX REV CODE- 250/637: Performed by: PHYSICIAN ASSISTANT

## 2022-04-03 PROCEDURE — 83735 ASSAY OF MAGNESIUM: CPT

## 2022-04-03 RX ORDER — FAMOTIDINE 20 MG/1
20 TABLET, FILM COATED ORAL 2 TIMES DAILY
Status: DISCONTINUED | OUTPATIENT
Start: 2022-04-03 | End: 2022-04-08

## 2022-04-03 RX ORDER — QUETIAPINE FUMARATE 25 MG/1
25 TABLET, FILM COATED ORAL DAILY
Status: DISCONTINUED | OUTPATIENT
Start: 2022-04-04 | End: 2022-04-20 | Stop reason: HOSPADM

## 2022-04-03 RX ORDER — QUETIAPINE FUMARATE 100 MG/1
100 TABLET, FILM COATED ORAL
Status: DISCONTINUED | OUTPATIENT
Start: 2022-04-03 | End: 2022-04-20 | Stop reason: HOSPADM

## 2022-04-03 RX ADMIN — FAMOTIDINE 20 MG: 20 TABLET, FILM COATED ORAL at 17:32

## 2022-04-03 RX ADMIN — QUETIAPINE FUMARATE 100 MG: 100 TABLET ORAL at 21:25

## 2022-04-03 RX ADMIN — CHLORHEXIDINE GLUCONATE 10 ML: 1.2 RINSE ORAL at 09:58

## 2022-04-03 RX ADMIN — AMIODARONE HYDROCHLORIDE 400 MG: 200 TABLET ORAL at 21:24

## 2022-04-03 RX ADMIN — OXYCODONE 10 MG: 5 TABLET ORAL at 02:47

## 2022-04-03 RX ADMIN — SODIUM CHLORIDE, PRESERVATIVE FREE 10 ML: 5 INJECTION INTRAVENOUS at 21:25

## 2022-04-03 RX ADMIN — SENNOSIDES AND DOCUSATE SODIUM 1 TABLET: 50; 8.6 TABLET ORAL at 09:57

## 2022-04-03 RX ADMIN — HALOPERIDOL LACTATE 3 MG: 5 INJECTION, SOLUTION INTRAMUSCULAR at 16:01

## 2022-04-03 RX ADMIN — ATORVASTATIN CALCIUM 40 MG: 40 TABLET, FILM COATED ORAL at 21:25

## 2022-04-03 RX ADMIN — SENNOSIDES AND DOCUSATE SODIUM 1 TABLET: 50; 8.6 TABLET ORAL at 17:32

## 2022-04-03 RX ADMIN — HALOPERIDOL LACTATE 3 MG: 5 INJECTION, SOLUTION INTRAMUSCULAR at 00:40

## 2022-04-03 RX ADMIN — Medication 400 MG: at 17:32

## 2022-04-03 RX ADMIN — POLYETHYLENE GLYCOL 3350 17 G: 17 POWDER, FOR SOLUTION ORAL at 09:57

## 2022-04-03 RX ADMIN — ACETAMINOPHEN 650 MG: 325 TABLET ORAL at 17:32

## 2022-04-03 RX ADMIN — QUETIAPINE FUMARATE 50 MG: 25 TABLET ORAL at 09:57

## 2022-04-03 RX ADMIN — Medication 400 MG: at 09:57

## 2022-04-03 RX ADMIN — Medication 6 MG: at 21:24

## 2022-04-03 RX ADMIN — MUPIROCIN: 20 OINTMENT TOPICAL at 09:58

## 2022-04-03 RX ADMIN — ACETAMINOPHEN 650 MG: 325 TABLET ORAL at 11:56

## 2022-04-03 RX ADMIN — CHLORHEXIDINE GLUCONATE 10 ML: 1.2 RINSE ORAL at 21:25

## 2022-04-03 RX ADMIN — MUPIROCIN: 20 OINTMENT TOPICAL at 17:29

## 2022-04-03 RX ADMIN — SODIUM CHLORIDE, PRESERVATIVE FREE 10 ML: 5 INJECTION INTRAVENOUS at 05:07

## 2022-04-03 RX ADMIN — AMIODARONE HYDROCHLORIDE 400 MG: 200 TABLET ORAL at 09:57

## 2022-04-03 RX ADMIN — SODIUM CHLORIDE, PRESERVATIVE FREE 10 ML: 5 INJECTION INTRAVENOUS at 16:02

## 2022-04-03 RX ADMIN — ASPIRIN 81 MG CHEWABLE TABLET 81 MG: 81 TABLET CHEWABLE at 09:57

## 2022-04-03 NOTE — PROGRESS NOTES
Problem: Pain  Goal: *Control of Pain  Outcome: Progressing Towards Goal  Goal: *PALLIATIVE CARE:  Alleviation of Pain  Outcome: Progressing Towards Goal     Problem: Patient Education: Go to Patient Education Activity  Goal: Patient/Family Education  Outcome: Progressing Towards Goal     Problem: Pressure Injury - Risk of  Goal: *Prevention of pressure injury  Description: Document Asim Scale and appropriate interventions in the flowsheet. Outcome: Progressing Towards Goal  Note: Pressure Injury Interventions:  Sensory Interventions: Assess changes in LOC,Keep linens dry and wrinkle-free,Minimize linen layers,Pressure redistribution bed/mattress (bed type),Turn and reposition approx. every two hours (pillows and wedges if needed)    Moisture Interventions: Absorbent underpads,Maintain skin hydration (lotion/cream),Minimize layers    Activity Interventions: Assess need for specialty bed,Pressure redistribution bed/mattress(bed type),Increase time out of bed,PT/OT evaluation    Mobility Interventions: Float heels,Pressure redistribution bed/mattress (bed type),Turn and reposition approx. every two hours(pillow and wedges)    Nutrition Interventions: Document food/fluid/supplement intake,Offer support with meals,snacks and hydration    Friction and Shear Interventions: Lift sheet,Minimize layers,Transferring/repositioning devices                Problem: Patient Education: Go to Patient Education Activity  Goal: Patient/Family Education  Outcome: Progressing Towards Goal     Problem: Falls - Risk of  Goal: *Absence of Falls  Description: Document Maricruz Fall Risk and appropriate interventions in the flowsheet.   Outcome: Progressing Towards Goal  Note: Fall Risk Interventions:  Mobility Interventions: Communicate number of staff needed for ambulation/transfer,PT Consult for mobility concerns,PT Consult for assist device competence,Strengthening exercises (ROM-active/passive)    Mentation Interventions: Door open when patient unattended,Evaluate medications/consider consulting pharmacy,Familiar objects from home,Reorient patient    Medication Interventions: Evaluate medications/consider consulting pharmacy    Elimination Interventions: Patient to call for help with toileting needs    History of Falls Interventions: Evaluate medications/consider consulting pharmacy,Door open when patient unattended         Problem: Patient Education: Go to Patient Education Activity  Goal: Patient/Family Education  Outcome: Progressing Towards Goal     Problem: Patient Education: Go to Patient Education Activity  Goal: Patient/Family Education  Outcome: Progressing Towards Goal     Problem: Cath Lab Procedures: Post-Cath Day of Procedure (Initiate SCIP Measures for Post-Op Care)  Goal: Off Pathway (Use only if patient is Off Pathway)  Outcome: Progressing Towards Goal  Goal: Activity/Safety  Outcome: Progressing Towards Goal  Goal: Consults, if ordered  Outcome: Progressing Towards Goal  Goal: Diagnostic Test/Procedures  Outcome: Progressing Towards Goal  Goal: Nutrition/Diet  Outcome: Progressing Towards Goal  Goal: Discharge Planning  Outcome: Progressing Towards Goal  Goal: Medications  Outcome: Progressing Towards Goal  Goal: Respiratory  Outcome: Progressing Towards Goal  Goal: Treatments/Interventions/Procedures  Outcome: Progressing Towards Goal  Goal: Psychosocial  Outcome: Progressing Towards Goal  Goal: *Procedure site is without bleeding and signs of infection six hours post sheath removal  Outcome: Progressing Towards Goal  Goal: *Hemodynamically stable  Outcome: Progressing Towards Goal  Goal: *Optimal pain control at patient's stated goal  Outcome: Progressing Towards Goal     Problem: Cath Lab Procedures: Post-Cath Day 1  Goal: Off Pathway (Use only if patient is Off Pathway)  Outcome: Progressing Towards Goal  Goal: Activity/Safety  Outcome: Progressing Towards Goal  Goal: Diagnostic Test/Procedures  Outcome: Progressing Towards Goal  Goal: Nutrition/Diet  Outcome: Progressing Towards Goal  Goal: Discharge Planning  Outcome: Progressing Towards Goal  Goal: Medications  Outcome: Progressing Towards Goal  Goal: Respiratory  Outcome: Progressing Towards Goal  Goal: Treatments/Interventions/Procedures  Outcome: Progressing Towards Goal  Goal: Psychosocial  Outcome: Progressing Towards Goal     Problem: Cath Lab Procedures: Discharge Outcomes  Goal: *Stable cardiac rhythm  Outcome: Progressing Towards Goal  Goal: *Hemodynamically stable  Outcome: Progressing Towards Goal  Goal: *Optimal pain control at patient's stated goal  Outcome: Progressing Towards Goal  Goal: *Pulses palpable, skin color within defined limits, skin temperature warm  Outcome: Progressing Towards Goal  Goal: *Lungs clear or at baseline  Outcome: Progressing Towards Goal  Goal: *Demonstrates ability to perform prescribed activity without shortness of breath or discomfort  Outcome: Progressing Towards Goal  Goal: *Verbalizes home exercise program, activity guidelines, cardiac precautions  Outcome: Progressing Towards Goal  Goal: *Verbalizes understanding and describes prescribed diet  Outcome: Progressing Towards Goal  Goal: *Verbalizes understanding and describes medication purposes and frequencies  Outcome: Progressing Towards Goal  Goal: *Identifies cardiac risk factors  Outcome: Progressing Towards Goal  Goal: *No signs and symptoms of infection or wound complications  Outcome: Progressing Towards Goal  Goal: *Anxiety reduced or absent  Outcome: Progressing Towards Goal  Goal: *Verbalizes and demonstrates incision care  Outcome: Progressing Towards Goal  Goal: *Understands and describes signs and symptoms to report to providers(Stroke Metric)  Outcome: Progressing Towards Goal  Goal: *Describes follow-up/return visits to physicians  Outcome: Progressing Towards Goal  Goal: *Describes available resources and support systems  Outcome: Progressing Towards Goal  Goal: *Influenza immunization  Outcome: Progressing Towards Goal  Goal: *Pneumococcal immunization  Outcome: Progressing Towards Goal     Problem: Patient Education: Go to Patient Education Activity  Goal: Patient/Family Education  Outcome: Progressing Towards Goal     Problem: AMI: Day 1  Goal: Off Pathway (Use only if patient is Off Pathway)  Outcome: Progressing Towards Goal  Goal: Activity/Safety  Outcome: Progressing Towards Goal  Goal: Consults, if ordered  Outcome: Progressing Towards Goal  Goal: Diagnostic Test/Procedures  Outcome: Progressing Towards Goal  Goal: Nutrition/Diet  Outcome: Progressing Towards Goal  Goal: Discharge Planning  Outcome: Progressing Towards Goal  Goal: Medications  Outcome: Progressing Towards Goal  Goal: Respiratory  Outcome: Progressing Towards Goal  Goal: Treatments/Interventions/Procedures  Outcome: Progressing Towards Goal  Goal: Psychosocial  Outcome: Progressing Towards Goal  Goal: *Eligible patient receives reperfusion therapy thrombolytics/PCI  Outcome: Progressing Towards Goal  Goal: *Optimal pain control at patient's stated goal  Outcome: Progressing Towards Goal  Goal: *Hemodynamically stable  Outcome: Progressing Towards Goal  Goal: *Received aspirin and beta-blocker within 24 hours of arrival unless contraindicated  Outcome: Progressing Towards Goal     Problem: AMI: Day 2  Goal: Off Pathway (Use only if patient is Off Pathway)  Outcome: Progressing Towards Goal  Goal: Activity/Safety  Outcome: Progressing Towards Goal  Goal: Consults, if ordered  Outcome: Progressing Towards Goal  Goal: Diagnostic Test/Procedures  Outcome: Progressing Towards Goal  Goal: Nutrition/Diet  Outcome: Progressing Towards Goal  Goal: Discharge Planning  Outcome: Progressing Towards Goal  Goal: Medications  Outcome: Progressing Towards Goal  Goal: Respiratory  Outcome: Progressing Towards Goal  Goal: Treatments/Interventions/Procedures  Outcome: Progressing Towards Goal  Goal: Psychosocial  Outcome: Progressing Towards Goal  Goal: *Optimal pain control at patient's stated goal  Outcome: Progressing Towards Goal  Goal: *Hemodynamically stable  Outcome: Progressing Towards Goal  Goal: *Demonstrates progressive activity  Outcome: Progressing Towards Goal  Goal: *Tolerating diet  Outcome: Progressing Towards Goal     Problem: AMI: Day 3  Goal: Off Pathway (Use only if patient is Off Pathway)  Outcome: Progressing Towards Goal  Goal: Activity/Safety  Outcome: Progressing Towards Goal  Goal: Consults, if ordered  Outcome: Progressing Towards Goal  Goal: Diagnostic Test/Procedures  Outcome: Progressing Towards Goal  Goal: Nutrition/Diet  Outcome: Progressing Towards Goal  Goal: Discharge Planning  Outcome: Progressing Towards Goal  Goal: Medications  Outcome: Progressing Towards Goal  Goal: Respiratory  Outcome: Progressing Towards Goal  Goal: Treatments/Interventions/Procedures  Outcome: Progressing Towards Goal  Goal: Psychosocial  Outcome: Progressing Towards Goal  Goal: *Optimal pain control at patient's stated goal  Outcome: Progressing Towards Goal  Goal: *Hemodynamically stable  Outcome: Progressing Towards Goal  Goal: *Demonstrates progressive activity  Outcome: Progressing Towards Goal  Goal: *Tolerating diet  Outcome: Progressing Towards Goal     Problem: AMI: Day 4  Goal: Off Pathway (Use only if patient is Off Pathway)  Outcome: Progressing Towards Goal  Goal: Activity/Safety  Outcome: Progressing Towards Goal  Goal: Diagnostic Test/Procedures  Outcome: Progressing Towards Goal  Goal: Nutrition/Diet  Outcome: Progressing Towards Goal  Goal: Discharge Planning  Outcome: Progressing Towards Goal  Goal: Medications  Outcome: Progressing Towards Goal  Goal: Respiratory  Outcome: Progressing Towards Goal  Goal: Treatments/Interventions/Procedures  Outcome: Progressing Towards Goal  Goal: Psychosocial  Outcome: Progressing Towards Goal  Goal: *Optimal pain control at patient's stated goal  Outcome: Progressing Towards Goal  Goal: *Hemodynamically stable  Outcome: Progressing Towards Goal  Goal: *Demonstrates progressive activity  Outcome: Progressing Towards Goal  Goal: *Tolerating diet  Outcome: Progressing Towards Goal     Problem: AMI: Day 5  Goal: Off Pathway (Use only if patient is Off Pathway)  Outcome: Progressing Towards Goal  Goal: Activity/Safety  Outcome: Progressing Towards Goal  Goal: Diagnostic Test/Procedures  Outcome: Progressing Towards Goal  Goal: Nutrition/Diet  Outcome: Progressing Towards Goal  Goal: Discharge Planning  Outcome: Progressing Towards Goal  Goal: Medications  Outcome: Progressing Towards Goal  Goal: Treatments/Interventions/Procedures  Outcome: Progressing Towards Goal  Goal: Psychosocial  Outcome: Progressing Towards Goal     Problem: AMI: Discharge Outcomes  Goal: *Demonstrates ability to perform prescribed activity without shortness of breath or discomfort  Outcome: Progressing Towards Goal  Goal: *Verbalizes understanding and describes prescribed diet  Outcome: Progressing Towards Goal  Goal: *Describes follow-up/return visits to physicians  Outcome: Progressing Towards Goal  Goal: *Describes home care/support arrangements established based on need  Outcome: Progressing Towards Goal  Goal: *Anxiety reduced or absent  Outcome: Progressing Towards Goal  Goal: *Understands and describes signs and symptoms to report to providers(Stroke Metric)  Outcome: Progressing Towards Goal  Goal: *Verbalizes name, dosage, time, side effects, and number of days to continue medications  Outcome: Progressing Towards Goal     Pt with periodic confusion/agitation in ICU- remains of precedex gtt. Continue with frequent redirection.

## 2022-04-03 NOTE — PROGRESS NOTES
Renal Dosing/Monitoring  Medication: famotidine   Current regimen:  20mg every 24 hr  Recent Labs     04/03/22  0302 04/02/22  0338 04/01/22  1252   CREA 1.06 1.26 1.37*   BUN 37* 56* 58*     Estimated CrCl:  55-60 ml/min  Plan: Change to famotidine 20 mg BID based on renal function and indication.

## 2022-04-03 NOTE — PROGRESS NOTES
1945: [facility] BEDSIDE_VERBAL shift change report received from [facility] RN to [employer]. Report included the following information SBAR, Intake/Output, MAR, Recent Results, Med Rec Status, Cardiac Rhythm NSR and Alarm Parameters . Assumed care. Pt lying in bed, resting quietly. Oriented to self/place at this time and redirectable. 0040: Pt with spontaneous awakening- attempting to get kick this RN, get OOB, and pulling at IV sites/chest tubes. Pt is not redirectable at this time and oriented only to self. PRN Haldol given (qtc appropriate). 0400: Labs drawn and sent. 0745: [facility] BEDSIDE_VERBAL shift change report given to [facility] RN (oncoming nurse) by [employer] (offgoing nurse). Report included the following information SBAR, Intake/Output, MAR, Recent Results, Med Rec Status, Cardiac Rhythm NSR and Alarm Parameters .

## 2022-04-03 NOTE — PROGRESS NOTES
0800: [facility] Patient resting in bed.   1600: Patient becoming increasingly restless and agitated. Haldol given. 1610: Bedside and Verbal shift change report given to Daniel Landa [facility] (oncoming nurse) by Darius Cummings RN (offgoing nurse). Report included the following information SBAR, OR Summary, Procedure Summary, Intake/Output, MAR and Recent Results.

## 2022-04-03 NOTE — PROGRESS NOTES
S/p cabg with continued delirium and agitation    Required haldol x1. Calm this morning, in bed    Weak    Cr improved with IVF to 37/1.06    CT with lower output but still elevated over 24    Plan:  Would limit narcs as much as possible  Recommend keeping in ICU to observe for agitation and delirium - at high risk for an event given his age and frailty - will keep his CT one more day  Cr improved signficiantly - turning down IVF from 100 to 50.  He is taking minimal PO if any so keeping maintenance IVF necessary

## 2022-04-03 NOTE — PROGRESS NOTES
1600 Bedside and Verbal shift change report given to Pattie Monk, [facility] (oncoming nurse) by Arron Mandujano RN (offgoing nurse). Report included the following information SBAR, Kardex, OR Summary, Procedure Summary, Intake/Output, MAR, Recent Results and Cardiac Rhythm NSR. Pt is combative, confused; trying to get out of bed. Haldol was given. 1630 Dr Melissa Naranjo at the bedside. Plan: adjust Seroquel dosing. 2000 Bedside and Verbal shift change report given to Geraldine Bartholomew RN (oncoming nurse) by Pattie Monk RN (offgoing nurse). Report included the following information SBAR, Kardex, Procedure Summary, Intake/Output, MAR, Recent Results and Cardiac Rhythm NSR.

## 2022-04-04 LAB
ALBUMIN SERPL-MCNC: 2.3 G/DL (ref 3.5–5)
ALBUMIN/GLOB SERPL: 0.8 {RATIO} (ref 1.1–2.2)
ALP SERPL-CCNC: 70 U/L (ref 45–117)
ALT SERPL-CCNC: 20 U/L (ref 12–78)
ANION GAP SERPL CALC-SCNC: 3 MMOL/L (ref 5–15)
AST SERPL-CCNC: 34 U/L (ref 15–37)
ATRIAL RATE: 89 BPM
BILIRUB SERPL-MCNC: 0.5 MG/DL (ref 0.2–1)
BUN SERPL-MCNC: 24 MG/DL (ref 6–20)
BUN/CREAT SERPL: 23 (ref 12–20)
CALCIUM SERPL-MCNC: 8.5 MG/DL (ref 8.5–10.1)
CALCULATED P AXIS, ECG09: 16 DEGREES
CALCULATED R AXIS, ECG10: -50 DEGREES
CALCULATED T AXIS, ECG11: 60 DEGREES
CHLORIDE SERPL-SCNC: 104 MMOL/L (ref 97–108)
CO2 SERPL-SCNC: 29 MMOL/L (ref 21–32)
CREAT SERPL-MCNC: 1.03 MG/DL (ref 0.7–1.3)
DIAGNOSIS, 93000: NORMAL
ERYTHROCYTE [DISTWIDTH] IN BLOOD BY AUTOMATED COUNT: 14 % (ref 11.5–14.5)
GLOBULIN SER CALC-MCNC: 3 G/DL (ref 2–4)
GLUCOSE SERPL-MCNC: 137 MG/DL (ref 65–100)
HCT VFR BLD AUTO: 32.8 % (ref 36.6–50.3)
HGB BLD-MCNC: 10.7 G/DL (ref 12.1–17)
MAGNESIUM SERPL-MCNC: 2.3 MG/DL (ref 1.6–2.4)
MCH RBC QN AUTO: 30.5 PG (ref 26–34)
MCHC RBC AUTO-ENTMCNC: 32.6 G/DL (ref 30–36.5)
MCV RBC AUTO: 93.4 FL (ref 80–99)
NRBC # BLD: 0 K/UL (ref 0–0.01)
NRBC BLD-RTO: 0 PER 100 WBC
P-R INTERVAL, ECG05: 162 MS
PLATELET # BLD AUTO: 161 K/UL (ref 150–400)
PMV BLD AUTO: 12 FL (ref 8.9–12.9)
POTASSIUM SERPL-SCNC: 4.2 MMOL/L (ref 3.5–5.1)
PROT SERPL-MCNC: 5.3 G/DL (ref 6.4–8.2)
Q-T INTERVAL, ECG07: 380 MS
QRS DURATION, ECG06: 96 MS
QTC CALCULATION (BEZET), ECG08: 462 MS
RBC # BLD AUTO: 3.51 M/UL (ref 4.1–5.7)
SODIUM SERPL-SCNC: 136 MMOL/L (ref 136–145)
VENTRICULAR RATE, ECG03: 89 BPM
WBC # BLD AUTO: 7.4 K/UL (ref 4.1–11.1)

## 2022-04-04 PROCEDURE — 83735 ASSAY OF MAGNESIUM: CPT

## 2022-04-04 PROCEDURE — 97530 THERAPEUTIC ACTIVITIES: CPT

## 2022-04-04 PROCEDURE — 97116 GAIT TRAINING THERAPY: CPT

## 2022-04-04 PROCEDURE — 74011250637 HC RX REV CODE- 250/637: Performed by: THORACIC SURGERY (CARDIOTHORACIC VASCULAR SURGERY)

## 2022-04-04 PROCEDURE — 93005 ELECTROCARDIOGRAM TRACING: CPT

## 2022-04-04 PROCEDURE — 74011250636 HC RX REV CODE- 250/636: Performed by: NURSE PRACTITIONER

## 2022-04-04 PROCEDURE — 85027 COMPLETE CBC AUTOMATED: CPT

## 2022-04-04 PROCEDURE — 74011250637 HC RX REV CODE- 250/637: Performed by: NURSE PRACTITIONER

## 2022-04-04 PROCEDURE — 97535 SELF CARE MNGMENT TRAINING: CPT

## 2022-04-04 PROCEDURE — 65660000001 HC RM ICU INTERMED STEPDOWN

## 2022-04-04 PROCEDURE — 74011250637 HC RX REV CODE- 250/637: Performed by: INTERNAL MEDICINE

## 2022-04-04 PROCEDURE — 74011000250 HC RX REV CODE- 250: Performed by: PHYSICIAN ASSISTANT

## 2022-04-04 PROCEDURE — 74011250637 HC RX REV CODE- 250/637: Performed by: PHYSICIAN ASSISTANT

## 2022-04-04 PROCEDURE — 97110 THERAPEUTIC EXERCISES: CPT

## 2022-04-04 PROCEDURE — 80053 COMPREHEN METABOLIC PANEL: CPT

## 2022-04-04 PROCEDURE — 36415 COLL VENOUS BLD VENIPUNCTURE: CPT

## 2022-04-04 RX ORDER — ENOXAPARIN SODIUM 100 MG/ML
40 INJECTION SUBCUTANEOUS EVERY 24 HOURS
Status: DISCONTINUED | OUTPATIENT
Start: 2022-04-04 | End: 2022-04-14

## 2022-04-04 RX ORDER — METOPROLOL TARTRATE 25 MG/1
12.5 TABLET, FILM COATED ORAL EVERY 12 HOURS
Status: DISCONTINUED | OUTPATIENT
Start: 2022-04-04 | End: 2022-04-05

## 2022-04-04 RX ORDER — TAMSULOSIN HYDROCHLORIDE 0.4 MG/1
0.4 CAPSULE ORAL DAILY
Status: DISCONTINUED | OUTPATIENT
Start: 2022-04-04 | End: 2022-04-20 | Stop reason: HOSPADM

## 2022-04-04 RX ORDER — FUROSEMIDE 10 MG/ML
20 INJECTION INTRAMUSCULAR; INTRAVENOUS ONCE
Status: COMPLETED | OUTPATIENT
Start: 2022-04-04 | End: 2022-04-04

## 2022-04-04 RX ADMIN — MUPIROCIN: 20 OINTMENT TOPICAL at 10:27

## 2022-04-04 RX ADMIN — FAMOTIDINE 20 MG: 20 TABLET, FILM COATED ORAL at 09:49

## 2022-04-04 RX ADMIN — QUETIAPINE FUMARATE 100 MG: 100 TABLET ORAL at 21:53

## 2022-04-04 RX ADMIN — SODIUM CHLORIDE, PRESERVATIVE FREE 10 ML: 5 INJECTION INTRAVENOUS at 21:55

## 2022-04-04 RX ADMIN — HALOPERIDOL LACTATE 3 MG: 5 INJECTION, SOLUTION INTRAMUSCULAR at 02:23

## 2022-04-04 RX ADMIN — TAMSULOSIN HYDROCHLORIDE 0.4 MG: 0.4 CAPSULE ORAL at 09:49

## 2022-04-04 RX ADMIN — SODIUM CHLORIDE, PRESERVATIVE FREE 10 ML: 5 INJECTION INTRAVENOUS at 05:21

## 2022-04-04 RX ADMIN — CHLORHEXIDINE GLUCONATE 10 ML: 1.2 RINSE ORAL at 10:27

## 2022-04-04 RX ADMIN — POLYETHYLENE GLYCOL 3350 17 G: 17 POWDER, FOR SOLUTION ORAL at 09:50

## 2022-04-04 RX ADMIN — CHLORHEXIDINE GLUCONATE 10 ML: 1.2 RINSE ORAL at 21:56

## 2022-04-04 RX ADMIN — OXYCODONE 5 MG: 5 TABLET ORAL at 21:54

## 2022-04-04 RX ADMIN — Medication 400 MG: at 09:49

## 2022-04-04 RX ADMIN — SENNOSIDES AND DOCUSATE SODIUM 1 TABLET: 50; 8.6 TABLET ORAL at 09:49

## 2022-04-04 RX ADMIN — HALOPERIDOL LACTATE 3 MG: 5 INJECTION, SOLUTION INTRAMUSCULAR at 22:34

## 2022-04-04 RX ADMIN — QUETIAPINE FUMARATE 25 MG: 25 TABLET ORAL at 09:49

## 2022-04-04 RX ADMIN — ATORVASTATIN CALCIUM 40 MG: 40 TABLET, FILM COATED ORAL at 21:53

## 2022-04-04 RX ADMIN — Medication 400 MG: at 17:59

## 2022-04-04 RX ADMIN — ENOXAPARIN SODIUM 40 MG: 100 INJECTION SUBCUTANEOUS at 09:50

## 2022-04-04 RX ADMIN — AMIODARONE HYDROCHLORIDE 400 MG: 200 TABLET ORAL at 09:49

## 2022-04-04 RX ADMIN — FUROSEMIDE 20 MG: 10 INJECTION, SOLUTION INTRAMUSCULAR; INTRAVENOUS at 09:50

## 2022-04-04 RX ADMIN — ASPIRIN 81 MG CHEWABLE TABLET 81 MG: 81 TABLET CHEWABLE at 09:50

## 2022-04-04 RX ADMIN — METOPROLOL TARTRATE 12.5 MG: 25 TABLET, FILM COATED ORAL at 09:50

## 2022-04-04 RX ADMIN — FAMOTIDINE 20 MG: 20 TABLET, FILM COATED ORAL at 17:59

## 2022-04-04 RX ADMIN — METOPROLOL TARTRATE 12.5 MG: 25 TABLET, FILM COATED ORAL at 21:53

## 2022-04-04 RX ADMIN — SENNOSIDES AND DOCUSATE SODIUM 1 TABLET: 50; 8.6 TABLET ORAL at 17:59

## 2022-04-04 RX ADMIN — AMIODARONE HYDROCHLORIDE 400 MG: 200 TABLET ORAL at 21:53

## 2022-04-04 NOTE — PROGRESS NOTES
Problem: Pain  Goal: *Control of Pain  Outcome: Progressing Towards Goal  Goal: *PALLIATIVE CARE:  Alleviation of Pain  Outcome: Progressing Towards Goal     Problem: Patient Education: Go to Patient Education Activity  Goal: Patient/Family Education  Outcome: Progressing Towards Goal     Problem: Pressure Injury - Risk of  Goal: *Prevention of pressure injury  Description: Document Asim Scale and appropriate interventions in the flowsheet. Outcome: Progressing Towards Goal  Note: Pressure Injury Interventions:  Sensory Interventions: Minimize linen layers,Pressure redistribution bed/mattress (bed type),Turn and reposition approx. every two hours (pillows and wedges if needed),Check visual cues for pain    Moisture Interventions: Maintain skin hydration (lotion/cream),Minimize layers,Assess need for specialty bed    Activity Interventions: Assess need for specialty bed,Pressure redistribution bed/mattress(bed type)    Mobility Interventions: Assess need for specialty bed,Pressure redistribution bed/mattress (bed type),Turn and reposition approx. every two hours(pillow and wedges)    Nutrition Interventions: Document food/fluid/supplement intake    Friction and Shear Interventions: Lift sheet,Lift team/patient mobility team,Minimize layers,Transferring/repositioning devices                Problem: Patient Education: Go to Patient Education Activity  Goal: Patient/Family Education  Outcome: Progressing Towards Goal     Problem: Falls - Risk of  Goal: *Absence of Falls  Description: Document Maricruz Fall Risk and appropriate interventions in the flowsheet.   Outcome: Progressing Towards Goal  Note: Fall Risk Interventions:  Mobility Interventions: Communicate number of staff needed for ambulation/transfer,Patient to call before getting OOB,PT Consult for mobility concerns,Strengthening exercises (ROM-active/passive)    Mentation Interventions: Adequate sleep, hydration, pain control,Door open when patient unattended,Evaluate medications/consider consulting pharmacy,Familiar objects from home,Reorient patient,More frequent rounding    Medication Interventions: Evaluate medications/consider consulting pharmacy,Utilize gait belt for transfers/ambulation    Elimination Interventions: Patient to call for help with toileting needs    History of Falls Interventions: Evaluate medications/consider consulting pharmacy         Problem: Patient Education: Go to Patient Education Activity  Goal: Patient/Family Education  Outcome: Progressing Towards Goal     Problem: Patient Education: Go to Patient Education Activity  Goal: Patient/Family Education  Outcome: Progressing Towards Goal     Problem: Cath Lab Procedures: Post-Cath Day of Procedure (Initiate SCIP Measures for Post-Op Care)  Goal: Off Pathway (Use only if patient is Off Pathway)  Outcome: Progressing Towards Goal  Goal: Activity/Safety  Outcome: Progressing Towards Goal  Goal: Consults, if ordered  Outcome: Progressing Towards Goal  Goal: Diagnostic Test/Procedures  Outcome: Progressing Towards Goal  Goal: Nutrition/Diet  Outcome: Progressing Towards Goal  Goal: Discharge Planning  Outcome: Progressing Towards Goal  Goal: Medications  Outcome: Progressing Towards Goal  Goal: Respiratory  Outcome: Progressing Towards Goal  Goal: Treatments/Interventions/Procedures  Outcome: Progressing Towards Goal  Goal: Psychosocial  Outcome: Progressing Towards Goal  Goal: *Procedure site is without bleeding and signs of infection six hours post sheath removal  Outcome: Progressing Towards Goal  Goal: *Hemodynamically stable  Outcome: Progressing Towards Goal  Goal: *Optimal pain control at patient's stated goal  Outcome: Progressing Towards Goal     Problem: Cath Lab Procedures: Post-Cath Day 1  Goal: Off Pathway (Use only if patient is Off Pathway)  Outcome: Progressing Towards Goal  Goal: Activity/Safety  Outcome: Progressing Towards Goal  Goal: Diagnostic Test/Procedures  Outcome: Progressing Towards Goal  Goal: Nutrition/Diet  Outcome: Progressing Towards Goal  Goal: Discharge Planning  Outcome: Progressing Towards Goal  Goal: Medications  Outcome: Progressing Towards Goal  Goal: Respiratory  Outcome: Progressing Towards Goal  Goal: Treatments/Interventions/Procedures  Outcome: Progressing Towards Goal  Goal: Psychosocial  Outcome: Progressing Towards Goal     Problem: Cath Lab Procedures: Discharge Outcomes  Goal: *Stable cardiac rhythm  Outcome: Progressing Towards Goal  Goal: *Hemodynamically stable  Outcome: Progressing Towards Goal  Goal: *Optimal pain control at patient's stated goal  Outcome: Progressing Towards Goal  Goal: *Pulses palpable, skin color within defined limits, skin temperature warm  Outcome: Progressing Towards Goal  Goal: *Lungs clear or at baseline  Outcome: Progressing Towards Goal  Goal: *Demonstrates ability to perform prescribed activity without shortness of breath or discomfort  Outcome: Progressing Towards Goal  Goal: *Verbalizes home exercise program, activity guidelines, cardiac precautions  Outcome: Progressing Towards Goal  Goal: *Verbalizes understanding and describes prescribed diet  Outcome: Progressing Towards Goal  Goal: *Verbalizes understanding and describes medication purposes and frequencies  Outcome: Progressing Towards Goal  Goal: *Identifies cardiac risk factors  Outcome: Progressing Towards Goal  Goal: *No signs and symptoms of infection or wound complications  Outcome: Progressing Towards Goal  Goal: *Anxiety reduced or absent  Outcome: Progressing Towards Goal  Goal: *Verbalizes and demonstrates incision care  Outcome: Progressing Towards Goal  Goal: *Understands and describes signs and symptoms to report to providers(Stroke Metric)  Outcome: Progressing Towards Goal  Goal: *Describes follow-up/return visits to physicians  Outcome: Progressing Towards Goal  Goal: *Describes available resources and support systems  Outcome: Progressing Towards Goal  Goal: *Influenza immunization  Outcome: Progressing Towards Goal  Goal: *Pneumococcal immunization  Outcome: Progressing Towards Goal     Problem: Patient Education: Go to Patient Education Activity  Goal: Patient/Family Education  Outcome: Progressing Towards Goal     Problem: AMI: Day 1  Goal: Off Pathway (Use only if patient is Off Pathway)  Outcome: Progressing Towards Goal  Goal: Activity/Safety  Outcome: Progressing Towards Goal  Goal: Consults, if ordered  Outcome: Progressing Towards Goal  Goal: Diagnostic Test/Procedures  Outcome: Progressing Towards Goal  Goal: Nutrition/Diet  Outcome: Progressing Towards Goal  Goal: Discharge Planning  Outcome: Progressing Towards Goal  Goal: Medications  Outcome: Progressing Towards Goal  Goal: Respiratory  Outcome: Progressing Towards Goal  Goal: Treatments/Interventions/Procedures  Outcome: Progressing Towards Goal  Goal: Psychosocial  Outcome: Progressing Towards Goal  Goal: *Eligible patient receives reperfusion therapy thrombolytics/PCI  Outcome: Progressing Towards Goal  Goal: *Optimal pain control at patient's stated goal  Outcome: Progressing Towards Goal  Goal: *Hemodynamically stable  Outcome: Progressing Towards Goal  Goal: *Received aspirin and beta-blocker within 24 hours of arrival unless contraindicated  Outcome: Progressing Towards Goal     Problem: AMI: Day 2  Goal: Off Pathway (Use only if patient is Off Pathway)  Outcome: Progressing Towards Goal  Goal: Activity/Safety  Outcome: Progressing Towards Goal  Goal: Consults, if ordered  Outcome: Progressing Towards Goal  Goal: Diagnostic Test/Procedures  Outcome: Progressing Towards Goal  Goal: Nutrition/Diet  Outcome: Progressing Towards Goal  Goal: Discharge Planning  Outcome: Progressing Towards Goal  Goal: Medications  Outcome: Progressing Towards Goal  Goal: Respiratory  Outcome: Progressing Towards Goal  Goal: Treatments/Interventions/Procedures  Outcome: Progressing Towards Goal  Goal: Psychosocial  Outcome: Progressing Towards Goal  Goal: *Optimal pain control at patient's stated goal  Outcome: Progressing Towards Goal  Goal: *Hemodynamically stable  Outcome: Progressing Towards Goal  Goal: *Demonstrates progressive activity  Outcome: Progressing Towards Goal  Goal: *Tolerating diet  Outcome: Progressing Towards Goal     Problem: AMI: Day 3  Goal: Off Pathway (Use only if patient is Off Pathway)  Outcome: Progressing Towards Goal  Goal: Activity/Safety  Outcome: Progressing Towards Goal  Goal: Consults, if ordered  Outcome: Progressing Towards Goal  Goal: Diagnostic Test/Procedures  Outcome: Progressing Towards Goal  Goal: Nutrition/Diet  Outcome: Progressing Towards Goal  Goal: Discharge Planning  Outcome: Progressing Towards Goal  Goal: Medications  Outcome: Progressing Towards Goal  Goal: Respiratory  Outcome: Progressing Towards Goal  Goal: Treatments/Interventions/Procedures  Outcome: Progressing Towards Goal  Goal: Psychosocial  Outcome: Progressing Towards Goal  Goal: *Optimal pain control at patient's stated goal  Outcome: Progressing Towards Goal  Goal: *Hemodynamically stable  Outcome: Progressing Towards Goal  Goal: *Demonstrates progressive activity  Outcome: Progressing Towards Goal  Goal: *Tolerating diet  Outcome: Progressing Towards Goal     Problem: AMI: Day 4  Goal: Off Pathway (Use only if patient is Off Pathway)  Outcome: Progressing Towards Goal  Goal: Activity/Safety  Outcome: Progressing Towards Goal  Goal: Diagnostic Test/Procedures  Outcome: Progressing Towards Goal  Goal: Nutrition/Diet  Outcome: Progressing Towards Goal  Goal: Discharge Planning  Outcome: Progressing Towards Goal  Goal: Medications  Outcome: Progressing Towards Goal  Goal: Respiratory  Outcome: Progressing Towards Goal  Goal: Treatments/Interventions/Procedures  Outcome: Progressing Towards Goal  Goal: Psychosocial  Outcome: Progressing Towards Goal  Goal: *Optimal pain control at patient's stated goal  Outcome: Progressing Towards Goal  Goal: *Hemodynamically stable  Outcome: Progressing Towards Goal  Goal: *Demonstrates progressive activity  Outcome: Progressing Towards Goal  Goal: *Tolerating diet  Outcome: Progressing Towards Goal     Problem: AMI: Day 5  Goal: Off Pathway (Use only if patient is Off Pathway)  Outcome: Progressing Towards Goal  Goal: Activity/Safety  Outcome: Progressing Towards Goal  Goal: Diagnostic Test/Procedures  Outcome: Progressing Towards Goal  Goal: Nutrition/Diet  Outcome: Progressing Towards Goal  Goal: Discharge Planning  Outcome: Progressing Towards Goal  Goal: Medications  Outcome: Progressing Towards Goal  Goal: Treatments/Interventions/Procedures  Outcome: Progressing Towards Goal  Goal: Psychosocial  Outcome: Progressing Towards Goal     Problem: AMI: Discharge Outcomes  Goal: *Demonstrates ability to perform prescribed activity without shortness of breath or discomfort  Outcome: Progressing Towards Goal  Goal: *Verbalizes understanding and describes prescribed diet  Outcome: Progressing Towards Goal  Goal: *Describes follow-up/return visits to physicians  Outcome: Progressing Towards Goal  Goal: *Describes home care/support arrangements established based on need  Outcome: Progressing Towards Goal  Goal: *Anxiety reduced or absent  Outcome: Progressing Towards Goal  Goal: *Understands and describes signs and symptoms to report to providers(Stroke Metric)  Outcome: Progressing Towards Goal  Goal: *Verbalizes name, dosage, time, side effects, and number of days to continue medications  Outcome: Progressing Towards Goal

## 2022-04-04 NOTE — PROGRESS NOTES
[facility] ICU Progress Note    Admit Date: 3/29/2022  POD:  5 Days Post-Op    Procedure:  Procedure(s):  CORONARY ARTERY BYPASS GRAFT (CABG) X3, LIMA. RSVH VIA EVH. ECC. LAWSON AND EPI AORTIC US BY DR Lynnwood Babinski        Subjective:   Pt seen with Dr. James Prom X3 this am. Agitated and combative last night required Haldol. Seroquel 100 at hs and 25 during the day. Lasix 40X1. Stopped IVF. Creatinine normal.    Objective:   Vitals:  Blood pressure (!) 144/98, pulse 93, temperature 98.5 °F (36.9 °C), resp. rate 21, height 5' 8\" (1.727 m), weight 177 lb 3.2 oz (80.4 kg), SpO2 91 %. Temp (24hrs), Av.4 °F (36.9 °C), Min:98.3 °F (36.8 °C), Max:98.6 °F (37 °C)    Hemodynamics:   CO: CO (l/min): 5 l/min   CI: CI (l/min/m2): 2.7 l/min/m2   CVP: CVP (mmHg): 14 mmHg (22)   SVR: SVR (dyne*sec)/cm5: 945 (dyne*sec)/cm5 (22 9808)   PAP Systolic: PAP Systolic: 42 (00/66/81 2598)   PAP Diastolic: PAP Diastolic: 16 (84/72/50 6092)   PVR:     SV02: SVO2 (%): 61 % (22 06)   SCV02:      EKG/Rhythm:  SR 90      CT Output: 280/100      Oxygen Therapy:  Oxygen Therapy  O2 Sat (%): 91 % (22 0500)  Pulse via Oximetry: 91 beats per minute (22 1955)  O2 Device: Nasal cannula (22 040)  Skin Assessment: Clean, dry, & intact (22 1600)  Skin Protection for O2 Device: N/A (22 0800)  O2 Flow Rate (L/min): 2 l/min (22 0400)  O2 Temperature: 80 °F (26.7 °C) (22 1631)  FIO2 (%): 80 % (22 1629)    CXR: small bilateral pleural effusions    Admission Weight: Last Weight   Weight: 167 lb 1.7 oz (75.8 kg) Weight: 177 lb 3.2 oz (80.4 kg)     Intake / Output / Drain:  Current Shift: No intake/output data recorded. Last 24 hrs.:     Intake/Output Summary (Last 24 hours) at 2022 0803  Last data filed at 2022 0600  Gross per 24 hour   Intake 1450 ml   Output 3110 ml   Net -1660 ml       EXAM:  General:   WDWN man sitting in the chair in NAD. Lungs:    Clear upper lobes. Few crackles and diminished in bases. Incision:  No erythema, drainage or swelling. Heart:  Regular rate and rhythm. 4/6 SHREE across precordium, loudest at L axillary line, 4th ICS. Abdomen:   Soft, non-tender. Bowel sounds normal. No masses,  No organomegaly. No BM since surgery, 5 days. Appetite improved per nursing staff. Extremities:  Trace edema. PPP. Neurologic:  Gross motor and sensory apparatus intact. Labs:   Recent Labs     04/04/22  0329 04/03/22  0302 04/02/22  2113   WBC 7.4   < >  --    HGB 10.7*   < >  --    HCT 32.8*   < >  --       < >  --       < >  --    K 4.2   < >  --    BUN 24*   < >  --    CREA 1.03   < >  --    *   < >  --    GLUCPOC  --   --  108    < > = values in this interval not displayed. Assessment:     Active Problems:    CAD (coronary artery disease) (3/29/2022)      S/P CABG x 3 (3/30/2022)      Overview: On pump CORONARY ARTERY BYPASS GRAFT (CABG) X3, LIMA to LAD, RSVH to PDA,       RSVG to Ramus      [facility]               Plan/Recommendations/Medical Decision Making:   . 65 yo man with CAD, STEMI, hypothyroidism, HTN, dementia. He had violent outbursts and forgetfulness prior to surgery. He is POD # 5  and has been having agitation and violent outbursts postop as well.      1. CAD/STEMI:    On asa, statin. Begin low dose beta blocker as HR is 90.   2. HTN:  Start ACE or ARB tomorrow if pt tolerates BB w/o hypotension. 3. Hypothyroid:  synthroid  4. Acute systolic CHF: LV EF 73% on LHC. EF improved by LAWSON in OR to 45%-50%. Repeat TTE prior to discharge.     5. Chronic dementia/acute delirium:  Per reports from pt's children, some agitation/outbursts noted at times.  Untreated prior to surgery. Intermittent Haldol. Seroquel bid. PM dose of Seroquel was increased to 100 mg. Daytime dose 25 mg. Was agitated and combative last night again.    6. Renal insufficiency: resolved. Creatinine 1.03, baseline 1.3. Adequate urine output. Discontinue  IV fluids. Wt is 10 lbs greater than on admission. Lasix 40 mg today once. IV.   7. Urinary retention: add Flomax. Keep Mcmahan one more day per Dr. Deng Benz. 8. Prolonged QTc:   Continue to monitor. No need to adjust medications. 9. Chest tubes removed. Keep epicardial wires as we are starting bb and he was bradycardic in early postop days. 10. Thrombocytopenia: resolved. Platelets 515. 11. DVT prophylaxis: enoxaparin. Ambulated. 12.  GI prophylaxis: famotidine. 13. Discharge planning: transfer to stepdown if he remains coherent. 14. Constipation: had bm this morning. 15. Soc: spoke to his brother in law who is in [place] on the phone. They spoke in [place].    Signed By: Kay aSlter NP

## 2022-04-04 NOTE — PROGRESS NOTES
Problem: Self Care Deficits Care Plan (Adult)  Goal: *Acute Goals and Plan of Care (Insert Text)  Description: FUNCTIONAL STATUS PRIOR TO ADMISSION: Patient was independent and active without use of DME. 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 Patient with mild cognitive impairments and unclear if he was still doing these activities. 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 Recently patient's wife passed. Occupational Therapy Goals  Initiated 3/31/2022  1. Patient will perform ADLs standing 5 mins without fatigue or LOB with supervision/set-up within 5 day(s). 2.  Patient will perform lower body ADLs with supervision/set-up within 5 day(s). 3.  Patient will perform gathering ADL items high and low 2/2 with supervision/set-up within 5 day(s). 4.  Patient will perform toilet transfers with supervision/set-up within 5 day(s). 5.  Patient will perform all aspects of toileting with supervision/set-up within 5 day(s). 6.  Patient will participate in cardiac/sternal upper extremity therapeutic exercise/activities to increase independence with ADLs with supervision/set-up for 5 minutes within 5 day(s). Outcome: Progressing Towards Goal       OCCUPATIONAL THERAPY TREATMENT  Patient: Lino Fox (6 y.o. male)  Date: 4/4/2022  Diagnosis: CAD (coronary artery disease) [I25.10] <principal problem not specified>  Procedure(s) (LRB):  CORONARY ARTERY BYPASS GRAFT (CABG) X3, LIMA. RSVH VIA EVH. ECC. LAWSON AND EPI AORTIC US BY DR Samantha Garces (N/A) 5 Days Post-Op  Precautions: Fall,Sternal,Other (comment) (move in the tube)  Chart, occupational therapy assessment, plan of care, and goals were reviewed. ASSESSMENT  Patient continues with skilled OT services and is slowly progressing towards goals.   Pt remains limited by confusion and requires re-direction as pt attempting OOB without assistance upon OT's arrival. Pt oriented x 2 this date. Pt was MIN A for upper trunk management to achieve supine to sit, and pt demonstrated functional reach to feet to adjust socks with min A. Overall min A for mobility and balance with instability of R knee from previous injury in young adulthood. VSS on 2 L, however, mild FARRELL noted with activity. In sitting, pt required up to min A for grooming ADL for problem solving and sequencing. Continue to recommend working towards discharge home with [facility] OT/PT in familiar environment. Patient is not verbalizing and is not demonstrating understanding of mindful-based movements (\"move in the tube\") principles of keeping UEs proximal to ribcage to prevent lateral pull on the sternum during load-bearing activities with verbal cues required for compliance. Current Level of Function Impacting Discharge (ADLs): up to  min A for balance, fall risk, decreased carry over of move in the tube with transfers    Other factors to consider for discharge: hx of dementia          PLAN :  Patient continues to benefit from skilled intervention to address the above impairments. Continue treatment per established plan of care to address goals. Recommend with staff: OOB to chair, encourage ADLs    Recommend next OT session: standing ADLs    Recommendation for discharge: (in order for the patient to meet his/her long term goals)  To be determined: recommend home to familiar environment with HHOT/PT  If not, then short term rehab    This discharge recommendation:  Has been made in collaboration with the attending provider and/or case management    IF patient discharges home will need the following DME: TBD       SUBJECTIVE:   Patient stated we are in [address] [facility].     OBJECTIVE DATA SUMMARY:   Cognitive/Behavioral Status:     Orientation Level: Oriented to place;Oriented to person;Disoriented to situation;Disoriented to time  Cognition: Impulsive;Memory loss; Impaired decision making;Decreased attention/concentration  Perception: Appears intact  Perseveration: Perseverates during conversation  Safety/Judgement: Decreased insight into deficits; Decreased awareness of need for safety    Functional Mobility and Transfers for ADLs:  Bed Mobility:  Supine to Sit: Minimum assistance (pt repeatedly attempting OOB without assistance)    Transfers:  Sit to Stand: Minimum assistance;Assist x1     Bed to Chair: Minimum assistance; Additional time    Balance:  Sitting: Intact; Without support  Standing: Impaired; With support  Standing - Static: Constant support; Fair  Standing - Dynamic : Fair    ADL Intervention:       Grooming  Brushing Teeth: Minimum assistance (seated, decreased problem solving), pt spilling water out of mouth, required cues to utilize emesis basin                             Cognitive Retraining  Safety/Judgement: Decreased insight into deficits; Decreased awareness of need for safety    Patient instructed and educated on mindful movement principles based on Move in The Tube concept to include maintaining bilateral elbows close to rib cage when performing any load-bearing activity such as getting in/out of bed, pushing up from a chair, opening a door, or lifting a box. Pt will need reinforcement due to memory loss    Therapeutic Exercises:   Patient performed with PT      Pain:  none    Activity Tolerance:   Fair and requires rest breaks    After treatment patient left in no apparent distress:   Sitting in chair, Call bell within reach, and Bed / chair alarm activated    COMMUNICATION/COLLABORATION:   The patients plan of care was discussed with: Physical therapist and Registered nurse.      Fer Knox OT  Time Calculation: 29 mins

## 2022-04-04 NOTE — PROGRESS NOTES
0000[facility] report received from [facility] to Saisei. Report included the following information SBAR, Intake/Output, MAR, Recent Results, Med Rec Status, Cardiac Rhythm NSR and Alarm Parameters      0100: Pt awake throughout night and restless, oriented x 2 but redirectable at this time. 0215: Pt sitting up in bed, pulling at chest tube sites and making numerous attempts to get OOB despite this RN verbal redirection, and becoming increasingly agitated attempting to kick staff with repositioning in bed. PRN Haldol given. 0700: Pt assisted OOB to chair x 2 assist and gait belt. 0745: [facility] BEDSIDE_VERBAL shift change report given to [facility] (oncoming nurse) by Saisei (offgoing nurse). Report included the following information SBAR, Intake/Output, MAR, Recent Results, Med Rec Status, Cardiac Rhythm NSR and Alarm Parameters .

## 2022-04-04 NOTE — PROGRESS NOTES
Problem: Mobility Impaired (Adult and Pediatric)  Goal: *Acute Goals and Plan of Care (Insert Text)  Description: FUNCTIONAL STATUS PRIOR TO ADMISSION: Patient was independent and active without use of DME.     HOME SUPPORT PRIOR TO ADMISSION: The patient lived alone with son (lives on the property) available to provide assistance. Pt reports his wife recently passed away. Physical Therapy Goals  Initiated 3/31/2022  1. Patient will move from supine to sit and sit to supine  in bed with modified independence within 5 days. 2.  Patient will perform sit to/from stand with modified independence within 5 days. 3.  Patient will ambulate 300 feet with least restrictive assistive device and modified independence within 5 days. 4.  Patient will ascend/descend 12 stairs with handrail(s) with independence within 5 days. 5.  Patient will perform cardiac exercises per protocol with modified independence within 5 days. 6.  Patient will verbally recall and functionally demonstrate mindful-based movements (\"move in the tube\") principles without cues within 5 days. Outcome: Progressing Towards Goal     PHYSICAL THERAPY TREATMENT  Patient: Albaro Wright (54 y.o. male)  Date: 4/4/2022  Diagnosis: CAD (coronary artery disease) [I25.10] <principal problem not specified>  Procedure(s) (LRB):  CORONARY ARTERY BYPASS GRAFT (CABG) X3, LIMA. RSVH VIA EVH. ECC. LAWSON AND EPI AORTIC US BY DR Roxana Zhang (N/A) 5 Days Post-Op  Precautions: Fall,Sternal,Other (comment) (move in the tube)  Chart, physical therapy assessment, plan of care and goals were reviewed. ASSESSMENT  Patient continues with skilled PT services and is progressing towards goals. Arrived to OT and 2 RNs in the room assisting pt as he was attempting to get OOB on his own. Patient is confused and not following instructions re: mindful based movement principles. He is unaware of safety and his risk for falls.    Patient is at overall min A for functional mobility (sit<->stand, ambulation, getting OOB). He was able to ambulate throughout the unit with LE's flexed/ appearing to give way, mild path deviation, unsteady. Pt endorses h/o right knee injury impacting his gait. He completed cardiac exercises today with Min A and repeated reminders re: movement in the tube w/ weight bearing activity when attempting to adjust his position in the chair. Patient is not verbalizing and is not demonstrating understanding of mindful-based movements (\"move in the tube\") principles of keeping UEs proximal to ribcage to prevent lateral pull on the sternum during load-bearing activities with visual, verbal, manual, and tactile cues required for compliance. Current Level of Function Impacting Discharge (mobility/balance): Min A    Other factors to consider for discharge: sternotomy w/ decreased compliance w/ precautions         PLAN :  Patient continues to benefit from skilled intervention to address the above impairments. Continue treatment per established plan of care. to address goals. Recommendation for discharge: (in order for the patient to meet his/her long term goals)  Physical therapy at least 2 days/week in the home AND ensure assist and/or supervision for safety with mobility and adherence to mindful based movement. If patient does not have adequate caregiver support, recommend rehab over returning home alone. This discharge recommendation:  A follow-up discussion with the attending provider and/or case management is planned    IF patient discharges home will need the following DME: none       SUBJECTIVE:   Patient talkative re: [facility]. Required redirecting for therapy tasks. OBJECTIVE DATA SUMMARY:   Patient mobilized on continuous portable monitor/telemetry.   Critical Behavior:  Neurologic State: Eyes open spontaneously,Eyes open to voice,Restless  Orientation Level: Oriented to place,Oriented to person,Disoriented to situation,Disoriented to time  Cognition: Impulsive,Memory loss,Impaired decision making,Decreased attention/concentration  Safety/Judgement: Decreased insight into deficits,Decreased awareness of need for safety    Functional Mobility Training:  Bed Mobility:     Supine to Sit: Minimum assistance (pt repeatedly attempting OOB without assistance)  Arrived to patient in the bed with OT and two RNs in the room, pt's RLE hanging over the bedrail attempting to get OOB. Transfers:  Sit to Stand: Minimum assistance;Assist x1  Stand to Sit: Minimum assistance        Bed to Chair: Minimum assistance; Additional time  Tx: repeated cues and assist for mindful based movement with poor caregiver. Balance:  Sitting: Intact; Without support  Standing: Impaired; With support  Standing - Static: Constant support; Fair  Standing - Dynamic : Fair    Ambulation/Gait Training:  Distance (ft): 120 Feet (ft)  Assistive Device: Gait belt  Ambulation - Level of Assistance: Contact guard assistance;Minimal assistance;Assist x1;Additional time        Gait Abnormalities: Path deviations              Speed/Davina: Slow                      Cardiac diagnosis intervention:  Patient instructed and educated on mindful movement principles based on Move in The Tube concept to include maintaining bilateral elbows close to rib cage when performing any load-bearing activity such as getting in/out of bed, pushing up from a chair w/ poor follow through despite cues provided. Therapeutic Exercises:   Patient instructed in cardiac exercises while sitting with Minimum assistance for exercise technique.        CARDIAC  EXERCISE   Sets   Reps   Active Active Assist   Passive Self ROM   Comments   Shoulder flexion 1 5 [x]                                            []                                            []                                            []                                               Shoulder abduction 1      5 [x] []                                            []                                            []                                               Scapular elevation 1 5 [x]                                            []                                            []                                            []                                               Scapular retraction 1 5 [x]                                            []                                            []                                            []                                               Trunk rotation 1 5 [x]                                            []                                            []                                            []                                               Trunk sidebending 1 5 [x]                                            []                                            []                                            []                                                  []                                            []                                            []                                            []                                                   Pain Rating:  None indicated    Activity Tolerance:   Fair    After treatment patient left in no apparent distress:   Sitting in chair, Call bell within reach, and Bed / chair alarm activated    COMMUNICATION/COLLABORATION:   The patients plan of care was discussed with: Occupational therapist and Registered nurse.      Christianne Hughes, PT   Time Calculation: 23 mins

## 2022-04-04 NOTE — PROGRESS NOTES
0800: Bedside and Verbal shift change report given to [facility] RN and Henok Naik (oncoming nurse) by Ernestine Partida (offgoing nurse). Report included the following information SBAR, Kardex, OR Summary, Procedure Summary, Intake/Output, MAR, Recent Results, Cardiac Rhythm NSR and Alarm Parameters . 0830: Patricia Brito MD and team rounding at bedside. Plans to diurese patient today. 1000: Felicitas Robb NP at bedside. Patient's chest tubes pulled. 1537: Felicitas Robb NP at bedside. Updated on status of patient. Transfer orders received. 2000: Bedside and Verbal shift change report given to [place] (oncoming nurse) by [facility] RN (offgoing nurse). Report included the following information SBAR, Kardex, OR Summary, Procedure Summary, Intake/Output, MAR, Recent Results, Cardiac Rhythm NSR and Alarm Parameters .

## 2022-04-04 NOTE — PROGRESS NOTES
2000: Bedside and Verbal shift change report given to Mckinley Arthur RN (oncoming nurse) by Lorenza Luciano RN (offgoing nurse). Report included the following information SBAR, Kardex, Intake/Output, MAR, Accordion, Recent Results, Med Rec Status, Cardiac Rhythm NSR and Alarm Parameters . No significant events. Pt A&Ox2-3. Calm and cooperative. VSS.    0000: Bedside report given to [facility], RN included the following information SBAR, Kardex, Intake/Output, MAR, Accordion, Recent Results, Med Rec Status, Cardiac Rhythm NSR and Alarm Parameters.

## 2022-04-05 ENCOUNTER — APPOINTMENT (OUTPATIENT)
Dept: GENERAL RADIOLOGY | Age: 77
DRG: 235 | End: 2022-04-05
Attending: NURSE PRACTITIONER
Payer: MEDICARE

## 2022-04-05 LAB
ANION GAP SERPL CALC-SCNC: 4 MMOL/L (ref 5–15)
BUN SERPL-MCNC: 23 MG/DL (ref 6–20)
BUN/CREAT SERPL: 22 (ref 12–20)
CALCIUM SERPL-MCNC: 8.6 MG/DL (ref 8.5–10.1)
CHLORIDE SERPL-SCNC: 103 MMOL/L (ref 97–108)
CO2 SERPL-SCNC: 30 MMOL/L (ref 21–32)
CREAT SERPL-MCNC: 1.03 MG/DL (ref 0.7–1.3)
ERYTHROCYTE [DISTWIDTH] IN BLOOD BY AUTOMATED COUNT: 13.7 % (ref 11.5–14.5)
GLUCOSE SERPL-MCNC: 104 MG/DL (ref 65–100)
HCT VFR BLD AUTO: 33 % (ref 36.6–50.3)
HGB BLD-MCNC: 10.6 G/DL (ref 12.1–17)
MCH RBC QN AUTO: 30.3 PG (ref 26–34)
MCHC RBC AUTO-ENTMCNC: 32.1 G/DL (ref 30–36.5)
MCV RBC AUTO: 94.3 FL (ref 80–99)
NRBC # BLD: 0 K/UL (ref 0–0.01)
NRBC BLD-RTO: 0 PER 100 WBC
PLATELET # BLD AUTO: 202 K/UL (ref 150–400)
PMV BLD AUTO: 11.6 FL (ref 8.9–12.9)
POTASSIUM SERPL-SCNC: 3.7 MMOL/L (ref 3.5–5.1)
RBC # BLD AUTO: 3.5 M/UL (ref 4.1–5.7)
SODIUM SERPL-SCNC: 137 MMOL/L (ref 136–145)
WBC # BLD AUTO: 8.3 K/UL (ref 4.1–11.1)

## 2022-04-05 PROCEDURE — 74011000250 HC RX REV CODE- 250: Performed by: PHYSICIAN ASSISTANT

## 2022-04-05 PROCEDURE — 74011250637 HC RX REV CODE- 250/637: Performed by: THORACIC SURGERY (CARDIOTHORACIC VASCULAR SURGERY)

## 2022-04-05 PROCEDURE — 74011250636 HC RX REV CODE- 250/636: Performed by: NURSE PRACTITIONER

## 2022-04-05 PROCEDURE — 80048 BASIC METABOLIC PNL TOTAL CA: CPT

## 2022-04-05 PROCEDURE — 74011250637 HC RX REV CODE- 250/637: Performed by: NURSE PRACTITIONER

## 2022-04-05 PROCEDURE — 74011250637 HC RX REV CODE- 250/637: Performed by: INTERNAL MEDICINE

## 2022-04-05 PROCEDURE — 65660000001 HC RM ICU INTERMED STEPDOWN

## 2022-04-05 PROCEDURE — 71045 X-RAY EXAM CHEST 1 VIEW: CPT

## 2022-04-05 PROCEDURE — 36415 COLL VENOUS BLD VENIPUNCTURE: CPT

## 2022-04-05 PROCEDURE — 74011250636 HC RX REV CODE- 250/636: Performed by: PHYSICIAN ASSISTANT

## 2022-04-05 PROCEDURE — 74011250637 HC RX REV CODE- 250/637: Performed by: PHYSICIAN ASSISTANT

## 2022-04-05 PROCEDURE — 85027 COMPLETE CBC AUTOMATED: CPT

## 2022-04-05 RX ORDER — LISINOPRIL 5 MG/1
2.5 TABLET ORAL DAILY
Status: DISCONTINUED | OUTPATIENT
Start: 2022-04-05 | End: 2022-04-06

## 2022-04-05 RX ORDER — METOPROLOL TARTRATE 50 MG/1
50 TABLET ORAL EVERY 12 HOURS
Status: DISCONTINUED | OUTPATIENT
Start: 2022-04-05 | End: 2022-04-12

## 2022-04-05 RX ORDER — METOPROLOL TARTRATE 25 MG/1
25 TABLET, FILM COATED ORAL EVERY 12 HOURS
Status: DISCONTINUED | OUTPATIENT
Start: 2022-04-05 | End: 2022-04-05

## 2022-04-05 RX ORDER — HYDRALAZINE HYDROCHLORIDE 20 MG/ML
10 INJECTION INTRAMUSCULAR; INTRAVENOUS
Status: DISCONTINUED | OUTPATIENT
Start: 2022-04-05 | End: 2022-04-20 | Stop reason: HOSPADM

## 2022-04-05 RX ORDER — LEVOTHYROXINE SODIUM 75 UG/1
75 TABLET ORAL
Status: DISCONTINUED | OUTPATIENT
Start: 2022-04-06 | End: 2022-04-20 | Stop reason: HOSPADM

## 2022-04-05 RX ORDER — POTASSIUM CHLORIDE 750 MG/1
40 TABLET, FILM COATED, EXTENDED RELEASE ORAL
Status: COMPLETED | OUTPATIENT
Start: 2022-04-05 | End: 2022-04-05

## 2022-04-05 RX ADMIN — ENOXAPARIN SODIUM 40 MG: 100 INJECTION SUBCUTANEOUS at 09:09

## 2022-04-05 RX ADMIN — METOPROLOL TARTRATE 25 MG: 25 TABLET, FILM COATED ORAL at 09:08

## 2022-04-05 RX ADMIN — METOPROLOL TARTRATE 50 MG: 50 TABLET, FILM COATED ORAL at 21:02

## 2022-04-05 RX ADMIN — POTASSIUM CHLORIDE 40 MEQ: 750 TABLET, EXTENDED RELEASE ORAL at 17:07

## 2022-04-05 RX ADMIN — HALOPERIDOL LACTATE 3 MG: 5 INJECTION, SOLUTION INTRAMUSCULAR at 23:03

## 2022-04-05 RX ADMIN — HALOPERIDOL LACTATE 3 MG: 5 INJECTION, SOLUTION INTRAMUSCULAR at 04:07

## 2022-04-05 RX ADMIN — SENNOSIDES AND DOCUSATE SODIUM 1 TABLET: 50; 8.6 TABLET ORAL at 17:07

## 2022-04-05 RX ADMIN — LISINOPRIL 2.5 MG: 5 TABLET ORAL at 09:07

## 2022-04-05 RX ADMIN — ASPIRIN 81 MG CHEWABLE TABLET 81 MG: 81 TABLET CHEWABLE at 09:09

## 2022-04-05 RX ADMIN — FAMOTIDINE 20 MG: 20 TABLET, FILM COATED ORAL at 17:07

## 2022-04-05 RX ADMIN — AMIODARONE HYDROCHLORIDE 400 MG: 200 TABLET ORAL at 21:03

## 2022-04-05 RX ADMIN — CHLORHEXIDINE GLUCONATE 10 ML: 1.2 RINSE ORAL at 09:12

## 2022-04-05 RX ADMIN — SENNOSIDES AND DOCUSATE SODIUM 1 TABLET: 50; 8.6 TABLET ORAL at 09:08

## 2022-04-05 RX ADMIN — SODIUM CHLORIDE, PRESERVATIVE FREE 10 ML: 5 INJECTION INTRAVENOUS at 06:00

## 2022-04-05 RX ADMIN — ATORVASTATIN CALCIUM 40 MG: 40 TABLET, FILM COATED ORAL at 21:03

## 2022-04-05 RX ADMIN — QUETIAPINE FUMARATE 25 MG: 25 TABLET ORAL at 09:09

## 2022-04-05 RX ADMIN — POLYETHYLENE GLYCOL 3350 17 G: 17 POWDER, FOR SOLUTION ORAL at 09:09

## 2022-04-05 RX ADMIN — FAMOTIDINE 20 MG: 20 TABLET, FILM COATED ORAL at 09:09

## 2022-04-05 RX ADMIN — AMIODARONE HYDROCHLORIDE 400 MG: 200 TABLET ORAL at 09:09

## 2022-04-05 RX ADMIN — HYDRALAZINE HYDROCHLORIDE 10 MG: 20 INJECTION INTRAMUSCULAR; INTRAVENOUS at 19:24

## 2022-04-05 RX ADMIN — TAMSULOSIN HYDROCHLORIDE 0.4 MG: 0.4 CAPSULE ORAL at 09:09

## 2022-04-05 RX ADMIN — SODIUM CHLORIDE, PRESERVATIVE FREE 10 ML: 5 INJECTION INTRAVENOUS at 21:03

## 2022-04-05 RX ADMIN — HALOPERIDOL LACTATE 3 MG: 5 INJECTION, SOLUTION INTRAMUSCULAR at 11:40

## 2022-04-05 RX ADMIN — QUETIAPINE FUMARATE 100 MG: 100 TABLET ORAL at 21:03

## 2022-04-05 RX ADMIN — Medication 400 MG: at 17:07

## 2022-04-05 RX ADMIN — Medication 400 MG: at 09:09

## 2022-04-05 NOTE — PROGRESS NOTES
Transitions of Care Plan   RUR: 14% - moderate   Clinical Update: s/p CABG; agitated overnight; restraints placed   Consults: Therapy  · Baseline: independent without DME; resides on same farm property as son, Vanda Mccoy  · Barriers to Discharge: medical  · Disposition:  vs IPR pending progress and caregiver support  · Estimated Discharge Date: 2+ days    Clinical update per chart review and/or patient discussed during Interdisciplinary Rounds:    Patient is not medically stable for discharge due to ongoing medical needs:    · Postop CABG patient remains confused and agitated especially at night requiring Halodol and restraints last night    CM continues to follow treatment plan for medical progress and disposition needs.     Disposition:  Home Health vs IPR - patient will need insurance authorization for facility placement; CM to follow up with treatment team and patient as he progresses    Valdez Szymanski, [address]  Available via [employer] or

## 2022-04-05 NOTE — PROGRESS NOTES
Follow visit in Room 4337. (Patient has a sitter due to confusion)   Patient was sitting up in his chair eating. His sitter was present during the visit. He shared that he was having a good day. No spiritual needs noted. Visited by: Osvaldo Pruett.  Aminata Nino[address] paging Service [phone] ([phone])

## 2022-04-05 NOTE — PROGRESS NOTES
Occupational Therapy  4/5/2022    Chart reviewed. Pt sleeping soundly. Per PT, RN reported pt agitated earlier and received medication to calm down. Will follow up tomorrow.  Elizabeth Jamil, OT

## 2022-04-05 NOTE — PROGRESS NOTES
[facility] ICU Progress Note    Admit Date: 3/29/2022  POD:  6 Days Post-Op    Procedure:  Procedure(s):  CORONARY ARTERY BYPASS GRAFT (CABG) X3, LIMA. RSVH VIA EVH. ECC. LAWSON AND EPI AORTIC US BY DR Roxana Zhang        Subjective:   Pt seen with Dr. Tiana Siemens last night. Attempted to strike sitter. Was restrained. Tranfer to step down with sitter. Objective:   Vitals:  Blood pressure (!) 140/89, pulse 97, temperature 98.6 °F (37 °C), resp. rate 21, height 5' 8\" (1.727 m), weight 177 lb 3.2 oz (80.4 kg), SpO2 93 %. Temp (24hrs), Av.6 °F (37 °C), Min:98.1 °F (36.7 °C), Max:98.9 °F (37.2 °C)    Hemodynamics:   CO: CO (l/min): 5 l/min   CI: CI (l/min/m2): 2.7 l/min/m2   CVP: CVP (mmHg): 14 mmHg (22 06)   SVR: SVR (dyne*sec)/cm5: 945 (dyne*sec)/cm5 (22 3260)   PAP Systolic: PAP Systolic: 42 (20/45/46 7916)   PAP Diastolic: PAP Diastolic: 16 (64/86/30 5875)   PVR:     SV02: SVO2 (%): 61 % (22 06)   SCV02:      EKG/Rhythm:  SR 90. Oxygen Therapy:  Oxygen Therapy  O2 Sat (%): 93 % (22 040)  Pulse via Oximetry: 97 beats per minute (22 040)  O2 Device: None (Room air) (22 040)  Skin Assessment: Clean, dry, & intact (22)  Skin Protection for O2 Device: No (22)  O2 Flow Rate (L/min): 2 l/min (22 1600)  O2 Temperature: 80 °F (26.7 °C) (22 1631)  FIO2 (%): 80 % (22 1629)    CXR:  FINDINGS: A portable AP radiograph of the chest was obtained at 0746 hours. The  patient is on a cardiac monitor. The patient is status post median sternotomy. No change mild cardiomegaly. Mediastinal pleural drains have been removed. Lungs  demonstrate low lung volumes with bibasilar atelectasis left greater than right. There are small pleural effusions. No pneumothorax. Admission Weight: Last Weight   Weight: 167 lb 1.7 oz (75.8 kg) Weight: 177 lb 3.2 oz (80.4 kg)     Intake / Output / Drain:  Current Shift: No intake/output data recorded.   Last 24 hrs.:     Intake/Output Summary (Last 24 hours) at 2022 0742  Last data filed at 2022 0400  Gross per 24 hour   Intake 849.17 ml   Output 2595 ml   Net -1745.83 ml       EXAM:  General:   WDWN man in NAD lying in bed. NAD but focused on his phone. Lungs:   Clear to auscultation bilaterally. Room air. Incision:  No erythema, drainage or swelling. Heart:  Regular rate and rhythm, S1, S2 normal, no murmur, click, rub or gallop. Abdomen:   Soft, non-tender. Bowel sounds normal. No masses,  No organomegaly. Extremities:  No edema. PPP. Neurologic:  Gross motor and sensory apparatus intact. O to n, p, month, year, president. Slow to retrieve information. FRAZIER to w/c. Calm. Labs:   Recent Labs     22  0329 22  0302 22  2113   WBC 7.4   < >  --    HGB 10.7*   < >  --    HCT 32.8*   < >  --       < >  --       < >  --    K 4.2   < >  --    BUN 24*   < >  --    CREA 1.03   < >  --    *   < >  --    GLUCPOC  --   --  108    < > = values in this interval not displayed. Assessment:     Active Problems:    CAD (coronary artery disease) (3/29/2022)      S/P CABG x 3 (3/30/2022)      Overview: On pump CORONARY ARTERY BYPASS GRAFT (CABG) X3, LIMA to LAD, RSVH to PDA,       RSVG to Ramus      R GSV EVH               Plan/Recommendations/Medical Decision Makin yo man with CAD, STEMI, hypothyroidism, HTN, dementia. [facility] had violent outbursts and forgetfulness prior to surgery.  He is POD # 6   and has been having agitation and violent outbursts postop as well.      1. CAD/STEMI:    On asa, statin. Increase metoprolol to 25 bid as Hr is 90 and BP robust.    2. HTN:  Start  Lisinopril 2.5 mg today. 3. Hypothyroid:  synthroid  4. Acute systolic CHF: LV EF 28% on C. EF improved by LAWSON in OR to 45%-50%. Repeat TTE prior to discharge.     5.  Chronic dementia/acute delirium:  Per reports from pt's children, some agitation/outbursts noted at times.  Untreated prior to surgery.  Intermittent Haldol. Seroquel bid. PM dose of Seroquel was increased to 100 mg. Daytime dose 25 mg.  Was agitated and combative last night again. 6. Renal insufficiency: resolved. Creatinine 1.03, baseline 1.3. Adequate urine output.   Discontinue  IV fluids. Wt is 10 lbs greater than on admission. Lasix 40 mg today once. IV.   7. Urinary retention: add Flomax. Keep Mcmahan one more day per Dr. Manjinder Murrieta. 8. Prolonged QTc:   Continue to monitor.  No need to adjust medications. 9.   Keep epicardial wires as we are increasing  bb and he was bradycardic in early postop days. 10. Thrombocytopenia: resolved. 11. DVT prophylaxis: enoxaparin. Ambulate. OOB to chair. 12.  GI prophylaxis: famotidine. 13. Discharge planning: transfer to stepdown when bed available. 14. Constipation: BM yesterday. 15.  Urinary retention: Flomax started 2 days ago. Voiding trial today.    Signed By: Bennett Garcia NP

## 2022-04-05 NOTE — PROGRESS NOTES
Physical Therapy - defer  Chart reviewed in prep for treatment session. Received pt sleeping. RN reporting pt was agitated earlier today and received medication to calm down. RN requesting therapy allow pt to sleep as he did not sleep well last night (agitated, combative, attempting to walk out, security called). Will follow up tomorrow.

## 2022-04-05 NOTE — PROGRESS NOTES
2000: Bedside and Verbal shift change report given to [address] (oncoming nurse) by Nacho Rodriguez and Norbert Ramirez RN (offgoing nurse). Report included the following information SBAR, Kardex, OR Summary, Procedure Summary, Intake/Output, MAR, Recent Results, Cardiac Rhythm NSR and Alarm Parameters. 2230: Agitated and aggressive, redirection efforts ineffective. PRN Haldol given with minimal effectiveness. 4/5/22: 0100: Attempted to get OOB unassisted, became aggressive when redirected. Security notified for assistance. Offered and accepted snack and drink. 0345: Agitated and aggressive, swinging and kicking at staff. Security notified for assistance. Redirection efforts ineffective. 0407: PRN Haldol given with minimal effectiveness. 5: On-call MD Addis Burgos notified. New order for restraints received and initiated. 0800: Bedside and Verbal shift change report given to Nacho Rodriguez RN and Melita SCHMITT (oncoming nurse) by [employer] (offgoing nurse). Report included the following information SBAR, Kardex, OR Summary, Procedure Summary, Intake/Output, MAR, Recent Results, Cardiac Rhythm NSR and Alarm Parameters .

## 2022-04-05 NOTE — PROGRESS NOTES
0800: Bedside and Verbal shift change report given to [facility] RN and Melita SCHMITT (oncoming nurse) by Connie (offgoing nurse). Report included the following information SBAR, Kardex, OR Summary, Procedure Summary, Intake/Output, MAR, Recent Results, Cardiac Rhythm NSR and Alarm Parameters . 1140: Patient agitated, trying to walk around room. Unable to redirect patient. Haldol given. 1245: Haldol ineffective for patient, patient still agitated, attempting to walk out of the unit, becoming combative when redirected to go back to chair or bed. Security called. 1700: Mcmahan removed. 1719: TRANSFER - OUT REPORT:    Verbal report given to Jose(name) on [facility]  being transferred to [facility](unit) for routine progression of care       Report consisted of patients Situation, Background, Assessment and   Recommendations(SBAR). Information from the following report(s) SBAR, Kardex, OR Summary, MAR, Recent Results and Cardiac Rhythm NSR was reviewed with the receiving nurse. Lines:   Peripheral IV 03/31/22 Left Hand (Active)   Site Assessment Clean, dry, & intact 04/05/22 0800   Phlebitis Assessment 0 04/05/22 0800   Infiltration Assessment 0 04/05/22 0800   Dressing Status Clean, dry, & intact 04/05/22 0800   Dressing Type Tape;Transparent 04/05/22 0800   Hub Color/Line Status Capped; Patent; Flushed;End cap changed 04/05/22 0800   Action Taken Wrapped 04/05/22 0800   Alcohol Cap Used Yes 04/05/22 0800       Peripheral IV 03/31/22 Right Forearm (Active)   Site Assessment Clean, dry, & intact 04/05/22 0800   Phlebitis Assessment 0 04/05/22 0800   Infiltration Assessment 0 04/05/22 0800   Dressing Status Clean, dry, & intact 04/05/22 0800   Dressing Type Tape;Transparent 04/05/22 0800   Hub Color/Line Status End cap changed; Flushed;Patent 04/05/22 0800   Action Taken Wrapped 04/05/22 0800   Alcohol Cap Used Yes 04/05/22 0800        Opportunity for questions and clarification was provided. Patient transported with:   Monitor  Patient-specific medications from Pharmacy  Registered Nurse

## 2022-04-06 ENCOUNTER — APPOINTMENT (OUTPATIENT)
Dept: NON INVASIVE DIAGNOSTICS | Age: 77
DRG: 235 | End: 2022-04-06
Attending: NURSE PRACTITIONER
Payer: MEDICARE

## 2022-04-06 LAB
ALBUMIN SERPL-MCNC: 2.5 G/DL (ref 3.5–5)
ALBUMIN/GLOB SERPL: 0.8 {RATIO} (ref 1.1–2.2)
ALP SERPL-CCNC: 78 U/L (ref 45–117)
ALT SERPL-CCNC: 33 U/L (ref 12–78)
ANION GAP SERPL CALC-SCNC: 8 MMOL/L (ref 5–15)
AST SERPL-CCNC: 39 U/L (ref 15–37)
BASOPHILS # BLD: 0.1 K/UL (ref 0–0.1)
BASOPHILS NFR BLD: 1 % (ref 0–1)
BILIRUB SERPL-MCNC: 0.6 MG/DL (ref 0.2–1)
BUN SERPL-MCNC: 21 MG/DL (ref 6–20)
BUN/CREAT SERPL: 22 (ref 12–20)
CALCIUM SERPL-MCNC: 8.7 MG/DL (ref 8.5–10.1)
CHLORIDE SERPL-SCNC: 105 MMOL/L (ref 97–108)
CO2 SERPL-SCNC: 21 MMOL/L (ref 21–32)
CREAT SERPL-MCNC: 0.94 MG/DL (ref 0.7–1.3)
DIFFERENTIAL METHOD BLD: ABNORMAL
ECHO AO ROOT DIAM: 3.6 CM
ECHO AO ROOT INDEX: 1.88 CM/M2
ECHO AV AREA PEAK VELOCITY: 1.6 CM2
ECHO AV AREA VTI: 1.7 CM2
ECHO AV AREA/BSA PEAK VELOCITY: 0.8 CM2/M2
ECHO AV AREA/BSA VTI: 0.9 CM2/M2
ECHO AV MEAN GRADIENT: 19 MMHG
ECHO AV MEAN VELOCITY: 2.1 M/S
ECHO AV PEAK GRADIENT: 33 MMHG
ECHO AV PEAK VELOCITY: 2.9 M/S
ECHO AV VELOCITY RATIO: 0.38
ECHO AV VTI: 53.2 CM
ECHO LA DIAMETER INDEX: 2.14 CM/M2
ECHO LA DIAMETER: 4.1 CM
ECHO LA TO AORTIC ROOT RATIO: 1.14
ECHO LV E' LATERAL VELOCITY: 8 CM/S
ECHO LV E' SEPTAL VELOCITY: 3 CM/S
ECHO LV FRACTIONAL SHORTENING: 16 % (ref 28–44)
ECHO LV INTERNAL DIMENSION DIASTOLE INDEX: 2.66 CM/M2
ECHO LV INTERNAL DIMENSION DIASTOLIC: 5.1 CM (ref 4.2–5.9)
ECHO LV INTERNAL DIMENSION SYSTOLIC INDEX: 2.24 CM/M2
ECHO LV INTERNAL DIMENSION SYSTOLIC: 4.3 CM
ECHO LV IVSD: 1.3 CM (ref 0.6–1)
ECHO LV MASS 2D: 255.5 G (ref 88–224)
ECHO LV MASS INDEX 2D: 133.1 G/M2 (ref 49–115)
ECHO LV POSTERIOR WALL DIASTOLIC: 1.2 CM (ref 0.6–1)
ECHO LV RELATIVE WALL THICKNESS RATIO: 0.47
ECHO LVOT AREA: 4.2 CM2
ECHO LVOT AV VTI INDEX: 0.41
ECHO LVOT DIAM: 2.3 CM
ECHO LVOT MEAN GRADIENT: 2 MMHG
ECHO LVOT PEAK GRADIENT: 5 MMHG
ECHO LVOT PEAK VELOCITY: 1.1 M/S
ECHO LVOT STROKE VOLUME INDEX: 47.4 ML/M2
ECHO LVOT SV: 90.9 ML
ECHO LVOT VTI: 21.9 CM
ECHO MV A VELOCITY: 0.92 M/S
ECHO MV AREA PHT: 3.6 CM2
ECHO MV E DECELERATION TIME (DT): 209.9 MS
ECHO MV E VELOCITY: 0.68 M/S
ECHO MV E/A RATIO: 0.74
ECHO MV E/E' LATERAL: 8.5
ECHO MV E/E' RATIO (AVERAGED): 15.58
ECHO MV E/E' SEPTAL: 22.67
ECHO MV PRESSURE HALF TIME (PHT): 60.9 MS
ECHO RV TAPSE: 0.8 CM (ref 1.5–2)
ECHO TV REGURGITANT MAX VELOCITY: 2.33 M/S
ECHO TV REGURGITANT PEAK GRADIENT: 22 MMHG
EOSINOPHIL # BLD: 0.2 K/UL (ref 0–0.4)
EOSINOPHIL NFR BLD: 3 % (ref 0–7)
ERYTHROCYTE [DISTWIDTH] IN BLOOD BY AUTOMATED COUNT: 13.8 % (ref 11.5–14.5)
GLOBULIN SER CALC-MCNC: 3 G/DL (ref 2–4)
GLUCOSE SERPL-MCNC: 93 MG/DL (ref 65–100)
HCT VFR BLD AUTO: 31.8 % (ref 36.6–50.3)
HGB BLD-MCNC: 10.2 G/DL (ref 12.1–17)
IMM GRANULOCYTES # BLD AUTO: 0.1 K/UL (ref 0–0.04)
IMM GRANULOCYTES NFR BLD AUTO: 1 % (ref 0–0.5)
LYMPHOCYTES # BLD: 0.8 K/UL (ref 0.8–3.5)
LYMPHOCYTES NFR BLD: 10 % (ref 12–49)
MCH RBC QN AUTO: 30.4 PG (ref 26–34)
MCHC RBC AUTO-ENTMCNC: 32.1 G/DL (ref 30–36.5)
MCV RBC AUTO: 94.6 FL (ref 80–99)
MONOCYTES # BLD: 0.9 K/UL (ref 0–1)
MONOCYTES NFR BLD: 11 % (ref 5–13)
NEUTS SEG # BLD: 6.4 K/UL (ref 1.8–8)
NEUTS SEG NFR BLD: 74 % (ref 32–75)
NRBC # BLD: 0 K/UL (ref 0–0.01)
NRBC BLD-RTO: 0 PER 100 WBC
PLATELET # BLD AUTO: 193 K/UL (ref 150–400)
PMV BLD AUTO: 11.4 FL (ref 8.9–12.9)
POTASSIUM SERPL-SCNC: 4.1 MMOL/L (ref 3.5–5.1)
PROT SERPL-MCNC: 5.5 G/DL (ref 6.4–8.2)
RBC # BLD AUTO: 3.36 M/UL (ref 4.1–5.7)
SODIUM SERPL-SCNC: 134 MMOL/L (ref 136–145)
WBC # BLD AUTO: 8.5 K/UL (ref 4.1–11.1)

## 2022-04-06 PROCEDURE — 74011250637 HC RX REV CODE- 250/637: Performed by: PHYSICIAN ASSISTANT

## 2022-04-06 PROCEDURE — APPSS45 APP SPLIT SHARED TIME 31-45 MINUTES: Performed by: NURSE PRACTITIONER

## 2022-04-06 PROCEDURE — 74011250637 HC RX REV CODE- 250/637: Performed by: NURSE PRACTITIONER

## 2022-04-06 PROCEDURE — 93308 TTE F-UP OR LMTD: CPT

## 2022-04-06 PROCEDURE — 74011250636 HC RX REV CODE- 250/636: Performed by: NURSE PRACTITIONER

## 2022-04-06 PROCEDURE — 74011250636 HC RX REV CODE- 250/636: Performed by: PHYSICIAN ASSISTANT

## 2022-04-06 PROCEDURE — 74011000250 HC RX REV CODE- 250: Performed by: NURSE PRACTITIONER

## 2022-04-06 PROCEDURE — 80053 COMPREHEN METABOLIC PANEL: CPT

## 2022-04-06 PROCEDURE — 36415 COLL VENOUS BLD VENIPUNCTURE: CPT

## 2022-04-06 PROCEDURE — 93321 DOPPLER ECHO F-UP/LMTD STD: CPT | Performed by: INTERNAL MEDICINE

## 2022-04-06 PROCEDURE — 93325 DOPPLER ECHO COLOR FLOW MAPG: CPT | Performed by: INTERNAL MEDICINE

## 2022-04-06 PROCEDURE — 93308 TTE F-UP OR LMTD: CPT | Performed by: INTERNAL MEDICINE

## 2022-04-06 PROCEDURE — 65660000001 HC RM ICU INTERMED STEPDOWN

## 2022-04-06 PROCEDURE — 85025 COMPLETE CBC W/AUTO DIFF WBC: CPT

## 2022-04-06 RX ORDER — LISINOPRIL 5 MG/1
7.5 TABLET ORAL
Status: COMPLETED | OUTPATIENT
Start: 2022-04-06 | End: 2022-04-06

## 2022-04-06 RX ORDER — FUROSEMIDE 40 MG/1
40 TABLET ORAL DAILY
Status: DISCONTINUED | OUTPATIENT
Start: 2022-04-06 | End: 2022-04-20 | Stop reason: HOSPADM

## 2022-04-06 RX ORDER — POTASSIUM CHLORIDE 750 MG/1
20 TABLET, FILM COATED, EXTENDED RELEASE ORAL DAILY
Status: DISCONTINUED | OUTPATIENT
Start: 2022-04-06 | End: 2022-04-20 | Stop reason: HOSPADM

## 2022-04-06 RX ORDER — LISINOPRIL 10 MG/1
10 TABLET ORAL DAILY
Status: DISCONTINUED | OUTPATIENT
Start: 2022-04-07 | End: 2022-04-12

## 2022-04-06 RX ADMIN — SODIUM CHLORIDE, PRESERVATIVE FREE 10 ML: 5 INJECTION INTRAVENOUS at 17:52

## 2022-04-06 RX ADMIN — Medication 400 MG: at 09:28

## 2022-04-06 RX ADMIN — TAMSULOSIN HYDROCHLORIDE 0.4 MG: 0.4 CAPSULE ORAL at 09:28

## 2022-04-06 RX ADMIN — Medication 400 MG: at 17:52

## 2022-04-06 RX ADMIN — ATORVASTATIN CALCIUM 40 MG: 40 TABLET, FILM COATED ORAL at 21:18

## 2022-04-06 RX ADMIN — METOPROLOL TARTRATE 50 MG: 50 TABLET, FILM COATED ORAL at 09:28

## 2022-04-06 RX ADMIN — POTASSIUM CHLORIDE 20 MEQ: 750 TABLET, EXTENDED RELEASE ORAL at 09:28

## 2022-04-06 RX ADMIN — AMIODARONE HYDROCHLORIDE 400 MG: 200 TABLET ORAL at 21:18

## 2022-04-06 RX ADMIN — POLYETHYLENE GLYCOL 3350 17 G: 17 POWDER, FOR SOLUTION ORAL at 09:29

## 2022-04-06 RX ADMIN — LEVOTHYROXINE SODIUM 75 MCG: 0.07 TABLET ORAL at 07:10

## 2022-04-06 RX ADMIN — SENNOSIDES AND DOCUSATE SODIUM 1 TABLET: 50; 8.6 TABLET ORAL at 17:52

## 2022-04-06 RX ADMIN — ENOXAPARIN SODIUM 40 MG: 100 INJECTION SUBCUTANEOUS at 09:27

## 2022-04-06 RX ADMIN — SENNOSIDES AND DOCUSATE SODIUM 1 TABLET: 50; 8.6 TABLET ORAL at 09:28

## 2022-04-06 RX ADMIN — FAMOTIDINE 20 MG: 20 TABLET, FILM COATED ORAL at 17:52

## 2022-04-06 RX ADMIN — QUETIAPINE FUMARATE 25 MG: 25 TABLET ORAL at 09:28

## 2022-04-06 RX ADMIN — FUROSEMIDE 40 MG: 40 TABLET ORAL at 11:55

## 2022-04-06 RX ADMIN — HYDRALAZINE HYDROCHLORIDE 10 MG: 20 INJECTION INTRAMUSCULAR; INTRAVENOUS at 03:48

## 2022-04-06 RX ADMIN — METOPROLOL TARTRATE 50 MG: 50 TABLET, FILM COATED ORAL at 21:18

## 2022-04-06 RX ADMIN — SODIUM CHLORIDE, PRESERVATIVE FREE 10 ML: 5 INJECTION INTRAVENOUS at 21:18

## 2022-04-06 RX ADMIN — LISINOPRIL 7.5 MG: 5 TABLET ORAL at 09:28

## 2022-04-06 RX ADMIN — SODIUM CHLORIDE, PRESERVATIVE FREE 10 ML: 5 INJECTION INTRAVENOUS at 07:07

## 2022-04-06 RX ADMIN — QUETIAPINE FUMARATE 100 MG: 100 TABLET ORAL at 21:18

## 2022-04-06 RX ADMIN — ASPIRIN 81 MG CHEWABLE TABLET 81 MG: 81 TABLET CHEWABLE at 09:27

## 2022-04-06 RX ADMIN — AMIODARONE HYDROCHLORIDE 400 MG: 200 TABLET ORAL at 09:27

## 2022-04-06 RX ADMIN — CHLORHEXIDINE GLUCONATE 10 ML: 1.2 RINSE ORAL at 09:32

## 2022-04-06 RX ADMIN — HALOPERIDOL LACTATE 3 MG: 5 INJECTION, SOLUTION INTRAMUSCULAR at 03:48

## 2022-04-06 RX ADMIN — FAMOTIDINE 20 MG: 20 TABLET, FILM COATED ORAL at 09:28

## 2022-04-06 NOTE — PROGRESS NOTES
CSS Progress Note    Admit Date: 3/29/2022  POD:  7 Days Post-Op    Procedure:  Procedure(s):  CORONARY ARTERY BYPASS GRAFT (CABG) X3, LIMA. RSVH VIA EVH. ECC. LAWSON AND EPI AORTIC US BY DR Ermias Ceballos        Subjective:   Pt seen with Dr. Raina Doran. Calm this am. In soft wrist restraints. Did not work with PT/OT yesterday due to need for Haldol. Objective:   Vitals:  Blood pressure 129/73, pulse 84, temperature 98.6 °F (37 °C), resp. rate 20, height 5' 8\" (1.727 m), weight 173 lb 3.2 oz (78.6 kg), SpO2 97 %. Temp (24hrs), Av.5 °F (36.9 °C), Min:98.2 °F (36.8 °C), Max:98.6 °F (37 °C)    EKG/Rhythm:  SR 80s      Oxygen Therapy: Room air    CXR:  CXR Results  (Last 48 hours)               22 0750  XR CHEST PORT Final result    Impression:  1. No pneumothorax       Narrative:  EXAM: XR CHEST PORT       INDICATION: s/p CABG; removed chest tubes       COMPARISON: 4/3/2022       FINDINGS: A portable AP radiograph of the chest was obtained at 0746 hours. The   patient is on a cardiac monitor. The patient is status post median sternotomy. No change mild cardiomegaly. Mediastinal pleural drains have been removed. Lungs   demonstrate low lung volumes with bibasilar atelectasis left greater than right. There are small pleural effusions. No pneumothorax. Admission Weight: Last Weight   Weight: 167 lb 1.7 oz (75.8 kg) Weight: 173 lb 3.2 oz (78.6 kg)     Intake / Output / Drain:  Current Shift: No intake/output data recorded. Last 24 hrs.:     Intake/Output Summary (Last 24 hours) at 2022 0898  Last data filed at 2022 0414  Gross per 24 hour   Intake 370 ml   Output 410 ml   Net -40 ml       EXAM:  General:   No acute distress                                                             Lungs:   Clear to auscultation bilaterally. Incision:  No erythema, drainage or swelling. Heart:  Regular rate and rhythm, S1, S2 normal, no murmur, click, rub or gallop. Abdomen:   Soft, non-tender. Bowel sounds normal. No masses,  No organomegaly. +BS, +Flatus, ? BM    Extremities:  No edema. PPP. Neurologic:  Gross motor and sensory apparatus intact. Oriented to name, place, month, year, president. Slow to retrieve information. FRAZIER to w/c. Calm. Labs:   Recent Labs     22  0412   WBC 8.5   HGB 10.2*   HCT 31.8*      *   K 4.1   BUN 21*   CREA 0.94   GLU 93        Assessment:     Active Problems:    CAD (coronary artery disease) (3/29/2022)      S/P CABG x 3 (3/30/2022)      Overview: On pump CORONARY ARTERY BYPASS GRAFT (CABG) X3, LIMA to LAD, RSVH to PDA,       RSVG to Ramus      R GSV EVH               Plan/Recommendations/Medical Decision Makin. CAD/STEMI:    On asa, statin,ACEi, BB-increased last night due to hypotension    2.  HTN: Increase Lisinopril. Continue BB.     3. Hypothyroid:  synthroid    4. Acute systolic CHF: LV EF 40% on LHC. EF improved by LAWSON in OR to 45%-50%. Repeat TTE prior to discharge-ordered limited to assess LVEF.        5. Chronic dementia/acute delirium:  Per reports from pt's children, some agitation/outbursts noted at times.    Untreated prior to surgery.  Intermittent Haldol. Seroquel bid. PM dose of Seroquel 100 mg. Daytime dose 25 mg.      6. Renal insufficiency: resolved. Creatinine baseline 1.3. Add daily lasix    7. Urinary retention: Resolved. Continue Flomax. 8. Prolonged QTc:   Continue to monitor.  No need to adjust medications. 9.   Keep epicardial wires as we are increasing  bb and he was bradycardic in early postop days. 10. Thrombocytopenia: resolved. 11. DVT prophylaxis: enoxaparin. Ambulate. OOB to chair. 12.  GI prophylaxis: famotidine. 13. Constipation: Continue stool softener, Miralax, PRN Dulcolax. Increase acitivity. Dispo: PT/OT/Cardiac Rehab. Case Management for discharge planning. Currently has a sitter.           Signed By: Samanta Vick NP

## 2022-04-06 NOTE — PROGRESS NOTES
Physical Therapy - defer  Chart reviewed in prep for therapy session. Noted pt did not wake for OT session earlier. Discussed with RN. RN reporting pt is sleeping and requested therapy defer. Per RN,  pt has not been combative today, no longer in restraints, continues to be impulsive and with a sitter in the room. Will defer per RN request.  Will attempt again later today as able.

## 2022-04-06 NOTE — PROGRESS NOTES
1930: Bedside and Verbal shift change report given to Kenny Walker RN  (oncoming nurse) by Joie Aquino RN  (offgoing nurse). Report included the following information SBAR, Kardex, Procedure Summary, Recent Results, Med Rec Status, and Cardiac Rhythm NSR . Problem: Pain  Goal: *Control of Pain  Outcome: Progressing Towards Goal  Goal: *PALLIATIVE CARE:  Alleviation of Pain  Outcome: Progressing Towards Goal     Problem: Pressure Injury - Risk of  Goal: *Prevention of pressure injury  Description: Document Asim Scale and appropriate interventions in the flowsheet. Outcome: Progressing Towards Goal  Note: Pressure Injury Interventions:  Sensory Interventions: Assess changes in LOC,Minimize linen layers,Keep linens dry and wrinkle-free    Moisture Interventions: Absorbent underpads,Check for incontinence Q2 hours and as needed    Activity Interventions: Increase time out of bed    Mobility Interventions: HOB 30 degrees or less    Nutrition Interventions: Document food/fluid/supplement intake    Friction and Shear Interventions: Apply protective barrier, creams and emollients,HOB 30 degrees or less,Minimize layers                Problem: Falls - Risk of  Goal: *Absence of Falls  Description: Document Maricruz Fall Risk and appropriate interventions in the flowsheet.   Outcome: Progressing Towards Goal  Note: Fall Risk Interventions:  Mobility Interventions: Bed/chair exit alarm,Communicate number of staff needed for ambulation/transfer    Mentation Interventions: Adequate sleep, hydration, pain control,Door open when patient unattended,Room close to nurse's station,Family/sitter at bedside    Medication Interventions: Bed/chair exit alarm,Evaluate medications/consider consulting pharmacy    Elimination Interventions: Bed/chair exit alarm,Patient to call for help with toileting needs    History of Falls Interventions: Consult care management for discharge planning,Door open when patient unattended

## 2022-04-06 NOTE — PROGRESS NOTES
Occupational Therapy  04/06/22     Patient currently on OT caseload. OT tx attempted at 2:10 PM and patient lethargic, arouses to verbal and tactile stimulation but falls back asleep quickly and unable to maintain arousal for participation with therapy. Daughter present who reports that patient was attempting to get out of bed earlier today. Will continue to follow patient and attempt OT at a later time.      Thank you,  Ramos Trujillo, OTR/L

## 2022-04-07 LAB
ALBUMIN SERPL-MCNC: 2.5 G/DL (ref 3.5–5)
ALBUMIN/GLOB SERPL: 0.8 {RATIO} (ref 1.1–2.2)
ALP SERPL-CCNC: 75 U/L (ref 45–117)
ALT SERPL-CCNC: 31 U/L (ref 12–78)
ANION GAP SERPL CALC-SCNC: 7 MMOL/L (ref 5–15)
AST SERPL-CCNC: 28 U/L (ref 15–37)
BASOPHILS # BLD: 0.1 K/UL (ref 0–0.1)
BASOPHILS NFR BLD: 1 % (ref 0–1)
BILIRUB SERPL-MCNC: 0.6 MG/DL (ref 0.2–1)
BUN SERPL-MCNC: 24 MG/DL (ref 6–20)
BUN/CREAT SERPL: 20 (ref 12–20)
CALCIUM SERPL-MCNC: 8.4 MG/DL (ref 8.5–10.1)
CHLORIDE SERPL-SCNC: 104 MMOL/L (ref 97–108)
CO2 SERPL-SCNC: 24 MMOL/L (ref 21–32)
CREAT SERPL-MCNC: 1.23 MG/DL (ref 0.7–1.3)
DIFFERENTIAL METHOD BLD: ABNORMAL
EOSINOPHIL # BLD: 0.3 K/UL (ref 0–0.4)
EOSINOPHIL NFR BLD: 3 % (ref 0–7)
ERYTHROCYTE [DISTWIDTH] IN BLOOD BY AUTOMATED COUNT: 13.8 % (ref 11.5–14.5)
GLOBULIN SER CALC-MCNC: 3 G/DL (ref 2–4)
GLUCOSE SERPL-MCNC: 96 MG/DL (ref 65–100)
HCT VFR BLD AUTO: 32.7 % (ref 36.6–50.3)
HGB BLD-MCNC: 10.6 G/DL (ref 12.1–17)
IMM GRANULOCYTES # BLD AUTO: 0.1 K/UL (ref 0–0.04)
IMM GRANULOCYTES NFR BLD AUTO: 1 % (ref 0–0.5)
LYMPHOCYTES # BLD: 0.9 K/UL (ref 0.8–3.5)
LYMPHOCYTES NFR BLD: 10 % (ref 12–49)
MCH RBC QN AUTO: 30.5 PG (ref 26–34)
MCHC RBC AUTO-ENTMCNC: 32.4 G/DL (ref 30–36.5)
MCV RBC AUTO: 94.2 FL (ref 80–99)
MONOCYTES # BLD: 1 K/UL (ref 0–1)
MONOCYTES NFR BLD: 10 % (ref 5–13)
NEUTS SEG # BLD: 7 K/UL (ref 1.8–8)
NEUTS SEG NFR BLD: 75 % (ref 32–75)
NRBC # BLD: 0 K/UL (ref 0–0.01)
NRBC BLD-RTO: 0 PER 100 WBC
PLATELET # BLD AUTO: 262 K/UL (ref 150–400)
PMV BLD AUTO: 11.5 FL (ref 8.9–12.9)
POTASSIUM SERPL-SCNC: 4.2 MMOL/L (ref 3.5–5.1)
PROT SERPL-MCNC: 5.5 G/DL (ref 6.4–8.2)
RBC # BLD AUTO: 3.47 M/UL (ref 4.1–5.7)
SODIUM SERPL-SCNC: 135 MMOL/L (ref 136–145)
WBC # BLD AUTO: 9.3 K/UL (ref 4.1–11.1)

## 2022-04-07 PROCEDURE — 97530 THERAPEUTIC ACTIVITIES: CPT

## 2022-04-07 PROCEDURE — 74011250636 HC RX REV CODE- 250/636: Performed by: NURSE PRACTITIONER

## 2022-04-07 PROCEDURE — 36415 COLL VENOUS BLD VENIPUNCTURE: CPT

## 2022-04-07 PROCEDURE — 97535 SELF CARE MNGMENT TRAINING: CPT

## 2022-04-07 PROCEDURE — 65660000001 HC RM ICU INTERMED STEPDOWN

## 2022-04-07 PROCEDURE — 74011250637 HC RX REV CODE- 250/637: Performed by: PHYSICIAN ASSISTANT

## 2022-04-07 PROCEDURE — 80053 COMPREHEN METABOLIC PANEL: CPT

## 2022-04-07 PROCEDURE — 74011250637 HC RX REV CODE- 250/637: Performed by: NURSE PRACTITIONER

## 2022-04-07 PROCEDURE — 74011000250 HC RX REV CODE- 250: Performed by: NURSE PRACTITIONER

## 2022-04-07 PROCEDURE — 97116 GAIT TRAINING THERAPY: CPT

## 2022-04-07 PROCEDURE — 85025 COMPLETE CBC W/AUTO DIFF WBC: CPT

## 2022-04-07 RX ADMIN — HALOPERIDOL LACTATE 3 MG: 5 INJECTION, SOLUTION INTRAMUSCULAR at 02:12

## 2022-04-07 RX ADMIN — Medication 400 MG: at 17:27

## 2022-04-07 RX ADMIN — FUROSEMIDE 40 MG: 40 TABLET ORAL at 09:00

## 2022-04-07 RX ADMIN — ASPIRIN 81 MG CHEWABLE TABLET 81 MG: 81 TABLET CHEWABLE at 08:59

## 2022-04-07 RX ADMIN — TAMSULOSIN HYDROCHLORIDE 0.4 MG: 0.4 CAPSULE ORAL at 09:00

## 2022-04-07 RX ADMIN — SODIUM CHLORIDE, PRESERVATIVE FREE 10 ML: 5 INJECTION INTRAVENOUS at 06:42

## 2022-04-07 RX ADMIN — METOPROLOL TARTRATE 50 MG: 50 TABLET, FILM COATED ORAL at 09:00

## 2022-04-07 RX ADMIN — LISINOPRIL 10 MG: 10 TABLET ORAL at 09:00

## 2022-04-07 RX ADMIN — QUETIAPINE FUMARATE 25 MG: 25 TABLET ORAL at 09:00

## 2022-04-07 RX ADMIN — LEVOTHYROXINE SODIUM 75 MCG: 0.07 TABLET ORAL at 06:42

## 2022-04-07 RX ADMIN — SODIUM CHLORIDE, PRESERVATIVE FREE 10 ML: 5 INJECTION INTRAVENOUS at 17:27

## 2022-04-07 RX ADMIN — ENOXAPARIN SODIUM 40 MG: 100 INJECTION SUBCUTANEOUS at 08:59

## 2022-04-07 RX ADMIN — POTASSIUM CHLORIDE 20 MEQ: 750 TABLET, EXTENDED RELEASE ORAL at 09:00

## 2022-04-07 RX ADMIN — FAMOTIDINE 20 MG: 20 TABLET, FILM COATED ORAL at 09:00

## 2022-04-07 RX ADMIN — SODIUM CHLORIDE, PRESERVATIVE FREE 10 ML: 5 INJECTION INTRAVENOUS at 22:11

## 2022-04-07 RX ADMIN — ATORVASTATIN CALCIUM 40 MG: 40 TABLET, FILM COATED ORAL at 22:09

## 2022-04-07 RX ADMIN — SENNOSIDES AND DOCUSATE SODIUM 1 TABLET: 50; 8.6 TABLET ORAL at 17:27

## 2022-04-07 RX ADMIN — Medication 400 MG: at 09:00

## 2022-04-07 RX ADMIN — SENNOSIDES AND DOCUSATE SODIUM 1 TABLET: 50; 8.6 TABLET ORAL at 09:00

## 2022-04-07 RX ADMIN — QUETIAPINE FUMARATE 100 MG: 100 TABLET ORAL at 22:09

## 2022-04-07 RX ADMIN — FAMOTIDINE 20 MG: 20 TABLET, FILM COATED ORAL at 17:27

## 2022-04-07 RX ADMIN — AMIODARONE HYDROCHLORIDE 400 MG: 200 TABLET ORAL at 09:00

## 2022-04-07 RX ADMIN — AMIODARONE HYDROCHLORIDE 400 MG: 200 TABLET ORAL at 22:09

## 2022-04-07 RX ADMIN — CHLORHEXIDINE GLUCONATE 10 ML: 1.2 RINSE ORAL at 09:01

## 2022-04-07 RX ADMIN — METOPROLOL TARTRATE 50 MG: 50 TABLET, FILM COATED ORAL at 22:09

## 2022-04-07 NOTE — PROGRESS NOTES
GLENYS:    RUR 14%    Disposition: SNF rehab. CM spoke to patient's son to discuss disposition. Patient lives alone and does not have anyone to care for him during the day. Patient's son requested a referral to [facility]. CM spoke to Christ Almaraz in admissions [phone]. She asked that referral be sent via [employer]. Referral sent.     Transportation: S      Follow up: PCP/Specialist    Primary contact: Rafael Ross [phone]    Sagrario Florez RN/CRM  [phone]

## 2022-04-07 NOTE — PROGRESS NOTES
CSS FLOOR Progress Note    Admit Date: 3/29/2022  POD: 8 Days Post-Op      Procedure:  Procedure(s):  CORONARY ARTERY BYPASS GRAFT (CABG) X3, LIMA. RSVH VIA EVH. ECC. LAWSON AND EPI AORTIC US BY DR Nalini Galarza    Subjective:   Pt seen with Dr. Miryam Spivey  Pt is medically ready to leave but due to his dementia, he cannot live alone. Son wants him to go to [facility]. CM placed request.   Pt still impulsive and not safe. Wires cut. Objective:     Visit Vitals  BP (!) 81/50   Pulse 66   Temp 98.1 °F (36.7 °C)   Resp 18   Ht 5' 8\" (1.727 m)   Wt 169 lb 8 oz (76.9 kg)   SpO2 92%   BMI 25.77 kg/m²     Temp (24hrs), Av.1 °F (36.7 °C), Min:97.6 °F (36.4 °C), Max:98.6 °F (37 °C)      Last 24hr Input/Output:    Intake/Output Summary (Last 24 hours) at 2022 1424  Last data filed at 2022 0858  Gross per 24 hour   Intake 1040 ml   Output 0 ml   Net 1040 ml        EKG/Rhythm: SR    ECHO-yesterday   Left Ventricle: Left ventricle size is normal. Normal wall thickness. See diagram for wall motion findings. The EF by visual approximation is 35 - 40%. Grade II diastolic dysfunction with increased LAP. Akinetic apex consistent with recent anterior infarct. No obvious thrombus but at high risk of having apical thrombus.   Aortic Valve: No regurgitation. Moderate stenosis of the aortic valve. Aortic valve area of 1.5 cm² with mean transaortic gradient of 20 mmHg. Left Ventricle Left ventricle size is normal. Normal wall thickness. See diagram for wall motion findings. The EF by visual approximation is 35 - 40%. Grade II diastolic dysfunction with increased LAP. Left Atrium Left atrium size is normal.   Right Ventricle Right ventricle size is normal.   Right Atrium Right atrium size is normal.   Aortic Valve Moderately restricted motion. No regurgitation. Moderate stenosis of the aortic valve. Aortic valve area of 1.5 cm² with mean transaortic gradient of 20 mmHg.    Mitral Valve Valve structure is normal. Trace transvalvular regurgitation. Tricuspid Valve Not well visualized. Trace transvalvular regurgitation. No stenosis noted. Pulmonic Valve The pulmonic valve was not well visualized. Oxygen: RA    CXR:n/a      Admission Weight: Last Weight   Weight: 167 lb 1.7 oz (75.8 kg) Weight: 169 lb 8 oz (76.9 kg)       EXAM:  General: WDWN man in NAD sitting in chair. He is placing objects in [place] eggs to occupy himself. Sitter at bedside. Calm. Muddled thought processes. OX3 per usual questions. Lungs:   Diminished LLL. Unlabored on room air. Incision:  No erythema, drainage or swelling. Heart:  RRR. 4/6 SHREE across precordium, loudest left anterior chest.    Abdomen:   Soft, non-tender. Bowel sounds normal. No masses,  No organomegaly. Extremities:  No edema. PPP   Neurologic:  Gross motor and sensory apparatus intact. Activity: with assistance. Diet:  Regular cardiac. Lab Data Reviewed:   Recent Labs     22  0327   WBC 9.3   HGB 10.6*   HCT 32.7*      *   K 4.2   BUN 24*   CREA 1.23   GLU 96         Assessment:     Active Problems:    CAD (coronary artery disease) (3/29/2022)      S/P CABG x 3 (3/30/2022)      Overview: On pump CORONARY ARTERY BYPASS GRAFT (CABG) X3, LIMA to LAD, RSVH to PDA,       RSVG to Ramus      R GSV EVH                   Plan/Recommendations/Medical Decision Makin yo man with CAD, STEMI, hypothyroidism, HTN, dementia. Elridge Nancy had violent outbursts and forgetfulness prior to surgery.  He is POD # 6   and has been having agitation and violent outbursts postop as well.      1. CAD/STEMI:    On asa, statin, bb.     2.  HTN:  Lisinopril 2.5 mg.  BP controlled. 3. Hypothyroid:  synthroid  4. Acute systolic CHF: LV EF 99% on LHC. EF improved by LAWSON in OR to 45%-50%. Repeat TTE:  EF 35. Mod AS.     5.  Chronic dementia/acute delirium:  Per reports from pt's children, some agitation/outbursts noted at times.  Untreated prior to surgery.  Intermittent Haldol. Seroquel bid. PM dose of Seroquel was increased to 100 mg. Daytime dose 25 mg.  Was agitated and combative last night again. 6. Renal insufficiency: resolved. Creatinine 1.23, baseline 1.3. Adequate urine output  7. Discharge planning: his children cannot be with him 24/7. The son has requested transfer to [facility]. Case management has submitted request to facility. Possible discharge tomorrow depending on bed availiblility.      Signed By: Sheyla Young NP

## 2022-04-07 NOTE — PROGRESS NOTES
1930: Bedside shift change report given to Chuck Torrez RN (oncoming nurse) by Chester Rivero (offgoing nurse). Report included the following information SBAR, Intake/Output, MAR and Recent Results.

## 2022-04-07 NOTE — PROGRESS NOTES
Problem: Mobility Impaired (Adult and Pediatric)  Goal: *Acute Goals and Plan of Care (Insert Text)  Description: FUNCTIONAL STATUS PRIOR TO ADMISSION: Patient was independent and active without use of DME.     HOME SUPPORT PRIOR TO ADMISSION: The patient lived alone with son (lives on the property) available to provide assistance. Pt reports his wife recently passed away. Physical Therapy Goals  Weekly Reassessment 4/7/2022 (goals down graded)  1. Patient will move from supine to sit and sit to supine  in bed with CGA within 5 days. 2.  Patient will perform sit to/from stand with Min A within 5 days. 3.  Patient will ambulate 150 feet with least restrictive assistive device and Minimum assistance within 5 days. 4.  Patient will ascend/descend 2 stairs with handrail(s) with moderate assistance  within 5 days. 5.  Patient will perform cardiac exercises per protocol with minimum assistance within 5 days. 6.  Patient will verbally recall and functionally demonstrate mindful-based movements (\"move in the tube\") principles with cues within 5 days. Initiated 3/31/2022  1. Patient will move from supine to sit and sit to supine  in bed with modified independence within 5 days. 2.  Patient will perform sit to/from stand with modified independence within 5 days. 3.  Patient will ambulate 300 feet with least restrictive assistive device and modified independence within 5 days. 4.  Patient will ascend/descend 12 stairs with handrail(s) with independence within 5 days. 5.  Patient will perform cardiac exercises per protocol with modified independence within 5 days. 6.  Patient will verbally recall and functionally demonstrate mindful-based movements (\"move in the tube\") principles without cues within 5 days.       Outcome: Not Met     PHYSICAL THERAPY TREATMENT: WEEKLY REASSESSMENT  Patient: Ty Wall (4 y.o. male)  Date: 4/7/2022  Diagnosis: CAD (coronary artery disease) [I25.10] <principal problem not specified>  Procedure(s) (LRB):  CORONARY ARTERY BYPASS GRAFT (CABG) X3, LIMA. RSVH VIA EVH. ECC. LAWSON AND EPI AORTIC US BY DR Ermias Ceballos (N/A) 8 Days Post-Op  Precautions: Fall,Sternal,Other (comment) (move in the tube)  Chart, physical therapy assessment, plan of care and goals were reviewed. ASSESSMENT  Patient continues with skilled PT services and is not progressing towards goals. Patient's functional mobility is limited by weakness, impaired stand balance, memory impairment, confusion, sternal precautions (move in the tube), and activity tolerance. Received pt in bed with sitter in the room. Patient is pleasant and cooperative though confused. He required greater assist to ambulate today. He remained sitting up in the chair with the sitter and OT in the room. Patient is not verbalizing and is not demonstrating understanding of mindful-based movements (\"move in the tube\") principles of keeping UEs proximal to ribcage to prevent lateral pull on the sternum during load-bearing activities with visual, verbal and tactile cues required for compliance. Patient's progression toward goals since last assessment: 0/6 goals met. Goals downgraded. Current Level of Function Impacting Discharge (mobility/balance): Min/ Mod A sit<->stand; Max A x2 bed to chair; Max A x2 ambulating with bilateral HHA, improved to Mod A x2 w/ RW. High fall risk. Poor adherence to sternal precautions. Other factors to consider for discharge: poor compliance to sternal precautions, fall risk, memory impairment, level of assist required for transfers and to ambulate (2 person assist)         PLAN :  Goals have been updated based on progression since last assessment. Patient continues to benefit from skilled intervention to address the above impairments.     Recommendations and Planned Interventions: bed mobility training, transfer training, gait training, therapeutic exercises, neuromuscular re-education, patient and family training/education and therapeutic activities      Frequency/Duration: Patient will be followed by physical therapy:  5 times a week to address goals. Recommendation for discharge: (in order for the patient to meet his/her long term goals)  Recommend familiar surroundings of home with HHPT and 24/7 caregiver physical assist x1-2 for mobility (transfers, ambulation). If caregivers are not able to provide the level of assist currently required, would consider SNF over home as pt is a high fall risk, should not be left alone. Discussed concerns w/ CM. Recommended informing family of the level of assist currently required. This discharge recommendation:  Has been made in collaboration with the attending provider and/or case management    IF patient discharges home will need the following DME: to be determined (TBD) - If home, wheelchair, rolling walker, gait belt, and family training. SUBJECTIVE:       OBJECTIVE DATA SUMMARY:   HISTORY:    Past Medical History:   Diagnosis Date    Hypertension     Ill-defined condition     hypothyroid     Past Surgical History:   Procedure Laterality Date    HX APPENDECTOMY         Patient mobilized on continuous portable monitor/telemetry. Critical Behavior:  Neurologic State: Confused  Orientation Level: Oriented to person,Oriented to situation  Cognition: Impulsive,Memory loss,Poor safety awareness  Safety/Judgement: Lack of insight into deficits    Functional Mobility Training:  Bed Mobility:     Supine to Sit: Minimum assistance;Assist x1;Additional time     Scooting: Maximum assistance;Assist x1          Transfers:  Sit to Stand: Minimum assistance; Additional time; Adaptive equipment;Assist x1  Stand to Sit: Moderate assistance;Assist x2; Additional time; Adaptive equipment        Bed to Chair: Maximum assistance;Assist x2; Additional time; Adaptive equipment (pt large fall risk, weak, difficulty advancing R LE)                      Balance:  Sitting: Intact; Without support  Standing: Impaired; With support  Standing - Static: Constant support;Poor  Standing - Dynamic : Poor    Ambulation/Gait Training:  Distance (ft): 16 Feet (ft) (8ft x2)  Assistive Device: Gait belt; Other (comment) (no AD x8 ft; RW x8 ft)  Ambulation - Level of Assistance:Maximum assistance;Assist x2; Additional time; Adaptive equipment        Gait Abnormalities: Decreased step clearance; Path deviations; Shuffling gait; Step to gait;Trunk sway increased              Speed/Davina: Slow;Shuffled   Step Length: Right shortened;Left shortened  Swing Pattern: Right asymmetrical    Pain Rating:  None indicated    Activity Tolerance:   Fair and requires rest breaks      After treatment patient left in no apparent distress:   Sitting in chair, Call bell within reach, Bed / chair alarm activated and sitter in the room    COMMUNICATION/COLLABORATION:   The patients plan of care was discussed with: Occupational therapist and Case management.      Lauren Anne, PT   Time Calculation: 26 mins

## 2022-04-07 NOTE — PROGRESS NOTES
Comprehensive Nutrition Assessment    Type and Reason for Visit: Initial (LOS)    Nutrition Recommendations/Plan:      1. RD will change diet order to Low Fat/Low Chol/ALONSO    2. RD will order Ensure Enlive (high calorie, high protein) 1x/day      Nutrition Assessment:    63 yo male admitted for CAD. PMhx: CAD, HTN, dementia. S/P CABG x 3 on 3/30. Has been in restraints due to agitation and confusion. Currently with a sitter. PT in room working with pt during visit. Spoke with sitter who states pt ate all of his breakfast and has been eating well. She cuts up his food but then he is able to feed himself. Sitter asked about pt receiving Ensure, thinks he will enjoy it. Weight hx in EMR indicates no recent weight loss PTA. Weight on admission was 75.8 kg, went up to 80.3 kg so diuretic was started. Weight back down to 76.8 kg. Labs reviewed. Malnutrition Assessment:  Malnutrition Status:  No malnutrition      Nutritionally Significant Medications: Pepcid, Lasix, Mag-Ox, Miralax, Kcl, Pericolace    Estimated Daily Nutrient Needs:  Energy (kcal): 1880 (MSJ x AF 1.3); Weight Used for Energy Requirements: Current  Protein (g): 91 (1.2 gm/kg);  Weight Used for Protein Requirements: Current  Fluid (ml/day): 1880; Method Used for Fluid Requirements: 1 ml/kcal    Nutrition Related Findings:       BM: 4/6  Edema: non-pitting RUE  Wounds:  Surgical incision       Current Nutrition Therapies:   Diet: No Concentrated Sweets  Supplements: none  Additional Caloric Sources: none    Meal intake: Patient Vitals for the past 168 hrs:   % Diet Eaten   04/07/22 0858 76 - 100%   04/07/22 0400 0%   04/07/22 0000 0%   04/06/22 2000 0%   04/06/22 1841 76 - 100%   04/06/22 0800 76 - 100%   04/06/22 0414 0%   04/05/22 2000 0%   04/05/22 0800 76 - 100%   04/04/22 1820 51 - 75%   04/04/22 1200 76 - 100%   04/04/22 0800 51 - 75%   04/03/22 1800 76 - 100%   04/02/22 1200 76 - 100%   04/02/22 0900 76 - 100%       Anthropometric Measures:  · Height:  5' 8\" (172.7 cm)  · Current Body Wt:  75.8 kg (167 lb 1.7 oz)   · Admission Body Wt:       · Usual Body Wt:        · Ideal Body Wt:  154:  108.5 %   · Adjusted Body Weight:   ; Weight Adjustment for: No adjustment   · Adjusted BMI:       · BMI Categories:  Overweight (BMI 25.0-29. 9)     Wt Readings from Last 10 Encounters:   04/07/22 76.9 kg (169 lb 8 oz)   03/28/22 75.8 kg (167 lb)   06/15/17 75.3 kg (166 lb)   11/20/14 84.8 kg (187 lb)       Nutrition Diagnosis:   No nutrition diagnosis at this time    Nutrition Interventions:   Food and/or Nutrient Delivery: Continue current diet,Start oral nutrition supplement  Nutrition Education and Counseling: No recommendations at this time  Coordination of Nutrition Care: Continue to monitor while inpatient    Goals:  PO intakes > 75% meals + ONS within next 5-7 days       Nutrition Monitoring and Evaluation:   Behavioral-Environmental Outcomes: None identified  Food/Nutrient Intake Outcomes: Food and nutrient intake,Supplement intake  Physical Signs/Symptoms Outcomes: Biochemical data,Weight,GI status    Discharge Planning:    Continue current diet     Lynn Hunt RD  Contact via [employer]

## 2022-04-07 NOTE — PROGRESS NOTES
Problem: Self Care Deficits Care Plan (Adult)  Goal: *Acute Goals and Plan of Care (Insert Text)  Description: FUNCTIONAL STATUS PRIOR TO ADMISSION: Patient was independent and active without use of DME. 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 Patient with mild cognitive impairments and unclear if he was still doing these activities. 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 Recently patient's wife passed. Occupational Therapy Goals: goals modified below 4/7/22  Initiated 3/31/2022  1. Patient will perform ADLs standing 5 mins without fatigue or LOB with supervision/set-up within 5 day(s). (grooming at sink with least restrictive DME 4/7/22 with min A)  2. Patient will perform lower body ADLs with supervision/set-up within 5 day(s). (min A 4/7/22)  3. Patient will perform gathering ADL items high and low 2/2 with supervision/set-up within 5 day(s). (discontinue, not appropriate 4/7/22)  4. Patient will perform toilet transfers with supervision/set-up within 5 day(s). (min A 4/7/22)  5. Patient will perform all aspects of toileting with supervision/set-up within 5 day(s). (min A 4/7/22)  6. Patient will participate in cardiac/sternal upper extremity therapeutic exercise/activities to increase independence with ADLs with supervision/set-up for 5 minutes within 5 day(s). (continue 4/7/22)      Outcome: Not Met       OCCUPATIONAL THERAPY TREATMENT/WEEKLY REASSESSMENT  Patient: Jadiel Major (77 y.o. male)  Date: 4/7/2022  Diagnosis: CAD (coronary artery disease) [I25.10] <principal problem not specified>  Procedure(s) (LRB):  CORONARY ARTERY BYPASS GRAFT (CABG) X3, LIMA. RSVH VIA EVH. ECC. LAWSON AND EPI AORTIC US BY DR Ora Alvarez (N/A) 8 Days Post-Op  Precautions: Fall,Sternal,Other (comment) (move in the tube)  Chart, occupational therapy assessment, plan of care, and goals were reviewed.     ASSESSMENT  Patient continues with skilled OT services and is not progressing towards goals. OT goals downgraded based on today's session. Pt had difficulty advancing R LE during functional mobility this date, needing up to max A x 2 to safely sit in chair. Pt was unable to successfully mobilize to the bathroom before chair had to be pulled to pt for safety and fall prevention. Observed slightly improved mobility from chair to bedside chair with RW but pt needed up to max A for RW guidance. He completed grooming ADLs seated as pt unable to tolerate standing or bathroom mobility at this time. Ideally pt would benefit from returning home to a familiar environment. However, based on today's mobility, pt is needing up to mod to max A x 2 to safely mobilize 10 ft. Family will need to provide physical A for pt with these transfers. Will continue to follow for DME recommendations pending progress as pt has also not been OOB in two days 2* medication, agitation, etc.     Patient is not verbalizing and is not demonstrating understanding of mindful-based movements (\"move in the tube\") principles of keeping UEs proximal to ribcage to prevent lateral pull on the sternum during load-bearing activities with verbal cues required for compliance. Current Level of Function Impacting Discharge (ADLs): mod to max A X 2 for safe transfers today, chronic issues with R knee from previous injury, dementia, move in the tube (poor carryover and recall)    Other factors to consider for discharge: lives with family         PLAN :  Patient continues to benefit from skilled intervention to address the above impairments. Continue treatment per established plan of care to address goals.     Recommend with staff: OOB to chair with RW A x 2, BSC    Recommend next OT session: ADLs per POC    Recommendation for discharge: (in order for the patient to meet his/her long term goals)  Occupational therapy at least 2 days/week in the home AND ensure assist and/or supervision for safety with transfers, mobility, ADLs    This discharge recommendation:  Has been made in collaboration with the attending provider and/or case management    IF patient discharges home will need the following DME: based on today's session, recommend BSC and RW       SUBJECTIVE:   Patient stated I had bypass.     OBJECTIVE DATA SUMMARY:   Cognitive/Behavioral Status:  Neurologic State: Confused  Orientation Level: Oriented to person;Oriented to situation  Cognition: Impulsive;Memory loss;Poor safety awareness  Perception: Appears intact  Perseveration: Perseverates during conversation  Safety/Judgement: Lack of insight into deficits    Functional Mobility and Transfers for ADLs:  Bed Mobility:  Supine to Sit: Minimum assistance;Assist x1;Additional time  Scooting: Maximum assistance;Assist x1    Transfers:  Sit to Stand: Minimum assistance; Additional time; Adaptive equipment;Assist x1     Bed to Chair: Maximum assistance;Assist x2; Additional time; Adaptive equipment (pt large fall risk, weak, difficulty advancing R LE)    Balance:  Sitting: Intact; Without support  Standing: Impaired; With support  Standing - Static: Constant support;Poor  Standing - Dynamic : Poor    ADL Intervention:       Grooming  Brushing Teeth: Set-up (seated in chair, VC)- slightly agitated as pt adamant to get up to bathroom, poor insight                             Cognitive Retraining  Safety/Judgement: Lack of insight into deficits    Patient instructed and educated on mindful movement principles based on Move in The Tube concept to include maintaining bilateral elbows close to rib cage when performing any load-bearing activity such as getting in/out of bed, pushing up from a chair, opening a door, or lifting a box.         Pain:  none    Activity Tolerance:   Good    After treatment patient left in no apparent distress:   Sitting in chair, Call bell within reach, Bed / chair alarm activated, and elsa    COMMUNICATION/COLLABORATION:   The patients plan of care was discussed with: Physical therapist and Registered nurse.      Gino Phillips OT  Time Calculation: 30 mins

## 2022-04-07 NOTE — PROGRESS NOTES
1930: Bedside and Verbal shift change report given to Yaneli Carlisle RN (oncoming nurse) by OSKAR Crump  (offgoing nurse). Report included the following information SBAR, Kardex, Intake/Output, Recent Results, Med Rec Status, and Cardiac Rhythm NSR . Problem: Pain  Goal: *Control of Pain  Outcome: Progressing Towards Goal  Goal: *PALLIATIVE CARE:  Alleviation of Pain  Outcome: Progressing Towards Goal     Problem: Patient Education: Go to Patient Education Activity  Goal: Patient/Family Education  Outcome: Progressing Towards Goal     Problem: Falls - Risk of  Goal: *Absence of Falls  Description: Document [facility] Fall Risk and appropriate interventions in the flowsheet.   Outcome: Progressing Towards Goal  Note: Fall Risk Interventions:  Mobility Interventions: Bed/chair exit alarm,Communicate number of staff needed for ambulation/transfer,Patient to call before getting OOB    Mentation Interventions: Bed/chair exit alarm,Door open when patient unattended,Room close to nurse's station,Reorient patient,More frequent rounding    Medication Interventions: Bed/chair exit alarm,Patient to call before getting OOB,Teach patient to arise slowly    Elimination Interventions: Bed/chair exit alarm,Toileting schedule/hourly rounds    History of Falls Interventions: Bed/chair exit alarm,Door open when patient unattended,Investigate reason for fall         Problem: AMI: Day 5  Goal: Off Pathway (Use only if patient is Off Pathway)  Outcome: Progressing Towards Goal  Goal: Activity/Safety  Outcome: Progressing Towards Goal  Goal: Diagnostic Test/Procedures  Outcome: Progressing Towards Goal  Goal: Nutrition/Diet  Outcome: Progressing Towards Goal

## 2022-04-08 ENCOUNTER — APPOINTMENT (OUTPATIENT)
Dept: CT IMAGING | Age: 77
DRG: 235 | End: 2022-04-08
Attending: NURSE PRACTITIONER
Payer: MEDICARE

## 2022-04-08 PROCEDURE — 74011250637 HC RX REV CODE- 250/637: Performed by: NURSE PRACTITIONER

## 2022-04-08 PROCEDURE — 74011250636 HC RX REV CODE- 250/636: Performed by: NURSE PRACTITIONER

## 2022-04-08 PROCEDURE — 70450 CT HEAD/BRAIN W/O DYE: CPT

## 2022-04-08 PROCEDURE — 74011250637 HC RX REV CODE- 250/637: Performed by: PHYSICIAN ASSISTANT

## 2022-04-08 PROCEDURE — 97116 GAIT TRAINING THERAPY: CPT

## 2022-04-08 PROCEDURE — 65660000001 HC RM ICU INTERMED STEPDOWN

## 2022-04-08 PROCEDURE — 97530 THERAPEUTIC ACTIVITIES: CPT

## 2022-04-08 PROCEDURE — 74011000250 HC RX REV CODE- 250: Performed by: NURSE PRACTITIONER

## 2022-04-08 RX ORDER — FAMOTIDINE 20 MG/1
20 TABLET, FILM COATED ORAL
Status: DISCONTINUED | OUTPATIENT
Start: 2022-04-09 | End: 2022-04-20 | Stop reason: HOSPADM

## 2022-04-08 RX ADMIN — Medication 400 MG: at 18:56

## 2022-04-08 RX ADMIN — QUETIAPINE FUMARATE 100 MG: 100 TABLET ORAL at 21:45

## 2022-04-08 RX ADMIN — FUROSEMIDE 40 MG: 40 TABLET ORAL at 09:44

## 2022-04-08 RX ADMIN — QUETIAPINE FUMARATE 25 MG: 25 TABLET ORAL at 09:47

## 2022-04-08 RX ADMIN — AMIODARONE HYDROCHLORIDE 400 MG: 200 TABLET ORAL at 21:45

## 2022-04-08 RX ADMIN — CHLORHEXIDINE GLUCONATE 10 ML: 1.2 RINSE ORAL at 21:46

## 2022-04-08 RX ADMIN — SODIUM CHLORIDE, PRESERVATIVE FREE 10 ML: 5 INJECTION INTRAVENOUS at 18:57

## 2022-04-08 RX ADMIN — LEVOTHYROXINE SODIUM 75 MCG: 0.07 TABLET ORAL at 09:45

## 2022-04-08 RX ADMIN — ENOXAPARIN SODIUM 40 MG: 100 INJECTION SUBCUTANEOUS at 09:48

## 2022-04-08 RX ADMIN — POTASSIUM CHLORIDE 20 MEQ: 750 TABLET, EXTENDED RELEASE ORAL at 09:45

## 2022-04-08 RX ADMIN — SODIUM CHLORIDE, PRESERVATIVE FREE 10 ML: 5 INJECTION INTRAVENOUS at 21:45

## 2022-04-08 RX ADMIN — FAMOTIDINE 20 MG: 20 TABLET, FILM COATED ORAL at 09:44

## 2022-04-08 RX ADMIN — METOPROLOL TARTRATE 50 MG: 50 TABLET, FILM COATED ORAL at 09:45

## 2022-04-08 RX ADMIN — Medication 400 MG: at 09:44

## 2022-04-08 RX ADMIN — TAMSULOSIN HYDROCHLORIDE 0.4 MG: 0.4 CAPSULE ORAL at 09:44

## 2022-04-08 RX ADMIN — POLYETHYLENE GLYCOL 3350 17 G: 17 POWDER, FOR SOLUTION ORAL at 12:00

## 2022-04-08 RX ADMIN — METOPROLOL TARTRATE 50 MG: 50 TABLET, FILM COATED ORAL at 21:45

## 2022-04-08 RX ADMIN — ATORVASTATIN CALCIUM 40 MG: 40 TABLET, FILM COATED ORAL at 21:45

## 2022-04-08 RX ADMIN — AMIODARONE HYDROCHLORIDE 400 MG: 200 TABLET ORAL at 09:44

## 2022-04-08 RX ADMIN — CHLORHEXIDINE GLUCONATE 10 ML: 1.2 RINSE ORAL at 11:59

## 2022-04-08 RX ADMIN — LISINOPRIL 10 MG: 10 TABLET ORAL at 09:45

## 2022-04-08 RX ADMIN — SENNOSIDES AND DOCUSATE SODIUM 1 TABLET: 50; 8.6 TABLET ORAL at 09:45

## 2022-04-08 RX ADMIN — ASPIRIN 81 MG CHEWABLE TABLET 81 MG: 81 TABLET CHEWABLE at 09:45

## 2022-04-08 RX ADMIN — SENNOSIDES AND DOCUSATE SODIUM 1 TABLET: 50; 8.6 TABLET ORAL at 18:56

## 2022-04-08 NOTE — PROGRESS NOTES
Problem: Self Care Deficits Care Plan (Adult)  Goal: *Acute Goals and Plan of Care (Insert Text)  Description: FUNCTIONAL STATUS PRIOR TO ADMISSION: Patient was independent and active without use of DME. 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 Patient with mild cognitive impairments and unclear if he was still doing these activities. 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 Recently patient's wife passed. Occupational Therapy Goals: goals modified below 4/7/22  Initiated 3/31/2022  1. Patient will perform ADLs standing 5 mins without fatigue or LOB with supervision/set-up within 5 day(s). (grooming at sink with least restrictive DME 4/7/22 with min A)  2. Patient will perform lower body ADLs with supervision/set-up within 5 day(s). (min A 4/7/22)  3. Patient will perform gathering ADL items high and low 2/2 with supervision/set-up within 5 day(s). (discontinue, not appropriate 4/7/22)  4. Patient will perform toilet transfers with supervision/set-up within 5 day(s). (min A 4/7/22)  5. Patient will perform all aspects of toileting with supervision/set-up within 5 day(s). (min A 4/7/22)  6. Patient will participate in cardiac/sternal upper extremity therapeutic exercise/activities to increase independence with ADLs with supervision/set-up for 5 minutes within 5 day(s). (continue 4/7/22)      Outcome: Progressing Towards Goal       OCCUPATIONAL THERAPY TREATMENT  Patient: Dixie Macdonald (37 y.o. male)  Date: 4/8/2022  Diagnosis: CAD (coronary artery disease) [I25.10] <principal problem not specified>  Procedure(s) (LRB):  CORONARY ARTERY BYPASS GRAFT (CABG) X3, LIMA. RSVH VIA EVH. ECC. LAWSON AND EPI AORTIC US BY DR Omar Magana (N/A) 9 Days Post-Op  Precautions: Fall,Sternal,Other (comment) (move in the tube)  Chart, occupational therapy assessment, plan of care, and goals were reviewed.     ASSESSMENT  Patient continues with skilled OT services and is progressing towards goals. Pt with great improvement today with bed mobility, sit to stand transfers, balance and functional mobility. Pt also able to complete standing ADLs at sink with CGA for balance. Pt completed task thoroughly without any need for cueing or prompting. Pt demonstrated safer mobility with use of RW compared to no AD. VSS on room air. Pt remains confused, perseverative in conversation but remains easily redirectable with cues. Continue to recommend 24 hour supervision if discharged home 2* assurance of safety with mobility and adherence to move in the tube. However, per CM, 24 hour is not available from family. Recommend SNF rehab at this time. Patient is not verbalizing and is demonstrating understanding of mindful-based movements (\"move in the tube\") principles of keeping UEs proximal to ribcage to prevent lateral pull on the sternum during load-bearing activities with verbal cues required for compliance. Current Level of Function Impacting Discharge (ADLs): min A x 1, RW, fair adherence to move in the tube    Other factors to consider for discharge: hx of dementia         PLAN :  Patient continues to benefit from skilled intervention to address the above impairments. Continue treatment per established plan of care to address goals. Recommend with staff: OOB to chair, ambulate with RW, gait belt, A x 2 for safety    Recommend next OT session: dressing, bathing, standing ADLs    Recommendation for discharge: (in order for the patient to meet his/her long term goals)  Therapy up to 5 days/week in SNF setting    This discharge recommendation:  Has been made in collaboration with the attending provider and/or case management    IF patient discharges home will need the following DME: none       SUBJECTIVE:   Patient stated you see. ..something changed last night. There was an operation. A national operation.  (pt confused, perseverative during conversation)    OBJECTIVE DATA SUMMARY:   Cognitive/Behavioral Status:  Neurologic State: Drowsy; Confused  Orientation Level: Disoriented to place; Disoriented to situation;Disoriented to time;Oriented to person  Cognition: Memory loss;Poor safety awareness;Decreased command following; Impaired decision making; Impulsive             Functional Mobility and Transfers for ADLs:  Bed Mobility:  Supine to Sit: Minimum assistance;Assist x1;Additional time  Scooting: Stand-by assistance    Transfers:  Sit to Stand: Contact guard assistance; Other (comment) (from bed; Min A from chair)          Balance:  Sitting: Intact; Without support  Standing: Impaired; Without support  Standing - Static: Fair;Occasional  Standing - Dynamic : Fair;Constant support    ADL Intervention:       Grooming  Brushing Teeth: Contact guard assistance (standing at sink)                   Lower Body Dressing Assistance  Socks: Supervision  Leg Crossed Method Used: Yes  Position Performed: Seated edge of bed              Patient instructed and educated on mindful movement principles based on Move in The Tube concept to include maintaining bilateral elbows close to rib cage when performing any load-bearing activity such as getting in/out of bed, pushing up from a chair, opening a door, or lifting a box. Patient demonstrated lower body dressing with Supervision. Instruction and indicated understanding on the benefits of loose clothing throughout to accommodate for post surgical swelling, decreased ROM and increased pain. Instruction and indicated understanding the technique of pull over shirt versus front open clothing. Pain:  No complaints    Activity Tolerance:   Good    After treatment patient left in no apparent distress:   Sitting in chair, Call bell within reach, Bed / chair alarm activated, and sitter at bedside    COMMUNICATION/COLLABORATION:   The patients plan of care was discussed with: Physical therapist and Registered nurse. Otilia Dempsey, OT  Time Calculation: 20 mins

## 2022-04-08 NOTE — PROGRESS NOTES
1930: Bedside and Verbal shift change report given to Mio Lopez RN (oncoming nurse) by Chad Flynn RN/Cuco RN  (offgoing nurse). Report included the following information SBAR, Kardex, Intake/Output, Recent Results, Med Rec Status, and Cardiac Rhythm NSR . Problem: Pain  Goal: *Control of Pain  Outcome: Progressing Towards Goal  Goal: *PALLIATIVE CARE:  Alleviation of Pain  Outcome: Progressing Towards Goal     Problem: Patient Education: Go to Patient Education Activity  Goal: Patient/Family Education  Outcome: Progressing Towards Goal     Problem: Pressure Injury - Risk of  Goal: *Prevention of pressure injury  Description: Document Asim Scale and appropriate interventions in the flowsheet.   Outcome: Progressing Towards Goal  Note: Pressure Injury Interventions:  Sensory Interventions: Assess changes in LOC,Keep linens dry and wrinkle-free    Moisture Interventions: Absorbent underpads,Minimize layers    Activity Interventions: Increase time out of bed    Mobility Interventions: Pressure redistribution bed/mattress (bed type)    Nutrition Interventions: Document food/fluid/supplement intake    Friction and Shear Interventions: Minimize layers

## 2022-04-08 NOTE — PROGRESS NOTES
Pharmacy Renal Dosing/Monitoring Protocol  Medication: famotidine   Current regimen:  20mg every 12 hr     Recent Labs     04/07/22  0327 04/06/22  0412   CREA 1.23 0.94   BUN 24* 21*     Estimated CrCl:  < 50 ml/min  Plan: Change to 20 mg po every day based on renal function and indication.

## 2022-04-08 NOTE — PROGRESS NOTES
Transitions of Care Plan   RUR: 14% - low   Clinical Update: stable for d/c; sitter discontinued at 1215    Consults: Therapy   Baseline: independent without DME; resides on same property as son, Millicent Dimas Barriers to Discharge: placement; family dynamics   Disposition: HH vs SNF - referral under review with [facility]; needs insurance auth   Estimated Discharge Date: 4/11/22    CM spoke with treatment team including therapy - patient progressed well today with therapy and was able to ambulate into the hallway with 1 assist and RW. Still appropriate for SNF if family cannot provide adequate support at home. CSS RN Co- to assist in speaking with patient's family. CM to continue to pursue SNF placement at [facility]. 1325 - CM called [facility] and requested update from  in admissions. Left VM and requested callback. CM will continue to follow.     Wallace Rodgers, MPH  Care Manager [facility]  Available via [address] [place] [address] or

## 2022-04-08 NOTE — PROGRESS NOTES
[facility] FLOOR Progress Note    Admit Date: 3/29/2022  POD: 9 Days Post-Op      Procedure:  Procedure(s):  CORONARY ARTERY BYPASS GRAFT (CABG) X3, LIMA. RSVH VIA EVH. ECC. LAWSON AND EPI AORTIC US BY DR Beatriz Marks    Subjective:   Pt seen with Dr. Mt Webb. However, thought processes are confused. Says we are in an emergency. Head CT.  RA. Sitter. Applied for [employer] for [employer].   R:     Visit Vitals  BP (!) 155/79 (BP 1 Location: Left upper arm, BP Patient Position: Sitting) Comment (BP Patient Position): after walking   Pulse 68   Temp 98.9 °F (37.2 °C)   Resp 14   Ht 5' 8\" (1.727 m)   Wt 169 lb 8 oz (76.9 kg)   SpO2 93%   BMI 25.77 kg/m²     Temp (24hrs), Av.5 °F (36.9 °C), Min:98 °F (36.7 °C), Max:98.9 °F (37.2 °C)      Last 24hr Input/Output: 920 in. Intake/Output Summary (Last 24 hours) at 2022 1035  Last data filed at 2022 8315  Gross per 24 hour   Intake 620 ml   Output 600 ml   Net 20 ml        EKG/Rhythm: SR 60's    Oxygen: room air. Admission Weight: Last Weight   Weight: 167 lb 1.7 oz (75.8 kg) Weight: 169 lb 8 oz (76.9 kg)       EXAM:  General: WDWN man in NAD. Sitter at bedside. Lungs:   Clear to auscultation bilaterally. On room air. Unlabored. Incision:  No erythema, drainage or swelling. Heart:  Regular rate and rhythm. 4/6 SHREE across precordium. Abdomen:   Soft, non-tender. Bowel sounds normal. No masses,  No organomegaly. BM last night. Poor appetite. Extremities:  No edema. PPP   Neurologic:  Gross motor and sensory apparatus intact. Yesterday he nearly fell when ambulating with staff. Today he was steady and supported his weight w/o problem. He is oriented to name. He is confused regarding situation. No focal deficit. Activity: ambulate with supervision. Diet:  Regular cardiac.      Lab Data Reviewed:   Recent Labs     22  0327   WBC 9.3   HGB 10.6*   HCT 32.7*      *   K 4.2   BUN 24*   CREA 1.23   GLU 96 Assessment:     Active Problems:    CAD (coronary artery disease) (3/29/2022)      S/P CABG x 3 (3/30/2022)      Overview: On pump CORONARY ARTERY BYPASS GRAFT (CABG) X3, LIMA to LAD, RSVH to PDA,       RSVG to Ramus      R GSV EVH                   Plan/Recommendations/Medical Decision Makin yo man with CAD, STEMI, hypothyroidism, HTN, dementia. Butch Arthur had violent outbursts and forgetfulness prior to surgery.  He is POD # 9   and has been having agitation and violent outbursts postop as well.      1. CAD/STEMI:    On asa, statin, bb, ACE I.     2.  HTN:  Lisinopril 2.5 mg.  BP controlled. 3. Hypothyroid:  synthroid  4. Acute systolic CHF: LV EF 98% on LHC. EF improved by LAWSON in OR to 45%-50%. Repeat TTE:  EF 35. Mod AS.     5. Chronic dementia/acute delirium:  Per reports from hospital staff, pt was combative and confused the day prior to surgery.    Intermittent Haldol. Seroquel bid. PM dose of Seroquel was increased to 100 mg. Daytime dose 25 mg.  He was combative again last night. An MRI of the brain in  showed mild chronic microvascular ischemic disease with no acute infarction. The study was done for fever and confusion. 6. Renal insufficiency:  Creatinine 1.23, baseline 1.3. Adequate urine output  7. Discharge planning: his children cannot be with him . The son has requested transfer to [facility]. Case management has submitted request to facility. Possible discharge Monday depending on bed availiblility. Insurance authorization pending. 8. Yesterday afternoon the patient's daughter requested to meet. I spoke to her and she is convinced that her father was not like this before surgery. We have documentation that he was confused and combative the day prior to surgery. He had an MI at that time. His mental changes are not a result of surgery. Today I called her back to relay the information from the morning rounds with Dr. Wilver Mathews.   I relayed the results of the MRI in 2009, microvascular ischemic disease. I explained that we will check a head CT today to look for an acute event but his symptoms are consistent with ischemic disease. If this is temporary, it should resolve over the next 6 months. She was grasping for a definitive answer, mentioning that her father had Lyme disease in the past and questioned if that was a factor. She was not around him in 2009 and can't speak to the confusion he had then. She was told to call for any more questions.        Signed By: Pricila Patient, NP

## 2022-04-08 NOTE — PROGRESS NOTES
1100 Significant drop in SBP from 137 to SBP 91. Sondra Ndiaye, ELI informed. Will continue to monitor. 1215 Sitter no longer at bedside. Will continue to monitor. 1600 Patient walked a total of 4 laps today.

## 2022-04-09 PROCEDURE — 65660000001 HC RM ICU INTERMED STEPDOWN

## 2022-04-09 PROCEDURE — 74011250637 HC RX REV CODE- 250/637: Performed by: NURSE PRACTITIONER

## 2022-04-09 PROCEDURE — 74011250636 HC RX REV CODE- 250/636: Performed by: NURSE PRACTITIONER

## 2022-04-09 PROCEDURE — 74011000250 HC RX REV CODE- 250: Performed by: NURSE PRACTITIONER

## 2022-04-09 PROCEDURE — 74011250637 HC RX REV CODE- 250/637: Performed by: PHYSICIAN ASSISTANT

## 2022-04-09 PROCEDURE — 74011250637 HC RX REV CODE- 250/637: Performed by: THORACIC SURGERY (CARDIOTHORACIC VASCULAR SURGERY)

## 2022-04-09 PROCEDURE — APPSS45 APP SPLIT SHARED TIME 31-45 MINUTES: Performed by: NURSE PRACTITIONER

## 2022-04-09 RX ORDER — SPIRONOLACTONE 25 MG/1
12.5 TABLET ORAL DAILY
Status: DISCONTINUED | OUTPATIENT
Start: 2022-04-09 | End: 2022-04-20 | Stop reason: HOSPADM

## 2022-04-09 RX ADMIN — HALOPERIDOL LACTATE 3 MG: 5 INJECTION, SOLUTION INTRAMUSCULAR at 12:56

## 2022-04-09 RX ADMIN — AMIODARONE HYDROCHLORIDE 400 MG: 200 TABLET ORAL at 20:40

## 2022-04-09 RX ADMIN — POTASSIUM CHLORIDE 20 MEQ: 750 TABLET, EXTENDED RELEASE ORAL at 09:58

## 2022-04-09 RX ADMIN — ENOXAPARIN SODIUM 40 MG: 100 INJECTION SUBCUTANEOUS at 09:59

## 2022-04-09 RX ADMIN — SENNOSIDES AND DOCUSATE SODIUM 1 TABLET: 50; 8.6 TABLET ORAL at 09:58

## 2022-04-09 RX ADMIN — LISINOPRIL 10 MG: 10 TABLET ORAL at 09:58

## 2022-04-09 RX ADMIN — CHLORHEXIDINE GLUCONATE 10 ML: 1.2 RINSE ORAL at 09:59

## 2022-04-09 RX ADMIN — SODIUM CHLORIDE, PRESERVATIVE FREE 10 ML: 5 INJECTION INTRAVENOUS at 06:55

## 2022-04-09 RX ADMIN — AMIODARONE HYDROCHLORIDE 400 MG: 200 TABLET ORAL at 09:58

## 2022-04-09 RX ADMIN — SODIUM CHLORIDE, PRESERVATIVE FREE 10 ML: 5 INJECTION INTRAVENOUS at 20:41

## 2022-04-09 RX ADMIN — Medication 400 MG: at 18:15

## 2022-04-09 RX ADMIN — CHLORHEXIDINE GLUCONATE 10 ML: 1.2 RINSE ORAL at 20:41

## 2022-04-09 RX ADMIN — QUETIAPINE FUMARATE 100 MG: 100 TABLET ORAL at 20:39

## 2022-04-09 RX ADMIN — SPIRONOLACTONE 12.5 MG: 25 TABLET ORAL at 14:44

## 2022-04-09 RX ADMIN — ATORVASTATIN CALCIUM 40 MG: 40 TABLET, FILM COATED ORAL at 20:40

## 2022-04-09 RX ADMIN — Medication 400 MG: at 09:58

## 2022-04-09 RX ADMIN — LEVOTHYROXINE SODIUM 75 MCG: 0.07 TABLET ORAL at 06:55

## 2022-04-09 RX ADMIN — SODIUM CHLORIDE, PRESERVATIVE FREE 10 ML: 5 INJECTION INTRAVENOUS at 14:45

## 2022-04-09 RX ADMIN — ASPIRIN 81 MG CHEWABLE TABLET 81 MG: 81 TABLET CHEWABLE at 09:58

## 2022-04-09 RX ADMIN — POLYETHYLENE GLYCOL 3350 17 G: 17 POWDER, FOR SOLUTION ORAL at 09:59

## 2022-04-09 RX ADMIN — METOPROLOL TARTRATE 50 MG: 50 TABLET, FILM COATED ORAL at 09:58

## 2022-04-09 RX ADMIN — QUETIAPINE FUMARATE 25 MG: 25 TABLET ORAL at 09:58

## 2022-04-09 RX ADMIN — FAMOTIDINE 20 MG: 20 TABLET, FILM COATED ORAL at 06:55

## 2022-04-09 RX ADMIN — METOPROLOL TARTRATE 50 MG: 50 TABLET, FILM COATED ORAL at 20:40

## 2022-04-09 RX ADMIN — SENNOSIDES AND DOCUSATE SODIUM 1 TABLET: 50; 8.6 TABLET ORAL at 18:15

## 2022-04-09 RX ADMIN — FUROSEMIDE 40 MG: 40 TABLET ORAL at 09:58

## 2022-04-09 RX ADMIN — TAMSULOSIN HYDROCHLORIDE 0.4 MG: 0.4 CAPSULE ORAL at 09:58

## 2022-04-09 NOTE — PROGRESS NOTES
1930: Bedside and Verbal shift change report given to OSKAR Carbajal (oncoming nurse) by OSKAR Dela Cruz (offgoing nurse). Report included the following information SBAR, Kardex, Procedure Summary, Intake/Output, MAR, Recent Results and Cardiac Rhythm SR. Do Not Disturb was not placed, but OSKAR Dela Cruz spoke with Shweta Alvarez NP about this earlier during her shift.  Will put sign on door to not disturb patient from 12 am - 5 am.

## 2022-04-09 NOTE — PROGRESS NOTES
Bedside shift change report given to Chelsey Alanis (oncoming nurse) by Liudmila Prabhakar RN (offgoing nurse). Report included the following information SBAR, Kardex, Intake/Output, MAR and Recent Results. Patient was very drowsy; PO medications held due to lethargy. No complaints of pain patient states feeling 'not well'. Zofran given for complaints of nausea. Got patient up to bathroom with assistance and use of a gait belt.

## 2022-04-09 NOTE — PROGRESS NOTES
Problem: Pressure Injury - Risk of  Goal: *Prevention of pressure injury  Description: Document Asim Scale and appropriate interventions in the flowsheet. Outcome: Progressing Towards Goal  Note: Pressure Injury Interventions:  Sensory Interventions: Keep linens dry and wrinkle-free    Moisture Interventions: Minimize layers    Activity Interventions: PT/OT evaluation    Mobility Interventions: PT/OT evaluation,Turn and reposition approx. every two hours(pillow and wedges)    Nutrition Interventions: Document food/fluid/supplement intake    Friction and Shear Interventions: Minimize layers                Problem: Falls - Risk of  Goal: *Absence of Falls  Description: Document [facility] Fall Risk and appropriate interventions in the flowsheet.   Outcome: Progressing Towards Goal  Note: Fall Risk Interventions:  Mobility Interventions: Patient to call before getting OOB,Bed/chair exit alarm    Mentation Interventions: Bed/chair exit alarm    Medication Interventions: Bed/chair exit alarm,Patient to call before getting OOB    Elimination Interventions: Bed/chair exit alarm    History of Falls Interventions: Bed/chair exit alarm         Problem: AMI: Day 5  Goal: Activity/Safety  Outcome: Progressing Towards Goal  Goal: Nutrition/Diet  Outcome: Progressing Towards Goal  Goal: Medications  Outcome: Progressing Towards Goal  Goal: Psychosocial  Outcome: Progressing Towards Goal

## 2022-04-09 NOTE — PROGRESS NOTES
CSS FLOOR Progress Note    Admit Date: 3/29/2022  POD: 10 Days Post-Op      Procedure:  Procedure(s):  CORONARY ARTERY BYPASS GRAFT (CABG) X3, LIMA. RSVH VIA EVH. ECC. LAWSON AND EPI AORTIC US BY DR General Lomeli    Subjective:   Pt seen with Dr. Shawnee Sosa. Tmax 99.2, room air. Resting in bed. No complaints. Objective:     Visit Vitals  /61 (BP 1 Location: Left upper arm, BP Patient Position: Supine)   Pulse (!) 59   Temp 99.2 °F (37.3 °C)   Resp 20   Ht 5' 8\" (1.727 m)   Wt 168 lb 1.6 oz (76.2 kg)   SpO2 (!) 89%   BMI 25.56 kg/m²     Temp (24hrs), Av.6 °F (37 °C), Min:98.3 °F (36.8 °C), Max:99.2 °F (37.3 °C)      Last 24hr Input/Output:    Intake/Output Summary (Last 24 hours) at 2022 1233  Last data filed at 2022 0400  Gross per 24 hour   Intake 710 ml   Output 1050 ml   Net -340 ml        EKG/Rhythm: SR in the 60s    Oxygen: Room air    CXR:  CXR Results  (Last 48 hours)    None            Admission Weight: Last Weight   Weight: 167 lb 1.7 oz (75.8 kg) Weight: 168 lb 1.6 oz (76.2 kg)       EXAM:  General: No acute distress. Lungs:   Clear to auscultation bilaterally. Incision:  No erythema, drainage or swelling. Heart:  Regular rate and rhythm, S1, S2 normal, no murmur, click, rub or gallop. Abdomen:   Soft, non-tender. Bowel sounds normal. No masses,  No organomegaly. Extremities:  No edema. PPP   Neurologic:  Gross motor and sensory apparatus intact. Activity: Up with assist        Lab Data Reviewed:   Recent Labs     22  0327   WBC 9.3   HGB 10.6*   HCT 32.7*      *   K 4.2   BUN 24*   CREA 1.23   GLU 96         Assessment:     Active Problems:    CAD (coronary artery disease) (3/29/2022)      S/P CABG x 3 (3/30/2022)      Overview: On pump CORONARY ARTERY BYPASS GRAFT (CABG) X3, LIMA to LAD, RSVH to PDA,       RSVG to Ramus      R GSV EVH                   Plan/Recommendations/Medical Decision Makin. CAD/STEMI:    On asa, statin,ACEi, BB    2.  HTN: Lisinopril, BB, Lasix.      3. Hypothyroid:  synthroid     4. Acute combined systolic and diastolic HF: LV EF 51% on LHC. EF improved by LAWSON in OR to 45%-50%. Repeat TTE prior to discharge showing LVEF of 35-40% with Grade 2 diastolic dysfunction. GDMT as able and BP management as above. (BB, ACEi, Lasix) Add Spironolactone-watch Na, K.       5. Chronic dementia/acute delirium:  Per reports from pt's children, some agitation/outbursts noted at times prior to surgery. Head CT showing chronic microvascular changes. Has been much improved. Continue Seroquel bid.      6. Renal insufficiency: resolved. Creatinine baseline 1.3. Continue daily lasix     7. Urinary retention: Resolved. Continue Flomax.      8. Prolonged QTc:   Continue to monitor.  No need to adjust medications.      9.   Thrombocytopenia: resolved. 11.  DVT prophylaxis: enoxaparin.  Ambulate. OOB to chair.       12.  GI prophylaxis: famotidine.      Dispo: PT/OT/Cardiac Rehab. Case Management for discharge planning.  Plan for SNF on Monday if bed availability        Signed By: Miya Hendrix NP

## 2022-04-10 PROCEDURE — 74011250637 HC RX REV CODE- 250/637: Performed by: THORACIC SURGERY (CARDIOTHORACIC VASCULAR SURGERY)

## 2022-04-10 PROCEDURE — 97116 GAIT TRAINING THERAPY: CPT

## 2022-04-10 PROCEDURE — 97535 SELF CARE MNGMENT TRAINING: CPT

## 2022-04-10 PROCEDURE — 74011250637 HC RX REV CODE- 250/637: Performed by: NURSE PRACTITIONER

## 2022-04-10 PROCEDURE — 74011000250 HC RX REV CODE- 250: Performed by: NURSE PRACTITIONER

## 2022-04-10 PROCEDURE — APPSS45 APP SPLIT SHARED TIME 31-45 MINUTES: Performed by: NURSE PRACTITIONER

## 2022-04-10 PROCEDURE — 97110 THERAPEUTIC EXERCISES: CPT

## 2022-04-10 PROCEDURE — 65660000001 HC RM ICU INTERMED STEPDOWN

## 2022-04-10 PROCEDURE — 74011250637 HC RX REV CODE- 250/637: Performed by: PHYSICIAN ASSISTANT

## 2022-04-10 PROCEDURE — 74011250636 HC RX REV CODE- 250/636: Performed by: NURSE PRACTITIONER

## 2022-04-10 RX ADMIN — TAMSULOSIN HYDROCHLORIDE 0.4 MG: 0.4 CAPSULE ORAL at 10:32

## 2022-04-10 RX ADMIN — POLYETHYLENE GLYCOL 3350 17 G: 17 POWDER, FOR SOLUTION ORAL at 10:35

## 2022-04-10 RX ADMIN — ENOXAPARIN SODIUM 40 MG: 100 INJECTION SUBCUTANEOUS at 10:30

## 2022-04-10 RX ADMIN — Medication 400 MG: at 10:32

## 2022-04-10 RX ADMIN — FUROSEMIDE 40 MG: 40 TABLET ORAL at 10:30

## 2022-04-10 RX ADMIN — HALOPERIDOL LACTATE 3 MG: 5 INJECTION, SOLUTION INTRAMUSCULAR at 02:53

## 2022-04-10 RX ADMIN — QUETIAPINE FUMARATE 100 MG: 100 TABLET ORAL at 21:40

## 2022-04-10 RX ADMIN — METOPROLOL TARTRATE 50 MG: 50 TABLET, FILM COATED ORAL at 10:31

## 2022-04-10 RX ADMIN — LEVOTHYROXINE SODIUM 75 MCG: 0.07 TABLET ORAL at 07:14

## 2022-04-10 RX ADMIN — AMIODARONE HYDROCHLORIDE 400 MG: 200 TABLET ORAL at 10:34

## 2022-04-10 RX ADMIN — POTASSIUM CHLORIDE 20 MEQ: 750 TABLET, EXTENDED RELEASE ORAL at 10:31

## 2022-04-10 RX ADMIN — FAMOTIDINE 20 MG: 20 TABLET, FILM COATED ORAL at 07:14

## 2022-04-10 RX ADMIN — LISINOPRIL 10 MG: 10 TABLET ORAL at 10:33

## 2022-04-10 RX ADMIN — ATORVASTATIN CALCIUM 40 MG: 40 TABLET, FILM COATED ORAL at 21:40

## 2022-04-10 RX ADMIN — METOPROLOL TARTRATE 50 MG: 50 TABLET, FILM COATED ORAL at 21:41

## 2022-04-10 RX ADMIN — ASPIRIN 81 MG CHEWABLE TABLET 81 MG: 81 TABLET CHEWABLE at 10:34

## 2022-04-10 RX ADMIN — CHLORHEXIDINE GLUCONATE 10 ML: 1.2 RINSE ORAL at 09:00

## 2022-04-10 RX ADMIN — AMIODARONE HYDROCHLORIDE 400 MG: 200 TABLET ORAL at 21:40

## 2022-04-10 RX ADMIN — SODIUM CHLORIDE, PRESERVATIVE FREE 10 ML: 5 INJECTION INTRAVENOUS at 07:16

## 2022-04-10 RX ADMIN — Medication 400 MG: at 17:25

## 2022-04-10 RX ADMIN — SPIRONOLACTONE 12.5 MG: 25 TABLET ORAL at 10:32

## 2022-04-10 RX ADMIN — HALOPERIDOL LACTATE 3 MG: 5 INJECTION, SOLUTION INTRAMUSCULAR at 14:21

## 2022-04-10 RX ADMIN — SENNOSIDES AND DOCUSATE SODIUM 1 TABLET: 50; 8.6 TABLET ORAL at 10:32

## 2022-04-10 RX ADMIN — SODIUM CHLORIDE, PRESERVATIVE FREE 10 ML: 5 INJECTION INTRAVENOUS at 21:43

## 2022-04-10 RX ADMIN — QUETIAPINE FUMARATE 25 MG: 25 TABLET ORAL at 10:32

## 2022-04-10 NOTE — PROGRESS NOTES
Physical therapy: Defer    Chart reviewed and attempted to see patient for PT treatment. RN had just returned pt to bed after sitting up in the chair for the morning and requested to defer therapy until later. Will check back as able and appropriate.       Collette Lux, PT, DPT

## 2022-04-10 NOTE — PROGRESS NOTES
Problem: Self Care Deficits Care Plan (Adult)  Goal: *Acute Goals and Plan of Care (Insert Text)  Description: FUNCTIONAL STATUS PRIOR TO ADMISSION: Patient was independent and active without use of DME. 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 Patient with mild cognitive impairments and unclear if he was still doing these activities. 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 Recently patient's wife passed. Occupational Therapy Goals: goals modified below 4/7/22  Initiated 3/31/2022  1. Patient will perform ADLs standing 5 mins without fatigue or LOB with supervision/set-up within 5 day(s). (grooming at sink with least restrictive DME 4/7/22 with min A)  2. Patient will perform lower body ADLs with supervision/set-up within 5 day(s). (min A 4/7/22)  3. Patient will perform gathering ADL items high and low 2/2 with supervision/set-up within 5 day(s). (discontinue, not appropriate 4/7/22)  4. Patient will perform toilet transfers with supervision/set-up within 5 day(s). (min A 4/7/22)  5. Patient will perform all aspects of toileting with supervision/set-up within 5 day(s). (min A 4/7/22)  6. Patient will participate in cardiac/sternal upper extremity therapeutic exercise/activities to increase independence with ADLs with supervision/set-up for 5 minutes within 5 day(s). (continue 4/7/22)      Outcome: Progressing Towards Goal   OCCUPATIONAL THERAPY TREATMENT  Patient: Ty Wall (18 y.o. male)  Date: 4/10/2022  Diagnosis: CAD (coronary artery disease) [I25.10] <principal problem not specified>  Procedure(s) (LRB):  CORONARY ARTERY BYPASS GRAFT (CABG) X3, LIMA. RSVH VIA EVH. ECC. LAWSON AND EPI AORTIC US BY DR Jamison Nice (N/A) 11 Days Post-Op  Precautions: Fall,Sternal,Other (comment) (move in the tube)  Chart, occupational therapy assessment, plan of care, and goals were reviewed.     ASSESSMENT  Patient continues with skilled OT services and is progressing towards goals. Patient received semi supine in bed and initially drowsy but agreeable to participate with therapy. Patient had difficulty maintain alertness during sup -> sit transfer requiring increased assist to complete. Patient ambulated to bathroom and voided standing at toilet, completed grooming at sink ~5 minutes to wash hands and brush teeth with cueing to find toothbrush/paste. Patient returned to recliner and completed post op cardiac exercises with verbal and visual cueing in sitting and standing, see grid below for details. Initially upon completed of exercises, patient drowsy sitting in chair but breakfast placed in front of him and improvement in alertness with feeding. Patient left with chair alarm on and nursing updated. Overall patient did fair with session but remains limited by confusion, impaired safety awareness, frequent cueing to maintain move in tube precautions. Patient would benefit from skilled OT services during admission to improve independence with self care and functional mobility/transfers. Recommend discharge to SNF at this time. If discharged home, would need 24/7 supervision for safety. Patient is not verbalizing and is not demonstrating understanding of mindful-based movements (\"move in the tube\") principles of keeping UEs proximal to ribcage to prevent lateral pull on the sternum during load-bearing activities with visual and verbal cues required for compliance. Current Level of Function Impacting Discharge (ADLs): max bed mobility, otherwise SBA to CGA for mobility/transfers, setup lower extremity dressing edge of bed, setup feeding, min A toileting, CGA grooming at sink    Other factors to consider for discharge: baseline dementia, impaired safety awareness, family unable to provide 24/7 supervision at discharge         PLAN :  Patient continues to benefit from skilled intervention to address the above impairments. Continue treatment per established plan of care to address goals. Recommend with staff: up to chair 3x/day with RW and gait belt, chair alarm on, functional mobility to and from bathroom for toileting    Recommend next OT session: bathing, and increase time in standing for grooming at sink    Recommendation for discharge: (in order for the patient to meet his/her long term goals)  Therapy up to 5 days/week in SNF setting    This discharge recommendation:  Has been made in collaboration with the attending provider and/or case management    IF patient discharges home will need the following DME: TBD pending progress       SUBJECTIVE:   Patient stated I'd like to use the bathroom.     OBJECTIVE DATA SUMMARY:   Cognitive/Behavioral Status:  Neurologic State: Confused;Drowsy  Orientation Level: Oriented to place; Disoriented to place; Disoriented to situation;Disoriented to time  Cognition: Decreased attention/concentration;Poor safety awareness             Functional Mobility and Transfers for ADLs:  Bed Mobility:  Supine to Sit: Maximum assistance (due to drowsiness)  Sit to Supine: Other (comment) (left sitting in recliner)  Scooting: Stand-by assistance    Transfers:  Sit to Stand: Contact guard assistance  Functional Transfers  Bathroom Mobility: Contact guard assistance  Bed to Chair: Contact guard assistance    Balance:  Sitting: Intact  Standing: Impaired; With support  Standing - Static: Fair;Occasional  Standing - Dynamic : Fair;Constant support    ADL Intervention:  Feeding  Feeding Assistance: Set-up  Container Management: Minimum assistance  Food to Mouth: Independent  Drink to Mouth:  Independent    Grooming  Position Performed: Standing  Washing Hands: Contact guard assistance  Brushing Teeth: Contact guard assistance                   Lower Body Dressing Assistance  Socks: Set-up  Leg Crossed Method Used: Yes  Position Performed: Seated edge of bed  Cues: Verbal cues provided;Visual cues provided    Toileting  Bladder Hygiene: Contact guard assistance  Clothing Management: Minimum assistance  Cues: Verbal cues provided         Patient instructed and educated on mindful movement principles based on Move in The Tube concept to include maintaining bilateral elbows close to rib cage when performing any load-bearing activity such as getting in/out of bed, pushing up from a chair, opening a door, or lifting a box. Patient was given a handout with diagrams of each correct/incorrect method of performing each of the above tasks. Patient instructed on the ability to utilize upper extremities outside the tube when doing any non-load bearing activity such as washing hair/body, brushing teeth, retrieving clothing items, or scratching your back. Patient encouraged to also perform upper extremity exercises \"outside of the tube\" to prevent scar tissue formation around sternal incision site. Therapeutic Exercises:   Patient instructed on the benefits and demonstrated cardiac exercises while sitting and standing with Contact guard assistance in standing and verbal/visual cues. Instructed and indicated understanding on how to progress reps, sets against gravity, pacing through progressive muscle strengthening standing based on surgeon clearance for more weight in prep for basic and instrumental ADLs. Instruction on the use of household items in place of weights as needed.     CARDIAC   EXERCISE    Sets    Reps    Active  Active Assist    Passive  Self ROM    Comments    Shoulder flexion  1  5   [x]                            []                             []                             []                             In sitting   Shoulder abduction  1  5  [x]                             []                             []                             []                             In standing with CGA   Scapular elevation  1  5  [x]                             []                              [] []                             In sitting   Scapular retraction  1  5  [x]                             []                             []                             []                             In sitting   Trunk rotation  1  5  [x]                             []                             []                             []                             In sitting   Trunk sidebending  1  5  [x]                             []                              []                             []                                   In standing with increased cues       Pain:  None reported    Activity Tolerance:   Fair and requires rest breaks    After treatment patient left in no apparent distress:   Sitting in chair, Call bell within reach and Bed / chair alarm activated    COMMUNICATION/COLLABORATION:   The patients plan of care was discussed with: Physical therapist and Registered nurse.      ALFONSO Patino/L  Time Calculation: 27 mins

## 2022-04-10 NOTE — PROGRESS NOTES
Problem: Pain  Goal: *Control of Pain  Outcome: Progressing Towards Goal  Goal: *PALLIATIVE CARE:  Alleviation of Pain  Outcome: Progressing Towards Goal     Problem: Patient Education: Go to Patient Education Activity  Goal: Patient/Family Education  Outcome: Progressing Towards Goal     Problem: Pressure Injury - Risk of  Goal: *Prevention of pressure injury  Description: Document Asim Scale and appropriate interventions in the flowsheet. Outcome: Progressing Towards Goal  Note: Pressure Injury Interventions:  Sensory Interventions: Keep linens dry and wrinkle-free,Maintain/enhance activity level,Minimize linen layers,Monitor skin under medical devices,Turn and reposition approx. every two hours (pillows and wedges if needed)    Moisture Interventions: Minimize layers,Check for incontinence Q2 hours and as needed    Activity Interventions: PT/OT evaluation,Pressure redistribution bed/mattress(bed type),Assess need for specialty bed    Mobility Interventions: PT/OT evaluation,Turn and reposition approx. every two hours(pillow and wedges),Float heels    Nutrition Interventions: Document food/fluid/supplement intake    Friction and Shear Interventions: Minimize layers                Problem: Patient Education: Go to Patient Education Activity  Goal: Patient/Family Education  Outcome: Progressing Towards Goal     Problem: Falls - Risk of  Goal: *Absence of Falls  Description: Document Maricruz Fall Risk and appropriate interventions in the flowsheet.   Outcome: Progressing Towards Goal  Note: Fall Risk Interventions:  Mobility Interventions: Patient to call before getting OOB    Mentation Interventions: Bed/chair exit alarm    Medication Interventions: Bed/chair exit alarm    Elimination Interventions: Bed/chair exit alarm    History of Falls Interventions: Bed/chair exit alarm

## 2022-04-10 NOTE — PROGRESS NOTES
Verbal shift change report given to (oncoming nurse) by [employer], RN (offgoing nurse). Report included the following information SBAR, Intake/Output, Recent Results and Cardiac Rhythm NSR.

## 2022-04-10 NOTE — PROGRESS NOTES
[facility] FLOOR Progress Note    Admit Date: 3/29/2022  POD: 11 Days Post-Op      Procedure:  Procedure(s):  CORONARY ARTERY BYPASS GRAFT (CABG) X3, LIMA. RSVH VIA EVH. ECC. LAWSON AND EPI AORTIC US BY DR Paulette Mendoza    Subjective:   Pt seen with Dr. Eboni Cline. Tmax 99.1, room air. Resting in bed. No complaints. Objective:     Visit Vitals  BP (!) 146/69 (BP 1 Location: Right upper arm, BP Patient Position: At rest)   Pulse 74   Temp 99.1 °F (37.3 °C)   Resp 18   Ht 5' 8\" (1.727 m)   Wt 165 lb 14.4 oz (75.3 kg)   SpO2 92%   BMI 25.23 kg/m²     Temp (24hrs), Av.5 °F (36.9 °C), Min:97.9 °F (36.6 °C), Max:99.1 °F (37.3 °C)      Last 24hr Input/Output:    Intake/Output Summary (Last 24 hours) at 4/10/2022 1224  Last data filed at 4/10/2022 0407  Gross per 24 hour   Intake --   Output 400 ml   Net -400 ml        EKG/Rhythm: SR in the 70s    Oxygen: Room air    CXR:  CXR Results  (Last 48 hours)    None            Admission Weight: Last Weight   Weight: 167 lb 1.7 oz (75.8 kg) Weight: 165 lb 14.4 oz (75.3 kg)       EXAM:  General: No acute distress. Lungs:   Clear to auscultation bilaterally. Incision:  No erythema, drainage or swelling. Heart:  Regular rate and rhythm, S1, S2 normal, no murmur, click, rub or gallop. Abdomen:   Soft, non-tender. Bowel sounds normal. No masses,  No organomegaly. Extremities:  No edema. PPP   Neurologic:  Gross motor and sensory apparatus intact. Activity: Up with assist        Lab Data Reviewed:   No results for input(s): WBC, HGB, HCT, PLT, NA, K, BUN, CREA, GLU, GLUCPOC, INR, HGBEXT, HCTEXT, PLTEXT, INREXT, HGBEXT, HCTEXT, PLTEXT, INREXT in the last 72 hours. No lab exists for component: GLPOC      Assessment:     Active Problems:    CAD (coronary artery disease) (3/29/2022)      S/P CABG x 3 (3/30/2022)      Overview:  On pump CORONARY ARTERY BYPASS GRAFT (CABG) X3, LIMA to LAD, RSVH to PDA,       RSVG to Ramus      R GSV [facility]                   Plan/Recommendations/Medical Decision Makin. CAD/STEMI:    On asa, statin,ACEi, BB    2.  HTN: Lisinopril, BB, Lasix.      3. Hypothyroid:  synthroid     4. Acute combined systolic and diastolic HF: LV EF 57% on LHC. EF improved by LAWSON in OR to 45%-50%. Repeat TTE prior to discharge showing LVEF of 35-40% with Grade 2 diastolic dysfunction. GDMT as able and BP management as above. (BB, ACEi, Lasix) Add Spironolactone-watch Na, K.       5. Chronic dementia/acute delirium:  Per reports from pt's children, some agitation/outbursts noted at times prior to surgery. Head CT showing chronic microvascular changes. Has been much improved. Continue Seroquel bid.      6. Renal insufficiency: resolved. Creatinine baseline 1.3. Continue daily lasix     7. Urinary retention: Resolved. Continue Flomax.      8. Prolonged QTc:   Continue to monitor.  No need to adjust medications.      9.   Thrombocytopenia: resolved. 11.  DVT prophylaxis: enoxaparin.  Ambulate. OOB to chair.       12.  GI prophylaxis: famotidine.      Dispo: PT/OT/Cardiac Rehab. Case Management for discharge planning.  Plan for SNF on Monday if bed availability        Signed By: Anders Matute, NP

## 2022-04-11 LAB
ALBUMIN SERPL-MCNC: 2.4 G/DL (ref 3.5–5)
ALBUMIN/GLOB SERPL: 0.9 {RATIO} (ref 1.1–2.2)
ALP SERPL-CCNC: 82 U/L (ref 45–117)
ALT SERPL-CCNC: 28 U/L (ref 12–78)
ANION GAP SERPL CALC-SCNC: 8 MMOL/L (ref 5–15)
AST SERPL-CCNC: 18 U/L (ref 15–37)
BASOPHILS # BLD: 0.1 K/UL (ref 0–0.1)
BASOPHILS NFR BLD: 1 % (ref 0–1)
BILIRUB SERPL-MCNC: 0.4 MG/DL (ref 0.2–1)
BUN SERPL-MCNC: 24 MG/DL (ref 6–20)
BUN/CREAT SERPL: 16 (ref 12–20)
CALCIUM SERPL-MCNC: 8 MG/DL (ref 8.5–10.1)
CHLORIDE SERPL-SCNC: 105 MMOL/L (ref 97–108)
CO2 SERPL-SCNC: 24 MMOL/L (ref 21–32)
CREAT SERPL-MCNC: 1.53 MG/DL (ref 0.7–1.3)
DIFFERENTIAL METHOD BLD: ABNORMAL
EOSINOPHIL # BLD: 0.3 K/UL (ref 0–0.4)
EOSINOPHIL NFR BLD: 3 % (ref 0–7)
ERYTHROCYTE [DISTWIDTH] IN BLOOD BY AUTOMATED COUNT: 13.8 % (ref 11.5–14.5)
GLOBULIN SER CALC-MCNC: 2.6 G/DL (ref 2–4)
GLUCOSE SERPL-MCNC: 97 MG/DL (ref 65–100)
HCT VFR BLD AUTO: 30.4 % (ref 36.6–50.3)
HGB BLD-MCNC: 10 G/DL (ref 12.1–17)
IMM GRANULOCYTES # BLD AUTO: 0.1 K/UL (ref 0–0.04)
IMM GRANULOCYTES NFR BLD AUTO: 1 % (ref 0–0.5)
LYMPHOCYTES # BLD: 1.3 K/UL (ref 0.8–3.5)
LYMPHOCYTES NFR BLD: 15 % (ref 12–49)
MCH RBC QN AUTO: 30.3 PG (ref 26–34)
MCHC RBC AUTO-ENTMCNC: 32.9 G/DL (ref 30–36.5)
MCV RBC AUTO: 92.1 FL (ref 80–99)
MONOCYTES # BLD: 1.1 K/UL (ref 0–1)
MONOCYTES NFR BLD: 12 % (ref 5–13)
NEUTS SEG # BLD: 6.2 K/UL (ref 1.8–8)
NEUTS SEG NFR BLD: 68 % (ref 32–75)
NRBC # BLD: 0 K/UL (ref 0–0.01)
NRBC BLD-RTO: 0 PER 100 WBC
PLATELET # BLD AUTO: 329 K/UL (ref 150–400)
PMV BLD AUTO: 11.4 FL (ref 8.9–12.9)
POTASSIUM SERPL-SCNC: 4.7 MMOL/L (ref 3.5–5.1)
PROT SERPL-MCNC: 5 G/DL (ref 6.4–8.2)
RBC # BLD AUTO: 3.3 M/UL (ref 4.1–5.7)
SODIUM SERPL-SCNC: 137 MMOL/L (ref 136–145)
WBC # BLD AUTO: 9 K/UL (ref 4.1–11.1)

## 2022-04-11 PROCEDURE — 97116 GAIT TRAINING THERAPY: CPT

## 2022-04-11 PROCEDURE — 74011250636 HC RX REV CODE- 250/636: Performed by: NURSE PRACTITIONER

## 2022-04-11 PROCEDURE — 36415 COLL VENOUS BLD VENIPUNCTURE: CPT

## 2022-04-11 PROCEDURE — 74011250637 HC RX REV CODE- 250/637: Performed by: NURSE PRACTITIONER

## 2022-04-11 PROCEDURE — 65660000001 HC RM ICU INTERMED STEPDOWN

## 2022-04-11 PROCEDURE — 74011250637 HC RX REV CODE- 250/637: Performed by: PHYSICIAN ASSISTANT

## 2022-04-11 PROCEDURE — 74011250637 HC RX REV CODE- 250/637: Performed by: THORACIC SURGERY (CARDIOTHORACIC VASCULAR SURGERY)

## 2022-04-11 PROCEDURE — 74011000250 HC RX REV CODE- 250: Performed by: NURSE PRACTITIONER

## 2022-04-11 PROCEDURE — 80053 COMPREHEN METABOLIC PANEL: CPT

## 2022-04-11 PROCEDURE — 85025 COMPLETE CBC W/AUTO DIFF WBC: CPT

## 2022-04-11 PROCEDURE — 97110 THERAPEUTIC EXERCISES: CPT

## 2022-04-11 RX ORDER — QUETIAPINE FUMARATE 25 MG/1
25 TABLET, FILM COATED ORAL DAILY
Qty: 30 TABLET | Refills: 0 | Status: SHIPPED
Start: 2022-04-12 | End: 2022-04-20

## 2022-04-11 RX ORDER — FUROSEMIDE 40 MG/1
40 TABLET ORAL DAILY
Qty: 30 TABLET | Refills: 1 | Status: SHIPPED
Start: 2022-04-11 | End: 2022-04-20

## 2022-04-11 RX ORDER — GUAIFENESIN 100 MG/5ML
81 LIQUID (ML) ORAL DAILY
Qty: 30 TABLET | Refills: 1 | Status: SHIPPED | OUTPATIENT
Start: 2022-04-12 | End: 2022-04-12

## 2022-04-11 RX ORDER — ATORVASTATIN CALCIUM 40 MG/1
40 TABLET, FILM COATED ORAL
Qty: 30 TABLET | Refills: 1 | Status: SHIPPED
Start: 2022-04-11 | End: 2022-04-20

## 2022-04-11 RX ORDER — SPIRONOLACTONE 25 MG/1
12.5 TABLET ORAL DAILY
Qty: 15 TABLET | Refills: 1 | Status: SHIPPED
Start: 2022-04-12 | End: 2022-04-20

## 2022-04-11 RX ORDER — TAMSULOSIN HYDROCHLORIDE 0.4 MG/1
0.4 CAPSULE ORAL DAILY
Qty: 30 CAPSULE | Refills: 1 | Status: SHIPPED
Start: 2022-04-12 | End: 2022-04-20

## 2022-04-11 RX ORDER — ALBUTEROL SULFATE 0.83 MG/ML
2.5 SOLUTION RESPIRATORY (INHALATION)
Qty: 30 NEBULE | Refills: 0 | Status: SHIPPED
Start: 2022-04-11 | End: 2022-04-20

## 2022-04-11 RX ORDER — ACETAMINOPHEN 325 MG/1
650 TABLET ORAL
Qty: 20 TABLET | Refills: 0 | Status: SHIPPED
Start: 2022-04-11 | End: 2022-04-20

## 2022-04-11 RX ORDER — METOPROLOL TARTRATE 50 MG/1
50 TABLET ORAL EVERY 12 HOURS
Qty: 60 TABLET | Refills: 1 | Status: SHIPPED
Start: 2022-04-11 | End: 2022-04-12

## 2022-04-11 RX ORDER — QUETIAPINE FUMARATE 100 MG/1
100 TABLET, FILM COATED ORAL
Qty: 30 TABLET | Refills: 1 | Status: SHIPPED
Start: 2022-04-11 | End: 2022-04-20

## 2022-04-11 RX ORDER — LISINOPRIL 10 MG/1
10 TABLET ORAL DAILY
Qty: 30 TABLET | Refills: 0 | Status: SHIPPED
Start: 2022-04-12 | End: 2022-04-12

## 2022-04-11 RX ADMIN — SENNOSIDES AND DOCUSATE SODIUM 1 TABLET: 50; 8.6 TABLET ORAL at 08:42

## 2022-04-11 RX ADMIN — ASPIRIN 81 MG CHEWABLE TABLET 81 MG: 81 TABLET CHEWABLE at 08:42

## 2022-04-11 RX ADMIN — Medication 400 MG: at 08:42

## 2022-04-11 RX ADMIN — QUETIAPINE FUMARATE 100 MG: 100 TABLET ORAL at 21:41

## 2022-04-11 RX ADMIN — ENOXAPARIN SODIUM 40 MG: 100 INJECTION SUBCUTANEOUS at 08:39

## 2022-04-11 RX ADMIN — SODIUM CHLORIDE, PRESERVATIVE FREE 10 ML: 5 INJECTION INTRAVENOUS at 06:45

## 2022-04-11 RX ADMIN — LEVOTHYROXINE SODIUM 75 MCG: 0.07 TABLET ORAL at 06:45

## 2022-04-11 RX ADMIN — Medication 400 MG: at 17:24

## 2022-04-11 RX ADMIN — LISINOPRIL 10 MG: 10 TABLET ORAL at 08:43

## 2022-04-11 RX ADMIN — SPIRONOLACTONE 12.5 MG: 25 TABLET ORAL at 08:42

## 2022-04-11 RX ADMIN — QUETIAPINE FUMARATE 25 MG: 25 TABLET ORAL at 08:42

## 2022-04-11 RX ADMIN — POTASSIUM CHLORIDE 20 MEQ: 750 TABLET, EXTENDED RELEASE ORAL at 08:42

## 2022-04-11 RX ADMIN — METOPROLOL TARTRATE 50 MG: 50 TABLET, FILM COATED ORAL at 21:41

## 2022-04-11 RX ADMIN — HALOPERIDOL LACTATE 3 MG: 5 INJECTION, SOLUTION INTRAMUSCULAR at 18:23

## 2022-04-11 RX ADMIN — SENNOSIDES AND DOCUSATE SODIUM 1 TABLET: 50; 8.6 TABLET ORAL at 17:24

## 2022-04-11 RX ADMIN — METOPROLOL TARTRATE 50 MG: 50 TABLET, FILM COATED ORAL at 08:42

## 2022-04-11 RX ADMIN — FUROSEMIDE 40 MG: 40 TABLET ORAL at 08:43

## 2022-04-11 RX ADMIN — AMIODARONE HYDROCHLORIDE 400 MG: 200 TABLET ORAL at 08:43

## 2022-04-11 RX ADMIN — FAMOTIDINE 20 MG: 20 TABLET, FILM COATED ORAL at 06:45

## 2022-04-11 RX ADMIN — ATORVASTATIN CALCIUM 40 MG: 40 TABLET, FILM COATED ORAL at 21:41

## 2022-04-11 RX ADMIN — POLYETHYLENE GLYCOL 3350 17 G: 17 POWDER, FOR SOLUTION ORAL at 08:41

## 2022-04-11 RX ADMIN — TAMSULOSIN HYDROCHLORIDE 0.4 MG: 0.4 CAPSULE ORAL at 08:43

## 2022-04-11 RX ADMIN — SODIUM CHLORIDE, PRESERVATIVE FREE 10 ML: 5 INJECTION INTRAVENOUS at 21:41

## 2022-04-11 NOTE — PROGRESS NOTES
[facility] FLOOR Progress Note    Admit Date: 3/29/2022  POD: 12 Days Post-Op      Procedure:  Procedure(s):  CORONARY ARTERY BYPASS GRAFT (CABG) X3, LIMA. RSVH VIA EVH. ECC. LAWSON AND EPI AORTIC US BY DR Lynnwood Babinski    Subjective:   Pt seen with Dr. Julián Sethi  Pt is medically ready for discharge. Insurance authorization is pending for SNF. Objective:     Visit Vitals  BP 95/61   Pulse (!) 56   Temp 98.6 °F (37 °C)   Resp 18   Ht 5' 8\" (1.727 m)   Wt 164 lb 14.5 oz (74.8 kg)   SpO2 95%   BMI 25.07 kg/m²     Temp (24hrs), Av.4 °F (36.9 °C), Min:97.6 °F (36.4 °C), Max:99.1 °F (37.3 °C)      Last 24hr Input/Output:    Intake/Output Summary (Last 24 hours) at 2022 1451  Last data filed at 2022 0318  Gross per 24 hour   Intake 250 ml   Output 300 ml   Net -50 ml        EKG/Rhythm: SR      Oxygen: room air. CXR:      Admission Weight: Last Weight   Weight: 167 lb 1.7 oz (75.8 kg) Weight: 164 lb 14.5 oz (74.8 kg)       EXAM:  General: Confused elderly man in NAD sitting in the chair. He wanted to discuss discharge with his wife (she  3-4 weeks ago). Lungs:   Clear to auscultation bilaterally. Unlabored on ra. Incision:  No erythema, drainage or swelling. Heart:  Regular rate and rhythm. + murmur. 4/6 SHREE across precordium. Abdomen:   Soft, non-tender. Bowel sounds normal. No masses,  No organomegaly. Extremities:  No edema. PPP   Neurologic:  Gross motor and sensory apparatus intact. Confused regarding situation. Mild agitation. The nurse was playing a movie for  Him. No focal deficit. Activity: with supervision, ambulates with steady gait. Diet:  Cardiac. Lab Data Reviewed:   Recent Labs     22  0323   WBC 9.0   HGB 10.0*   HCT 30.4*         K 4.7   BUN 24*   CREA 1.53*   GLU 97         Assessment:     Active Problems:    CAD (coronary artery disease) (3/29/2022)      S/P CABG x 3 (3/30/2022)      Overview:  On pump CORONARY ARTERY BYPASS GRAFT (CABG) X3, LIMA to LAD, RSVH to PDA,       RSVG to Ramus      R GSV EVH            Delirium. Plan/Recommendations/Medical Decision Makin yo man with CAD, STEMI, hypothyroidism, HTN, dementia. Hakeem Quinn had violent outbursts and forgetfulness prior to surgery.  He is POD # 12   and has been having agitation and violent outbursts postop as well. 1. CAD/STEMI:    On asa, statin,ACEi, BB     2.  HTN: Lisinopril, BB, Lasix.      3. Hypothyroid:  synthroid     4. Acute combined systolic and diastolic HF: LV EF 50% on LHC. EF improved by LAWSON in OR to 45%-50%. Repeated TTE prior to discharge showing LVEF of 35-40% with Grade 2 diastolic dysfunction. GDMT as able and BP management as above. (BB, ACEi, Lasix) Add Spironolactone-watch Na, K.       5. Chronic dementia/acute delirium:  Per reports from pt's children, some agitation/outbursts noted at times prior to surgery. Head CT showing chronic microvascular changes. Has been much improved. Continue Seroquel bid. Stopped amiodarone as it can have neurologic side effects. The patient has not had any afib postop.      6. Renal insufficiency: resolved. Creatinine baseline 1.3. Continue daily lasix     7. Urinary retention: Resolved. Continue Flomax.      8. Prolonged QTc:   Continue to monitor.  No need to adjust medications. Amiodarone discontinued.      9.   Thrombocytopenia: resolved.      11.  DVT prophylaxis: enoxaparin.  Ambulate.  OOB to chair.       12.  GI prophylaxis: famotidine. 13. Discharge to SNF once insurance authorization approved. No approval today.    Signed By: Hanna Garcias NP

## 2022-04-11 NOTE — PROGRESS NOTES
Occupational Therapy  4/11/2022    Attempted to see pt for OT. Pt returning back to bed with nursing staff as pt was repeatedly attempting to get out of chair without assistance. Will follow. Continue to recommend SNF rehab.  Sunshine Brown MS, OTR/L

## 2022-04-11 NOTE — PROGRESS NOTES
Transitions of Care Plan   RUR: 15% - moderate   Clinical Update: SNF d/c; awaiting insurance auth   Consults: Therapy  · Baseline: independent without DME; resides on same property as son, Martínez Lima  · Barriers to Discharge: placement; insurance authorization  · Disposition: SNF - referral under review with [facility]; needs insurance auth  · Estimated Discharge Date: 4/13/22    CM faxed updated therapy notes and clinicals to [facility] for insurance authorization purposes. CM updated by [facility] NP that patient is medically stable for discharge to SNF. 1530 - CM spoke with [address] [place] [address] office will start insurance authorization process in the morning. Anticipated turnaround time for decision from [facility] is 48hrs. CM will continue to follow.     Mino Ortiz, MPH  Care Manager l [facility]  Available via [address] [place] [address] or

## 2022-04-11 NOTE — PROGRESS NOTES
Cardiac Surgery Care Coordinator- Met with Lamar Burch, he is alert and oriented to self. Provided Mr Latonya Peña with a red reminder bracelet, discussed the purpose of the bracelet and when to call. He is preparing for discharge to inpt rehab, awaiting insurance auth.  Freedom Whittaker RN

## 2022-04-11 NOTE — PROGRESS NOTES
1930: Bedside shift change report given to Melanie Ceballos RN (oncoming nurse) by German Gaston RN (offgoing nurse). Report included the following information SBAR, Kardex, Intake/Output, MAR, and Recent Results. 0515: pt completed CHG bath with total assistance. 0730: Bedside shift change report given to THE [facility], RN (oncoming nurse) by Melanie Ceballos RN (offgoing nurse). Report included the following information SBAR, Kardex, Intake/Output, MAR and Recent Results. Problem: Pressure Injury - Risk of  Goal: *Prevention of pressure injury  Description: Document Asim Scale and appropriate interventions in the flowsheet. Outcome: Progressing Towards Goal  Note: Pressure Injury Interventions:  Sensory Interventions: Minimize linen layers    Moisture Interventions: Absorbent underpads,Maintain skin hydration (lotion/cream),Minimize layers    Activity Interventions: Pressure redistribution bed/mattress(bed type),Increase time out of bed    Mobility Interventions: Pressure redistribution bed/mattress (bed type)    Nutrition Interventions: Document food/fluid/supplement intake    Friction and Shear Interventions: Minimize layers    Problem: Falls - Risk of  Goal: *Absence of Falls  Description: Document Maricruz Fall Risk and appropriate interventions in the flowsheet.   Outcome: Progressing Towards Goal  Note: Fall Risk Interventions:  Mobility Interventions: Patient to call before getting OOB    Mentation Interventions: Bed/chair exit alarm    Medication Interventions: Patient to call before getting OOB,Teach patient to arise slowly    Elimination Interventions: Bed/chair exit alarm,Call light in reach    History of Falls Interventions: Bed/chair exit alarm,Room close to nurse's station,Door open when patient unattended

## 2022-04-11 NOTE — PROGRESS NOTES
Problem: Mobility Impaired (Adult and Pediatric)  Goal: *Acute Goals and Plan of Care (Insert Text)  Description: FUNCTIONAL STATUS PRIOR TO ADMISSION: Patient was independent and active without use of DME.     HOME SUPPORT PRIOR TO ADMISSION: The patient lived alone with son (lives on the property) available to provide assistance. Pt reports his wife recently passed away. Physical Therapy Goals  Weekly Reassessment 4/7/2022 (goals down graded)  1. Patient will move from supine to sit and sit to supine  in bed with CGA within 5 days. 2.  Patient will perform sit to/from stand with Min A within 5 days. 3.  Patient will ambulate 150 feet with least restrictive assistive device and Minimum assistance within 5 days. 4.  Patient will ascend/descend 2 stairs with handrail(s) with moderate assistance  within 5 days. 5.  Patient will perform cardiac exercises per protocol with minimum assistance within 5 days. 6.  Patient will verbally recall and functionally demonstrate mindful-based movements (\"move in the tube\") principles with cues within 5 days. Initiated 3/31/2022  1. Patient will move from supine to sit and sit to supine  in bed with modified independence within 5 days. 2.  Patient will perform sit to/from stand with modified independence within 5 days. 3.  Patient will ambulate 300 feet with least restrictive assistive device and modified independence within 5 days. 4.  Patient will ascend/descend 12 stairs with handrail(s) with independence within 5 days. 5.  Patient will perform cardiac exercises per protocol with modified independence within 5 days. 6.  Patient will verbally recall and functionally demonstrate mindful-based movements (\"move in the tube\") principles without cues within 5 days.           Outcome: Progressing Towards Goal    PHYSICAL THERAPY TREATMENT  Patient: Ed Max (89 y.o. male)  Date: 4/11/2022  Diagnosis: CAD (coronary artery disease) [I25.10] <principal problem not specified>  Procedure(s) (LRB):  CORONARY ARTERY BYPASS GRAFT (CABG) X3, LIMA. RSVH VIA EVH. ECC. LAWSON AND EPI AORTIC US BY DR Roxana Zhang (N/A) 12 Days Post-Op  Precautions: Fall,Sternal,Other (comment) (move in the tube)  Chart, physical therapy assessment, plan of care and goals were reviewed. ASSESSMENT  Patient continues with skilled PT services and is progressing towards goals. Pt was agreeable to ambulate. Pt had decrease adherence to \"move in the tube\" principles. Pt increase gait tolerance, but noticeable fatigue especially in right knee. Noted buckling with fatigue  required increase assistance to prevent fall. Pt required seated rest break prior to returning to room. Patient is not verbalizing and is not demonstrating understanding of mindful-based movements (\"move in the tube\") principles of keeping UEs proximal to ribcage to prevent lateral pull on the sternum during load-bearing activities with verbal and tactile cues required for compliance. Current Level of Function Impacting Discharge (mobility/balance): mod A     Other factors to consider for discharge: sternal incision, confusion, fall risk, safety awareness          PLAN :  Patient continues to benefit from skilled intervention to address the above impairments. Continue treatment per established plan of care. to address goals. Recommendation for discharge: (in order for the patient to meet his/her long term goals)  Therapy up to 5 days/week in SNF setting    This discharge recommendation:  Has not yet been discussed the attending provider and/or case management    IF patient discharges home will need the following DME: to be determined (TBD)       SUBJECTIVE:   Patient stated I am waiting for someone to tell me when I am going.     OBJECTIVE DATA SUMMARY:   Patient mobilized on continuous portable monitor/telemetry.   Critical Behavior:  Neurologic State: Confused  Orientation Level: Oriented to person,Disoriented to time,Disoriented to situation,Disoriented to place  Cognition: Poor safety awareness  Safety/Judgement: Lack of insight into deficits    Functional Mobility Training:  Bed Mobility:                      Transfers:  Sit to Stand: Minimum assistance  Stand to Sit: Contact guard assistance                               Balance:  Sitting: Intact  Standing: Impaired  Standing - Static: Fair  Standing - Dynamic : Fair    Ambulation/Gait Training:  Distance (ft): 150 Feet (ft) (required one seated restbreak die to fatigue and right knee )  Assistive Device: Gait belt;Walker, rolling  Ambulation - Level of Assistance: Contact guard assistance; Moderate assistance (decrease stability with fatigue)        Gait Abnormalities: Decreased step clearance (increase right buckling )              Speed/Davina: Pace decreased (<100 feet/min)                      Stairs:             Cardiac diagnosis intervention:  Patient instructed and educated on mindful movement principles based on Move in The Tube concept to include maintaining bilateral elbows close to rib cage when performing any load-bearing activity such as getting in/out of bed, pushing up from a chair, opening a door, or lifting a box. Patient was given a handout with diagrams of each correct/incorrect method of performing each of the above tasks. Therapeutic Exercises:   Patient instructed on the benefits and demonstrated cardiac exercises while sitting /standing  with Minimum assistance. Instructed and indicated understanding on how to progress reps, sets against gravity, pacing through progressive muscle strengthening standing based on surgeon clearance for more weight in prep for functional activity. Instruction on the use of household items in place of weights as needed.      CARDIAC  EXERCISE   Sets   Reps   Active Active Assist   Passive Self ROM   Comments   Shoulder flexion 1 5 [x]                                            [] []                                            []                                            sitting   Shoulder abduction 1      5 [x]                                            []                                            []                                            []                                            standing   Scapular elevation 1 5 [x]                                            []                                            []                                            []                                            sitting   Scapular retraction 1 5 [x]                                            []                                            []                                            []                                            sitting   Trunk rotation 1 5 [x]                                            []                                            []                                            [x]                                            standing   Trunk sidebending 1 5 [x]                                            []                                            []                                            []                                            standing      []                                            []                                            []                                            []                                                   Pain Rating:  No complaints     Activity Tolerance:   Limited     After treatment patient left in no apparent distress:   Sitting in chair, Call bell within reach, Bed / chair alarm activated, and reclined     COMMUNICATION/COLLABORATION:   The patients plan of care was discussed with: Registered nurse.      Lidya Lugo PTA   Time Calculation: 26 mins

## 2022-04-12 LAB
ALBUMIN SERPL-MCNC: 2.6 G/DL (ref 3.5–5)
ALBUMIN/GLOB SERPL: 0.8 {RATIO} (ref 1.1–2.2)
ALP SERPL-CCNC: 85 U/L (ref 45–117)
ALT SERPL-CCNC: 26 U/L (ref 12–78)
ANION GAP SERPL CALC-SCNC: 5 MMOL/L (ref 5–15)
AST SERPL-CCNC: 18 U/L (ref 15–37)
BASOPHILS # BLD: 0.1 K/UL (ref 0–0.1)
BASOPHILS NFR BLD: 1 % (ref 0–1)
BILIRUB SERPL-MCNC: 0.4 MG/DL (ref 0.2–1)
BUN SERPL-MCNC: 21 MG/DL (ref 6–20)
BUN/CREAT SERPL: 13 (ref 12–20)
CALCIUM SERPL-MCNC: 8.5 MG/DL (ref 8.5–10.1)
CHLORIDE SERPL-SCNC: 105 MMOL/L (ref 97–108)
CO2 SERPL-SCNC: 27 MMOL/L (ref 21–32)
CREAT SERPL-MCNC: 1.56 MG/DL (ref 0.7–1.3)
DIFFERENTIAL METHOD BLD: ABNORMAL
EOSINOPHIL # BLD: 0.3 K/UL (ref 0–0.4)
EOSINOPHIL NFR BLD: 3 % (ref 0–7)
ERYTHROCYTE [DISTWIDTH] IN BLOOD BY AUTOMATED COUNT: 13.8 % (ref 11.5–14.5)
GLOBULIN SER CALC-MCNC: 3.2 G/DL (ref 2–4)
GLUCOSE SERPL-MCNC: 88 MG/DL (ref 65–100)
HCT VFR BLD AUTO: 32.9 % (ref 36.6–50.3)
HGB BLD-MCNC: 10.5 G/DL (ref 12.1–17)
IMM GRANULOCYTES # BLD AUTO: 0.1 K/UL (ref 0–0.04)
IMM GRANULOCYTES NFR BLD AUTO: 1 % (ref 0–0.5)
LYMPHOCYTES # BLD: 1.3 K/UL (ref 0.8–3.5)
LYMPHOCYTES NFR BLD: 15 % (ref 12–49)
MCH RBC QN AUTO: 29.7 PG (ref 26–34)
MCHC RBC AUTO-ENTMCNC: 31.9 G/DL (ref 30–36.5)
MCV RBC AUTO: 92.9 FL (ref 80–99)
MONOCYTES # BLD: 1 K/UL (ref 0–1)
MONOCYTES NFR BLD: 11 % (ref 5–13)
NEUTS SEG # BLD: 6.1 K/UL (ref 1.8–8)
NEUTS SEG NFR BLD: 69 % (ref 32–75)
NRBC # BLD: 0 K/UL (ref 0–0.01)
NRBC BLD-RTO: 0 PER 100 WBC
PLATELET # BLD AUTO: 358 K/UL (ref 150–400)
PMV BLD AUTO: 11.8 FL (ref 8.9–12.9)
POTASSIUM SERPL-SCNC: 4.4 MMOL/L (ref 3.5–5.1)
PROT SERPL-MCNC: 5.8 G/DL (ref 6.4–8.2)
RBC # BLD AUTO: 3.54 M/UL (ref 4.1–5.7)
SODIUM SERPL-SCNC: 137 MMOL/L (ref 136–145)
WBC # BLD AUTO: 8.8 K/UL (ref 4.1–11.1)

## 2022-04-12 PROCEDURE — 97535 SELF CARE MNGMENT TRAINING: CPT

## 2022-04-12 PROCEDURE — 74011250637 HC RX REV CODE- 250/637: Performed by: PHYSICIAN ASSISTANT

## 2022-04-12 PROCEDURE — 80053 COMPREHEN METABOLIC PANEL: CPT

## 2022-04-12 PROCEDURE — 74011250637 HC RX REV CODE- 250/637: Performed by: NURSE PRACTITIONER

## 2022-04-12 PROCEDURE — 85025 COMPLETE CBC W/AUTO DIFF WBC: CPT

## 2022-04-12 PROCEDURE — 97116 GAIT TRAINING THERAPY: CPT

## 2022-04-12 PROCEDURE — 65660000001 HC RM ICU INTERMED STEPDOWN

## 2022-04-12 PROCEDURE — 36415 COLL VENOUS BLD VENIPUNCTURE: CPT

## 2022-04-12 PROCEDURE — 97110 THERAPEUTIC EXERCISES: CPT

## 2022-04-12 PROCEDURE — 74011250637 HC RX REV CODE- 250/637: Performed by: THORACIC SURGERY (CARDIOTHORACIC VASCULAR SURGERY)

## 2022-04-12 PROCEDURE — 74011250636 HC RX REV CODE- 250/636: Performed by: NURSE PRACTITIONER

## 2022-04-12 PROCEDURE — 74011000250 HC RX REV CODE- 250: Performed by: NURSE PRACTITIONER

## 2022-04-12 RX ORDER — METOPROLOL TARTRATE 25 MG/1
25 TABLET, FILM COATED ORAL EVERY 12 HOURS
Qty: 60 TABLET | Refills: 1 | Status: SHIPPED
Start: 2022-04-12 | End: 2022-04-20 | Stop reason: SDUPTHER

## 2022-04-12 RX ORDER — LISINOPRIL 5 MG/1
5 TABLET ORAL DAILY
Status: DISCONTINUED | OUTPATIENT
Start: 2022-04-13 | End: 2022-04-19

## 2022-04-12 RX ORDER — LISINOPRIL 5 MG/1
5 TABLET ORAL DAILY
Qty: 30 TABLET | Refills: 0 | Status: SHIPPED
Start: 2022-04-13 | End: 2022-04-20

## 2022-04-12 RX ORDER — GUAIFENESIN 100 MG/5ML
81 LIQUID (ML) ORAL DAILY
Qty: 30 TABLET | Refills: 1 | Status: SHIPPED
Start: 2022-04-12 | End: 2022-04-20

## 2022-04-12 RX ORDER — METOPROLOL TARTRATE 25 MG/1
25 TABLET, FILM COATED ORAL EVERY 12 HOURS
Status: DISCONTINUED | OUTPATIENT
Start: 2022-04-12 | End: 2022-04-20 | Stop reason: HOSPADM

## 2022-04-12 RX ORDER — LISINOPRIL 10 MG/1
5 TABLET ORAL DAILY
Qty: 15 TABLET | Refills: 0 | Status: SHIPPED | OUTPATIENT
Start: 2022-04-12 | End: 2022-04-12

## 2022-04-12 RX ADMIN — SENNOSIDES AND DOCUSATE SODIUM 1 TABLET: 50; 8.6 TABLET ORAL at 17:08

## 2022-04-12 RX ADMIN — LISINOPRIL 10 MG: 10 TABLET ORAL at 09:11

## 2022-04-12 RX ADMIN — SENNOSIDES AND DOCUSATE SODIUM 1 TABLET: 50; 8.6 TABLET ORAL at 09:11

## 2022-04-12 RX ADMIN — SPIRONOLACTONE 12.5 MG: 25 TABLET ORAL at 09:11

## 2022-04-12 RX ADMIN — TAMSULOSIN HYDROCHLORIDE 0.4 MG: 0.4 CAPSULE ORAL at 09:11

## 2022-04-12 RX ADMIN — QUETIAPINE FUMARATE 100 MG: 100 TABLET ORAL at 21:42

## 2022-04-12 RX ADMIN — SODIUM CHLORIDE, PRESERVATIVE FREE 10 ML: 5 INJECTION INTRAVENOUS at 06:40

## 2022-04-12 RX ADMIN — POLYETHYLENE GLYCOL 3350 17 G: 17 POWDER, FOR SOLUTION ORAL at 09:12

## 2022-04-12 RX ADMIN — METOPROLOL TARTRATE 25 MG: 25 TABLET, FILM COATED ORAL at 21:42

## 2022-04-12 RX ADMIN — ENOXAPARIN SODIUM 40 MG: 100 INJECTION SUBCUTANEOUS at 09:09

## 2022-04-12 RX ADMIN — LEVOTHYROXINE SODIUM 75 MCG: 0.07 TABLET ORAL at 06:40

## 2022-04-12 RX ADMIN — SODIUM CHLORIDE, PRESERVATIVE FREE 10 ML: 5 INJECTION INTRAVENOUS at 21:43

## 2022-04-12 RX ADMIN — ASPIRIN 81 MG CHEWABLE TABLET 81 MG: 81 TABLET CHEWABLE at 09:11

## 2022-04-12 RX ADMIN — METOPROLOL TARTRATE 50 MG: 50 TABLET, FILM COATED ORAL at 09:11

## 2022-04-12 RX ADMIN — Medication 400 MG: at 17:08

## 2022-04-12 RX ADMIN — Medication 400 MG: at 09:11

## 2022-04-12 RX ADMIN — FAMOTIDINE 20 MG: 20 TABLET, FILM COATED ORAL at 06:39

## 2022-04-12 RX ADMIN — ATORVASTATIN CALCIUM 40 MG: 40 TABLET, FILM COATED ORAL at 21:42

## 2022-04-12 RX ADMIN — QUETIAPINE FUMARATE 25 MG: 25 TABLET ORAL at 09:11

## 2022-04-12 NOTE — PROGRESS NOTES
[facility] FLOOR Progress Note    Admit Date: 3/29/2022  POD: 13 Days Post-Op      Procedure:  Procedure(s):  CORONARY ARTERY BYPASS GRAFT (CABG) X3, LIMA. RSVH VIA EVH. ECC. LAWSON AND EPI AORTIC US BY DR Bebeto De Guzman    Subjective:   Pt seen with Dr. Ariel Lawrence. Pt sleeping in am.  Awake and agitated throughout the night. Nurses are good at trying to keep him occupied. Medically ready for discharge. Awaiting insurance authorization for [facility]. Hypotensive. Decreased bb and ACE I  Objective:     Visit Vitals  BP (!) 152/73   Pulse 61   Temp 98.9 °F (37.2 °C)   Resp 20   Ht 5' 8\" (1.727 m)   Wt 162 lb 11.2 oz (73.8 kg)   SpO2 94%   BMI 24.74 kg/m²     Temp (24hrs), Av.2 °F (36.8 °C), Min:97.6 °F (36.4 °C), Max:98.9 °F (37.2 °C)      Last 24hr Input/Output:    Intake/Output Summary (Last 24 hours) at 2022 0933  Last data filed at 2022 0502  Gross per 24 hour   Intake 0 ml   Output 225 ml   Net -225 ml        EKG/Rhythm: SR      Oxygen: room air. CXR:      Admission Weight: Last Weight   Weight: 167 lb 1.7 oz (75.8 kg) Weight: 162 lb 11.2 oz (73.8 kg)       EXAM:  General: WDWN man in NAD sleeping in the reclining chair during am rounds. Lungs:   Unlabored on room air. Incision:  No erythema, drainage or swelling. Heart:  Regular rate and rhythm.  + SHREE across precordium /   Abdomen:   Soft, non-tender. Bowel sounds normal. No masses,  No organomegaly. Extremities:  No edema. PPP   Neurologic:  Gross motor intact. Confused. Asked for directions to go into space. He is watching the [employer] channel. Activity: ambulate with supervision. Diet:  Regular heart healthy    Lab Data Reviewed:   Recent Labs     22  0205   WBC 8.8   HGB 10.5*   HCT 32.9*         K 4.4   BUN 21*   CREA 1.56*   GLU 88         Assessment:     Active Problems:    CAD (coronary artery disease) (3/29/2022)      S/P CABG x 3 (3/30/2022)      Overview:  On pump CORONARY ARTERY BYPASS GRAFT (CABG) X3, LIMA to LAD, RSVH to PDA,       RSVG to Ramus      R GSV EV                   Plan/Recommendations/Medical Decision Makin yo man with CAD, STEMI, hypothyroidism, HTN, dementia. Dmitry Love had violent outbursts and forgetfulness prior to surgery.  He is POD # 13   and has been having agitation and violent outbursts postop as well.         1. CAD/STEMI:    On asa, statin,ACEi, BB     2.  HTN: Lisinopril, BB. Lasix on hold today.       3. Hypothyroid:  synthroid     4. Acute combined systolic and diastolic HF: LV EF 10% on LHC. EF improved by LAWSON in OR to 45%-50%. Repeated TTE prior to discharge showing LVEF of 35-40% with Grade 2 diastolic dysfunction. GDMT as able and BP management as above. (BB, ACEi, Lasix), Spironolactone-watch Na, K (normal today).       5. Chronic dementia/acute delirium:  Per reports from pt's children, some agitation/outbursts noted at times prior to surgery. Head CT showing chronic microvascular changes. Improving, but still confused and agitated. Continue Seroquel bid. Stopped amiodarone as it can have neurologic side effects. The patient has not had any afib postop.      6. Renal insufficiency: Creatinine 1.56.   Creatinine baseline 1.3. Hold Lasix today. Restart tomorrow if creatinine reduces. Hold Potassium as well when holding Lasix. Continue spironolactone.      7. Urinary retention: Resolved. Continue Flomax.      8. Prolonged QTc:  resolved.      9.   Thrombocytopenia: resolved.      11.  DVT prophylaxis: enoxaparin.  Ambulate.  OOB to chair.       12.  GI prophylaxis: famotidine.      13. [facility] once [facility] approved.        Signed By: Surinder Arango NP

## 2022-04-12 NOTE — PROGRESS NOTES
Transitions of Care Plan  · RUR: 15% - moderate  · Clinical Update: SNF d/c; awaiting insurance auth  · Consults: Therapy  · Baseline: independent without DME; resides on same property as sonNick  · Barriers to [address] [place] [address]; insurance authorization  · Disposition: [facility] insurance auth started  · Estimated Discharge Date: 4/13/22    CM received update from [facility] that authorization process started today for patient. Anticipate determination in 48hrs.     Caroline Butcher, MPH  Care Manager l [employer]  Available via [employer] or

## 2022-04-12 NOTE — PROGRESS NOTES
Problem: Mobility Impaired (Adult and Pediatric)  Goal: *Acute Goals and Plan of Care (Insert Text)  Description: FUNCTIONAL STATUS PRIOR TO ADMISSION: Patient was independent and active without use of DME.     HOME SUPPORT PRIOR TO ADMISSION: The patient lived alone with son (lives on the property) available to provide assistance. Pt reports his wife recently passed away. Physical Therapy Goals  Weekly Reassessment 4/7/2022 (goals down graded)  1. Patient will move from supine to sit and sit to supine  in bed with CGA within 5 days. 2.  Patient will perform sit to/from stand with Min A within 5 days. 3.  Patient will ambulate 150 feet with least restrictive assistive device and Minimum assistance within 5 days. 4.  Patient will ascend/descend 2 stairs with handrail(s) with moderate assistance  within 5 days. 5.  Patient will perform cardiac exercises per protocol with minimum assistance within 5 days. 6.  Patient will verbally recall and functionally demonstrate mindful-based movements (\"move in the tube\") principles with cues within 5 days. Initiated 3/31/2022  1. Patient will move from supine to sit and sit to supine  in bed with modified independence within 5 days. 2.  Patient will perform sit to/from stand with modified independence within 5 days. 3.  Patient will ambulate 300 feet with least restrictive assistive device and modified independence within 5 days. 4.  Patient will ascend/descend 12 stairs with handrail(s) with independence within 5 days. 5.  Patient will perform cardiac exercises per protocol with modified independence within 5 days. 6.  Patient will verbally recall and functionally demonstrate mindful-based movements (\"move in the tube\") principles without cues within 5 days.           Outcome: Progressing Towards Goal       PHYSICAL THERAPY TREATMENT  Patient: Katerina Ibrahim (24 y.o. male)  Date: 4/12/2022  Diagnosis: CAD (coronary artery disease) [I25.10] <principal problem not specified>  Procedure(s) (LRB):  CORONARY ARTERY BYPASS GRAFT (CABG) X3, LIMA. RSVH VIA EVH. ECC. LAWSON AND EPI AORTIC US BY DR Jeanette Ely (N/A) 13 Days Post-Op  Precautions: Fall,Sternal,Other (comment) (move in the tube)  Chart, physical therapy assessment, plan of care and goals were reviewed. ASSESSMENT  Patient continues with skilled PT services and is progressing towards goals. Pt was able to ambulate with rolling walker. Pt had decrease walker management ambulating outside A.D. and attempting to lift to turn. Pt has decrease right knee stability. Pt could benfit from SNF to prgress independence and safety  prior to returning home alone     Patient is not verbalizing understanding of mindful-based movements (\"move in the tube\") principles of keeping UEs proximal to ribcage to prevent lateral pull on the sternum during load-bearing activities with verbal cues required for compliance. Current Level of Function Impacting Discharge (mobility/balance): min A    Other factors to consider for discharge: sternal incision, decrease mobility and independence, periodic confusion         PLAN :  Patient continues to benefit from skilled intervention to address the above impairments. Continue treatment per established plan of care. to address goals. Recommendation for discharge: (in order for the patient to meet his/her long term goals)  Therapy up to 5 days/week in SNF setting    This discharge recommendation:  Has not yet been discussed the attending provider and/or case management    IF patient discharges home will need the following DME: rolling walker       SUBJECTIVE:   Patient stated this take a little energy .  regards to cardiac arm exercises    OBJECTIVE DATA SUMMARY:   Patient mobilized on continuous portable monitor/telemetry.   Critical Behavior:  Neurologic State: Alert,Confused  Orientation Level: Oriented to person,Disoriented to place,Disoriented to time,Disoriented to situation  Cognition: Poor safety awareness  Safety/Judgement: Lack of insight into deficits    Functional Mobility Training:  Bed Mobility:                      Transfers:  Sit to Stand: Contact guard assistance (v.c for sequencing)  Stand to Sit: Contact guard assistance (decrease control )                               Balance:  Sitting: Intact  Standing: Impaired  Standing - Static: Fair  Standing - Dynamic : Fair    Ambulation/Gait Training:  Distance (ft): 90 Feet (ft) (x2 trials )  Assistive Device: Gait belt;Walker, rolling  Ambulation - Level of Assistance: Contact guard assistance;Minimal assistance        Gait Abnormalities: Decreased step clearance (decreae right knee stability)              Speed/Davina: Pace decreased (<100 feet/min)                      Stairs:             Cardiac diagnosis intervention:  Patient instructed and educated on mindful movement principles based on Move in The Tube concept to include maintaining bilateral elbows close to rib cage when performing any load-bearing activity such as getting in/out of bed, pushing up from a chair, opening a door, or lifting a box. Patient was given a handout with diagrams of each correct/incorrect method of performing each of the above tasks. Therapeutic Exercises:   Patient instructed on the benefits and demonstrated cardiac exercises while seated with Stand-by assistance. Instructed and indicated understanding on how to progress reps, sets against gravity, pacing through progressive muscle strengthening standing based on surgeon clearance for more weight in prep for functional activity. Instruction on the use of household items in place of weights as needed.      CARDIAC  EXERCISE   Sets   Reps   Active Active Assist   Passive Self ROM   Comments   Shoulder flexion 1 10 [x]                                            []                                            []                                            [] Shoulder abduction 1 10 [x]                                            []                                            []                                            []                                               Scapular elevation 1 10 [x]                                            []                                            []                                            []                                               Scapular retraction 1 10 [x]                                            []                                            []                                            []                                                   []                                            []                                            [x]                                                   []                                            []                                            []                                                  []                                            []                                            []                                            []                                                   Pain Rating:  none    Activity Tolerance:   requires rest breaks    After treatment patient left in no apparent distress:   Sitting in chair, Call bell within reach, Bed / chair alarm activated, and reclined    COMMUNICATION/COLLABORATION:   The patients plan of care was discussed with: Occupational therapist, Registered nurse, and Case management.      Skye Araya PTA   Time Calculation: 27 mins

## 2022-04-12 NOTE — PROGRESS NOTES
Problem: Self Care Deficits Care Plan (Adult)  Goal: *Acute Goals and Plan of Care (Insert Text)  Description: FUNCTIONAL STATUS PRIOR TO ADMISSION: Patient was independent and active without use of DME. 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 Patient with mild cognitive impairments and unclear if he was still doing these activities. 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 Recently patient's wife passed. Occupational Therapy Goals: goals modified below 4/7/22  Initiated 3/31/2022  1. Patient will perform ADLs standing 5 mins without fatigue or LOB with supervision/set-up within 5 day(s). (grooming at sink with least restrictive DME 4/7/22 with min A)  2. Patient will perform lower body ADLs with supervision/set-up within 5 day(s). (min A 4/7/22)  3. Patient will perform gathering ADL items high and low 2/2 with supervision/set-up within 5 day(s). (discontinue, not appropriate 4/7/22)  4. Patient will perform toilet transfers with supervision/set-up within 5 day(s). (min A 4/7/22)  5. Patient will perform all aspects of toileting with supervision/set-up within 5 day(s). (min A 4/7/22)  6. Patient will participate in cardiac/sternal upper extremity therapeutic exercise/activities to increase independence with ADLs with supervision/set-up for 5 minutes within 5 day(s). (continue 4/7/22)      Outcome: Progressing Towards Goal       OCCUPATIONAL THERAPY TREATMENT  Patient: Reyna Abarca (17 y.o. male)  Date: 4/12/2022  Diagnosis: CAD (coronary artery disease) [I25.10] <principal problem not specified>  Procedure(s) (LRB):  CORONARY ARTERY BYPASS GRAFT (CABG) X3, LIMA. RSVH VIA EVH. ECC. LAWSON AND EPI AORTIC US BY DR Pamla Opitz (N/A) 13 Days Post-Op  Precautions: Fall,Sternal,Other (comment) (move in the tube)  Chart, occupational therapy assessment, plan of care, and goals were reviewed.     ASSESSMENT  Patient continues with skilled OT services and is progressing towards goals. Pt pleasant and cooperative this date with relatively good adherence to move in the tube with sit to stands. Pt with short term memory loss and requires recall of directions. Pt completed functional mbility to bathroom with CGA and RW where he performed ADLs at sink with close SBA. Post ADLs, pt performed 2 standing cardiac exercises, with a combined standing time of 10 minutes. VSS with activity on room air. Continue to recommend SNF rehab to increase strength, balance and functional independence with ADLS. Patient is not verbalizing and is demonstrating understanding of mindful-based movements (\"move in the tube\") principles of keeping UEs proximal to ribcage to prevent lateral pull on the sternum during load-bearing activities with verbal cues required for compliance. Current Level of Function Impacting Discharge (ADLs): CGA, RW, short term memory loss, hx dementia    Other factors to consider for discharge: from home         PLAN :  Patient continues to benefit from skilled intervention to address the above impairments. Continue treatment per established plan of care to address goals. Recommend with staff: OOB to chair, RW A x 1 and gait belt    Recommend next OT session: dressing    Recommendation for discharge: (in order for the patient to meet his/her long term goals)  Therapy up to 5 days/week in SNF setting    This discharge recommendation:  Has been made in collaboration with the attending provider and/or case management    IF patient discharges home will need the following DME: none       SUBJECTIVE:   Patient stated Nini Delvalle that works.     OBJECTIVE DATA SUMMARY:   Cognitive/Behavioral Status:           Perception: Appears intact  Perseveration: No perseveration noted  Safety/Judgement: Fall prevention    Functional Mobility and Transfers for ADLs:  Bed Mobility:       Transfers:  Sit to Stand: Contact guard assistance (v.c for sequencing)          Balance:  Sitting: Intact  Standing: Impaired  Standing - Static: Fair  Standing - Dynamic : Fair    ADL Intervention:          Pt required batteries to be changed in tele box. With additional time, pt able to remove batteries and replace new ones. Brushing teeth: SBA at sink, cues to square Rw off to sink                      Cognitive Retraining  Safety/Judgement: Fall prevention    Patient instructed and educated on mindful movement principles based on Move in The Tube concept to include maintaining bilateral elbows close to rib cage when performing any load-bearing activity such as getting in/out of bed, pushing up from a chair, opening a door, or lifting a box. Therapeutic Exercises:   Patient instructed on the benefits and demonstrated cardiac exercises while standing with RW with Stand-by assistance. Instructed and indicated understanding on how to progress reps, sets against gravity, pacing through progressive muscle strengthening standing based on surgeon clearance for more weight in prep for basic and instrumental ADLs. Instruction on the use of household items in place of weights as needed. CARDIAC   EXERCISE    Sets    Reps    Active  Active Assist    Passive  Self ROM    Comments    Trunk rotation  1  5  [x]                             []                             []                             []                             standing   Trunk sidebending  1  5  [x]                             []                              []                             []                                   standing       Pain:  No complaints     Activity Tolerance:   Good    After treatment patient left in no apparent distress:   Sitting in chair, Call bell within reach, and Bed / chair alarm activated    COMMUNICATION/COLLABORATION:   The patients plan of care was discussed with: Physical therapy assistant and Registered nurse.      Chelo Thurman OT  Time Calculation: 14 mins

## 2022-04-12 NOTE — PROGRESS NOTES
1930: Bedside shift change report given to Anat Mccullough RN (oncoming nurse) by Clive Merrill RN (offgoing nurse). Report included the following information SBAR, Kardex, Intake/Output, MAR, and Recent Results. 0730: Bedside shift change report given to Clive Merrill RN (oncoming nurse) by Anat Mccullough RN (offgoing nurse). Report included the following information SBAR, Kardex, Intake/Output, MAR and Recent Results. Problem: Falls - Risk of  Goal: *Absence of Falls  Description: Document Ilene Lissette Fall Risk and appropriate interventions in the flowsheet.   Outcome: Progressing Towards Goal  Note: Fall Risk Interventions:  Mobility Interventions: Bed/chair exit alarm,Patient to call before getting OOB    Mentation Interventions: Bed/chair exit alarm,Door open when patient unattended,Reorient patient,More frequent rounding,Update white board,Room close to nurse's station    Medication Interventions: Patient to call before getting OOB,Teach patient to arise slowly,Bed/chair exit alarm    Elimination Interventions: Call light in reach,Bed/chair exit alarm,Toileting schedule/hourly rounds    History of Falls Interventions: Bed/chair exit alarm,Door open when patient unattended

## 2022-04-13 LAB
ALBUMIN SERPL-MCNC: 2.5 G/DL (ref 3.5–5)
ALBUMIN/GLOB SERPL: 1 {RATIO} (ref 1.1–2.2)
ALP SERPL-CCNC: 84 U/L (ref 45–117)
ALT SERPL-CCNC: 24 U/L (ref 12–78)
ANION GAP SERPL CALC-SCNC: 7 MMOL/L (ref 5–15)
AST SERPL-CCNC: 12 U/L (ref 15–37)
BASOPHILS # BLD: 0.1 K/UL (ref 0–0.1)
BASOPHILS NFR BLD: 1 % (ref 0–1)
BILIRUB SERPL-MCNC: 0.3 MG/DL (ref 0.2–1)
BUN SERPL-MCNC: 25 MG/DL (ref 6–20)
BUN/CREAT SERPL: 16 (ref 12–20)
CALCIUM SERPL-MCNC: 8.2 MG/DL (ref 8.5–10.1)
CHLORIDE SERPL-SCNC: 104 MMOL/L (ref 97–108)
CO2 SERPL-SCNC: 24 MMOL/L (ref 21–32)
CREAT SERPL-MCNC: 1.56 MG/DL (ref 0.7–1.3)
DIFFERENTIAL METHOD BLD: ABNORMAL
EOSINOPHIL # BLD: 0.4 K/UL (ref 0–0.4)
EOSINOPHIL NFR BLD: 4 % (ref 0–7)
ERYTHROCYTE [DISTWIDTH] IN BLOOD BY AUTOMATED COUNT: 13.5 % (ref 11.5–14.5)
GLOBULIN SER CALC-MCNC: 2.6 G/DL (ref 2–4)
GLUCOSE SERPL-MCNC: 107 MG/DL (ref 65–100)
HCT VFR BLD AUTO: 31.2 % (ref 36.6–50.3)
HGB BLD-MCNC: 10.1 G/DL (ref 12.1–17)
IMM GRANULOCYTES # BLD AUTO: 0 K/UL (ref 0–0.04)
IMM GRANULOCYTES NFR BLD AUTO: 0 % (ref 0–0.5)
LYMPHOCYTES # BLD: 1.1 K/UL (ref 0.8–3.5)
LYMPHOCYTES NFR BLD: 13 % (ref 12–49)
MCH RBC QN AUTO: 30 PG (ref 26–34)
MCHC RBC AUTO-ENTMCNC: 32.4 G/DL (ref 30–36.5)
MCV RBC AUTO: 92.6 FL (ref 80–99)
MONOCYTES # BLD: 1.1 K/UL (ref 0–1)
MONOCYTES NFR BLD: 13 % (ref 5–13)
NEUTS SEG # BLD: 6.2 K/UL (ref 1.8–8)
NEUTS SEG NFR BLD: 69 % (ref 32–75)
NRBC # BLD: 0 K/UL (ref 0–0.01)
NRBC BLD-RTO: 0 PER 100 WBC
PLATELET # BLD AUTO: 350 K/UL (ref 150–400)
PMV BLD AUTO: 11.2 FL (ref 8.9–12.9)
POTASSIUM SERPL-SCNC: 4.6 MMOL/L (ref 3.5–5.1)
PROT SERPL-MCNC: 5.1 G/DL (ref 6.4–8.2)
RBC # BLD AUTO: 3.37 M/UL (ref 4.1–5.7)
SODIUM SERPL-SCNC: 135 MMOL/L (ref 136–145)
WBC # BLD AUTO: 8.9 K/UL (ref 4.1–11.1)

## 2022-04-13 PROCEDURE — 74011250637 HC RX REV CODE- 250/637: Performed by: NURSE PRACTITIONER

## 2022-04-13 PROCEDURE — 74011250637 HC RX REV CODE- 250/637: Performed by: PHYSICIAN ASSISTANT

## 2022-04-13 PROCEDURE — 74011250637 HC RX REV CODE- 250/637: Performed by: THORACIC SURGERY (CARDIOTHORACIC VASCULAR SURGERY)

## 2022-04-13 PROCEDURE — 36415 COLL VENOUS BLD VENIPUNCTURE: CPT

## 2022-04-13 PROCEDURE — 65660000001 HC RM ICU INTERMED STEPDOWN

## 2022-04-13 PROCEDURE — 74011000250 HC RX REV CODE- 250: Performed by: NURSE PRACTITIONER

## 2022-04-13 PROCEDURE — 85025 COMPLETE CBC W/AUTO DIFF WBC: CPT

## 2022-04-13 PROCEDURE — 74011250636 HC RX REV CODE- 250/636: Performed by: NURSE PRACTITIONER

## 2022-04-13 PROCEDURE — 97116 GAIT TRAINING THERAPY: CPT

## 2022-04-13 PROCEDURE — 80053 COMPREHEN METABOLIC PANEL: CPT

## 2022-04-13 PROCEDURE — 30233N1 TRANSFUSION OF NONAUTOLOGOUS RED BLOOD CELLS INTO PERIPHERAL VEIN, PERCUTANEOUS APPROACH: ICD-10-PCS | Performed by: THORACIC SURGERY (CARDIOTHORACIC VASCULAR SURGERY)

## 2022-04-13 RX ADMIN — LEVOTHYROXINE SODIUM 75 MCG: 0.07 TABLET ORAL at 06:35

## 2022-04-13 RX ADMIN — FAMOTIDINE 20 MG: 20 TABLET, FILM COATED ORAL at 06:35

## 2022-04-13 RX ADMIN — SODIUM CHLORIDE, PRESERVATIVE FREE 10 ML: 5 INJECTION INTRAVENOUS at 17:46

## 2022-04-13 RX ADMIN — Medication 400 MG: at 17:46

## 2022-04-13 RX ADMIN — LISINOPRIL 5 MG: 5 TABLET ORAL at 08:29

## 2022-04-13 RX ADMIN — TAMSULOSIN HYDROCHLORIDE 0.4 MG: 0.4 CAPSULE ORAL at 08:29

## 2022-04-13 RX ADMIN — QUETIAPINE FUMARATE 100 MG: 100 TABLET ORAL at 22:56

## 2022-04-13 RX ADMIN — CHLORHEXIDINE GLUCONATE 10 ML: 1.2 RINSE ORAL at 08:30

## 2022-04-13 RX ADMIN — SODIUM CHLORIDE, PRESERVATIVE FREE 10 ML: 5 INJECTION INTRAVENOUS at 06:35

## 2022-04-13 RX ADMIN — ASPIRIN 81 MG CHEWABLE TABLET 81 MG: 81 TABLET CHEWABLE at 08:29

## 2022-04-13 RX ADMIN — ATORVASTATIN CALCIUM 40 MG: 40 TABLET, FILM COATED ORAL at 22:56

## 2022-04-13 RX ADMIN — SODIUM CHLORIDE, PRESERVATIVE FREE 10 ML: 5 INJECTION INTRAVENOUS at 22:57

## 2022-04-13 RX ADMIN — SPIRONOLACTONE 12.5 MG: 25 TABLET ORAL at 08:29

## 2022-04-13 RX ADMIN — METOPROLOL TARTRATE 25 MG: 25 TABLET, FILM COATED ORAL at 08:29

## 2022-04-13 RX ADMIN — ENOXAPARIN SODIUM 40 MG: 100 INJECTION SUBCUTANEOUS at 08:30

## 2022-04-13 RX ADMIN — Medication 400 MG: at 08:29

## 2022-04-13 RX ADMIN — SENNOSIDES AND DOCUSATE SODIUM 1 TABLET: 50; 8.6 TABLET ORAL at 08:29

## 2022-04-13 RX ADMIN — METOPROLOL TARTRATE 25 MG: 25 TABLET, FILM COATED ORAL at 20:49

## 2022-04-13 RX ADMIN — QUETIAPINE FUMARATE 25 MG: 25 TABLET ORAL at 08:29

## 2022-04-13 RX ADMIN — POLYETHYLENE GLYCOL 3350 17 G: 17 POWDER, FOR SOLUTION ORAL at 08:30

## 2022-04-13 RX ADMIN — CHLORHEXIDINE GLUCONATE 10 ML: 1.2 RINSE ORAL at 20:55

## 2022-04-13 RX ADMIN — SENNOSIDES AND DOCUSATE SODIUM 1 TABLET: 50; 8.6 TABLET ORAL at 17:46

## 2022-04-13 NOTE — PROGRESS NOTES
[facility] FLOOR Progress Note    Admit Date: 3/29/2022  POD: 14 Days Post-Op      Procedure:  Procedure(s):  CORONARY ARTERY BYPASS GRAFT (CABG) X3, LIMA. RSVH VIA EVH. ECC. LAWSON AND EPI AORTIC US BY DR Santamaria Sender    Subjective:   Pt seen with Dr. Renetta Ledezma. Resting in bed quietly. On RA SpO2 >92%     Objective:     Visit Vitals  /84 (BP 1 Location: Right upper arm, BP Patient Position: Sitting)   Pulse 67   Temp 97.7 °F (36.5 °C)   Resp 18   Ht 5' 8\" (1.727 m)   Wt 163 lb 9.3 oz (74.2 kg)   SpO2 97%   BMI 24.87 kg/m²     Temp (24hrs), Av.2 °F (36.8 °C), Min:97.6 °F (36.4 °C), Max:98.9 °F (37.2 °C)      Last 24hr Input/Output:    Intake/Output Summary (Last 24 hours) at 2022 1547  Last data filed at 2022 1520  Gross per 24 hour   Intake 200 ml   Output 900 ml   Net -700 ml        EKG/Rhythm: SR in the 70s    Oxygen: Room air    CXR:  CXR Results  (Last 48 hours)    None            Admission Weight: Last Weight   Weight: 167 lb 1.7 oz (75.8 kg) Weight: 163 lb 9.3 oz (74.2 kg)       EXAM:  General: No acute distress, resting in bed quietly. Lungs:   Clear to auscultation bilaterally. Incision:  No erythema, drainage or swelling. Heart:  Regular rate and rhythm, S1, S2 normal, no murmur, click, rub or gallop. Abdomen:   Soft, non-tender. Bowel sounds normal. No masses,  No organomegaly. Extremities:  No edema. PPP   Neurologic:  Confused with intermittent forgetfulness and violent outburst.      Activity: Up with assist        Lab Data Reviewed:   Recent Labs     22  0139   WBC 8.9   HGB 10.1*   HCT 31.2*      *   K 4.6   BUN 25*   CREA 1.56*   *         Assessment:     Active Problems:    CAD (coronary artery disease) (3/29/2022)      S/P CABG x 3 (3/30/2022)      Overview: On pump CORONARY ARTERY BYPASS GRAFT (CABG) X3, LIMA to LAD, RSVH to PDA,       RSVG to Ramus      R [facility]                   Plan/Recommendations/Medical Decision Makin.  CAD s/p CABG x3 LIMA to LAD, RSVH to PDA:  ASA, statin, ACEi, BB    2. Renal insufficiency: Creatinine stable at 1.56 this AM but had been trending up. Continue to hold Lasix. Bladder scan, if any sign of retention. Flomax    3.  HTN: Lisinopril, BB     4. Acute combined systolic and diastolic HF: LV EF 82% on LHC. EF improved by LAWSON in OR to 45%-50%. Repeat TTE prior to discharge showing LVEF of 35-40% with Grade 2 diastolic dysfunction. GDMT and BP management as above. (BB, ACEi) Spironolactone-monitor electrolytes.      5. Chronic dementia/acute delirium:  Per pt's children, some agitation/outbursts noted at times prior to surgery. Head CT showing chronic microvascular changes. Improving. Continue Seroquel bid.        6. Hypothyroid:  synthroid    7. GI/DVT px: Famotidine/Enoxaparin. Ambulation/OOB    8. Thrombocytopenia: Resolved. 9. Anemia: secondary to acute blood loss following surgery. Currently above transfusion threshold. Continue Iron. Monitor CBC.       Dispo: PT/OT/Cardiac Rehab. Case Management for discharge planning. Plan for [facility] when Insurance approves-in the next 24-48 hours. Signed By: Jami Solis     I saw and evaluated the patient, performing the key elements of the service. I discussed the findings, assessment and plan with the NP Student and agree with the NP Student's findings and plan as documented in the note.     Yeny Guzman NP

## 2022-04-13 NOTE — PROGRESS NOTES
1930: Bedside shift change report given to Pili Shrestha, RN (oncoming nurse) by Jessica Oliva RN (offgoing nurse). Report included the following information SBAR, Kardex, Intake/Output, MAR, and Recent Results. 4217: pt completed CHG bath with moderate assistance. 0730: Bedside shift change report given to Ilene Lilly (oncoming nurse) by Pili Shrestha RN (offgoing nurse). Report included the following information SBAR, Kardex, Intake/Output, MAR and Recent Results. Problem: Falls - Risk of  Goal: *Absence of Falls  Description: Document Shanti Crocker Fall Risk and appropriate interventions in the flowsheet.   Outcome: Progressing Towards Goal  Note: Fall Risk Interventions:  Mobility Interventions: Patient to call before getting OOB    Mentation Interventions: Bed/chair exit alarm    Medication Interventions: Patient to call before getting OOB,Teach patient to arise slowly    Elimination Interventions: Patient to call for help with toileting needs    History of Falls Interventions: Bed/chair exit alarm,Door open when patient unattended,Room close to nurse's station

## 2022-04-13 NOTE — PROGRESS NOTES
0730: Bedside and Verbal shift change report given to Scar Hernandes RN (oncoming nurse) by Leandra Jones RN (offgoing nurse). Report included the following information SBAR, Kardex, Intake/Output, MAR, Recent Results, and Cardiac Rhythm Sinus Rhythm . 0830: Patient refusing to get up to recliner at this time. 1045: Patient up in recliner and walked around unit    1300: Patient up in recliner and walked around unit with Earnstine Sumter, [address]: Patient up in recliner and walked around unit. 1930: Bedside and Verbal shift change report given to Alba Peoples RN (oncoming nurse) by Scar Hernandes RN (offgoing nurse). Report included the following information SBAR, Kardex, Intake/Output, MAR, Recent Results and Cardiac Rhythm Sinus Rhythm. Care Plans  Problem: Pain  Goal: *Control of Pain  Outcome: Progressing Towards Goal  Goal: *PALLIATIVE CARE:  Alleviation of Pain  Outcome: Progressing Towards Goal     Problem: Patient Education: Go to Patient Education Activity  Goal: Patient/Family Education  Outcome: Progressing Towards Goal     Problem: Pressure Injury - Risk of  Goal: *Prevention of pressure injury  Description: Document Asim Scale and appropriate interventions in the flowsheet. Outcome: Progressing Towards Goal  Note: Pressure Injury Interventions:  Sensory Interventions: Assess changes in LOC    Moisture Interventions: Absorbent underpads    Activity Interventions: Assess need for specialty bed,Increase time out of bed,Pressure redistribution bed/mattress(bed type)    Mobility Interventions: Assess need for specialty bed,Pressure redistribution bed/mattress (bed type),Turn and reposition approx.  every two hours(pillow and wedges)    Nutrition Interventions: Document food/fluid/supplement intake    Friction and Shear Interventions: Lift sheet,Minimize layers                Problem: Patient Education: Go to Patient Education Activity  Goal: Patient/Family Education  Outcome: Progressing Towards Goal     Problem: Falls - Risk of  Goal: *Absence of Falls  Description: Document [facility] Fall Risk and appropriate interventions in the flowsheet.   Outcome: Progressing Towards Goal  Note: Fall Risk Interventions:  Mobility Interventions: Bed/chair exit alarm,Communicate number of staff needed for ambulation/transfer,Patient to call before getting OOB    Mentation Interventions: Adequate sleep, hydration, pain control,Bed/chair exit alarm,Door open when patient unattended,Increase mobility,More frequent rounding,Room close to nurse's station,Reorient patient,Self-releasing belt,Toileting rounds,Update white board    Medication Interventions: Assess postural VS orthostatic hypotension,Bed/chair exit alarm,Patient to call before getting OOB,Teach patient to arise slowly    Elimination Interventions: Bed/chair exit alarm,Call light in reach,Patient to call for help with toileting needs,Stay With Me (per policy),Toilet paper/wipes in reach,Toileting schedule/hourly rounds    History of Falls Interventions: Bed/chair exit alarm,Door open when patient unattended,Room close to nurse's station,Investigate reason for fall         Problem: Patient Education: Go to Patient Education Activity  Goal: Patient/Family Education  Outcome: Progressing Towards Goal     Problem: Patient Education: Go to Patient Education Activity  Goal: Patient/Family Education  Outcome: Progressing Towards Goal     Problem: Cath Lab Procedures: Discharge Outcomes  Goal: *Stable cardiac rhythm  Outcome: Progressing Towards Goal  Goal: *Hemodynamically stable  Outcome: Progressing Towards Goal  Goal: *Optimal pain control at patient's stated goal  Outcome: Progressing Towards Goal  Goal: *Pulses palpable, skin color within defined limits, skin temperature warm  Outcome: Progressing Towards Goal  Goal: *Lungs clear or at baseline  Outcome: Progressing Towards Goal  Goal: *Demonstrates ability to perform prescribed activity without shortness of breath or discomfort  Outcome: Progressing Towards Goal  Goal: *Verbalizes home exercise program, activity guidelines, cardiac precautions  Outcome: Progressing Towards Goal  Goal: *Verbalizes understanding and describes prescribed diet  Outcome: Progressing Towards Goal  Goal: *Verbalizes understanding and describes medication purposes and frequencies  Outcome: Progressing Towards Goal  Goal: *Identifies cardiac risk factors  Outcome: Progressing Towards Goal  Goal: *No signs and symptoms of infection or wound complications  Outcome: Progressing Towards Goal  Goal: *Anxiety reduced or absent  Outcome: Progressing Towards Goal  Goal: *Verbalizes and demonstrates incision care  Outcome: Progressing Towards Goal  Goal: *Understands and describes signs and symptoms to report to providers(Stroke Metric)  Outcome: Progressing Towards Goal  Goal: *Describes follow-up/return visits to physicians  Outcome: Progressing Towards Goal  Goal: *Describes available resources and support systems  Outcome: Progressing Towards Goal  Goal: *Influenza immunization  Outcome: Progressing Towards Goal  Goal: *Pneumococcal immunization  Outcome: Progressing Towards Goal     Problem: AMI: Discharge Outcomes  Goal: *Demonstrates ability to perform prescribed activity without shortness of breath or discomfort  Outcome: Progressing Towards Goal  Goal: *Verbalizes understanding and describes prescribed diet  Outcome: Progressing Towards Goal  Goal: *Describes follow-up/return visits to physicians  Outcome: Progressing Towards Goal  Goal: *Describes home care/support arrangements established based on need  Outcome: Progressing Towards Goal  Goal: *Anxiety reduced or absent  Outcome: Progressing Towards Goal  Goal: *Understands and describes signs and symptoms to report to providers(Stroke Metric)  Outcome: Progressing Towards Goal  Goal: *Verbalizes name, dosage, time, side effects, and number of days to continue medications  Outcome: Progressing Towards Goal

## 2022-04-13 NOTE — PROGRESS NOTES
Problem: Mobility Impaired (Adult and Pediatric)  Goal: *Acute Goals and Plan of Care (Insert Text)  Description: FUNCTIONAL STATUS PRIOR TO ADMISSION: Patient was independent and active without use of DME.     HOME SUPPORT PRIOR TO ADMISSION: The patient lived alone with son (lives on the property) available to provide assistance. Pt reports his wife recently passed away. Physical Therapy Goals  Weekly Reassessment 4/7/2022 (goals down graded)  1. Patient will move from supine to sit and sit to supine  in bed with CGA within 5 days. 2.  Patient will perform sit to/from stand with Min A within 5 days. 3.  Patient will ambulate 150 feet with least restrictive assistive device and Minimum assistance within 5 days. 4.  Patient will ascend/descend 2 stairs with handrail(s) with moderate assistance  within 5 days. 5.  Patient will perform cardiac exercises per protocol with minimum assistance within 5 days. 6.  Patient will verbally recall and functionally demonstrate mindful-based movements (\"move in the tube\") principles with cues within 5 days. Initiated 3/31/2022  1. Patient will move from supine to sit and sit to supine  in bed with modified independence within 5 days. 2.  Patient will perform sit to/from stand with modified independence within 5 days. 3.  Patient will ambulate 300 feet with least restrictive assistive device and modified independence within 5 days. 4.  Patient will ascend/descend 12 stairs with handrail(s) with independence within 5 days. 5.  Patient will perform cardiac exercises per protocol with modified independence within 5 days. 6.  Patient will verbally recall and functionally demonstrate mindful-based movements (\"move in the tube\") principles without cues within 5 days.           Outcome: Progressing Towards Goal    PHYSICAL THERAPY TREATMENT  Patient: Millicent Escobar (89 y.o. male)  Date: 4/13/2022  Diagnosis: CAD (coronary artery disease) [I25.10] <principal problem not specified>  Procedure(s) (LRB):  CORONARY ARTERY BYPASS GRAFT (CABG) X3, LIMA. RSVH VIA EVH. ECC. LAWSON AND EPI AORTIC US BY DR Christiano Hadley (N/A) 14 Days Post-Op  Precautions: Fall,Sternal,Other (comment) (move in the tube)  Chart, physical therapy assessment, plan of care and goals were reviewed. ASSESSMENT  Patient continues with skilled PT services and is progressing towards goals. Pt very active getting up frequently from the chair. Pt was able to increase gait distance but noted decrease right LE stability and walker management. Pt has decrease awareness to \"move in the tube\" principles. Pt is awaiting placement. .     Current Level of Function Impacting Discharge (mobility/balance): Min A     Other factors to consider for discharge: sternal incision, confusion, decrease safety awareness         PLAN :  Patient continues to benefit from skilled intervention to address the above impairments. Continue treatment per established plan of care. to address goals. Recommendation for discharge: (in order for the patient to meet his/her long term goals)  Therapy up to 5 days/week in SNF setting    This discharge recommendation:  Has not yet been discussed the attending provider and/or case management    IF patient discharges home will need the following DME: to be determined (TBD)       SUBJECTIVE:   Patient stated we can do a little.  pt standing up in room with alarm going off.  RN present     OBJECTIVE DATA SUMMARY:   Critical Behavior:  Neurologic State: Alert,Confused  Orientation Level: Oriented to person,Oriented to place,Oriented to time,Disoriented to situation  Cognition: Poor safety awareness  Safety/Judgement: Fall prevention  Functional Mobility Training:  Bed Mobility:                    Transfers:  Sit to Stand: Contact guard assistance  Stand to Sit: Contact guard assistance                             Balance:  Standing: Impaired  Standing - Static: Fair  Standing - Dynamic : Fair  Ambulation/Gait Training:  Distance (ft): 150 Feet (ft)  Assistive Device: Gait belt;Walker, rolling  Ambulation - Level of Assistance: Contact guard assistance;Minimal assistance        Gait Abnormalities: Decreased step clearance (decrease right LE stability)              Speed/Davina: Pace decreased (<100 feet/min)          Stairs: Therapeutic Exercises:     Pain Rating:  No complaints     Activity Tolerance:   Limited     After treatment patient left in no apparent distress:   Sitting in chair, Call bell within reach, and Bed / chair alarm activated    COMMUNICATION/COLLABORATION:   The patients plan of care was discussed with: Registered nurse.      Manjinder Alvarez PTA   Time Calculation: 20 mins

## 2022-04-13 NOTE — PROCEDURES
[address]  PULMONARY FUNCTION TEST    Name:  Charles Cornejo  MR#:  [id number]  :  1945  ACCOUNT #:  [de-identified]  DATE OF SERVICE:  2022      Bedside spirometry was performed. INDICATION:  Preoperative evaluation. FINDINGS:  Reduced FVC, reduced FEV1, normal FEV1/FVC ratio. IMPRESSION:  Mild restrictive ventilatory defect based on spirometry. Clinical correlation is recommended.       Judson Sandoval MD      [id number]  D:  2022 13:31  T:  2022 15:28  JOB #:  [id number]

## 2022-04-14 PROCEDURE — 74011250637 HC RX REV CODE- 250/637: Performed by: NURSE PRACTITIONER

## 2022-04-14 PROCEDURE — 74011250637 HC RX REV CODE- 250/637: Performed by: THORACIC SURGERY (CARDIOTHORACIC VASCULAR SURGERY)

## 2022-04-14 PROCEDURE — 97535 SELF CARE MNGMENT TRAINING: CPT

## 2022-04-14 PROCEDURE — 74011250636 HC RX REV CODE- 250/636: Performed by: NURSE PRACTITIONER

## 2022-04-14 PROCEDURE — 74011000250 HC RX REV CODE- 250: Performed by: NURSE PRACTITIONER

## 2022-04-14 PROCEDURE — 74011250637 HC RX REV CODE- 250/637: Performed by: PHYSICIAN ASSISTANT

## 2022-04-14 PROCEDURE — 65660000001 HC RM ICU INTERMED STEPDOWN

## 2022-04-14 RX ORDER — HEPARIN SODIUM 5000 [USP'U]/ML
5000 INJECTION, SOLUTION INTRAVENOUS; SUBCUTANEOUS EVERY 12 HOURS
Status: DISCONTINUED | OUTPATIENT
Start: 2022-04-14 | End: 2022-04-20 | Stop reason: HOSPADM

## 2022-04-14 RX ADMIN — SODIUM CHLORIDE, PRESERVATIVE FREE 10 ML: 5 INJECTION INTRAVENOUS at 18:18

## 2022-04-14 RX ADMIN — SODIUM CHLORIDE, PRESERVATIVE FREE 10 ML: 5 INJECTION INTRAVENOUS at 21:13

## 2022-04-14 RX ADMIN — HEPARIN SODIUM 5000 UNITS: 5000 INJECTION INTRAVENOUS; SUBCUTANEOUS at 08:48

## 2022-04-14 RX ADMIN — Medication 400 MG: at 08:46

## 2022-04-14 RX ADMIN — SPIRONOLACTONE 12.5 MG: 25 TABLET ORAL at 08:46

## 2022-04-14 RX ADMIN — SENNOSIDES AND DOCUSATE SODIUM 1 TABLET: 50; 8.6 TABLET ORAL at 08:46

## 2022-04-14 RX ADMIN — SODIUM CHLORIDE, PRESERVATIVE FREE 10 ML: 5 INJECTION INTRAVENOUS at 07:03

## 2022-04-14 RX ADMIN — ATORVASTATIN CALCIUM 40 MG: 40 TABLET, FILM COATED ORAL at 21:13

## 2022-04-14 RX ADMIN — METOPROLOL TARTRATE 25 MG: 25 TABLET, FILM COATED ORAL at 21:13

## 2022-04-14 RX ADMIN — Medication 400 MG: at 18:17

## 2022-04-14 RX ADMIN — QUETIAPINE FUMARATE 100 MG: 100 TABLET ORAL at 21:13

## 2022-04-14 RX ADMIN — CHLORHEXIDINE GLUCONATE 10 ML: 1.2 RINSE ORAL at 08:46

## 2022-04-14 RX ADMIN — FAMOTIDINE 20 MG: 20 TABLET, FILM COATED ORAL at 07:03

## 2022-04-14 RX ADMIN — TAMSULOSIN HYDROCHLORIDE 0.4 MG: 0.4 CAPSULE ORAL at 08:46

## 2022-04-14 RX ADMIN — POLYETHYLENE GLYCOL 3350 17 G: 17 POWDER, FOR SOLUTION ORAL at 08:47

## 2022-04-14 RX ADMIN — METOPROLOL TARTRATE 25 MG: 25 TABLET, FILM COATED ORAL at 08:46

## 2022-04-14 RX ADMIN — QUETIAPINE FUMARATE 25 MG: 25 TABLET ORAL at 08:46

## 2022-04-14 RX ADMIN — LEVOTHYROXINE SODIUM 75 MCG: 0.07 TABLET ORAL at 07:03

## 2022-04-14 RX ADMIN — HALOPERIDOL LACTATE 3 MG: 5 INJECTION, SOLUTION INTRAMUSCULAR at 00:46

## 2022-04-14 RX ADMIN — CHLORHEXIDINE GLUCONATE 10 ML: 1.2 RINSE ORAL at 21:14

## 2022-04-14 RX ADMIN — SENNOSIDES AND DOCUSATE SODIUM 1 TABLET: 50; 8.6 TABLET ORAL at 18:17

## 2022-04-14 RX ADMIN — ASPIRIN 81 MG CHEWABLE TABLET 81 MG: 81 TABLET CHEWABLE at 08:46

## 2022-04-14 RX ADMIN — HEPARIN SODIUM 5000 UNITS: 5000 INJECTION INTRAVENOUS; SUBCUTANEOUS at 21:14

## 2022-04-14 NOTE — PROGRESS NOTES
0730: Bedside and Verbal shift change report given to Melissa Caraballo RN (oncoming nurse) by Owen Conner RN (offgoing nurse). Report included the following information SBAR, Kardex, Intake/Output, MAR, Recent Results and Cardiac Rhythm Sinus Rhythm. 0800: Patient is tired and refusing to get out of bed at this time. 1145: Patient refusing to walk around unit, but placed up in recliner at this time. 1540: Patient walked with PT and placed in recliner. 1830: Walked around room with patient and placed patient in recliner. 1930: Bedside and Verbal shift change report given to Catalina RN (oncoming nurse) by Melissa Caraballo RN (offgoing nurse). Report included the following information SBAR, Kardex, Intake/Output, MAR, Recent Results and Cardiac Rhythm Sinus Rhythm. Care Plans:   Problem: Pain  Goal: *Control of Pain  Outcome: Progressing Towards Goal  Goal: *PALLIATIVE CARE:  Alleviation of Pain  Outcome: Progressing Towards Goal     Problem: Patient Education: Go to Patient Education Activity  Goal: Patient/Family Education  Outcome: Progressing Towards Goal     Problem: Pressure Injury - Risk of  Goal: *Prevention of pressure injury  Description: Document Asim Scale and appropriate interventions in the flowsheet. Outcome: Progressing Towards Goal  Note: Pressure Injury Interventions:  Sensory Interventions: Assess changes in LOC,Assess need for specialty bed,Check visual cues for pain,Keep linens dry and wrinkle-free,Maintain/enhance activity level,Pressure redistribution bed/mattress (bed type),Turn and reposition approx.  every two hours (pillows and wedges if needed)    Moisture Interventions: Absorbent underpads,Maintain skin hydration (lotion/cream)    Activity Interventions: Assess need for specialty bed,Increase time out of bed,Pressure redistribution bed/mattress(bed type),PT/OT evaluation    Mobility Interventions: Assess need for specialty bed,Pressure redistribution bed/mattress (bed type),PT/OT evaluation,Turn and reposition approx. every two hours(pillow and wedges)    Nutrition Interventions: Document food/fluid/supplement intake    Friction and Shear Interventions: Lift sheet                Problem: Patient Education: Go to Patient Education Activity  Goal: Patient/Family Education  Outcome: Progressing Towards Goal     Problem: Falls - Risk of  Goal: *Absence of Falls  Description: Document Maricruz Fall Risk and appropriate interventions in the flowsheet.   Outcome: Progressing Towards Goal  Note: Fall Risk Interventions:  Mobility Interventions: Bed/chair exit alarm,Communicate number of staff needed for ambulation/transfer,OT consult for ADLs,Patient to call before getting OOB,PT Consult for mobility concerns,PT Consult for assist device competence,Strengthening exercises (ROM-active/passive),Utilize gait belt for transfers/ambulation    Mentation Interventions: Adequate sleep, hydration, pain control,Bed/chair exit alarm,Door open when patient unattended,Evaluate medications/consider consulting pharmacy,Increase mobility,More frequent rounding,Reorient patient,Room close to nurse's station,Self-releasing belt,Toileting rounds,Update white board    Medication Interventions: Evaluate medications/consider consulting pharmacy,Patient to call before getting OOB,Teach patient to arise slowly,Utilize gait belt for transfers/ambulation,Bed/chair exit alarm    Elimination Interventions: Bed/chair exit alarm,Call light in reach,Patient to call for help with toileting needs,Stay With Me (per policy),Urinal in reach,Toileting schedule/hourly rounds    History of Falls Interventions: Bed/chair exit alarm,Consult care management for discharge planning,Door open when patient unattended,Evaluate medications/consider consulting pharmacy,Investigate reason for fall,Room close to nurse's station,Utilize gait belt for transfer/ambulation         Problem: Patient Education: Go to Patient Education Activity  Goal: Patient/Family Education  Outcome: Progressing Towards Goal     Problem: Patient Education: Go to Patient Education Activity  Goal: Patient/Family Education  Outcome: Progressing Towards Goal     Problem: Cath Lab Procedures: Discharge Outcomes  Goal: *Stable cardiac rhythm  Outcome: Progressing Towards Goal  Goal: *Hemodynamically stable  Outcome: Progressing Towards Goal  Goal: *Optimal pain control at patient's stated goal  Outcome: Progressing Towards Goal  Goal: *Pulses palpable, skin color within defined limits, skin temperature warm  Outcome: Progressing Towards Goal  Goal: *Lungs clear or at baseline  Outcome: Progressing Towards Goal  Goal: *Demonstrates ability to perform prescribed activity without shortness of breath or discomfort  Outcome: Progressing Towards Goal  Goal: *Verbalizes home exercise program, activity guidelines, cardiac precautions  Outcome: Progressing Towards Goal  Goal: *Verbalizes understanding and describes prescribed diet  Outcome: Progressing Towards Goal  Goal: *Verbalizes understanding and describes medication purposes and frequencies  Outcome: Progressing Towards Goal  Goal: *Identifies cardiac risk factors  Outcome: Progressing Towards Goal  Goal: *No signs and symptoms of infection or wound complications  Outcome: Progressing Towards Goal  Goal: *Anxiety reduced or absent  Outcome: Progressing Towards Goal  Goal: *Verbalizes and demonstrates incision care  Outcome: Progressing Towards Goal  Goal: *Understands and describes signs and symptoms to report to providers(Stroke Metric)  Outcome: Progressing Towards Goal  Goal: *Describes follow-up/return visits to physicians  Outcome: Progressing Towards Goal  Goal: *Describes available resources and support systems  Outcome: Progressing Towards Goal  Goal: *Influenza immunization  Outcome: Progressing Towards Goal  Goal: *Pneumococcal immunization  Outcome: Progressing Towards Goal     Problem: Patient Education: Go to Patient Education Activity  Goal: Patient/Family Education  Outcome: Progressing Towards Goal     Problem: AMI: Discharge Outcomes  Goal: *Demonstrates ability to perform prescribed activity without shortness of breath or discomfort  Outcome: Progressing Towards Goal  Goal: *Verbalizes understanding and describes prescribed diet  Outcome: Progressing Towards Goal  Goal: *Describes follow-up/return visits to physicians  Outcome: Progressing Towards Goal  Goal: *Describes home care/support arrangements established based on need  Outcome: Progressing Towards Goal  Goal: *Anxiety reduced or absent  Outcome: Progressing Towards Goal  Goal: *Understands and describes signs and symptoms to report to providers(Stroke Metric)  Outcome: Progressing Towards Goal  Goal: *Verbalizes name, dosage, time, side effects, and number of days to continue medications  Outcome: Progressing Towards Goal

## 2022-04-14 NOTE — FORENSIC NURSE
Code [place] safety plan- Continue to reorient patient to surroundings, patient currently in 2 point soft wrist restraints and medicated with haldol for agitation.

## 2022-04-14 NOTE — PROGRESS NOTES
Chart reviewed and attempted to treat pt however pt observed supine in bed, snoring, and not responding to verbal cues. Will hold therapy at this time and f/u as able and appropriate.      Marcin Hanson PT, DPT

## 2022-04-14 NOTE — PROGRESS NOTES
[facility] ICU Progress Note    Admit Date: 3/29/2022  POD:  15 Days Post-Op    Procedure:  Procedure(s):  CORONARY ARTERY BYPASS GRAFT (CABG) X3, LIMA. RSVH VIA EVH. ECC. LAWSON AND EPI AORTIC US BY DR Bebeto De Guzman        Subjective:   Pt seen with Dr. rAiel Lawrence   combative again last night. Treated with haldol. RA. Medically ready for discharge. Awaiting insurance authorization for SNF. Objective:   Vitals:  Blood pressure 107/66, pulse (!) 58, temperature 98.5 °F (36.9 °C), resp. rate 18, height 5' 8\" (1.727 m), weight 163 lb 9.3 oz (74.2 kg), SpO2 94 %. Temp (24hrs), Av.3 °F (36.8 °C), Min:97.7 °F (36.5 °C), Max:98.6 °F (37 °C)    Hemodynamics:   CO: CO (l/min): 5 l/min   CI: CI (l/min/m2): 2.7 l/min/m2   CVP: CVP (mmHg): 14 mmHg (22 0600)   SVR: SVR (dyne*sec)/cm5: 945 (dyne*sec)/cm5 (22 4095)   PAP Systolic: PAP Systolic: 42 (19/34/54 7561)   PAP Diastolic: PAP Diastolic: 16 (99/37/97 8176)   PVR:     SV02: SVO2 (%): 61 % (22 0600)   SCV02:      EKG/Rhythm:  SR:     Oxygen Therapy: room air  Oxygen Therapy  O2 Sat (%): 94 % (22 1137)  Pulse via Oximetry:  (94) (22 0800)  O2 Device: None (Room air) (22 1137)  Skin Assessment: Clean, dry, & intact (22)  Skin Protection for O2 Device: N/A (22)  O2 Flow Rate (L/min): 2 l/min (22 1600)  O2 Temperature: 80 °F (26.7 °C) (22 1631)  FIO2 (%): 80 % (22 1629)    CXR: n/a    Admission Weight: Last Weight   Weight: 167 lb 1.7 oz (75.8 kg) Weight: 163 lb 9.3 oz (74.2 kg)     Intake / Output / Drain:  Current Shift:  0701 -  1900  In: 200 [P.O.:200]  Out: 0   Last 24 hrs.: inaccurate    Intake/Output Summary (Last 24 hours) at 2022 1254  Last data filed at 2022 1137  Gross per 24 hour   Intake 720 ml   Output 725 ml   Net -5 ml       EXAM:  General:   WDWN man lying in bed in NAD.                                                                                             Lungs:   Clear to auscultation bilaterally. Diminished in bases only. RA. Unlabored. Incision:  No erythema, drainage or swelling. Heart:  Regular rate and rhythm. + murmur across precordium. Abdomen:   Soft, non-tender. Bowel sounds normal. No masses,  No organomegaly. Extremities:  No edema. PPP. Neurologic:  Gross motor and sensory apparatus intact. O to name, hospital.  Not month. Calm. Following commands. 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  CAD s/p CABG x3 LIMA to LAD, RSVH to PDA:  ASA, statin, ACEi, BB     2. Renal insufficiency: Creatinine stable at 1.56 this AM but had been trending up. Continue to hold Lasix. Bladder scan, if any sign of retention. Flomax. Holding ACE I and will follow creatinine tomorrow.      3.  HTN:  BB     4. Acute combined systolic and diastolic HF: LV EF 57% on LHC. EF improved by LAWSON in OR to 45%-50%. Repeat TTE prior to discharge showing LVEF of 35-40% with Grade 2 diastolic dysfunction. GDMT and BP management as above. (BB, ACEi) Spironolactone-monitor electrolytes.      5. Chronic dementia/acute delirium:  Per pt's children, some agitation/outbursts noted at times prior to surgery. Head CT showing chronic microvascular changes. Improving. Continue Seroquel bid.        6. Hypothyroid:  synthroid     7. GI/DVT px: Famotidine/Enoxaparin. Ambulation/OOB     8. Thrombocytopenia: Resolved.     9. Anemia: secondary to acute blood loss following surgery. Currently above transfusion threshold. Continue Iron. Monitor CBC.       Dispo: PT/OT/Cardiac Rehab.  Case Management for discharge planning.  Plan for [facility] when Insurance approves-in the next 24-48 hours.        Signed By: Dyan Mario, NP

## 2022-04-14 NOTE — PROGRESS NOTES
2015: Bedside and Verbal shift change report given to [facility], RN (oncoming nurse) by Edelmira Crooks RN (offgoing nurse). Report included the following information SBAR, Kardex, OR Summary, Procedure Summary, Intake/Output, MAR, Recent Results and Cardiac Rhythm NSR - sinus sylvie. [id number]: Pt becoming increasingly confused. Consistently only oriented to self. Disoriented to place and situation. When asked by RN where he was, pt responded, \"I'm at home and I need to go downstairs to get my medicine. \" Initially pt was able to be re-directed back to bed, but after multiple attempts to redirect patient, he started to become more agitated. 8406: Pt got up out of bed, and when RN tried to redirect patient back to bed, he attempted to bite RN. Pt assisted to recliner, however, he is still agitated and belligerent. Pt attempted multiple times to push RN away to try and leave room. Pt also cursing and swearing at staff. 0045: prn haldol given per [facility] for agitation. 0100: Code [place] called. Pt still confused, attempting to leave unit. He insist that he is at home and just needs his medications. Every time staff tries to redirect him, he becomes agitated and starts trying to push staff. 0130: Spoke with LUIS Palm. Received order for bilateral wrist restraints.

## 2022-04-14 NOTE — PROGRESS NOTES
Problem: Self Care Deficits Care Plan (Adult)  Goal: *Acute Goals and Plan of Care (Insert Text)  Description: FUNCTIONAL STATUS PRIOR TO ADMISSION: Patient was independent and active without use of DME. 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 Patient with mild cognitive impairments and unclear if he was still doing these activities. 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 Recently patient's wife passed. Occupational Therapy Goals: goals modified below 4/7/22  Initiated 3/31/2022  1. Patient will perform ADLs standing 5 mins without fatigue or LOB with supervision/set-up within 5 day(s). (grooming at sink with least restrictive DME 4/7/22 with min A, met and upgrade to supervision 4/14/22)  2. Patient will perform lower body ADLs with supervision/set-up within 5 day(s). (min A 4/7/22, continue 4/14)  3. Patient will perform gathering ADL items high and low 2/2 with supervision/set-up within 5 day(s). (discontinue, not appropriate 4/7/22)  4. Patient will perform toilet transfers with supervision/set-up within 5 day(s). (min A 4/7/22, upgrade to supervision 4/14)  5. Patient will perform all aspects of toileting with supervision/set-up within 5 day(s). (min A 4/7/22, upgrade to supervision 4/7/22)  6. Patient will participate in cardiac/sternal upper extremity therapeutic exercise/activities to increase independence with ADLs with supervision/set-up for 5 minutes within 5 day(s). (continue 4/7/22, 4/14)      Outcome: Progressing Towards Goal       OCCUPATIONAL THERAPY TREATMENT/WEEKLY RE-ASSESSMENT  Patient: Dutch Bello (74 y.o. male)  Date: 4/14/2022  Diagnosis: CAD (coronary artery disease) [I25.10] <principal problem not specified>  Procedure(s) (LRB):  CORONARY ARTERY BYPASS GRAFT (CABG) X3, LIMA. RSVH VIA EVH. ECC.  LAWSON AND EPI AORTIC US BY DR Jovon Meza (N/A) 15 Days Post-Op  Precautions: Fall,Sternal,Other (comment) (move in the tube)  Chart, occupational therapy assessment, plan of care, and goals were reviewed. ASSESSMENT  Patient continues with skilled OT services and is progressing towards goals. Pt cooperative and agreeable to OOB activity. Pt required CGA to stand with pt able to transfer onto scale with CGA. Pt mobilized entire CVSU unit with RW and CGA with VSS. Without a seated nor standing rest break, pt completed standing ADLs at sink x 5 minutes with SBA. One verbal cue for hand placement during stand to sit. Continue to recommend SNF rehab at discharge. If home, pt would benefit from HHOT,PT and family assist/supervision for fall prevention, move in the tube adherence. Patient is not verbalizing and is not demonstrating understanding of mindful-based movements (\"move in the tube\") principles of keeping UEs proximal to ribcage to prevent lateral pull on the sternum during load-bearing activities with verbal cues required for compliance. Current Level of Function Impacting Discharge (ADLs): CGA, RW, chronic R knee issues    Other factors to consider for discharge: hx dementia          PLAN :  Patient continues to benefit from skilled intervention to address the above impairments. Continue treatment per established plan of care to address goals. Recommend with staff: OOB to chair, bathroom, ambulate with RW A x 1 and gait belt    Recommend next OT session: ADLs per POC    Recommendation for discharge: (in order for the patient to meet his/her long term goals)  Therapy up to 5 days/week in SNF setting    This discharge recommendation:  Has been made in collaboration with the attending provider and/or case management    IF patient discharges home will need the following DME: TBD       SUBJECTIVE:   Patient stated Muriel Gutting that was wonderful.     OBJECTIVE DATA SUMMARY:   Cognitive/Behavioral Status:  Neurologic State: Alert;Confused  Orientation Level: Oriented to person;Oriented to place; Disoriented to time;Disoriented to situation  Cognition: Follows commands             Functional Mobility and Transfers for ADLs:  Bed Mobility:  Sit to Supine: Moderate assistance (in R side lying for upper trunk mgmt)    Transfers:  Sit to Stand: Contact guard assistance     Bed to Chair: Contact guard assistance;Assist x1    Balance:  Sitting: Intact  Standing: Impaired; Without support  Standing - Static: Good (when using RW)  Standing - Dynamic : Fair    ADL Intervention:       Grooming  Position Performed: Standing  Washing Face: Stand-by assistance  Washing Hands: Stand-by assistance  Brushing/Combing Hair: Stand-by assistance              Patient instructed and educated on mindful movement principles based on Move in The Tube concept to include maintaining bilateral elbows close to rib cage when performing any load-bearing activity such as getting in/out of bed, pushing up from a chair, opening a door, or lifting a box. Pain:  No complaints    Activity Tolerance:   Good    After treatment patient left in no apparent distress:   Sitting in chair, Call bell within reach, and Bed / chair alarm activated    COMMUNICATION/COLLABORATION:   The patients plan of care was discussed with: Physical therapist and Registered nurse.      Nan Sánchez OT  Time Calculation: 16 mins

## 2022-04-15 PROCEDURE — 74011000250 HC RX REV CODE- 250: Performed by: NURSE PRACTITIONER

## 2022-04-15 PROCEDURE — 74011250637 HC RX REV CODE- 250/637: Performed by: THORACIC SURGERY (CARDIOTHORACIC VASCULAR SURGERY)

## 2022-04-15 PROCEDURE — 74011250636 HC RX REV CODE- 250/636: Performed by: NURSE PRACTITIONER

## 2022-04-15 PROCEDURE — 74011250637 HC RX REV CODE- 250/637: Performed by: NURSE PRACTITIONER

## 2022-04-15 PROCEDURE — 74011250637 HC RX REV CODE- 250/637: Performed by: PHYSICIAN ASSISTANT

## 2022-04-15 PROCEDURE — 97116 GAIT TRAINING THERAPY: CPT

## 2022-04-15 PROCEDURE — 97110 THERAPEUTIC EXERCISES: CPT

## 2022-04-15 PROCEDURE — 65660000001 HC RM ICU INTERMED STEPDOWN

## 2022-04-15 RX ADMIN — POLYETHYLENE GLYCOL 3350 17 G: 17 POWDER, FOR SOLUTION ORAL at 09:08

## 2022-04-15 RX ADMIN — ASPIRIN 81 MG CHEWABLE TABLET 81 MG: 81 TABLET CHEWABLE at 09:08

## 2022-04-15 RX ADMIN — METOPROLOL TARTRATE 25 MG: 25 TABLET, FILM COATED ORAL at 09:09

## 2022-04-15 RX ADMIN — FAMOTIDINE 20 MG: 20 TABLET, FILM COATED ORAL at 07:29

## 2022-04-15 RX ADMIN — TAMSULOSIN HYDROCHLORIDE 0.4 MG: 0.4 CAPSULE ORAL at 09:09

## 2022-04-15 RX ADMIN — HEPARIN SODIUM 5000 UNITS: 5000 INJECTION INTRAVENOUS; SUBCUTANEOUS at 21:36

## 2022-04-15 RX ADMIN — SPIRONOLACTONE 12.5 MG: 25 TABLET ORAL at 09:08

## 2022-04-15 RX ADMIN — Medication 400 MG: at 09:08

## 2022-04-15 RX ADMIN — METOPROLOL TARTRATE 25 MG: 25 TABLET, FILM COATED ORAL at 21:36

## 2022-04-15 RX ADMIN — SODIUM CHLORIDE, PRESERVATIVE FREE 10 ML: 5 INJECTION INTRAVENOUS at 17:43

## 2022-04-15 RX ADMIN — SODIUM CHLORIDE, PRESERVATIVE FREE 10 ML: 5 INJECTION INTRAVENOUS at 09:10

## 2022-04-15 RX ADMIN — CHLORHEXIDINE GLUCONATE 10 ML: 1.2 RINSE ORAL at 09:08

## 2022-04-15 RX ADMIN — LEVOTHYROXINE SODIUM 75 MCG: 0.07 TABLET ORAL at 07:29

## 2022-04-15 RX ADMIN — ATORVASTATIN CALCIUM 40 MG: 40 TABLET, FILM COATED ORAL at 21:36

## 2022-04-15 RX ADMIN — QUETIAPINE FUMARATE 100 MG: 100 TABLET ORAL at 21:36

## 2022-04-15 RX ADMIN — Medication 400 MG: at 17:43

## 2022-04-15 RX ADMIN — SENNOSIDES AND DOCUSATE SODIUM 1 TABLET: 50; 8.6 TABLET ORAL at 09:08

## 2022-04-15 RX ADMIN — SODIUM CHLORIDE, PRESERVATIVE FREE 10 ML: 5 INJECTION INTRAVENOUS at 23:14

## 2022-04-15 RX ADMIN — QUETIAPINE FUMARATE 25 MG: 25 TABLET ORAL at 09:09

## 2022-04-15 RX ADMIN — HEPARIN SODIUM 5000 UNITS: 5000 INJECTION INTRAVENOUS; SUBCUTANEOUS at 09:09

## 2022-04-15 RX ADMIN — SENNOSIDES AND DOCUSATE SODIUM 1 TABLET: 50; 8.6 TABLET ORAL at 17:43

## 2022-04-15 NOTE — PROGRESS NOTES
[facility] FLO[facility] Progress Note    Admit Date: 3/29/2022  POD: 16 Days Post-Op      Procedure:  Procedure(s):  CORONARY ARTERY BYPASS GRAFT (CABG) X3, LIMA. RSVH VIA EVH. ECC. LAWSON AND EPI AORTIC US BY DR Samantha Garces    Subjective:   Pt seen with Dr. Cece Arias authorization from [facility] for SNF. Pt medically ready for discharge. Had confusion at 5 am.      Objective:     Visit Vitals  /63 (BP 1 Location: Right upper arm, BP Patient Position: At rest)   Pulse 68   Temp 97.9 °F (36.6 °C)   Resp 18   Ht 5' 8\" (1.727 m)   Wt 158 lb 4.6 oz (71.8 kg)   SpO2 97%   BMI 24.07 kg/m²     Temp (24hrs), Av.4 °F (36.9 °C), Min:97.9 °F (36.6 °C), Max:98.9 °F (37.2 °C)      Last 24hr Input/Output:    Intake/Output Summary (Last 24 hours) at 4/15/2022 1635  Last data filed at 4/15/2022 1523  Gross per 24 hour   Intake 500 ml   Output 605 ml   Net -105 ml        EKG/Rhythm: SR      Oxygen: room air. Admission Weight: Last Weight   Weight: 167 lb 1.7 oz (75.8 kg) Weight: 158 lb 4.6 oz (71.8 kg)       EXAM:  General:  WDWN man in NAD lying in bed. Sleeping on and off during the day. Lungs:   Clear to auscultation bilaterally. Incision:  No erythema, drainage or swelling. Heart:  Regular rate and rhythm. + murmur 3-4/6 across precordium. Abdomen:   Soft, non-tender. Bowel sounds normal. No masses,  No organomegaly. BM today. Extremities:  No edema. PPP   Neurologic:  Gross motor and sensory apparatus intact. Oriented to self, president. Knows his wife . Thinks he is in rehab. No focal deficit. Activity: ambulates with supervision. Diet:  Cardiac. Lab Data Reviewed:   Recent Labs     22  0139   WBC 8.9   HGB 10.1*   HCT 31.2*      *   K 4.6   BUN 25*   CREA 1.56*   *         Assessment:     Active Problems:    CAD (coronary artery disease) (3/29/2022)      S/P CABG x 3 (3/30/2022)      Overview:  On pump CORONARY ARTERY BYPASS GRAFT (CABG) X3, LIMA to LAD, RSVH to PDA,       RSVG to Ramus      R [facility]                   Plan/Recommendations/Medical Decision Makin.  CAD s/p CABG x3 LIMA to LAD, RSVH to PDA:  ASA, statin, ACEi, BB     2. Renal insufficiency: Creatinine stable at 1.56 this AM but had been trending up. Continue to hold Lasix.  Bladder scan, if any sign of retention. Flomax. Holding ACE I and will follow creatinine tomorrow. 3. HTN:  BB     4. Acute combined systolic and diastolic HF: LV EF 93% on LHC. EF improved by LAWSON in OR to 45%-50%. Repeat TTE prior to discharge showing LVEF of 35-40% with Grade 2 diastolic dysfunction. GDMT and BP management as above. (BB, ACEi) Spironolactone-monitor electrolytes.      5. Chronic dementia/acute delirium:  Per pt's children, some agitation/outbursts noted at times prior to surgery. Head CT showing chronic microvascular changes. Improving. Continue Seroquel bid.        6. Hypothyroid:  synthroid     7. GI/DVT px: Famotidine/Enoxaparin. Ambulation/OOB     8. Thrombocytopenia: Resolved.     9. Anemia: secondary to acute blood loss following surgery.  Currently above transfusion threshold.  Continue Iron.         Dispo: PT/OT/Cardiac Rehab. Case Management for discharge planning. [facility] when Insurance approves-in the next 24-48 hours.   Still awaiting insurance authorization. D/W . She is actively pursuing authorization.      Signed By: Zo Reyes NP

## 2022-04-15 NOTE — PROGRESS NOTES
Bedside and Verbal shift change report given to Kaur Valerio RN (oncoming nurse) by Avi Christian RN (offgoing nurse). Report included the following information SBAR, Kardex, ED Summary, OR Summary, Procedure Summary and Cardiac Rhythm SR.      2100: Gave Patient all PM medications. Patient tolerated well. AOx2 at time. 0000: Gave patient water and snacks and helped patient void at side of bed. Patient remains calm, cooperative and understanding. RN continues to reorient patient to time and place. 0400: Patient continues to be redirectable and cooperative. 0500: Patient began becoming agitated at alarms and threw heart monitor box on bed. RN redirected patient back to bed, offered decaf coffee and snacks to past the time. Bedside and Verbal shift change report given to Avi Christian RN (oncoming nurse) by Kaur Valerio RN (offgoing nurse). Report included the following information SBAR, Kardex and Cardiac Rhythm SR. Pressure Injury Interventions:  Sensory Interventions: Keep linens dry and wrinkle-free,Minimize linen layers     Moisture Interventions: Absorbent underpads,Limit adult briefs,Minimize layers     Activity Interventions: Increase time out of bed,PT/OT evaluation     Mobility Interventions: Turn and reposition approx.  every two hours(pillow and wedges)     Nutrition Interventions: Document food/fluid/supplement intake,Offer support with meals,snacks and hydration     Friction and Shear Interventions: Lift sheet,Minimize layers    Note  Fall Risk Interventions:  Mobility Interventions: Bed/chair exit alarm     Mentation Interventions: Bed/chair exit alarm,More frequent rounding,Reorient patient,Room close to nurse's station,Update white board     Medication Interventions: Bed/chair exit alarm,Teach patient to arise slowly     Elimination Interventions: Bed/chair exit alarm,Call light in reach,Stay With Me (per policy),Urinal in reach     History of Falls Interventions: Bed/chair exit alarm,Door open when patient unattended,Room close to nurse's station      Bedside and Verbal shift change report given to Shantelle Tam RN (oncoming nurse) by Ana Denise RN (offgoing nurse).  Report included the following information SBAR, Kardex and Cardiac Rhythm SR.

## 2022-04-15 NOTE — PROGRESS NOTES
Transitions of Care Plan  · RUR: 16% - moderate  · Clinical Update: SNF d/c; awaiting insurance auth  · Consults: Therapy  · Baseline: independent without DME; resides on same property as son, Vipul Jack  · Barriers to Discharge: placement; insurance authorization  · Disposition: [facility] auth started  · Estimated Discharge Date: 4/16/22    7633 - CM called [facility] (p: [phone]) and requested update on insurance authorization. Business office to call CM back with update shortly. 56 - CM received call from [facility] - no update on authorization at this time. Business Office will call Steve today. CM will continue to follow.     Sonido Hunt, MPH  Care Manager [facility]  Available via Youmiam or

## 2022-04-15 NOTE — PROGRESS NOTES
0730: Bedside and Verbal shift change report given to Maribel Paz RN (oncoming nurse) by Fermin Smith RN (offgoing nurse). Report included the following information SBAR, Kardex, Intake/Output, MAR, Recent Results, and Cardiac Rhythm Sinus Rhythm . 0830: Patient refusing to get out of bed at this time. 1023: Patient refusing to get out of bed at this time. 1135: Spoke with Pedro Prasad NP about patients current BP of 93/53. No new orders received at this time. 1300: Patient walked around unit and placed in recliner at this time. 1400: Patient placed in recliner at this time. 1613: Patient walked with PT and placed back in recliner at this time. 1930: Bedside and Verbal shift change report given to Chuck Torrez RN (oncoming nurse) by Maribel Paz RN (offgoing nurse). Report included the following information SBAR, Kardex, Intake/Output, MAR, Recent Results and Cardiac Rhythm Sinus Rhythm. Care Plans:   Problem: Pain  Goal: *Control of Pain  Outcome: Progressing Towards Goal  Goal: *PALLIATIVE CARE:  Alleviation of Pain  Outcome: Progressing Towards Goal     Problem: Patient Education: Go to Patient Education Activity  Goal: Patient/Family Education  Outcome: Progressing Towards Goal     Problem: Pressure Injury - Risk of  Goal: *Prevention of pressure injury  Description: Document Asim Scale and appropriate interventions in the flowsheet. Outcome: Progressing Towards Goal  Note: Pressure Injury Interventions:  Sensory Interventions: Keep linens dry and wrinkle-free,Minimize linen layers    Moisture Interventions: Absorbent underpads,Limit adult briefs,Minimize layers    Activity Interventions: Increase time out of bed,PT/OT evaluation    Mobility Interventions: Turn and reposition approx.  every two hours(pillow and wedges)    Nutrition Interventions: Document food/fluid/supplement intake,Offer support with meals,snacks and hydration    Friction and Shear Interventions: Lift sheet,Minimize layers Problem: Patient Education: Go to Patient Education Activity  Goal: Patient/Family Education  Outcome: Progressing Towards Goal     Problem: Falls - Risk of  Goal: *Absence of Falls  Description: Document Sarah Beth Hairston Fall Risk and appropriate interventions in the flowsheet.   Outcome: Progressing Towards Goal  Note: Fall Risk Interventions:  Mobility Interventions: Bed/chair exit alarm    Mentation Interventions: Bed/chair exit alarm,More frequent rounding,Reorient patient,Room close to nurse's station,Update white board    Medication Interventions: Bed/chair exit alarm,Teach patient to arise slowly    Elimination Interventions: Bed/chair exit alarm,Call light in reach,Stay With Me (per policy),Urinal in reach    History of Falls Interventions: Bed/chair exit alarm,Door open when patient unattended,Room close to nurse's station         Problem: Patient Education: Go to Patient Education Activity  Goal: Patient/Family Education  Outcome: Progressing Towards Goal     Problem: Patient Education: Go to Patient Education Activity  Goal: Patient/Family Education  Outcome: Progressing Towards Goal     Problem: Cath Lab Procedures: Discharge Outcomes  Goal: *Stable cardiac rhythm  Outcome: Progressing Towards Goal  Goal: *Hemodynamically stable  Outcome: Progressing Towards Goal  Goal: *Optimal pain control at patient's stated goal  Outcome: Progressing Towards Goal  Goal: *Pulses palpable, skin color within defined limits, skin temperature warm  Outcome: Progressing Towards Goal  Goal: *Lungs clear or at baseline  Outcome: Progressing Towards Goal  Goal: *Demonstrates ability to perform prescribed activity without shortness of breath or discomfort  Outcome: Progressing Towards Goal  Goal: *Verbalizes home exercise program, activity guidelines, cardiac precautions  Outcome: Progressing Towards Goal  Goal: *Verbalizes understanding and describes prescribed diet  Outcome: Progressing Towards Goal  Goal: *Verbalizes understanding and describes medication purposes and frequencies  Outcome: Progressing Towards Goal  Goal: *Identifies cardiac risk factors  Outcome: Progressing Towards Goal  Goal: *No signs and symptoms of infection or wound complications  Outcome: Progressing Towards Goal  Goal: *Anxiety reduced or absent  Outcome: Progressing Towards Goal  Goal: *Verbalizes and demonstrates incision care  Outcome: Progressing Towards Goal  Goal: *Understands and describes signs and symptoms to report to providers(Stroke Metric)  Outcome: Progressing Towards Goal  Goal: *Describes follow-up/return visits to physicians  Outcome: Progressing Towards Goal  Goal: *Describes available resources and support systems  Outcome: Progressing Towards Goal  Goal: *Influenza immunization  Outcome: Progressing Towards Goal  Goal: *Pneumococcal immunization  Outcome: Progressing Towards Goal     Problem: AMI: Discharge Outcomes  Goal: *Demonstrates ability to perform prescribed activity without shortness of breath or discomfort  Outcome: Progressing Towards Goal  Goal: *Verbalizes understanding and describes prescribed diet  Outcome: Progressing Towards Goal  Goal: *Describes follow-up/return visits to physicians  Outcome: Progressing Towards Goal  Goal: *Describes home care/support arrangements established based on need  Outcome: Progressing Towards Goal  Goal: *Anxiety reduced or absent  Outcome: Progressing Towards Goal  Goal: *Understands and describes signs and symptoms to report to providers(Stroke Metric)  Outcome: Progressing Towards Goal  Goal: *Verbalizes name, dosage, time, side effects, and number of days to continue medications  Outcome: Progressing Towards Goal

## 2022-04-15 NOTE — ROUTINE PROCESS
Care Management:  Transitions of Care Plan  · RUR: 16% - moderate  · Clinical Update: SNF d/c; awaiting insurance auth  · Consults: Therapy  · Baseline: independent without DME; resides on same property as son, Soraya August  · Barriers to Discharge: placement; insurance authorization  · Disposition: [facility] auth started  · Estimated Discharge Date: 4/16/22     Received call from patient's nurse that patient's daughter Maciel Stokes ([phone]) requested a call from  regarding the authorization process. [facility] ([phone]) and spoke with Lamont Sandoval, . She said she initiated the authorization on Monday via a voicemail and fax. She has called them everyday for an update. She has spoken with several departments including prior auth. No one has been able to give her an answer. She resubmitted the updated information today via fax and also via the online portal. Gave her this 's cell number to call at any time today or this weekend if she receives authorization. [facility] also has her cell number to call her at any time. Called patient's daughter Deirdre Alcantara and left voicemail to call this CM today or tomorrow.      YANELIS Mills

## 2022-04-15 NOTE — PROGRESS NOTES
Problem: Mobility Impaired (Adult and Pediatric)  Goal: *Acute Goals and Plan of Care (Insert Text)  Description: FUNCTIONAL STATUS PRIOR TO ADMISSION: Patient was independent and active without use of DME.     HOME SUPPORT PRIOR TO ADMISSION: The patient lived alone with son (lives on the property) available to provide assistance. Pt reports his wife recently passed away. Physical Therapy Goals  Weekly Reassessment 4/7/2022 (goals down graded): goals upgraded 4/15/22  1. Patient will move from supine to sit and sit to supine  in bed with supervision assist within 5 days. 2.  Patient will perform sit to/from stand with mod I within 5 days. 3.  Patient will ambulate 150 feet with least restrictive assistive device and supervision assist within 5 days. 4.  Patient will ascend/descend 2 stairs with handrail(s) with moderate assistance  within 5 days. 5.  Patient will perform cardiac exercises per protocol with minimum assistance within 5 days. 6.  Patient will verbally recall and functionally demonstrate mindful-based movements (\"move in the tube\") principles with cues within 5 days. Initiated 3/31/2022  1. Patient will move from supine to sit and sit to supine  in bed with modified independence within 5 days. 2.  Patient will perform sit to/from stand with modified independence within 5 days. 3.  Patient will ambulate 300 feet with least restrictive assistive device and modified independence within 5 days. 4.  Patient will ascend/descend 12 stairs with handrail(s) with independence within 5 days. 5.  Patient will perform cardiac exercises per protocol with modified independence within 5 days. 6.  Patient will verbally recall and functionally demonstrate mindful-based movements (\"move in the tube\") principles without cues within 5 days.           Outcome: Progressing Towards Goal     PHYSICAL THERAPY TREATMENT: WEEKLY REASSESSMENT  Patient: Lemuel Lira [de-identified]64 y.o. male)  Date: 4/15/2022  Diagnosis: CAD (coronary artery disease) [I25.10] <principal problem not specified>  Procedure(s) (LRB):  CORONARY ARTERY BYPASS GRAFT (CABG) X3, LIMA. RSVH VIA EVH. ECC. LAWSON AND EPI AORTIC US BY DR Evelin Mckeon (N/A) 16 Days Post-Op  Precautions: Fall,Sternal,Other (comment) (move in the tube)  Chart, physical therapy assessment, plan of care and goals were reviewed. ASSESSMENT  Patient continues with skilled PT services and is progressing towards goals. PT was called to room by chair alarm going off, patient was standing up in between the chair and the bed, when asked why he stated \"I'm trying to turn that sound off\". PT educated sound indicated chair alarm and recommendation for him to call for assistance prior to standing, however not sure the patient was able to understand the information. Patient then stated he wanted to lay down because he was cold, ambulated around the bed with no AD and CGA for safety. Patient got in bed by kneeling on bed. PT educated patient on \"move in tube\", demonstrated log rolling and sit-stand, however not sure patient was able to comprehend information due to cognition. Patient performed sit<>Stand transfers with SBA due to verbal cuing for [facility] in tube\". Patient ambulated 150ft using RW with SBA. Patient was instructed in cardiac exercises, performed 10 reps of all except clapping and reaching overhead due to patient reporting pain in R shoulder. Patient also instructed in seated leg exercises, 10 reps of each. Patient left sitting in chair, call bell within reach, chair alarm on. Patient is not demonstrating understanding of mindful-based movements (\"move in the tube\") principles of keeping UEs proximal to ribcage to prevent lateral pull on the sternum during load-bearing activities with maximum verbal and tactile cues cues required for compliance.     Patient's progression toward goals since last assessment: patient has progressed from Francisco/CGA to SBA for bed mobility, transfers, and ambulation    Current Level of Function Impacting Discharge (mobility/balance): CGA/SBA for bed mobility, transfers, and ambulation with RW due to verbal cues for safety    Functional Outcome Measure: The patient scored 70 on the Barthel outcome measure which is indicative of moderate risk of fall. Other factors to consider for discharge: safety concerns, cognitive deficits         PLAN :  Goals have been updated based on progression since last assessment. Patient continues to benefit from skilled intervention to address the above impairments. Recommendations and Planned Interventions: bed mobility training, transfer training, gait training, therapeutic exercises, patient and family training/education and therapeutic activities      Frequency/Duration: Patient will be followed by physical therapy:  5 times a week to address goals. Recommendation for discharge: (in order for the patient to meet his/her long term goals)  Therapy up to 5 days/week in SNF setting    This discharge recommendation:  Has been made in collaboration with the attending provider and/or case management    IF patient discharges home will need the following DME: to be determined (TBD)       SUBJECTIVE:   Patient stated  I hate anything that makes a beeping sound.     OBJECTIVE DATA SUMMARY:   HISTORY:    Past Medical History:   Diagnosis Date    Hypertension     Ill-defined condition     hypothyroid     Past Surgical History:   Procedure Laterality Date    HX APPENDECTOMY         Personal factors and/or comorbidities impacting plan of care: no famil    Patient mobilized on continuous portable monitor/telemetry.   Critical Behavior:  Neurologic State: Alert,Confused  Orientation Level: Oriented to person,Oriented to place,Disoriented to situation,Disoriented to time  Cognition: Impulsive,Poor safety awareness  Safety/Judgement: Fall prevention    Functional Mobility Training:  Bed Mobility:        Sit to Supine: Contact guard assistance             Transfers:  Sit to Stand: Stand-by assistance  Stand to Sit: Stand-by assistance  Stand Pivot Transfers: Stand-by assistance                            Balance:  Sitting: Intact  Standing: Impaired  Standing - Static: Good  Standing - Dynamic : Good;Constant support    Ambulation/Gait Training:  Distance (ft): 150 Feet (ft)  Assistive Device: Gait belt;Walker, rolling  Ambulation - Level of Assistance: Stand-by assistance        Gait Abnormalities: Decreased step clearance              Speed/Davina: Pace decreased (<100 feet/min)                      Stairs:             Cardiac diagnosis intervention:  Patient instructed and educated on mindful movement principles based on Move in The Tube concept to include maintaining bilateral elbows close to rib cage when performing any load-bearing activity such as getting in/out of bed, pushing up from a chair, opening a door, or lifting a box. Patient was given a handout with diagrams of each correct/incorrect method of performing each of the above tasks. Therapeutic Exercises:   Patient instructed on the benefits and demonstrated cardiac exercises while sitting with Stand-by assistance. Instructed and indicated understanding on how to progress reps, sets against gravity, pacing through progressive muscle strengthening standing based on surgeon clearance for more weight in prep for functional activity. Instruction on the use of household items in place of weights as needed.      CARDIAC  EXERCISE   Sets   Reps   Active Active Assist   Passive Self ROM   Comments   Shoulder flexion 1 5 [x]                                            []                                            []                                            []                                               Shoulder abduction 1      5 [x]                                            []                                            [] []                                               Scapular elevation 1 5 [x]                                            []                                            []                                            []                                               Scapular retraction 1 10 [x]                                            []                                            []                                            []                                               Trunk rotation 1 10 [x]                                            []                                            []                                            []                                               Trunk sidebending 1 10 [x]                                            []                                            []                                            []                                                  []                                            []                                            []                                            []                                                   Activity Tolerance:   Good      After treatment patient left in no apparent distress:   Sitting in chair, Call bell within reach and Bed / chair alarm activated    COMMUNICATION/COLLABORATION:   The patients plan of care was discussed with: Registered nurse.      Ramón Reynolds, PT   Time Calculation: 30 mins

## 2022-04-16 PROCEDURE — 65660000001 HC RM ICU INTERMED STEPDOWN

## 2022-04-16 PROCEDURE — 74011250637 HC RX REV CODE- 250/637: Performed by: NURSE PRACTITIONER

## 2022-04-16 PROCEDURE — 74011250637 HC RX REV CODE- 250/637: Performed by: THORACIC SURGERY (CARDIOTHORACIC VASCULAR SURGERY)

## 2022-04-16 PROCEDURE — 74011000250 HC RX REV CODE- 250: Performed by: NURSE PRACTITIONER

## 2022-04-16 PROCEDURE — 74011250637 HC RX REV CODE- 250/637: Performed by: PHYSICIAN ASSISTANT

## 2022-04-16 PROCEDURE — 74011250636 HC RX REV CODE- 250/636: Performed by: NURSE PRACTITIONER

## 2022-04-16 RX ADMIN — QUETIAPINE FUMARATE 25 MG: 25 TABLET ORAL at 08:38

## 2022-04-16 RX ADMIN — SODIUM CHLORIDE, PRESERVATIVE FREE 10 ML: 5 INJECTION INTRAVENOUS at 21:58

## 2022-04-16 RX ADMIN — METOPROLOL TARTRATE 25 MG: 25 TABLET, FILM COATED ORAL at 08:37

## 2022-04-16 RX ADMIN — HEPARIN SODIUM 5000 UNITS: 5000 INJECTION INTRAVENOUS; SUBCUTANEOUS at 21:52

## 2022-04-16 RX ADMIN — SENNOSIDES AND DOCUSATE SODIUM 1 TABLET: 50; 8.6 TABLET ORAL at 17:25

## 2022-04-16 RX ADMIN — FAMOTIDINE 20 MG: 20 TABLET, FILM COATED ORAL at 07:15

## 2022-04-16 RX ADMIN — CHLORHEXIDINE GLUCONATE 10 ML: 1.2 RINSE ORAL at 21:58

## 2022-04-16 RX ADMIN — LEVOTHYROXINE SODIUM 75 MCG: 0.07 TABLET ORAL at 07:14

## 2022-04-16 RX ADMIN — Medication 400 MG: at 17:25

## 2022-04-16 RX ADMIN — ASPIRIN 81 MG CHEWABLE TABLET 81 MG: 81 TABLET CHEWABLE at 08:36

## 2022-04-16 RX ADMIN — Medication 400 MG: at 08:37

## 2022-04-16 RX ADMIN — TAMSULOSIN HYDROCHLORIDE 0.4 MG: 0.4 CAPSULE ORAL at 08:37

## 2022-04-16 RX ADMIN — SODIUM CHLORIDE, PRESERVATIVE FREE 10 ML: 5 INJECTION INTRAVENOUS at 06:47

## 2022-04-16 RX ADMIN — METOPROLOL TARTRATE 25 MG: 25 TABLET, FILM COATED ORAL at 21:52

## 2022-04-16 RX ADMIN — CHLORHEXIDINE GLUCONATE 10 ML: 1.2 RINSE ORAL at 08:34

## 2022-04-16 RX ADMIN — QUETIAPINE FUMARATE 100 MG: 100 TABLET ORAL at 21:52

## 2022-04-16 RX ADMIN — HEPARIN SODIUM 5000 UNITS: 5000 INJECTION INTRAVENOUS; SUBCUTANEOUS at 08:36

## 2022-04-16 RX ADMIN — ATORVASTATIN CALCIUM 40 MG: 40 TABLET, FILM COATED ORAL at 21:52

## 2022-04-16 RX ADMIN — ACETAMINOPHEN 650 MG: 325 TABLET ORAL at 00:14

## 2022-04-16 RX ADMIN — SPIRONOLACTONE 12.5 MG: 25 TABLET ORAL at 08:38

## 2022-04-16 NOTE — PROGRESS NOTES
1930: Bedside and Verbal shift change report given to Candace Del Valle RN (oncoming nurse) by Jay Villa RN  (offgoing nurse). Report included the following information SBAR, Kardex, Intake/Output, Recent Results, and Cardiac Rhythm NSR . Problem: Pain  Goal: *Control of Pain  Outcome: Progressing Towards Goal     Problem: Patient Education: Go to Patient Education Activity  Goal: Patient/Family Education  Outcome: Progressing Towards Goal     Problem: Pressure Injury - Risk of  Goal: *Prevention of pressure injury  Description: Document Asim Scale and appropriate interventions in the flowsheet. Outcome: Progressing Towards Goal  Note: Pressure Injury Interventions:  Sensory Interventions: Assess changes in LOC,Assess need for specialty bed,Avoid rigorous massage over bony prominences,Check visual cues for pain,Maintain/enhance activity level,Minimize linen layers,Monitor skin under medical devices,Pad between skin to skin    Moisture Interventions: Absorbent underpads,Apply protective barrier, creams and emollients,Limit adult briefs,Maintain skin hydration (lotion/cream),Minimize layers,Moisture barrier    Activity Interventions: Assess need for specialty bed,Increase time out of bed,Pressure redistribution bed/mattress(bed type)    Mobility Interventions: HOB 30 degrees or less    Nutrition Interventions: Document food/fluid/supplement intake    Friction and Shear Interventions: Lift sheet,Minimize layers                Problem: Falls - Risk of  Goal: *Absence of Falls  Description: Document Maricruz Fall Risk and appropriate interventions in the flowsheet.   Outcome: Progressing Towards Goal  Note: Fall Risk Interventions:  Mobility Interventions: Bed/chair exit alarm,Communicate number of staff needed for ambulation/transfer,Patient to call before getting OOB    Mentation Interventions: Adequate sleep, hydration, pain control,Door open when patient unattended,Bed/chair exit alarm,Room close to nurse's station    Medication Interventions: Evaluate medications/consider consulting pharmacy,Patient to call before getting OOB    Elimination Interventions: Bed/chair exit alarm,Patient to call for help with toileting needs,Toileting schedule/hourly rounds    History of Falls Interventions: Bed/chair exit alarm,Door open when patient unattended,Room close to nurse's station

## 2022-04-16 NOTE — PROGRESS NOTES
[facility] FLOOR Progress Note    Admit Date: 3/29/2022  POD: 17 Days Post-Op      Procedure:  Procedure(s):  CORONARY ARTERY BYPASS GRAFT (CABG) X3, LIMA. RSVH VIA EVH. ECC. LAWSON AND EPI AORTIC US BY DR Gayle Pulling    Subjective:   Pt seen with Dr. Miryam Spivey. Awaiting authorization for discharge to [facility]. Still having episodes of confusion. Objective:     Visit Vitals  BP (!) 125/94 (BP 1 Location: Right upper arm, BP Patient Position: At rest)   Pulse 68   Temp 98 °F (36.7 °C)   Resp 18   Ht 5' 8\" (1.727 m)   Wt 158 lb 15.2 oz (72.1 kg)   SpO2 96%   BMI 24.17 kg/m²     Temp (24hrs), Av °F (36.7 °C), Min:97.5 °F (36.4 °C), Max:98.3 °F (36.8 °C)      Last 24hr Input/Output:    Intake/Output Summary (Last 24 hours) at 2022 0917  Last data filed at 2022 0400  Gross per 24 hour   Intake 1280 ml   Output 175 ml   Net 1105 ml        EKG/Rhythm: SR      Oxygen: Room air. CXR:      Admission Weight: Last Weight   Weight: 167 lb 1.7 oz (75.8 kg) Weight: 158 lb 15.2 oz (72.1 kg)       EXAM:  General: WDWN man in NAD sitting in chair. Calm. Cooperative. Lungs:   Clear to auscultation bilaterally. Incision:  No erythema, drainage or swelling. Heart:  Regular rate and rhythm, S1, S2 normal, no murmur, click, rub or gallop. Abdomen:   Soft, non-tender. Bowel sounds normal. No masses,  No organomegaly. Extremities:  No edema. PPP   Neurologic:  Gross motor and sensory apparatus intact. Activity: ad rocio with supervision. Diet:  Regular cardiac. Lab Data Reviewed: No results for input(s): WBC, HGB, HCT, PLT, NA, K, BUN, CREA, GLU, GLUCPOC, INR, HGBEXT, HCTEXT, PLTEXT, INREXT in the last 72 hours. No lab exists for component: GLPOC      Assessment:     Active Problems:    CAD (coronary artery disease) (3/29/2022)      S/P CABG x 3 (3/30/2022)      Overview:  On pump CORONARY ARTERY BYPASS GRAFT (CABG) X3, LIMA to LAD, RSVH to PDA,       RSVG to Ramus      R GSV EVH Plan/Recommendations/Medical Decision Making:   POD #17.   1. CAD s/p CABG x3 LIMA to LAD, RSVH to PDA:  ASA, statin, ACEi, BB     2. Renal insufficiency: Creatinine stable at 1.56. Continue to hold Lasix.  Bladder scan, if any sign of retention. Flomax.      3.  HTN:  BB     4. Acute combined systolic and diastolic HF: LV EF 36% on LHC. EF improved by LAWSON in OR to 45%-50%. Repeat TTE prior to discharge showing LVEF of 35-40% with Grade 2 diastolic dysfunction. GDMT and BP management as above. (BB, ACEi) Spironolactone-monitor electrolytes.      5. Chronic dementia/acute delirium:  Per pt's children, some agitation/outbursts noted at times prior to surgery. Head CT showing chronic microvascular changes. Improving. Continue Seroquel bid.        6. Hypothyroid:  synthroid     7. GI/DVT px: Famotidine/Enoxaparin. Ambulation/OOB     8. Thrombocytopenia: Resolved.     9. Anemia: secondary to acute blood loss following surgery.  Currently above transfusion threshold.  Continue Iron.         Dispo: PT/OT/Cardiac Rehab. Case Management for discharge planning. [facility] when Insurance approves-in the next 24-48 hours.   Still awaiting insurance authorization. D/W . She is actively pursuing authorization.        Signed By: Damir Koenig NP

## 2022-04-16 NOTE — PROGRESS NOTES
0730: Bedside and Verbal shift change report given to Linda Anderson RN and Jerald RN (oncoming nurse) by Ivan Griffiths RN (offgoing nurse). Report included the following information SBAR, Kardex, Intake/Output, Recent Results and Cardiac Rhythm NSR.

## 2022-04-17 LAB
ANION GAP SERPL CALC-SCNC: 7 MMOL/L (ref 5–15)
BASOPHILS # BLD: 0.1 K/UL (ref 0–0.1)
BASOPHILS NFR BLD: 2 % (ref 0–1)
BUN SERPL-MCNC: 16 MG/DL (ref 6–20)
BUN/CREAT SERPL: 11 (ref 12–20)
CALCIUM SERPL-MCNC: 8.7 MG/DL (ref 8.5–10.1)
CHLORIDE SERPL-SCNC: 105 MMOL/L (ref 97–108)
CO2 SERPL-SCNC: 24 MMOL/L (ref 21–32)
CREAT SERPL-MCNC: 1.52 MG/DL (ref 0.7–1.3)
DIFFERENTIAL METHOD BLD: ABNORMAL
EOSINOPHIL # BLD: 0.6 K/UL (ref 0–0.4)
EOSINOPHIL NFR BLD: 8 % (ref 0–7)
ERYTHROCYTE [DISTWIDTH] IN BLOOD BY AUTOMATED COUNT: 13.7 % (ref 11.5–14.5)
GLUCOSE SERPL-MCNC: 118 MG/DL (ref 65–100)
HCT VFR BLD AUTO: 35.8 % (ref 36.6–50.3)
HGB BLD-MCNC: 11.7 G/DL (ref 12.1–17)
IMM GRANULOCYTES # BLD AUTO: 0 K/UL (ref 0–0.04)
IMM GRANULOCYTES NFR BLD AUTO: 1 % (ref 0–0.5)
LYMPHOCYTES # BLD: 1 K/UL (ref 0.8–3.5)
LYMPHOCYTES NFR BLD: 14 % (ref 12–49)
MAGNESIUM SERPL-MCNC: 2.5 MG/DL (ref 1.6–2.4)
MCH RBC QN AUTO: 29.8 PG (ref 26–34)
MCHC RBC AUTO-ENTMCNC: 32.7 G/DL (ref 30–36.5)
MCV RBC AUTO: 91.3 FL (ref 80–99)
MONOCYTES # BLD: 0.8 K/UL (ref 0–1)
MONOCYTES NFR BLD: 11 % (ref 5–13)
NEUTS SEG # BLD: 4.8 K/UL (ref 1.8–8)
NEUTS SEG NFR BLD: 64 % (ref 32–75)
NRBC # BLD: 0 K/UL (ref 0–0.01)
NRBC BLD-RTO: 0 PER 100 WBC
PLATELET # BLD AUTO: 380 K/UL (ref 150–400)
PMV BLD AUTO: 10.6 FL (ref 8.9–12.9)
POTASSIUM SERPL-SCNC: 4.7 MMOL/L (ref 3.5–5.1)
RBC # BLD AUTO: 3.92 M/UL (ref 4.1–5.7)
SODIUM SERPL-SCNC: 136 MMOL/L (ref 136–145)
WBC # BLD AUTO: 7.4 K/UL (ref 4.1–11.1)

## 2022-04-17 PROCEDURE — 80048 BASIC METABOLIC PNL TOTAL CA: CPT

## 2022-04-17 PROCEDURE — 74011250637 HC RX REV CODE- 250/637: Performed by: NURSE PRACTITIONER

## 2022-04-17 PROCEDURE — 74011250637 HC RX REV CODE- 250/637: Performed by: PHYSICIAN ASSISTANT

## 2022-04-17 PROCEDURE — 83735 ASSAY OF MAGNESIUM: CPT

## 2022-04-17 PROCEDURE — 85025 COMPLETE CBC W/AUTO DIFF WBC: CPT

## 2022-04-17 PROCEDURE — 65660000001 HC RM ICU INTERMED STEPDOWN

## 2022-04-17 PROCEDURE — 74011000250 HC RX REV CODE- 250: Performed by: NURSE PRACTITIONER

## 2022-04-17 PROCEDURE — 36415 COLL VENOUS BLD VENIPUNCTURE: CPT

## 2022-04-17 PROCEDURE — 74011250637 HC RX REV CODE- 250/637: Performed by: THORACIC SURGERY (CARDIOTHORACIC VASCULAR SURGERY)

## 2022-04-17 PROCEDURE — 74011250636 HC RX REV CODE- 250/636: Performed by: NURSE PRACTITIONER

## 2022-04-17 RX ADMIN — Medication 400 MG: at 19:45

## 2022-04-17 RX ADMIN — CHLORHEXIDINE GLUCONATE 10 ML: 1.2 RINSE ORAL at 22:16

## 2022-04-17 RX ADMIN — POLYETHYLENE GLYCOL 3350 17 G: 17 POWDER, FOR SOLUTION ORAL at 10:21

## 2022-04-17 RX ADMIN — ASPIRIN 81 MG CHEWABLE TABLET 81 MG: 81 TABLET CHEWABLE at 10:20

## 2022-04-17 RX ADMIN — METOPROLOL TARTRATE 25 MG: 25 TABLET, FILM COATED ORAL at 10:21

## 2022-04-17 RX ADMIN — QUETIAPINE FUMARATE 25 MG: 25 TABLET ORAL at 10:20

## 2022-04-17 RX ADMIN — SODIUM CHLORIDE, PRESERVATIVE FREE 10 ML: 5 INJECTION INTRAVENOUS at 05:44

## 2022-04-17 RX ADMIN — CHLORHEXIDINE GLUCONATE 10 ML: 1.2 RINSE ORAL at 10:21

## 2022-04-17 RX ADMIN — Medication 400 MG: at 10:20

## 2022-04-17 RX ADMIN — HEPARIN SODIUM 5000 UNITS: 5000 INJECTION INTRAVENOUS; SUBCUTANEOUS at 07:06

## 2022-04-17 RX ADMIN — SODIUM CHLORIDE, PRESERVATIVE FREE 10 ML: 5 INJECTION INTRAVENOUS at 15:00

## 2022-04-17 RX ADMIN — SODIUM CHLORIDE, PRESERVATIVE FREE 10 ML: 5 INJECTION INTRAVENOUS at 22:16

## 2022-04-17 RX ADMIN — QUETIAPINE FUMARATE 100 MG: 100 TABLET ORAL at 22:16

## 2022-04-17 RX ADMIN — SENNOSIDES AND DOCUSATE SODIUM 1 TABLET: 50; 8.6 TABLET ORAL at 19:45

## 2022-04-17 RX ADMIN — METOPROLOL TARTRATE 25 MG: 25 TABLET, FILM COATED ORAL at 22:16

## 2022-04-17 RX ADMIN — ATORVASTATIN CALCIUM 40 MG: 40 TABLET, FILM COATED ORAL at 22:16

## 2022-04-17 RX ADMIN — SENNOSIDES AND DOCUSATE SODIUM 1 TABLET: 50; 8.6 TABLET ORAL at 10:20

## 2022-04-17 RX ADMIN — TAMSULOSIN HYDROCHLORIDE 0.4 MG: 0.4 CAPSULE ORAL at 10:21

## 2022-04-17 RX ADMIN — FAMOTIDINE 20 MG: 20 TABLET, FILM COATED ORAL at 07:06

## 2022-04-17 RX ADMIN — LEVOTHYROXINE SODIUM 75 MCG: 0.07 TABLET ORAL at 07:06

## 2022-04-17 RX ADMIN — SPIRONOLACTONE 12.5 MG: 25 TABLET ORAL at 10:20

## 2022-04-17 RX ADMIN — HEPARIN SODIUM 5000 UNITS: 5000 INJECTION INTRAVENOUS; SUBCUTANEOUS at 22:27

## 2022-04-17 NOTE — PROGRESS NOTES
1930: Bedside and Verbal shift change report given to Laly Marks RN (oncoming nurse) by Bibiana Collins RN  (offgoing nurse). Report included the following information SBAR, Kardex, Intake/Output, MAR, Recent Results, and Cardiac Rhythm NSR . Problem: Pain  Goal: *Control of Pain  Outcome: Progressing Towards Goal  Goal: *PALLIATIVE CARE:  Alleviation of Pain  Outcome: Progressing Towards Goal     Problem: Pressure Injury - Risk of  Goal: *Prevention of pressure injury  Description: Document Asim Scale and appropriate interventions in the flowsheet. Outcome: Progressing Towards Goal  Note: Pressure Injury Interventions:  Sensory Interventions: Assess changes in LOC,Assess need for specialty bed,Keep linens dry and wrinkle-free,Minimize linen layers    Moisture Interventions: Absorbent underpads,Assess need for specialty bed,Minimize layers    Activity Interventions: Assess need for specialty bed    Mobility Interventions: HOB 30 degrees or less,Pressure redistribution bed/mattress (bed type)    Nutrition Interventions: Document food/fluid/supplement intake    Friction and Shear Interventions: Minimize layers                Problem: Patient Education: Go to Patient Education Activity  Goal: Patient/Family Education  Outcome: Progressing Towards Goal     Problem: Falls - Risk of  Goal: *Absence of Falls  Description: Document Maricruz Fall Risk and appropriate interventions in the flowsheet.   Outcome: Progressing Towards Goal  Note: Fall Risk Interventions:  Mobility Interventions: Bed/chair exit alarm,Communicate number of staff needed for ambulation/transfer    Mentation Interventions: Bed/chair exit alarm,Door open when patient unattended,Room close to nurse's station    Medication Interventions: Bed/chair exit alarm,Patient to call before getting OOB,Teach patient to arise slowly    Elimination Interventions: Bed/chair exit alarm,Call light in reach,Patient to call for help with toileting needs    History of Falls Interventions: Door open when patient unattended,Bed/chair exit alarm,Room close to nurse's station

## 2022-04-18 PROCEDURE — 97530 THERAPEUTIC ACTIVITIES: CPT

## 2022-04-18 PROCEDURE — 97535 SELF CARE MNGMENT TRAINING: CPT

## 2022-04-18 PROCEDURE — 74011250637 HC RX REV CODE- 250/637: Performed by: NURSE PRACTITIONER

## 2022-04-18 PROCEDURE — 74011250636 HC RX REV CODE- 250/636: Performed by: NURSE PRACTITIONER

## 2022-04-18 PROCEDURE — 74011250637 HC RX REV CODE- 250/637: Performed by: PHYSICIAN ASSISTANT

## 2022-04-18 PROCEDURE — 97116 GAIT TRAINING THERAPY: CPT

## 2022-04-18 PROCEDURE — 74011250637 HC RX REV CODE- 250/637: Performed by: THORACIC SURGERY (CARDIOTHORACIC VASCULAR SURGERY)

## 2022-04-18 PROCEDURE — 65660000001 HC RM ICU INTERMED STEPDOWN

## 2022-04-18 PROCEDURE — 74011000250 HC RX REV CODE- 250: Performed by: NURSE PRACTITIONER

## 2022-04-18 RX ADMIN — SODIUM CHLORIDE, PRESERVATIVE FREE 10 ML: 5 INJECTION INTRAVENOUS at 07:17

## 2022-04-18 RX ADMIN — ASPIRIN 81 MG CHEWABLE TABLET 81 MG: 81 TABLET CHEWABLE at 09:33

## 2022-04-18 RX ADMIN — ATORVASTATIN CALCIUM 40 MG: 40 TABLET, FILM COATED ORAL at 22:00

## 2022-04-18 RX ADMIN — CHLORHEXIDINE GLUCONATE 10 ML: 1.2 RINSE ORAL at 09:33

## 2022-04-18 RX ADMIN — METOPROLOL TARTRATE 25 MG: 25 TABLET, FILM COATED ORAL at 09:33

## 2022-04-18 RX ADMIN — TAMSULOSIN HYDROCHLORIDE 0.4 MG: 0.4 CAPSULE ORAL at 09:33

## 2022-04-18 RX ADMIN — SPIRONOLACTONE 12.5 MG: 25 TABLET ORAL at 09:32

## 2022-04-18 RX ADMIN — LEVOTHYROXINE SODIUM 75 MCG: 0.07 TABLET ORAL at 07:17

## 2022-04-18 RX ADMIN — Medication 400 MG: at 09:33

## 2022-04-18 RX ADMIN — FAMOTIDINE 20 MG: 20 TABLET, FILM COATED ORAL at 07:17

## 2022-04-18 RX ADMIN — CHLORHEXIDINE GLUCONATE 10 ML: 1.2 RINSE ORAL at 22:01

## 2022-04-18 RX ADMIN — SENNOSIDES AND DOCUSATE SODIUM 1 TABLET: 50; 8.6 TABLET ORAL at 09:33

## 2022-04-18 RX ADMIN — HEPARIN SODIUM 5000 UNITS: 5000 INJECTION INTRAVENOUS; SUBCUTANEOUS at 19:19

## 2022-04-18 RX ADMIN — SODIUM CHLORIDE, PRESERVATIVE FREE 10 ML: 5 INJECTION INTRAVENOUS at 22:00

## 2022-04-18 RX ADMIN — HEPARIN SODIUM 5000 UNITS: 5000 INJECTION INTRAVENOUS; SUBCUTANEOUS at 09:32

## 2022-04-18 RX ADMIN — METOPROLOL TARTRATE 25 MG: 25 TABLET, FILM COATED ORAL at 22:00

## 2022-04-18 RX ADMIN — QUETIAPINE FUMARATE 100 MG: 100 TABLET ORAL at 22:00

## 2022-04-18 RX ADMIN — SENNOSIDES AND DOCUSATE SODIUM 1 TABLET: 50; 8.6 TABLET ORAL at 19:19

## 2022-04-18 RX ADMIN — Medication 400 MG: at 19:19

## 2022-04-18 RX ADMIN — ACETAMINOPHEN 650 MG: 325 TABLET ORAL at 22:00

## 2022-04-18 RX ADMIN — QUETIAPINE FUMARATE 25 MG: 25 TABLET ORAL at 09:33

## 2022-04-18 NOTE — PROGRESS NOTES
Cardiac Surgery Care Coordinator- Met with Reyna Abarca reviewed plan of care and discussed upcoming discharge plan. He is oriented to Person , place, date and time. Reinforced sternal precautions and encouraged continued use of the incentive spirometer. Reviewed goals for the day and discussed the  importance of increased activity and continued activity after discharge. Reviewed importance of wearing the red reminder bracelet and when to call MD. Will continue to follow for educational and emotional needs. 1200- Met with Jaki Gross the daughter of Reyna Abarca in the hallway, she verbalized some concerns she had related to her fathers care. Encouraged her to verbalize and offered emotional support. She stated she did not want any family members to receive any medical information unless they had privacy code. Spoke with RN at the bedside, provided this information to the nursing staff, including care manager. Pt made private through registration. Will continue to follow for educational and emotional support.  Dennys Guerrero RN

## 2022-04-18 NOTE — PROGRESS NOTES
Physical Therapy - defer  Chart reviewed in prep for therapy session, RN cleared for activity. Two attempts were made to see pt and deferred d/t pt sleeping. Woke pt on the second attempt. Pt requested to defer stating \"right now I just want to rest\". Will check back later as able. Noted pt ambulated in the [place] with staff assist over the weekend. Pt seemed less confused during this brief encounter.

## 2022-04-18 NOTE — PROGRESS NOTES
1930: Bedside and Verbal shift change report given to Jonathon Gill RN  (oncoming nurse) by Tahir Lucio RN  (offgoing nurse). Report included the following information SBAR, Kardex, Intake/Output, Recent Results, and Cardiac Rhythm NSR . Problem: Pain  Goal: *Control of Pain  Outcome: Progressing Towards Goal  Goal: *PALLIATIVE CARE:  Alleviation of Pain  Outcome: Progressing Towards Goal     Problem: Pressure Injury - Risk of  Goal: *Prevention of pressure injury  Description: Document Asim Scale and appropriate interventions in the flowsheet.   Outcome: Progressing Towards Goal  Note: Pressure Injury Interventions:  Sensory Interventions: Assess changes in LOC,Assess need for specialty bed,Keep linens dry and wrinkle-free,Minimize linen layers    Moisture Interventions: Absorbent underpads,Assess need for specialty bed,Minimize layers    Activity Interventions: Assess need for specialty bed,Chair cushion,Increase time out of bed    Mobility Interventions: HOB 30 degrees or less,Pressure redistribution bed/mattress (bed type)    Nutrition Interventions: Document food/fluid/supplement intake,Discuss nutritional consult with provider,Offer support with meals,snacks and hydration    Friction and Shear Interventions: Minimize layers                Problem: Patient Education: Go to Patient Education Activity  Goal: Patient/Family Education  Outcome: Progressing Towards Goal

## 2022-04-18 NOTE — PROGRESS NOTES
Problem: Self Care Deficits Care Plan (Adult)  Goal: *Acute Goals and Plan of Care (Insert Text)  Description: FUNCTIONAL STATUS PRIOR TO ADMISSION: Patient was independent and active without use of DME. 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 Patient with mild cognitive impairments and unclear if he was still doing these activities. 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 Recently patient's wife passed. Occupational Therapy Goals: goals modified below 4/7/22  Initiated 3/31/2022  1. Patient will perform ADLs standing 5 mins without fatigue or LOB with supervision/set-up within 5 day(s). (grooming at sink with least restrictive DME 4/7/22 with min A, met and upgrade to supervision 4/14/22)  2. Patient will perform lower body ADLs with supervision/set-up within 5 day(s). (min A 4/7/22, continue 4/14)  3. Patient will perform gathering ADL items high and low 2/2 with supervision/set-up within 5 day(s). (discontinue, not appropriate 4/7/22)  4. Patient will perform toilet transfers with supervision/set-up within 5 day(s). (min A 4/7/22, upgrade to supervision 4/14)  5. Patient will perform all aspects of toileting with supervision/set-up within 5 day(s). (min A 4/7/22, upgrade to supervision 4/7/22)  6. Patient will participate in cardiac/sternal upper extremity therapeutic exercise/activities to increase independence with ADLs with supervision/set-up for 5 minutes within 5 day(s). (continue 4/7/22, 4/14)      Outcome: Progressing Towards Goal       OCCUPATIONAL THERAPY TREATMENT  Patient: Merline Fennel (49 y.o. male)  Date: 4/18/2022  Diagnosis: CAD (coronary artery disease) [I25.10] <principal problem not specified>  Procedure(s) (LRB):  CORONARY ARTERY BYPASS GRAFT (CABG) X3, LIMA. RSVH VIA EVH. ECC.  LAWSON AND EPI AORTIC US BY DR Pippa Jamil (N/A) 19 Days Post-Op  Precautions: Fall,Sternal,Other (comment) (move in the tube)  Chart, occupational therapy assessment, plan of care, and goals were reviewed. ASSESSMENT  Patient continues with skilled OT services and is progressing towards goals. Pt with decreased adherence to move in the tube, despite verbal cueing for hand placement. Pt completed functional mobility to bathroom with CGA and no AD. Pt completed standing ADLs at sink  with SBA to wash face and wash hands. He is likely nearing his baseline for ADLs. If SNF rehab is not an option, recommend discharge home with HHOT/PT with family assist.     Patient is not verbalizing and is not demonstrating understanding of mindful-based movements (\"move in the tube\") principles of keeping UEs proximal to ribcage to prevent lateral pull on the sternum during load-bearing activities with visual and verbal cues required for compliance. Current Level of Function Impacting Discharge (ADLs): SBA, fall risk, chronic R knee issues    Other factors to consider for discharge: hx of dementia          PLAN :  Patient continues to benefit from skilled intervention to address the above impairments. Continue treatment per established plan of care to address goals. Recommend with staff: ambulate    Recommend next OT session: ADLs per POC, dressing    Recommendation for discharge: (in order for the patient to meet his/her long term goals)  Therapy up to 5 days/week in SNF setting      If not, recommend HHOT/PT and family assist. Pt will need help with move in the tube carryover and IADLs,    This discharge recommendation:  Has been made in collaboration with the attending provider and/or case management    IF patient discharges home will need the following DME: none       SUBJECTIVE:   Patient stated oh see that doesn't work.     OBJECTIVE DATA SUMMARY:   Cognitive/Behavioral Status:  Neurologic State: Alert  Orientation Level: Oriented X4                Functional Mobility and Transfers for ADLs:  Bed Mobility:   Received sitting EOB with PT    Transfers:  Sit to Stand: Stand-by assistance- decreased adherence to move in the tube          Balance:   Impaired higher level balance but same with RW vs without    ADL Intervention:       Grooming  Position Performed: Standing  Washing Face: Stand-by assistance  Washing Hands: Stand-by assistance                                  Patient instructed and educated on mindful movement principles based on Move in The Tube concept to include maintaining bilateral elbows close to rib cage when performing any load-bearing activity such as getting in/out of bed, pushing up from a chair, opening a door, or lifting a box. Pain:  none    Activity Tolerance:   Good    After treatment patient left in no apparent distress:   Sitting in chair, Call bell within reach, and Bed / chair alarm activated    COMMUNICATION/COLLABORATION:   The patients plan of care was discussed with: Physical therapist and Registered nurse.      Fatimah Shannon OT  Time Calculation: 11 mins

## 2022-04-18 NOTE — PROGRESS NOTES
Transitions of Care Plan  · RUR: 16% - moderate  · Clinical Update: SNF d/c; awaiting insurance auth  · Consults: Therapy  · Baseline: independent without DME; resides on same property as son, Grace Cuello  · Barriers to Discharge: placement; insurance authorization  · [address] auth started last monday  · Estimated Discharge Date: 4/18/22    CM called [facility] (p: [phone]) and requested update on insurance authorization. Business office to call CM back with update shortly. 1630 - CM spoke with patient's daughter and son-in-law at the [facility] nurse's station. Other son, Rere Saenz, also participated by phone. Family inquired into status of patient's discharge - CM advised we are still at the mercy of [facility] and their decision on sending him to SNF. CM assured family we are calling the facility daily as they are the agency that is ultimately responsible for obtaining insurance authorization for SNF placement. Family also concerned that providers are diagnosing patient with dementia and stated he had dementia prior to surgery when there has never been an issue with patient's memory and never been diagnosed with dementia. Family concerned about PHI being released to unauthorized people when they call the unit. CM informed family that the staff has been updated that patient is now a confidential patient and anybody who calls must provide a code to obtain information. Son, Rere Saenz, offered for patient to stay at his residence starting Wednesday if we do not receive a determination from insurance for placement.  CM will relay information to surgical team.    Miguel Freitas, [address]  Available via [address] [place] [address] or

## 2022-04-18 NOTE — PROGRESS NOTES
Problem: Mobility Impaired (Adult and Pediatric)  Goal: *Acute Goals and Plan of Care (Insert Text)  Description: FUNCTIONAL STATUS PRIOR TO ADMISSION: Patient was independent and active without use of DME.     HOME SUPPORT PRIOR TO ADMISSION: The patient lived alone with son (lives on the property) available to provide assistance. Pt reports his wife recently passed away. Physical Therapy Goals  Weekly Reassessment 4/7/2022 (goals down graded): goals upgraded 4/15/22  1. Patient will move from supine to sit and sit to supine  in bed with supervision assist within 5 days. 2.  Patient will perform sit to/from stand with mod I within 5 days. 3.  Patient will ambulate 150 feet with least restrictive assistive device and supervision assist within 5 days. 4.  Patient will ascend/descend 2 stairs with handrail(s) with moderate assistance  within 5 days. 5.  Patient will perform cardiac exercises per protocol with minimum assistance within 5 days. 6.  Patient will verbally recall and functionally demonstrate mindful-based movements (\"move in the tube\") principles with cues within 5 days. Initiated 3/31/2022  1. Patient will move from supine to sit and sit to supine  in bed with modified independence within 5 days. 2.  Patient will perform sit to/from stand with modified independence within 5 days. 3.  Patient will ambulate 300 feet with least restrictive assistive device and modified independence within 5 days. 4.  Patient will ascend/descend 12 stairs with handrail(s) with independence within 5 days. 5.  Patient will perform cardiac exercises per protocol with modified independence within 5 days. 6.  Patient will verbally recall and functionally demonstrate mindful-based movements (\"move in the tube\") principles without cues within 5 days.           Outcome: Progressing Towards Goal     PHYSICAL THERAPY TREATMENT  Patient: Renetta Powell (89 y.o. male)  Date: 4/18/2022  Diagnosis: CAD (coronary artery disease) [I25.10] <principal problem not specified>  Procedure(s) (LRB):  CORONARY ARTERY BYPASS GRAFT (CABG) X3, LIMA. RSVH VIA EVH. ECC. LAWSON AND EPI AORTIC US BY DR Jovon Meza (N/A) 19 Days Post-Op  Precautions: Fall,Sternal,Other (comment) (move in the tube)  Chart, physical therapy assessment, plan of care and goals were reviewed. ASSESSMENT  Patient continues with skilled PT services and is progressing towards goals. Patient demonstrates improvement in functional mobility requiring less overall assist though does not adhere to movement in the tube. Mentation seems improved as compared with last week. Pt is conversational, calm, pleasant though difficult to keep on task when working to educate/ train re: precautions. He ambulated around the unit with the RW with steady gait and again w/ no device w/ fairly steady gait/ no loss of balance. He has chronic right knee issues causing him to ambulate with flexed LE's. Suspect pt is close to his baseline mobility. He remains a fall risk w/ his flexed LE's, impulsiveness, and at risk for sternal dehiscence with poor compliance to precautions. Patient is not verbalizing and is not demonstrating understanding of mindful-based movements (\"move in the tube\") principles of keeping UEs proximal to ribcage to prevent lateral pull on the sternum during load-bearing activities with visual, verbal and tactile cues required for compliance. Current Level of Function Impacting Discharge (mobility/balance): Stand by assist bed mobility, transfers, and ambulation w/ RW. Ambulated w/ no AD w/ CGA    Other factors to consider for discharge: lives alone, poor compliance to move in the tube (dementia), fall risk         PLAN :  Patient continues to benefit from skilled intervention to address the above impairments. Continue treatment per established plan of care. to address goals.     Recommendation for discharge: (in order for the patient to meet his/her long term goals)  Therapy up to 5 days/week in SNF setting. If home, recommend HHPT and caregiver assist for IADLs and mobility within precautions    This discharge recommendation:  Has been made in collaboration with the attending provider and/or case management    IF patient discharges home will need the following DME: none       SUBJECTIVE:   Patient stated Caitlyn Carlson was talking to my wife. You know she . I knew she wasn't here.     OBJECTIVE DATA SUMMARY:   Patient mobilized on continuous portable monitor/telemetry. Critical Behavior:  Neurologic State: Alert  Orientation Level: Oriented to person, being in the hospital, having surgery  Cognition: Impaired decision making,Impulsive,Follows commands  Safety/Judgement: Fall prevention    Functional Mobility Training:  Bed Mobility:        Sit to Supine: Stand-by assistance  Scooting: Stand-by assistance          Transfers:  Sit to Stand: Stand-by assistance  Stand to Sit: Stand-by assistance (decreased adherence to move in the tube)                               Balance:  Sitting: Intact; Without support  Standing: Impaired; Without support  Standing - Static: Good  Standing - Dynamic : Good;Constant support    Ambulation/Gait Training:  Distance (ft): 150 Feet 2  Assistive Device: Gait belt;Walker, rolling; Other (comment) (rolling walker)  Ambulation - Level of Assistance: Stand-by assistance;Assist x1        Gait Abnormalities: Decreased step clearance; Antalgic       Cardiac diagnosis intervention:  Educated pt in move in the tube and worked to improve adherence when scooting (seated) and with sit<-.stand. Pt did not grasp the concept w/ scooting. He did appear to understand the tube concept with sit<->stand though did not adhere to it. Therapeutic Exercises:   Patient instructed in cardiac exercises while standing with Contact guard assistance.   Has limited shoulder ROM     CARDIAC  EXERCISE   Sets   Reps   Active Active Assist   Passive Self ROM   Comments Shoulder flexion 1 5 [x]                                            []                                            []                                            []                                               Shoulder abduction 1      5 [x]                                            []                                            []                                            []                                               Scapular elevation 1 5 [x]                                            []                                            []                                            []                                               Scapular retraction 1 5 [x]                                            []                                            []                                            []                                               Trunk rotation 1 5 [x]                                            []                                            []                                            [x]                                               Trunk sidebending 1 5 [x]                                            []                                            []                                            []                                                  []                                            []                                            []                                            []                                                   Pain Rating:  None indicated    Activity Tolerance:   Good    After treatment patient left in no apparent distress:   Sitting EOB with OT starting session. COMMUNICATION/COLLABORATION:   The patients plan of care was discussed with: Occupational therapist, Registered nurse and Case management.      Rosemarie Ga, PT   Time Calculation: 23 mins

## 2022-04-19 PROCEDURE — 74011250637 HC RX REV CODE- 250/637: Performed by: NURSE PRACTITIONER

## 2022-04-19 PROCEDURE — 65660000001 HC RM ICU INTERMED STEPDOWN

## 2022-04-19 PROCEDURE — 74011250636 HC RX REV CODE- 250/636: Performed by: NURSE PRACTITIONER

## 2022-04-19 PROCEDURE — 74011000250 HC RX REV CODE- 250: Performed by: NURSE PRACTITIONER

## 2022-04-19 PROCEDURE — 74011250637 HC RX REV CODE- 250/637: Performed by: PHYSICIAN ASSISTANT

## 2022-04-19 PROCEDURE — 74011250637 HC RX REV CODE- 250/637: Performed by: THORACIC SURGERY (CARDIOTHORACIC VASCULAR SURGERY)

## 2022-04-19 RX ORDER — LISINOPRIL 5 MG/1
2.5 TABLET ORAL DAILY
Status: DISCONTINUED | OUTPATIENT
Start: 2022-04-20 | End: 2022-04-20 | Stop reason: HOSPADM

## 2022-04-19 RX ADMIN — SODIUM CHLORIDE, PRESERVATIVE FREE 10 ML: 5 INJECTION INTRAVENOUS at 17:35

## 2022-04-19 RX ADMIN — HEPARIN SODIUM 5000 UNITS: 5000 INJECTION INTRAVENOUS; SUBCUTANEOUS at 21:35

## 2022-04-19 RX ADMIN — HEPARIN SODIUM 5000 UNITS: 5000 INJECTION INTRAVENOUS; SUBCUTANEOUS at 08:11

## 2022-04-19 RX ADMIN — CHLORHEXIDINE GLUCONATE 10 ML: 1.2 RINSE ORAL at 21:37

## 2022-04-19 RX ADMIN — METOPROLOL TARTRATE 25 MG: 25 TABLET, FILM COATED ORAL at 08:11

## 2022-04-19 RX ADMIN — CHLORHEXIDINE GLUCONATE 10 ML: 1.2 RINSE ORAL at 08:11

## 2022-04-19 RX ADMIN — QUETIAPINE FUMARATE 100 MG: 100 TABLET ORAL at 21:35

## 2022-04-19 RX ADMIN — POLYETHYLENE GLYCOL 3350 17 G: 17 POWDER, FOR SOLUTION ORAL at 08:11

## 2022-04-19 RX ADMIN — SPIRONOLACTONE 12.5 MG: 25 TABLET ORAL at 08:11

## 2022-04-19 RX ADMIN — METOPROLOL TARTRATE 25 MG: 25 TABLET, FILM COATED ORAL at 21:35

## 2022-04-19 RX ADMIN — ASPIRIN 81 MG CHEWABLE TABLET 81 MG: 81 TABLET CHEWABLE at 08:11

## 2022-04-19 RX ADMIN — ATORVASTATIN CALCIUM 40 MG: 40 TABLET, FILM COATED ORAL at 21:35

## 2022-04-19 RX ADMIN — SENNOSIDES AND DOCUSATE SODIUM 1 TABLET: 50; 8.6 TABLET ORAL at 08:11

## 2022-04-19 RX ADMIN — Medication 400 MG: at 08:11

## 2022-04-19 RX ADMIN — Medication 400 MG: at 17:35

## 2022-04-19 RX ADMIN — FAMOTIDINE 20 MG: 20 TABLET, FILM COATED ORAL at 06:11

## 2022-04-19 RX ADMIN — SODIUM CHLORIDE, PRESERVATIVE FREE 10 ML: 5 INJECTION INTRAVENOUS at 06:11

## 2022-04-19 RX ADMIN — LEVOTHYROXINE SODIUM 75 MCG: 0.07 TABLET ORAL at 06:11

## 2022-04-19 RX ADMIN — SODIUM CHLORIDE, PRESERVATIVE FREE 10 ML: 5 INJECTION INTRAVENOUS at 21:35

## 2022-04-19 RX ADMIN — QUETIAPINE FUMARATE 25 MG: 25 TABLET ORAL at 08:11

## 2022-04-19 RX ADMIN — TAMSULOSIN HYDROCHLORIDE 0.4 MG: 0.4 CAPSULE ORAL at 08:11

## 2022-04-19 NOTE — PROGRESS NOTES
Physician Progress Note      Karine Shaikh  CSN #:                  [id number]  :                       1945  ADMIT DATE:       3/29/2022 12:07 AM  DISCH DATE:  RESPONDING  PROVIDER #:        Ale Washburn NP          QUERY TEXT:    Patient admitted with CAD . Patient meets ASPEN criteria for moderate protein calorie malnutrition  If possible, please document in progress notes and discharge summary if you are evaluating and /or treating any of the following: The medical record reflects the following:  Risk Factors: decreased intake  Clinical Indicators:     nutritional consult  Malnutrition Assessment:  Malnutrition Status: Moderate malnutrition  Context:  Acute illness  Findings of the 6 clinical characteristics of malnutrition:  Energy Intake:  1 - 75% or less of est energy req for 7 or more days  Weight Loss:  7 - Greater than 2% over 1 week  Body Fat Loss:  1 - Mild body fat loss, Orbital  Muscle Mass Loss:  1 - Mild muscle mass loss, Temples (temporalis)  Fluid Accumulation:  No significant fluid accumulation,   Strength:  Not performed  ? Nutrition Diagnosis:  ? Inadequate oral intake related to inadequate protein-energy intake as evidenced by intake 51-75%      Treatment: nutritional consult,Nutrition Interventions: Food and/or Nutrient Delivery: Continue current diet,Continue oral nutrition supplement    Thank you,  Robert Villa RN, CDI, Jerry, [facility]  Certified Clinical   [phone]          ASPEN Criteria:  https://aspenjournals. onlinelibrary. corona. com/doi/full/10.1177/1947616381855865  Options provided:  -- Protein calorie malnutrition moderate  -- Protein calorie malnutrition unspecified  -- Other - I will add my own diagnosis  -- Disagree - Not applicable / Not valid  -- Disagree - Clinically unable to determine / Unknown  -- Refer to Clinical Documentation Reviewer    PROVIDER RESPONSE TEXT:    This patient has unspecified protein calorie malnutrition.     Query created by: Nadir Jasmine on 4/19/2022 2:55 PM      Electronically signed by:  Kaur Rider NP 4/19/2022 3:05 PM

## 2022-04-19 NOTE — PROGRESS NOTES
Comprehensive Nutrition Assessment    Type and Reason for Visit: Reassess    Nutrition Recommendations/Plan:      1. Continue with low Fat/Low Chol/ALONSO diet order    2. Continue with Ensure Enlive (high calorie, high protein) 1x/day      Nutrition Assessment:    63 yo male admitted for CAD. PMhx: CAD, HTN, dementia. S/P CABG x 3 on 3/30. Has been in restraints due to agitation and confusion. Currently with a sitter. PT in room working with pt during visit. Spoke with sitter who states pt ate all of his breakfast and has been eating well. She cuts up his food but then he is able to feed himself. Sitter asked about pt receiving Ensure, thinks he will enjoy it. Weight hx in EMR indicates no recent weight loss PTA. Weight on admission was 75.8 kg, went up to 80.3 kg so diuretic was started. Weight back down to 76.8 kg. Labs reviewed. 4/19: F/u. Pt was expected to discharge but is waiting on insurance auth. Confused, sitting at [employer] station playing on [employer], RN states this redirection helps him. Spoke with RN about his intakes, states he eats > 50% meals but eats them very late. For example, eats his breakfast tray when lunch comes. Encouraged RN to leave a note on tray for dietary to not take his trays. She has not seen him drink Ensure but states she will encourage it today. Pt's weight has come down 11% since admission, some may be due to fluid, receiving diuretic. Labs reviewed. Malnutrition Assessment:  Malnutrition Status:   Moderate malnutrition    Context:  Acute illness     Findings of the 6 clinical characteristics of malnutrition:   Energy Intake:  1 - 75% or less of est energy req for 7 or more days  Weight Loss:  7 - Greater than 2% over 1 week     Body Fat Loss:  1 - Mild body fat loss, Orbital   Muscle Mass Loss:  1 - Mild muscle mass loss, Temples (temporalis)  Fluid Accumulation:  No significant fluid accumulation,     Strength:  Not performed       Nutritionally Significant Medications: Pepcid, MagOx, Synthroid, Miralax, Pericolace, Aldactone    Estimated Daily Nutrient Needs:  Energy (kcal): 1880 (MSJ x AF 1.3); Weight Used for Energy Requirements: Current  Protein (g): 91 (1.2 gm/kg);  Weight Used for Protein Requirements: Current  Fluid (ml/day): 1880; Method Used for Fluid Requirements: 1 ml/kcal    Nutrition Related Findings:       BM: 4/15  Edema: none  Wounds:  Surgical incision       Current Nutrition Therapies:   Diet: Low Fat/Low Chol/ALONSO  Supplements: Ensure Enlive 1x/day  Additional Caloric Sources: none    Meal intake:   Patient Vitals for the past 168 hrs:   % Diet Eaten   04/18/22 0400 0%   04/18/22 0000 0%   04/17/22 2000 0%   04/17/22 1800 0%   04/17/22 1513 0%   04/17/22 1257 0%   04/17/22 0800 0%   04/17/22 0728 0%   04/17/22 0400 0%   04/17/22 0000 0%   04/16/22 2000 0%   04/16/22 1508 51 - 75%   04/16/22 1231 51 - 75%   04/16/22 0836 51 - 75%   04/16/22 0400 0%   04/16/22 0014 0%   04/15/22 2030 0%   04/15/22 1523 51 - 75%   04/15/22 1135 51 - 75%   04/15/22 0859 1 - 25%   04/14/22 1526 1 - 25%   04/14/22 1137 76 - 100%   04/14/22 0840 0%   04/13/22 1520 51 - 75%   04/13/22 1102 51 - 75%   04/13/22 0829 1 - 25%       Anthropometric Measures:  · Height:  5' 8\" (172.7 cm)  · Current Body Wt:  66.5 kg (146 lb 9.7 oz)   · Admission Body Wt:       · Usual Body Wt:        · Ideal Body Wt:  154:  95.2 %   · Adjusted Body Weight:   ; Weight Adjustment for: No adjustment   · Adjusted BMI:       · BMI Categories:  Normal weight (BMI 22.0-24.9) age over 72     Wt Readings from Last 10 Encounters:   04/17/22 66.5 kg (146 lb 9.7 oz)   03/28/22 75.8 kg (167 lb)   06/15/17 75.3 kg (166 lb)   11/20/14 84.8 kg (187 lb)       Nutrition Diagnosis:   · Inadequate oral intake related to inadequate protein-energy intake as evidenced by intake 51-75%      Nutrition Interventions:   Food and/or Nutrient Delivery: Continue current diet,Continue oral nutrition supplement  Nutrition Education and Counseling: No recommendations at this time  Coordination of Nutrition Care: Continue to monitor while inpatient    Goals:  PO intakes > 75% meals + ONS within next 5-7 days       Nutrition Monitoring and Evaluation:   Behavioral-Environmental Outcomes: None identified  Food/Nutrient Intake Outcomes: Food and nutrient intake,Supplement intake  Physical Signs/Symptoms Outcomes: Biochemical data,Weight,GI status    Discharge Planning:    Continue current diet     Ino Oneill RD  Contact via [place]

## 2022-04-19 NOTE — PROGRESS NOTES
Problem: Pain  Goal: *Control of Pain  Outcome: Progressing Towards Goal     Problem: Pressure Injury - Risk of  Goal: *Prevention of pressure injury  Description: Document Asim Scale and appropriate interventions in the flowsheet.   Outcome: Progressing Towards Goal  Note: Pressure Injury Interventions:  Sensory Interventions: Assess changes in LOC    Moisture Interventions: Absorbent underpads    Activity Interventions: Assess need for specialty bed    Mobility Interventions: Pressure redistribution bed/mattress (bed type)    Nutrition Interventions: Document food/fluid/supplement intake    Friction and Shear Interventions: Minimize layers

## 2022-04-19 NOTE — PROGRESS NOTES
Occupational Therapy  4/19/2022    Chart reviewed. Pt soundly sleeping upon OT's arrival. Pt busy this morning, sitting at nurses' station, and using computer. Will follow back as able. Malka Lima, OT      3:51 PM    Attempted to see for OT. Pt sleeping and RN requested OT not awaken pt. Encourage pt up for meals in chair and to mobilize around unit this evening.

## 2022-04-19 NOTE — PROGRESS NOTES
[facility] FLOOR Progress Note    Admit Date: 3/29/2022  POD: 20 Days Post-Op      Procedure:  Procedure(s):  CORONARY ARTERY BYPASS GRAFT (CABG) X3, LIMA. RSVH VIA EVH. ECC. LAWSON AND EPI AORTIC US BY DR Gucci Grigsby    Subjective:   Room air. SR.   Pt's mental status improved. No violent outbursts. Family meeting last evening with step son (who lives on the same property with the patient) and the daughter. Objective:     Visit Vitals  /83 (BP 1 Location: Left upper arm, BP Patient Position: At rest)   Pulse 65   Temp 98 °F (36.7 °C)   Resp 16   Ht 5' 8\" (1.727 m)   Wt 146 lb 9.7 oz (66.5 kg)   SpO2 94%   BMI 22.29 kg/m²     Temp (24hrs), Av.8 °F (36.6 °C), Min:97.6 °F (36.4 °C), Max:98 °F (36.7 °C)      Last 24hr Input/Output: n/a    Intake/Output Summary (Last 24 hours) at 2022 0958  Last data filed at 2022 0806  Gross per 24 hour   Intake 560 ml   Output 2 ml   Net 558 ml        EKG/Rhythm: SR      Oxygen: room air    CXR:n/a      Admission Weight: Last Weight   Weight: 167 lb 1.7 oz (75.8 kg) Weight: 146 lb 9.7 oz (66.5 kg)       EXAM:  General: WDWN man sitting at the computed in the nurse  s station. NAD. Lungs:   Clear to auscultation bilaterally. Incision:  No erythema, drainage or swelling. Heart:  Regular rate and rhythm.  + murmur. Abdomen:   Soft, non-tender. Bowel sounds normal. No masses,  No organomegaly. Extremities:  No edema. PPP   Neurologic:  Gross motor and sensory apparatus intact. Activity: ad rocio with supervision. Diet:  Regular cardiac. 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 Pt to go to [facility] once approved by BC/BS. 1. CAD s/p CABG x3 LIMA to LAD, RSVH to PDA:  ASA, statin, ACEi, BB.  Lucretia Alba had been held for hypotension. Restart today at 2.5 mg.  Monitor response. 2. Renal insufficiency: Creatinine stable at 1.56. Continue to hold Lasix.  Bladder scan if any sign of retention. Flomax.       3.  HTN:  BB, lisinopril 2.5 mg     4. Acute combined systolic and diastolic HF: LV EF 21% on LHC. EF improved by LAWSON in OR to 45%-50%. Repeat TTE prior to discharge showing LVEF of 35-40% with Grade 2 diastolic dysfunction. GDMT and BP management as above. (BB, ACEi) Spironolactone-monitor electrolytes.      5. Chronic dementia/acute delirium:  Per pt's children, some agitation/outbursts noted at times prior to surgery. They deny he has dementia. Head CT showing chronic microvascular changes. Improving. Continue Seroquel bid.        6. Hypothyroid:  synthroid     7. GI/DVT px: Famotidine/SQ heparin. Ambulation/OOB     8. Thrombocytopenia: Resolved. 9. Anemia: secondary to acute blood loss following surgery.   Continue Iron. No need for transfusion. 10. To SNF when authorization approved by insurance. Addendum: pt's po intake is sub optimal.  He is not taking in the recommended calories or protein. He is ordered nutritional supplements. He was seen by the Registered dietitian for protein calorie deficiency. Please refer to her note.      Signed By: Kenji Najera NP

## 2022-04-19 NOTE — PROGRESS NOTES
0730: Bedside and Verbal shift change report given to Shantelle Tam RN (oncoming nurse) by Vijay Harrell RN (offgoing nurse). Report included the following information SBAR, Kardex, Intake/Output, MAR, Recent Results, and Cardiac Rhythm Sinus Rhythm . 0920: Patient walked outside of room and sitting in chair. 1437: Patient walked around unit and placed in recliner after. 1900: Patient walked around unit and placed in recliner at this time. 1930: Bedside and Verbal shift change report given to Lou Ling RN (oncoming nurse) by Shantelle Tam RN (offgoing nurse). Report included the following information SBAR, Kardex, Intake/Output, MAR, Recent Results and Cardiac Rhythm Sinus Rhythm. Care Plans:   Problem: Pain  Goal: *Control of Pain  Outcome: Progressing Towards Goal  Goal: *PALLIATIVE CARE:  Alleviation of Pain  Outcome: Progressing Towards Goal     Problem: Patient Education: Go to Patient Education Activity  Goal: Patient/Family Education  Outcome: Progressing Towards Goal     Problem: Pressure Injury - Risk of  Goal: *Prevention of pressure injury  Description: Document Asim Scale and appropriate interventions in the flowsheet. Outcome: Progressing Towards Goal  Note: Pressure Injury Interventions:  Sensory Interventions: Assess changes in LOC    Moisture Interventions: Absorbent underpads    Activity Interventions: Assess need for specialty bed    Mobility Interventions: Assess need for specialty bed    Nutrition Interventions: Document food/fluid/supplement intake    Friction and Shear Interventions: Lift sheet,Minimize layers                Problem: Patient Education: Go to Patient Education Activity  Goal: Patient/Family Education  Outcome: Progressing Towards Goal     Problem: Falls - Risk of  Goal: *Absence of Falls  Description: Document Liz Locket Fall Risk and appropriate interventions in the flowsheet.   Outcome: Progressing Towards Goal  Note: Fall Risk Interventions:  Mobility Interventions: Bed/chair exit alarm,Communicate number of staff needed for ambulation/transfer,Patient to call before getting OOB    Mentation Interventions: Adequate sleep, hydration, pain control,Bed/chair exit alarm,Door open when patient unattended,Update white board,Toileting rounds,Self-releasing belt,Room close to nurse's station,Reorient patient,More frequent rounding,Increase mobility    Medication Interventions: Assess postural VS orthostatic hypotension,Bed/chair exit alarm,Patient to call before getting OOB,Teach patient to arise slowly    Elimination Interventions: Bed/chair exit alarm,Call light in reach,Patient to call for help with toileting needs,Stay With Me (per policy),Toilet paper/wipes in reach,Toileting schedule/hourly rounds,Urinal in reach    History of Falls Interventions: Bed/chair exit alarm,Door open when patient unattended,Room close to nurse's station         Problem: Patient Education: Go to Patient Education Activity  Goal: Patient/Family Education  Outcome: Progressing Towards Goal     Problem: Cath Lab Procedures: Discharge Outcomes  Goal: *Stable cardiac rhythm  Outcome: Progressing Towards Goal  Goal: *Hemodynamically stable  Outcome: Progressing Towards Goal  Goal: *Optimal pain control at patient's stated goal  Outcome: Progressing Towards Goal  Goal: *Pulses palpable, skin color within defined limits, skin temperature warm  Outcome: Progressing Towards Goal  Goal: *Lungs clear or at baseline  Outcome: Progressing Towards Goal  Goal: *Demonstrates ability to perform prescribed activity without shortness of breath or discomfort  Outcome: Progressing Towards Goal  Goal: *Verbalizes home exercise program, activity guidelines, cardiac precautions  Outcome: Progressing Towards Goal  Goal: *Verbalizes understanding and describes prescribed diet  Outcome: Progressing Towards Goal  Goal: *Verbalizes understanding and describes medication purposes and frequencies  Outcome: Progressing Towards Goal  Goal: *Identifies cardiac risk factors  Outcome: Progressing Towards Goal  Goal: *No signs and symptoms of infection or wound complications  Outcome: Progressing Towards Goal  Goal: *Anxiety reduced or absent  Outcome: Progressing Towards Goal  Goal: *Verbalizes and demonstrates incision care  Outcome: Progressing Towards Goal  Goal: *Understands and describes signs and symptoms to report to providers(Stroke Metric)  Outcome: Progressing Towards Goal  Goal: *Describes follow-up/return visits to physicians  Outcome: Progressing Towards Goal  Goal: *Describes available resources and support systems  Outcome: Progressing Towards Goal  Goal: *Influenza immunization  Outcome: Progressing Towards Goal  Goal: *Pneumococcal immunization  Outcome: Progressing Towards Goal     Problem: AMI: Discharge Outcomes  Goal: *Demonstrates ability to perform prescribed activity without shortness of breath or discomfort  Outcome: Progressing Towards Goal  Goal: *Verbalizes understanding and describes prescribed diet  Outcome: Progressing Towards Goal  Goal: *Describes follow-up/return visits to physicians  Outcome: Progressing Towards Goal  Goal: *Describes home care/support arrangements established based on need  Outcome: Progressing Towards Goal  Goal: *Anxiety reduced or absent  Outcome: Progressing Towards Goal  Goal: *Understands and describes signs and symptoms to report to providers(Stroke Metric)  Outcome: Progressing Towards Goal  Goal: *Verbalizes name, dosage, time, side effects, and number of days to continue medications  Outcome: Progressing Towards Goal

## 2022-04-19 NOTE — PROGRESS NOTES
Transitions of Care Plan   RUR: 11% - low   Clinical Update: stable    Consults: Therapy  · Baseline: independent without DME; resides on same property as son, Nick Alebrto  · Barriers to Discharge: placement; insurance authorization  · Ag Rehman [id number] vs home w home health tomorrow  · Estimated Discharge Date: 4/20/22    1130 - CM received update from [facility] - no response/determination from [facility]. [phone] - CM spoke with patient's son, Trupti Adame, regarding update and disposition plan. We are still awaiting [facility] determination on SNF placement. ASHWINI and Reddy Hairston discussed backup plan for patient - Reddy Hairston stated he can make his guest room available for patient to stay in while he recovers. He will call CM back directly tomorrow morning once he has a bed moved from patient's home to his son's home. If insurance approves for SNF placement in the interim, patient will d/c to SNF. CM will obtain Edgemere's address for home health should patient d/c to that residence. Agreeable to home with home health. CM will continue to follow.     Caroline Butcher, MPH  Care Manager [facility]  Available via [facility] or

## 2022-04-20 VITALS
HEIGHT: 68 IN | HEART RATE: 83 BPM | OXYGEN SATURATION: 93 % | BODY MASS INDEX: 23.76 KG/M2 | WEIGHT: 156.75 LBS | TEMPERATURE: 98.4 F | RESPIRATION RATE: 16 BRPM | SYSTOLIC BLOOD PRESSURE: 138 MMHG | DIASTOLIC BLOOD PRESSURE: 76 MMHG

## 2022-04-20 PROCEDURE — 74011000250 HC RX REV CODE- 250: Performed by: NURSE PRACTITIONER

## 2022-04-20 PROCEDURE — 74011250637 HC RX REV CODE- 250/637: Performed by: NURSE PRACTITIONER

## 2022-04-20 PROCEDURE — 74011250637 HC RX REV CODE- 250/637: Performed by: PHYSICIAN ASSISTANT

## 2022-04-20 PROCEDURE — 74011250636 HC RX REV CODE- 250/636: Performed by: NURSE PRACTITIONER

## 2022-04-20 PROCEDURE — 97116 GAIT TRAINING THERAPY: CPT

## 2022-04-20 PROCEDURE — 74011250637 HC RX REV CODE- 250/637: Performed by: THORACIC SURGERY (CARDIOTHORACIC VASCULAR SURGERY)

## 2022-04-20 PROCEDURE — 97110 THERAPEUTIC EXERCISES: CPT

## 2022-04-20 PROCEDURE — 97535 SELF CARE MNGMENT TRAINING: CPT

## 2022-04-20 RX ORDER — GUAIFENESIN 100 MG/5ML
81 LIQUID (ML) ORAL DAILY
Qty: 30 TABLET | Refills: 1 | Status: SHIPPED | COMMUNITY
Start: 2022-04-20

## 2022-04-20 RX ORDER — TAMSULOSIN HYDROCHLORIDE 0.4 MG/1
0.4 CAPSULE ORAL DAILY
Qty: 30 CAPSULE | Refills: 2 | Status: SHIPPED | OUTPATIENT
Start: 2022-04-20 | End: 2022-07-19

## 2022-04-20 RX ORDER — QUETIAPINE FUMARATE 100 MG/1
100 TABLET, FILM COATED ORAL
Qty: 30 TABLET | Refills: 2 | Status: SHIPPED | OUTPATIENT
Start: 2022-04-20 | End: 2022-07-19

## 2022-04-20 RX ORDER — LISINOPRIL 2.5 MG/1
2.5 TABLET ORAL DAILY
Qty: 90 TABLET | Refills: 0 | Status: SHIPPED | OUTPATIENT
Start: 2022-04-20 | End: 2022-07-19

## 2022-04-20 RX ORDER — ACETAMINOPHEN 325 MG/1
650 TABLET ORAL
Qty: 20 TABLET | Refills: 0 | Status: SHIPPED | COMMUNITY
Start: 2022-04-20 | End: 2022-05-09 | Stop reason: ALTCHOICE

## 2022-04-20 RX ORDER — SPIRONOLACTONE 25 MG/1
12.5 TABLET ORAL DAILY
Qty: 15 TABLET | Refills: 2 | Status: SHIPPED | OUTPATIENT
Start: 2022-04-20 | End: 2022-07-19

## 2022-04-20 RX ORDER — QUETIAPINE FUMARATE 25 MG/1
25 TABLET, FILM COATED ORAL DAILY
Qty: 30 TABLET | Refills: 2 | Status: SHIPPED | OUTPATIENT
Start: 2022-04-20 | End: 2022-07-19

## 2022-04-20 RX ORDER — ATORVASTATIN CALCIUM 40 MG/1
40 TABLET, FILM COATED ORAL
Qty: 30 TABLET | Refills: 2 | Status: SHIPPED | OUTPATIENT
Start: 2022-04-20 | End: 2022-07-19

## 2022-04-20 RX ORDER — METOPROLOL TARTRATE 25 MG/1
25 TABLET, FILM COATED ORAL EVERY 12 HOURS
Qty: 60 TABLET | Refills: 2 | Status: SHIPPED | OUTPATIENT
Start: 2022-04-20 | End: 2022-07-19

## 2022-04-20 RX ORDER — LISINOPRIL 5 MG/1
2.5 TABLET ORAL DAILY
Qty: 30 TABLET | Refills: 0 | Status: SHIPPED | OUTPATIENT
Start: 2022-04-20 | End: 2022-04-20

## 2022-04-20 RX ADMIN — Medication 400 MG: at 08:59

## 2022-04-20 RX ADMIN — FAMOTIDINE 20 MG: 20 TABLET, FILM COATED ORAL at 06:38

## 2022-04-20 RX ADMIN — ASPIRIN 81 MG CHEWABLE TABLET 81 MG: 81 TABLET CHEWABLE at 08:55

## 2022-04-20 RX ADMIN — QUETIAPINE FUMARATE 25 MG: 25 TABLET ORAL at 08:59

## 2022-04-20 RX ADMIN — HEPARIN SODIUM 5000 UNITS: 5000 INJECTION INTRAVENOUS; SUBCUTANEOUS at 08:59

## 2022-04-20 RX ADMIN — SPIRONOLACTONE 12.5 MG: 25 TABLET ORAL at 08:56

## 2022-04-20 RX ADMIN — CHLORHEXIDINE GLUCONATE 10 ML: 1.2 RINSE ORAL at 09:00

## 2022-04-20 RX ADMIN — LEVOTHYROXINE SODIUM 75 MCG: 0.07 TABLET ORAL at 06:38

## 2022-04-20 RX ADMIN — TAMSULOSIN HYDROCHLORIDE 0.4 MG: 0.4 CAPSULE ORAL at 08:57

## 2022-04-20 NOTE — PROGRESS NOTES
Transitions of Care Plan  · RUR: 11% - low  · Clinical Update: stable   · Consults: Therapy  · Baseline: independent without DME; resides on same property as son, Jj Juárez  · Barriers to [facility] authorization; family dynamics  · Disposition: Home to son's house - family dynamic barrier  · Estimated Discharge Date: 4/20/22    0830 -  updated by [facility] NP - patient medically stable for discharge home today. CM updated by staff this AM of daughter having additional concerns regarding patient going home to his son's house Ingrid Mccarthy). 46 - CM spoke with patient's son - Mitch Kent - and advised that patient is medically stable to discharge to his house and we have not heard a decision from [facility]. Mitch Kent advised guest room is not ready because his sister Tosha Hough) informed him that she was told by a nurse last night that patient was not ready to go home. CM updated Mitch Kent this AM of surgical team's clearance for patient and that he no longer requires medical care at the hospital and is stable for discharge home. Stuart to call his siblings and return call to  this morning. 56 - CM spoke with [facility] RN regarding patient's discharge plan. [id number] - CM left HIPPA compliant VM for patient's daughter, requested call back. 1130 - CM spoke with patient's son, Jj Juárez, re disposition plan for today. CM answered all of Jj Juárez' questions/concerns related to discharge - advised we have not heard from the facility regarding SNF placement and [facility] team has medically cleared patient to discharge home with supervision for medication administration. Jj Juárez and siblings will be at the bedside today between [id number] for discharge. No home health due to patient location and managed medicare product.       Alma Cox, MPH  Care Manager [facility]  Available via [address] [place] [address] or

## 2022-04-20 NOTE — PROGRESS NOTES
Problem: Self Care Deficits Care Plan (Adult)  Goal: *Acute Goals and Plan of Care (Insert Text)  Description: FUNCTIONAL STATUS PRIOR TO ADMISSION: Patient was independent and active without use of DME. 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 Patient with mild cognitive impairments and unclear if he was still doing these activities. 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 Recently patient's wife passed. Occupational Therapy Goals: goals modified below 4/7/22  Initiated 3/31/2022  1. Patient will perform ADLs standing 5 mins without fatigue or LOB with supervision/set-up within 5 day(s). (grooming at sink with least restrictive DME 4/7/22 with min A, met and upgrade to supervision 4/14/22)  2. Patient will perform lower body ADLs with supervision/set-up within 5 day(s). (min A 4/7/22, continue 4/14)  3. Patient will perform gathering ADL items high and low 2/2 with supervision/set-up within 5 day(s). (discontinue, not appropriate 4/7/22)  4. Patient will perform toilet transfers with supervision/set-up within 5 day(s). (min A 4/7/22, upgrade to supervision 4/14)  5. Patient will perform all aspects of toileting with supervision/set-up within 5 day(s). (min A 4/7/22, upgrade to supervision 4/7/22)  6. Patient will participate in cardiac/sternal upper extremity therapeutic exercise/activities to increase independence with ADLs with supervision/set-up for 5 minutes within 5 day(s). (continue 4/7/22, 4/14)      Outcome: Progressing Towards Goal   OCCUPATIONAL THERAPY TREATMENT  Patient: Rossana Artis (68 y.o. male)  Date: 4/20/2022  Diagnosis: CAD (coronary artery disease) [I25.10] <principal problem not specified>  Procedure(s) (LRB):  CORONARY ARTERY BYPASS GRAFT (CABG) X3, LIMA. RSVH VIA EVH. ECC.  LAWSON AND EPI AORTIC US BY DR Felicitas Valerio (N/A) 21 Days Post-Op  Precautions: Fall,Sternal,Other (comment) (move in the tube)  Chart, occupational therapy assessment, plan of care, and goals were reviewed. ASSESSMENT  Patient continues with skilled OT services and is progressing towards goals. Patient received reclined in bed, amenable to session. Patient with intermittent confusion throughout session. Patient benefits from cues to maintain attention to tasks for safety. Discussed ADL/environmental modifications to maximize ADL independence/safety at home, patient verbalized understanding however continued to require cueing during session to avoid deep bends and maintain sternal precautions. Patient will continue to benefit from SNF, however if not able, will require supervision at home and increased level of assist during ADL for safety. Patient left reclined in bed, all needs in reach. Patient is verbalizing and is demonstrating understanding of mindful-based movements (\"move in the tube\") principles of keeping UEs proximal to ribcage to prevent lateral pull on the sternum during load-bearing activities with verbal cues required for compliance. Current Level of Function Impacting Discharge (ADLs): up to CGA ADL and mobility for safety    Other factors to consider for discharge: below baseline, new sternotomy, impaired cognition         PLAN :  Patient continues to benefit from skilled intervention to address the above impairments. Continue treatment per established plan of care to address goals.     Recommend with staff: OOB 3x daily for meals, functional mobility to bathroom    Recommend next OT session: OOB ADL, reinforce sternal precautions    Recommendation for discharge: (in order for the patient to meet his/her long term goals)  Therapy up to 5 days/week in SNF setting, if not then home with supervision and [place]     This discharge recommendation:  Has not yet been discussed the attending provider and/or case management    IF patient discharges home will need the following DME: TBD pending progress SUBJECTIVE:   Patient stated I know how to brace myself when I fall so I don't get hurt.     OBJECTIVE DATA SUMMARY:   Cognitive/Behavioral Status:  Neurologic State: Alert  Orientation Level: Oriented to time;Oriented to place;Oriented to person;Disoriented to situation  Cognition: Decreased command following;Decreased attention/concentration  Perception: Appears intact  Perseveration: No perseveration noted  Safety/Judgement: Decreased insight into deficits    Functional Mobility and Transfers for ADLs:  Bed Mobility:  Supine to Sit: Modified independent    Transfers:  Sit to Stand: Supervision          Balance:  Sitting: Intact  Standing: Impaired  Standing - Static: Good  Standing - Dynamic : Good;Fair    ADL Intervention:  Feeding  Feeding Assistance: Set-up    Grooming  Position Performed: Standing  Washing Face: Stand-by assistance  Washing Hands: Stand-by assistance  Brushing Teeth: Contact guard assistance  Cues: Verbal cues provided                   Lower Body Dressing Assistance  Socks: Set-up  Leg Crossed Method Used: Yes  Position Performed: Seated edge of bed         Cognitive Retraining  Safety/Judgement: Decreased insight into deficits    Patient instructed and educated on mindful movement principles based on Move in The Tube concept to include maintaining bilateral elbows close to rib cage when performing any load-bearing activity such as getting in/out of bed, pushing up from a chair, opening a door, or lifting a box. Patient was given a handout with diagrams of each correct/incorrect method of performing each of the above tasks. Patient instructed on the ability to utilize upper extremities outside the tube when doing any non-load bearing activity such as washing hair/body, brushing teeth, retrieving clothing items, or scratching your back.  Patient encouraged to also perform upper extremity exercises \"outside of the tube\" to prevent scar tissue formation around sternal incision site.  Patient instructed in detail about activities to heed with caution, allowing pain to be the guide. These activities include but are not limited to: mowing the lawn, riding a bike, walking a dog, lifting a child, workshop hobbies, golfing, sexual activity, vacuuming, fishing, scrubbing the floors, and moving furniture. Patient was given the [address] in the [place] handout to describe each of these activities in detail. Patient instructed no asymmetrical reaching over head to ensure B UEs when shoulders >90* i.e. reaching in cabinets and dressing. Instruction on upper body dressing techniques of over head, then arms through to decrease pain and unilateral shoulder flexion >90*. Instruction on the benefits of utilizing B UEs during functional tasks i.e. opening the fridge, stepping into the tub. Instruction if continued pain at home with shoulder IR for BM hygiene can use wet wipes and toilet tongs PRN. Avoid valsalva maneuvers. May have to adjust home setup to increase ease with items closer to waist height to prevent deep bending and the automatic  of asymmetrical UE WB/pushing for stabilization during bending. Benefit to don clothing tailor sitting and don all clothing while sitting prior to standing. Patient demonstrated lower body dressing with Setup. Instruction and indicated understanding on the benefits of loose clothing throughout to accommodate for post surgical swelling, decreased ROM and increased pain. Instruction and indicated understanding the technique of pull over shirt versus front open clothing. Increase activity tolerance for home, work, and sexual intercourse by pacing self with increasing the arm exercises, sitting duration, frequency OOB, walking, standing, and ADLs. Instructed and indicated understanding of s/s of too much activity, how to respond to s/s safely.         Pain:  None reported    Activity Tolerance:   Fair and requires rest breaks    After treatment patient left in no apparent distress:   Supine in bed, Call bell within reach, Bed / chair alarm activated, Caregiver / family present, and Side rails x 3    COMMUNICATION/COLLABORATION:   The patients plan of care was discussed with: Physical therapist, Registered nurse, and Case management.      Amber Soliman OT  Time Calculation: 23 mins

## 2022-04-20 NOTE — DISCHARGE SUMMARY
\[facility]\"" Discharge Summary     Patient ID:  Jack Katz  [id number]  79 y.o.  1945    Admit date: 3/29/2022    Discharge date: 4/20/2022     Admitting Physician: Maggie Wallace MD     Referring Cardiologist:  Dr. Carlene Lloyd    PCP:  Gilda Osler, MD     Admitting Diagnoses: CAD    Discharge Diagnoses:     Hospital Problems  Never Reviewed          Codes Class Noted POA    S/P CABG x 3 ICD-10-CM: Z95.1  ICD-9-CM: V45.81  3/30/2022 Unknown    Overview Signed 3/30/2022  3:45 PM by LUIS Esteves     On pump CORONARY ARTERY BYPASS GRAFT (CABG) X3, LIMA to LAD, RSVH to PDA, RSVG to Ramus  R GSV EVH               CAD (coronary artery disease) ICD-10-CM: I25.10  ICD-9-CM: 414.00  3/29/2022 Unknown              Discharged Condition: stable    Disposition: home, see patient instructions for treatment and plan    Procedures for this admission:  Procedure(s):  CORONARY ARTERY BYPASS GRAFT (CABG) X3, LIMA. RSVH VIA EVH. ECC. LAWSON AND EPI AORTIC US BY DR Micah Rich    Discharge Medications:      My Medications      START taking these medications      Instructions Each Dose to Equal Morning Noon Evening Bedtime   acetaminophen 325 mg tablet  Commonly known as: TYLENOL    Your last dose was: Your next dose is: Take 2 Tablets by mouth every six (6) hours as needed for Pain. 650 mg                 aspirin 81 mg chewable tablet    Your last dose was: Your next dose is: Take 1 Tablet by mouth daily. 81 mg                 atorvastatin 40 mg tablet  Commonly known as: LIPITOR    Your last dose was: Your next dose is: Take 1 Tablet by mouth nightly for 90 days. 40 mg                 lisinopriL 2.5 mg tablet  Commonly known as: [place]reign    Your last dose was: Your next dose is: Take 1 Tablet by mouth daily for 90 days. 2.5 mg                 metoprolol tartrate 25 mg tablet  Commonly known as: LOPRESSOR    Your last dose was: Your next dose is:          Take 1 Tablet by mouth every twelve (12) hours for 90 days. 25 mg                 * QUEtiapine 100 mg tablet  Commonly known as: SEROquel    Your last dose was: Your next dose is: Take 1 Tablet by mouth nightly for 90 days. 100 mg                 * QUEtiapine 25 mg tablet  Commonly known as: SEROquel    Your last dose was: Your next dose is: Take 1 Tablet by mouth daily for 90 days. 25 mg                 spironolactone 25 mg tablet  Commonly known as: ALDACTONE    Your last dose was: Your next dose is: Take 0.5 Tablets by mouth daily for 90 days. 12.5 mg                 tamsulosin 0.4 mg capsule  Commonly known as: FLOMAX    Your last dose was: Your next dose is: Take 1 Capsule by mouth daily for 90 days. 0.4 mg                     * This list has 2 medication(s) that are the same as other medications prescribed for you. Read the directions carefully, and ask your doctor or other care provider to review them with you. CONTINUE taking these medications      Instructions Each Dose to Equal Morning Noon Evening Bedtime   Synthroid 75 mcg tablet  Generic drug: levothyroxine    Your last dose was: Your next dose is: Take 75 mcg by mouth Daily (before breakfast). 75 mcg                       Where to Get Your Medications      These medications were sent to [facility]/pharmacy #[id number]- [place], [place] - [address] [address] [place] [address] RD[address] [facility] [address]    Hours: 24-hours Phone: [phone]   · atorvastatin 40 mg tablet  · lisinopriL 2.5 mg tablet  · metoprolol tartrate 25 mg tablet  · QUEtiapine 100 mg tablet  · QUEtiapine 25 mg tablet  · spironolactone 25 mg tablet  · tamsulosin 0.4 mg capsule         HPI: Copied from [facility] note dated 3/29/22    Merline Fennel is a 64 y.o. male who was referred for cardiac evaluation of coronary artery disease, referred by Dr. Hina Vick. Pt's PMH includes hypothyroidism and HTN.  Pt presented to ER yesterday with chief complaint of mid-sternal chest pressure. Denies associative symptoms or radiation of pain. He reports he has felt this similar pain before years ago but wasn't as severe and resolved quickly. He reports this pain was more severe and do not go away. He called EMS and was brought to ER at [facility]. [facility]. Initial EKG did not show any ST changes but troponin was ++. LHC was completed, results below. He was unable to be admitted to [address] [place] [address] so he was transferred to [facility]. During transport to [facility] pt started having active chest pain and EKG showed new changes consistent with STEMI. Pt was given nitroglycerin and CODE STEMI was called at [facility]. He was taken back to cath lab for PCI, but unable to perform PCI. Cardiac Surgery is consulted for further surgical evaluation and treatment.        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 He wife recently passed away (in last month). Pt's son lives nearby. He is former tobacco smoker, quit many years ago. He drinks alcohol occasionally, and denies drug use. He participates in martial arts, bicycling. He denies use of assistive devices. No known family history of heart problems.      Of note, on further discussion w/ pt's daughter Brigitte Wood) and son Shamir Lindsay) they have concerns about reports of forgetfulness, varying moods including some outbursts that were violent.          Hospital Course: Mile Ledbetter was taken to the OR on 3/30/22 for a Coronary Artery Bypass X 3 (LIMA-LAD, RSVG-PDA, RSVG-Ramus). He was taken from the OR to the CVICU on the following drips: Precedex, Insulin, Epinephrine. He had a prolonged hospital course due to agitation and delirium which gradually improved. Then his DC was delayed due to disposition challenges.   A discharge plan was finally made for the patient to go home with his son and arrangements were made for that 04/20/22 with home health and the following assessment and plan:    1. CAD s/p CABG x3 LIMA to LAD, RSVH to PDA:  ASA, statin, ACEi, BB.       2. Renal insufficiency: Last Creat on 4/17 was 1.5. Lasix has been on hold.     3.  HTN:  BB, lisinopril 2.5 mg     4. Acute combined systolic and diastolic HF: LV EF 90% on LHC. EF improved by LAWSON in OR to 45%-50%. Repeat TTE prior to discharge showing LVEF of 35-40% with Grade 2 diastolic dysfunction. GDMT and BP management as above. (BB, ACEi) Spironolactone-monitor electrolytes.      5. Chronic dementia/acute delirium:  Per pt's children, some agitation/outbursts noted at times prior to surgery. They deny he has dementia.  Head CT showing chronic microvascular changes. Improving. Continue Seroquel bid.        6. Hypothyroid:  synthroid     7. GI/DVT px: Famotidine/SQ heparin. Ambulation/OOB     8. Anemia: secondary to acute blood loss following surgery.  Continue Iron. No need for transfusion.     9. Nutrition: Dietician following for protein calorie deficiency. Nutritional supplements added to diet.     Dispo: PT/OT/Cardiac Rehab. Case Management for discharge planning. Home with son and home health today         Referral to outpatient cardiac rehab made.      Discharge Vital Signs:   Visit Vitals  BP (!) 92/55 (BP 1 Location: Left upper arm, BP Patient Position: At rest)   Pulse 80   Temp 98.4 °F (36.9 °C)   Resp 16   Ht 5' 8\" (1.727 m)   Wt 156 lb 12 oz (71.1 kg)   SpO2 93%   BMI 23.83 kg/m²       Labs:    Lab Results   Component Value Date/Time    WBC 7.4 04/17/2022 05:44 AM    Hemoglobin (POC) 12.2 07/20/2009 02:14 PM    HGB 11.7 (L) 04/17/2022 05:44 AM    Hematocrit (POC) 36 (L) 07/20/2009 02:14 PM    HCT 35.8 (L) 04/17/2022 05:44 AM    PLATELET 098 76/59/9700 05:44 AM    MCV 91.3 04/17/2022 05:44 AM       Lab Results   Component Value Date/Time    Sodium 136 04/17/2022 05:44 AM    Potassium 4.7 04/17/2022 05:44 AM    Chloride 105 04/17/2022 05:44 AM    CO2 24 04/17/2022 05:44 AM    Anion gap 7 04/17/2022 05:44 AM    Glucose 118 (H) 04/17/2022 05:44 AM BUN 16 04/17/2022 05:44 AM    Creatinine 1.52 (H) 04/17/2022 05:44 AM    BUN/Creatinine ratio 11 (L) 04/17/2022 05:44 AM    GFR est AA 54 (L) 04/17/2022 05:44 AM    GFR est non-AA 45 (L) 04/17/2022 05:44 AM    Calcium 8.7 04/17/2022 05:44 AM       Diagnostics: 4/5/22  EXAM: XR CHEST PORT     INDICATION: s/p CABG; removed chest tubes       FINDINGS: A portable AP radiograph of the chest was obtained at 0746 hours. The  patient is on a cardiac monitor. The patient is status post median sternotomy. No change mild cardiomegaly. Mediastinal pleural drains have been removed. Lungs  demonstrate low lung volumes with bibasilar atelectasis left greater than right. There are small pleural effusions. No pneumothorax.     IMPRESSION  1. No pneumothorax    Patient Instructions/Follow Up Care:  Discharge instructions were reviewed with the patient and family present. Questions were also answered at this time. Prescriptions and medications were reviewed. The patient has a follow up appointment with the Nurse Practitioner or Physician's Assistant by phone in 3-5 days and with Dr. Maria Fernanda Ching in a month. The patient was also instructed to follow up with his primary care physician as needed. The patient and family were encouraged to call with any questions or concerns.        Signed:  Flavio Gaucher, NP  4/20/2022  2:34 PM

## 2022-04-20 NOTE — PROGRESS NOTES
Discharge instructions given to both sons and daughter. Explained thoroughly all discharge instructions and stated they understood. Told all of them to please call the Surgeons office with any concerns or questions at any time.   Pt taken out by nursing tech in a wheel chair in no visible distress at this time

## 2022-04-20 NOTE — PROGRESS NOTES
Problem: Mobility Impaired (Adult and Pediatric)  Goal: *Acute Goals and Plan of Care (Insert Text)  Description: FUNCTIONAL STATUS PRIOR TO ADMISSION: Patient was independent and active without use of DME.     HOME SUPPORT PRIOR TO ADMISSION: The patient lived alone with son (lives on the property) available to provide assistance. Pt reports his wife recently passed away. Physical Therapy Goals  Weekly Reassessment 4/7/2022 (goals down graded): goals upgraded 4/15/22  1. Patient will move from supine to sit and sit to supine  in bed with supervision assist within 5 days. 2.  Patient will perform sit to/from stand with mod I within 5 days. 3.  Patient will ambulate 150 feet with least restrictive assistive device and supervision assist within 5 days. 4.  Patient will ascend/descend 2 stairs with handrail(s) with moderate assistance  within 5 days. 5.  Patient will perform cardiac exercises per protocol with minimum assistance within 5 days. 6.  Patient will verbally recall and functionally demonstrate mindful-based movements (\"move in the tube\") principles with cues within 5 days. Initiated 3/31/2022  1. Patient will move from supine to sit and sit to supine  in bed with modified independence within 5 days. 2.  Patient will perform sit to/from stand with modified independence within 5 days. 3.  Patient will ambulate 300 feet with least restrictive assistive device and modified independence within 5 days. 4.  Patient will ascend/descend 12 stairs with handrail(s) with independence within 5 days. 5.  Patient will perform cardiac exercises per protocol with modified independence within 5 days. 6.  Patient will verbally recall and functionally demonstrate mindful-based movements (\"move in the tube\") principles without cues within 5 days.           Outcome: Progressing Towards Goal       PHYSICAL THERAPY TREATMENT  Patient: Dixie Macdonald (2 y.o. male)  Date: 4/20/2022  Diagnosis: CAD (coronary artery disease) [I25.10] <principal problem not specified>  Procedure(s) (LRB):  CORONARY ARTERY BYPASS GRAFT (CABG) X3, LIMA. RSVH VIA EVH. ECC. LAWSON AND EPI AORTIC US BY DR Christiano Hadley (N/A) 21 Days Post-Op  Precautions: Fall,Sternal,Other (comment) (move in the tube)  Chart, physical therapy assessment, plan of care and goals were reviewed. ASSESSMENT  Patient continues with skilled PT services and is progressing towards goals. Pt was agreeable. Pt was able to verbalize UE has to be together if goes overhead. Pt not fully adhering to \"move in the tube\" principles consistently. Pt was able to increase gait tolerance and perform steps. Pt does not know the amount of steps to enter son's home. Pt is utilizing rolling walker to assist with balance. Pt was able to perform standing cardiac exercises. Educated him on the hadbook and how to find pictures of the exeresis. Pt is progressing with mobility but could benefit from supervision due to fall risk, decrease safety awareness, impulsivity, periodic confusion, and adherence to \"move in the tube\" principles. Per pt, he owns a rolling walker. Patient is not demonstrating understanding of mindful-based movements (\"move in the tube\") principles of keeping UEs proximal to ribcage to prevent lateral pull on the sternum during load-bearing activities with verbal cues required for compliance. Current Level of Function Impacting Discharge (mobility/balance): CGA    Other factors to consider for discharge: Sternal incision, fall risk,          PLAN :  Patient continues to benefit from skilled intervention to address the above impairments. Continue treatment per established plan of care. to address goals.     Recommendation for discharge: (in order for the patient to meet his/her long term goals)  HHPT with supervision if unable then SNF    This discharge recommendation:  Has not yet been discussed the attending provider and/or case management    IF patient discharges home will need the following DME: patient owns DME required for discharge       SUBJECTIVE:   Patient stated Doyou believe in ghost.    OBJECTIVE DATA SUMMARY:   Patient mobilized on continuous portable monitor/telemetry. Critical Behavior:  Neurologic State: Alert  Orientation Level: Oriented X4 (periodic confusion; impulsive )  Cognition: Impaired decision making,Impulsive  Safety/Judgement: Fall prevention    Functional Mobility Training:  Bed Mobility:     Supine to Sit: Modified independent                Transfers:  Sit to Stand: Supervision  Stand to Sit: Supervision                               Balance:  Sitting: Intact  Standing: Impaired  Standing - Static: Good  Standing - Dynamic : Good;Fair    Ambulation/Gait Training:  Distance (ft): 300 Feet (ft)  Assistive Device: Gait belt;Walker, rolling  Ambulation - Level of Assistance: Stand-by assistance        Gait Abnormalities: Decreased step clearance (flexed knees)              Speed/Davina: Pace decreased (<100 feet/min)                      Stairs:  Number of Stairs Trained: 4  Stairs - Level of Assistance: Contact guard assistance  Rail Use: Both    Cardiac diagnosis intervention:  Patient instructed and educated on mindful movement principles based on Move in The Margie concept to include maintaining bilateral elbows close to rib cage when performing any load-bearing activity such as getting in/out of bed, pushing up from a chair, opening a door, or lifting a box. Patient was given a handout with diagrams of each correct/incorrect method of performing each of the above tasks. Therapeutic Exercises:   Patient instructed on the benefits and demonstrated cardiac exercises while standing  with Stand-by assistance. Instructed and indicated understanding on how to progress reps, sets against gravity, pacing through progressive muscle strengthening standing based on surgeon clearance for more weight in prep for functional activity. Instruction on the use of household items in place of weights as needed. CARDIAC  EXERCISE   Sets   Reps   Active Active Assist   Passive Self ROM   Comments   Shoulder flexion 1 5 [x]                                            []                                            []                                            []                                               Shoulder abduction 1      5 [x]                                            []                                            []                                            []                                               Scapular elevation 1 5 [x]                                            []                                            []                                            []                                               Scapular retraction 1 5 [x]                                            []                                            []                                            []                                               Trunk rotation 1 5 [x]                                            []                                            []                                            [x]                                               Trunk sidebending 1 5 [x]                                            []                                            []                                            []                                                  []                                            []                                            []                                            []                                                   Pain Rating:  No complaints     Activity Tolerance:   Improving      After treatment patient left in no apparent distress:   Sitting in chair, Call bell within reach, and Bed / chair alarm activated    COMMUNICATION/COLLABORATION:   The patients plan of care was discussed with: Registered nurse.      Lamont Giron PTA Time Calculation: 27 mins

## 2022-04-20 NOTE — PROGRESS NOTES
[facility] FLOOR Progress Note    Admit Date: 3/29/2022  POD: 21 Days Post-Op      Procedure:  Procedure(s):  CORONARY ARTERY BYPASS GRAFT (CABG) X3, LIMA. RSVH VIA EVH. ECC. LAWSON AND EPI AORTIC US BY DR Samantha Garces    Subjective:   Pt seen with Dr. Miller Adjutant. Resting quietly this AM. No acute events overnight. Pt RA with SPO2 >92%. Objective:     Visit Vitals  BP (!) 92/55 (BP 1 Location: Left upper arm, BP Patient Position: At rest)   Pulse 75   Temp 98.4 °F (36.9 °C)   Resp 16   Ht 5' 8\" (1.727 m)   Wt 156 lb 12 oz (71.1 kg)   SpO2 93%   BMI 23.83 kg/m²     Temp (24hrs), Av.1 °F (36.7 °C), Min:97.6 °F (36.4 °C), Max:98.6 °F (37 °C)      Last 24hr Input/Output:    Intake/Output Summary (Last 24 hours) at 2022 1046  Last data filed at 2022 0412  Gross per 24 hour   Intake 870 ml   Output 0 ml   Net 870 ml        EKG/Rhythm: NSR        Oxygen: RA      Admission Weight: Last Weight   Weight: 167 lb 1.7 oz (75.8 kg) Weight: 156 lb 12 oz (71.1 kg)       EXAM:  General: Alert and resting quietly in bed   Lungs:   Clear to auscultation bilaterally. Incision:  No erythema, drainage or swelling. Heart:  Regular rate and rhythm, S1, S2 normal, + murmur, click, rub or gallop. Abdomen:   Soft, non-tender. Bowel sounds normal. No masses,  No organomegaly. Extremities:  No edema. PPP   Neurologic:  Gross motor and sensory apparatus intact. Activity: OOB PT/OT    Diet:  Regular low fat/low chol/high fiber/ALONSO. Added oral nutrition supplement high calorie/high protein. Assessment:     Active Problems:    CAD (coronary artery disease) (3/29/2022)      S/P CABG x 3 (3/30/2022)      Overview: On pump CORONARY ARTERY BYPASS GRAFT (CABG) X3, LIMA to LAD, RSVH to PDA,       RSVG to Ramus      R GSV EVH                   Plan/Recommendations/Medical Decision Makin. CAD s/p CABG x3 LIMA to LAD, RSVH to PDA:  ASA, statin, ACEi, BB.       2. Renal insufficiency: Last Creat on  was 1.5.  Lasix has been on hold.     3.  HTN:  BB, lisinopril 2.5 mg     4. Acute combined systolic and diastolic HF: LV EF 08% on LHC. EF improved by LAWSON in OR to 45%-50%. Repeat TTE prior to discharge showing LVEF of 35-40% with Grade 2 diastolic dysfunction. GDMT and BP management as above. (BB, ACEi) Spironolactone-monitor electrolytes.      5. Chronic dementia/acute delirium:  Per pt's children, some agitation/outbursts noted at times prior to surgery. They deny he has dementia. Head CT showing chronic microvascular changes. Improving. Continue Seroquel bid.        6. Hypothyroid:  synthroid     7. GI/DVT px: Famotidine/SQ heparin. Ambulation/OOB     8. Anemia: secondary to acute blood loss following surgery.  Continue Iron. No need for transfusion. 9. Nutrition: Dietician following for protein calorie deficiency. Nutritional supplements added to diet. Dispo: PT/OT/Cardiac Rehab. Case Management for discharge planning. Home with son and home health today    Signed By: Pari Dacosta     I saw and evaluated the patient, performing the key elements of the service. I discussed the findings, assessment and plan with the NP Student and agree with the NP Student's findings and plan as documented in the note.     Bam Bahena NP

## 2022-04-20 NOTE — DISCHARGE INSTRUCTIONS
Cardiac Surgery Specialist    [address] [place], [place] [address] [address]  Office- [phone]  Fax- [phone]       Office- [phone]  Fax- [phone]  _____________________________________________________________  AMANDA Munroe Dr., NP  Dr. Eliz Fox, NP     AMANDA Brewer Dr., AMANDA Ovalle PA-C  _____________________________________________________________    Raydell Travis     Surgery & Date: Procedure(s):  CORONARY ARTERY BYPASS GRAFT (CABG) X3, LIMA. RSVH VIA EVH. ECC. LAWSON AND EPI AORTIC US BY DR Nalini Galarza    Discharge Date: No discharge date for patient encounter. MEDICATIONS:  Please refer to your After Visit Summary for your medication list. If you do not have a prescription for a new medication, you may purchase the medication over the counter. DO NOT TAKE ANY MEDICATIONS THAT ARE NOT ON THIS LIST      INSTRUCTIONS:  1. NO SMOKING OR TOBACCO PRODUCTS  2. Follow all the instructions in your discharge book  3. You may shower. Wash all incisions twice daily with mild soap and water. No lotions, ointments or powder. 4. Call the office immediately for any redness, swelling, or drainage from your incision. 5. Take your temperature daily and call for a temperature of 101 degrees or higher or for any symptoms that make you think you have and infection. 6. Weigh yourself each morning. Call if you gain more than 5 pounds in 48 hours. 7. Use the incentive spirometer 6-8 times a day-10 breaths each time. Use a pillow or your bear to splint your breastbone when coughing or sneezing.   8. If you feel your breast bone clicking or popping, notify the office immediately. 9. Walk several hundred feet several times daily. DIET  Eat an American Heart Association diet. If you are having trouble with your appetite, eat what you can. Try eating small, frequent meals throughout the day. ACTIVITY  1. NO DRIVING--you will be evaluated to drive at your follow up visit. 2. Increase your activity by walking several times a day. Stay out of bed most of the day. 3. When sitting, keep your legs elevated. 4. You may ride in a car, but you must get out every hour and walk around. If you ride in a car with an airbag that can not be switched off, put the seat ALL the way back or ride in the back seat. 5. Any load bearing activity can be performed as long as it can be performed \"in the tube\". You can reach \"out of the tube\" when performing activities that don't require heavy lifting. Let pain be your guide, your pain level will keep you from doing anything extreme. FOLLOW UP  1. Your first follow up appointment will be on ___ at ___. Our office is located in [address] [place] [address]. Your second follow up appointment will be in four weeks, on ___ at ___. Please call our office at [phone] if you are unable to make either one of these appointments. 2. You will be receiving a call before your 5 day appointment to begin cardiac rehab. They are programs located at [address] [place] [address], [facility] and [facility].  The contact information is located in your Cardiac Surgery booklet. Please call if you have not been contacted 2-3 weeks after discharge from the hospital.  3. We will make an appointment with your cardiologist at your last appointment. 4. Consult you primary care physician regarding your influenza &   pneumovax vaccines. 5.   Please bring all medications with you to your appointment.     Signature:___________________________________________________

## 2022-04-20 NOTE — PROGRESS NOTES
1930: Bedside and Verbal shift change report given to Fern Stoddard RN  (oncoming nurse) by Maribel Paz RN (offgoing nurse). Report included the following information SBAR, Kardex, Intake/Output, Recent Results, and Cardiac Rhythm NSR . ]    Problem: Pain  Goal: *Control of Pain  Outcome: Progressing Towards Goal  Goal: *PALLIATIVE CARE:  Alleviation of Pain  Outcome: Progressing Towards Goal     Problem: Pressure Injury - Risk of  Goal: *Prevention of pressure injury  Description: Document Asim Scale and appropriate interventions in the flowsheet. Outcome: Progressing Towards Goal  Note: Pressure Injury Interventions:  Sensory Interventions: Assess changes in LOC    Moisture Interventions: Absorbent underpads    Activity Interventions: Assess need for specialty bed    Mobility Interventions: Assess need for specialty bed,Pressure redistribution bed/mattress (bed type),Turn and reposition approx.  every two hours(pillow and wedges)    Nutrition Interventions: Document food/fluid/supplement intake    Friction and Shear Interventions: Lift sheet,Minimize layers

## 2022-04-25 ENCOUNTER — OFFICE VISIT (OUTPATIENT)
Dept: CARDIOLOGY CLINIC | Age: 77
End: 2022-04-25
Payer: MEDICARE

## 2022-04-25 VITALS — RESPIRATION RATE: 18 BRPM | HEIGHT: 68 IN | WEIGHT: 158 LBS | BODY MASS INDEX: 23.95 KG/M2

## 2022-04-25 DIAGNOSIS — Z95.1 S/P CABG (CORONARY ARTERY BYPASS GRAFT): Primary | ICD-10-CM

## 2022-04-25 PROCEDURE — 99024 POSTOP FOLLOW-UP VISIT: CPT | Performed by: NURSE PRACTITIONER

## 2022-04-25 NOTE — PROGRESS NOTES
Patient: Leanor Client   Age: 64 y.o. Patient Care Team:  Gavin Keen MD as PCP - General (Family Medicine)  Shaheen Donald MD (Cardiothoracic Surgery)  Cristi Yeh MD (Cardiology)    Diagnosis: s/p CABG     Problem List:   Patient Active Problem List   Diagnosis Code    NSTEMI (non-ST elevated myocardial infarction) ([facility] [address] I21.4    Chest pain R07.9    Hypothyroidism E03.9    Hypokalemia E87.6    HTN (hypertension), benign I10    CAD (coronary artery disease) I25.10    S/P CABG x 3 Z95.1        This service was provided at Cardiac Surgery Specialist's office and Leanor Client at their home. The patient did not notice that it was supposed to be a phone visit. He did not bring a list of medications or the pill bottles. He was accompanied by his son. Date of Surgery:  3-30-22     Surgery: CABG     HPI:  Pt is here for post op follow up, seen with NP. Cinthya Santiago a 64 y. o. man who was referred for cardiac evaluation of coronary artery disease, referred by Dr. Brenda Lr. Pt's PMH includes hypothyroidism and HTN. Pt presented to ER  with chief complaint of mid-sternal chest pressure. He called EMS and was brought to ER at [facility].  Initial EKG did not show any ST changes but troponin was ++.  LHC was completed, results below.  He was unable to be admitted to [address] [place] [address] so he was transferred to [address] [place] [address].  During transport to [address] [place] [address] pt started having active chest pain and EKG showed new changes consistent with STEMI.  Pt was given nitroglycerin and CODE STEMI was called at [address] [place] [address]. [facility] was taken back to cath lab for PCI, but unable to perform PCI.  Cardiac Surgery is consulted for further surgical evaluation and treatment.        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 He wife recently passed away (in last month).  Pt's son lives nearby. [facility] is former tobacco smoker, quit many years ago. [facility] drinks alcohol occasionally, and denies drug use.  He participates in martial arts, bicycling. [facility] denies use of assistive devices.  No known family history of heart problems.      Of note, on further discussion w/ pt's daughter Angela Salazar) and son Samantha Piña) they have concerns about reports of forgetfulness.       Today the patient comes in with his son. He is doing well and appears to be at his baseline, per the son. Postop, he had issues with combativeness, delirium and impulsivity. He required sedation, a sitter and Seroquel. Today, his thought processes are tangential. He jumps from one topic to another not completely answering questions. Is he covering for some dementia by redirecting? He was very talkative about issues unrelated to this situation. He denies SOB, fevers, chills, undue pain, or any other concerns. He would like to resume driving. Son reports that the patient ignores his phone, so contact must be with the son. Current Medications:   Current Outpatient Medications   Medication Sig Dispense Refill    acetaminophen (TYLENOL) 325 mg tablet Take 2 Tablets by mouth every six (6) hours as needed for Pain. 20 Tablet 0    aspirin 81 mg chewable tablet Take 1 Tablet by mouth daily. 30 Tablet 1    atorvastatin (LIPITOR) 40 mg tablet Take 1 Tablet by mouth nightly for 90 days. 30 Tablet 2    metoprolol tartrate (LOPRESSOR) 25 mg tablet Take 1 Tablet by mouth every twelve (12) hours for 90 days. 60 Tablet 2    QUEtiapine (SEROquel) 100 mg tablet Take 1 Tablet by mouth nightly for 90 days. 30 Tablet 2    QUEtiapine (SEROquel) 25 mg tablet Take 1 Tablet by mouth daily for 90 days. 30 Tablet 2    lisinopriL (PRINIVIL, ZESTRIL) 2.5 mg tablet Take 1 Tablet by mouth daily for 90 days. 90 Tablet 0    spironolactone (ALDACTONE) 25 mg tablet Take 0.5 Tablets by mouth daily for 90 days. 15 Tablet 2    tamsulosin (FLOMAX) 0.4 mg capsule Take 1 Capsule by mouth daily for 90 days. 30 Capsule 2    levothyroxine (SYNTHROID) 75 mcg tablet Take 75 mcg by mouth Daily (before breakfast). Vitals:  There were no vitals taken for this visit. Allergies: has No Known Allergies. Physical Exam:  Wounds: dry, no drainage, healing    Lungs: clear to auscultation bilaterally. Unlabored at rest. Walked 100 yards to the office (went to the hospital first) with rest X3 stops. Heart: regular rate and rhythm, S1, S2 normal, systolic murmur: systolic ejection 4/6, across the precordium. Extremities: no edema    Poor dentition. Assessment/Plan:   **call son Maranda Hughes for contact at [phone]. Son to call us with list of medications. Could not be reconciled as they had no list and no pill bottles.       1. CAD s/p CABG x3 LIMA to LAD, RSVH to PDA:  ASA, statin, ACEi, BB.       2. Renal insufficiency: Last Creat on  was 1.5.      3.  HTN:  BB, lisinopril 2.5 mg     4. Acute combined systolic and diastolic HF: LV EF 90% on LHC. EF improved by LAWSON in OR to 45%-50%. Repeat TTE prior to discharge showing LVEF of 35-40% with Grade 2 diastolic dysfunction. GDMT and BP management as above. (BB, ACEi) Spironolactone-monitor electrolytes.      5. Chronic dementia/acute delirium:  Head CT showing chronic ischemic changes. No acute findings. Calm. Distracted conversation, jumping from one topic to another.         6. Hypothyroid:  synthroid     7. GI/DVT px: Famotidine/SQ heparin. Ambulation/OOB     8. Anemia: secondary to acute blood loss following surgery.  Continue Iron. No need for transfusion.     9. Nutrition:states appetite is okay. Medication reconciliation not done: pt has no recollection and he did not bring list or pill bottles. Have asked his son to call us with the medications (preferred him to look at pill bottles). Pt is ready to start cardiac rehab. Rehab -  Yes if he can be contacted. Walkin yards, stopping a few times.

## 2022-05-09 ENCOUNTER — OFFICE VISIT (OUTPATIENT)
Dept: CARDIOLOGY CLINIC | Age: 77
End: 2022-05-09
Payer: MEDICARE

## 2022-05-09 VITALS
OXYGEN SATURATION: 96 % | BODY MASS INDEX: 24.4 KG/M2 | DIASTOLIC BLOOD PRESSURE: 80 MMHG | RESPIRATION RATE: 18 BRPM | SYSTOLIC BLOOD PRESSURE: 118 MMHG | WEIGHT: 161 LBS | HEART RATE: 83 BPM | HEIGHT: 68 IN | TEMPERATURE: 98.2 F

## 2022-05-09 DIAGNOSIS — Z95.1 S/P CABG X 3: ICD-10-CM

## 2022-05-09 DIAGNOSIS — I25.119 CORONARY ARTERY DISEASE INVOLVING NATIVE CORONARY ARTERY OF NATIVE HEART WITH ANGINA PECTORIS (HCC): Primary | ICD-10-CM

## 2022-05-09 PROCEDURE — 1111F DSCHRG MED/CURRENT MED MERGE: CPT | Performed by: THORACIC SURGERY (CARDIOTHORACIC VASCULAR SURGERY)

## 2022-05-09 PROCEDURE — G8427 DOCREV CUR MEDS BY ELIG CLIN: HCPCS | Performed by: THORACIC SURGERY (CARDIOTHORACIC VASCULAR SURGERY)

## 2022-05-09 PROCEDURE — G8536 NO DOC ELDER MAL SCRN: HCPCS | Performed by: THORACIC SURGERY (CARDIOTHORACIC VASCULAR SURGERY)

## 2022-05-09 PROCEDURE — G8752 SYS BP LESS 140: HCPCS | Performed by: THORACIC SURGERY (CARDIOTHORACIC VASCULAR SURGERY)

## 2022-05-09 PROCEDURE — 99024 POSTOP FOLLOW-UP VISIT: CPT | Performed by: THORACIC SURGERY (CARDIOTHORACIC VASCULAR SURGERY)

## 2022-05-09 PROCEDURE — G8510 SCR DEP NEG, NO PLAN REQD: HCPCS | Performed by: THORACIC SURGERY (CARDIOTHORACIC VASCULAR SURGERY)

## 2022-05-09 PROCEDURE — 1101F PT FALLS ASSESS-DOCD LE1/YR: CPT | Performed by: THORACIC SURGERY (CARDIOTHORACIC VASCULAR SURGERY)

## 2022-05-09 PROCEDURE — G8420 CALC BMI NORM PARAMETERS: HCPCS | Performed by: THORACIC SURGERY (CARDIOTHORACIC VASCULAR SURGERY)

## 2022-05-09 PROCEDURE — G8754 DIAS BP LESS 90: HCPCS | Performed by: THORACIC SURGERY (CARDIOTHORACIC VASCULAR SURGERY)

## 2022-05-09 NOTE — LETTER
5/9/2022    Patient: Selene Barlow   YOB: 1945   Date of Visit: 5/9/2022     Ti Jeffrey MD  Via Outside Provider Messaging    Dear Ti Jeffrey MD,      Thank you for referring Mr. Selene Barlow to [address] [place] [address] [facility] [place] for evaluation. My notes for this consultation are attached. If you have questions, please do not hesitate to call me. I look forward to following your patient along with you.       Sincerely,    Enrico Miller MD

## 2022-05-09 NOTE — PROGRESS NOTES
Patient: Millicent Escobar   Age: 64 y.o. Patient Care Team:  Aly Paula MD as PCP - General (Family Medicine)  Cinthia England MD (Cardiothoracic Surgery)  Kiera Ibanez MD (Cardiovascular Disease Physician)    Diagnosis: The primary encounter diagnosis was Coronary artery disease involving native coronary artery of native heart with angina pectoris ([facility] [address] A diagnosis of S/P CABG x 3 was also pertinent to this visit. Problem List:   Patient Active Problem List   Diagnosis Code    NSTEMI (non-ST elevated myocardial infarction) ([facility] [address] I21.4    Chest pain R07.9    Hypothyroidism E03.9    Hypokalemia E87.6    HTN (hypertension), benign I10    CAD (coronary artery disease) I25.10    S/P CABG x 3 Z95.1          Date of Surgery: 3/30/22     Surgery: CAB X 2    HPI:  Pt is here for post op follow up, seen with Dr. Michelle Ridre. He is accompanied by his daughter who aids in giving information. The patient states that he has been getting around at home. His limiting factor for activity is his right knee pain. He tripped over the solar cover to his pool and fell on that knee. He also had chronic problems with right knee pain which this fall exacerbated. His daughter is concerned because the patient is still confused at times. She is not sure if he is taking his medications appropriately. His VS are all stable in the office today. He assures his daughter and me that he has been doing well at home and taking his meds as prescribed. He states he is eating well but his appetite is not as robust as it once was. He has no complaints. Current Medications:   Current Outpatient Medications   Medication Sig Dispense Refill    atorvastatin (LIPITOR) 40 mg tablet Take 1 Tablet by mouth nightly for 90 days. 30 Tablet 2    metoprolol tartrate (LOPRESSOR) 25 mg tablet Take 1 Tablet by mouth every twelve (12) hours for 90 days.  60 Tablet 2    QUEtiapine (SEROquel) 100 mg tablet Take 1 Tablet by mouth nightly for 90 days. 30 Tablet 2    QUEtiapine (SEROquel) 25 mg tablet Take 1 Tablet by mouth daily for 90 days. 30 Tablet 2    spironolactone (ALDACTONE) 25 mg tablet Take 0.5 Tablets by mouth daily for 90 days. 15 Tablet 2    tamsulosin (FLOMAX) 0.4 mg capsule Take 1 Capsule by mouth daily for 90 days. 30 Capsule 2    aspirin 81 mg chewable tablet Take 1 Tablet by mouth daily. (Patient not taking: Reported on 5/9/2022) 30 Tablet 1    lisinopriL (PRINIVIL, ZESTRIL) 2.5 mg tablet Take 1 Tablet by mouth daily for 90 days. (Patient not taking: Reported on 5/9/2022) 90 Tablet 0    levothyroxine (SYNTHROID) 75 mcg tablet Take 75 mcg by mouth Daily (before breakfast). (Patient not taking: Reported on 5/9/2022)         Vitals: Blood pressure 118/80, pulse 83, temperature 98.2 °F (36.8 °C), temperature source Oral, resp. rate 18, height 5' 8\" (1.727 m), weight 161 lb (73 kg), SpO2 96 %. Allergies: has No Known Allergies. Physical Exam:  Wounds: clean, dry, healed    Lungs: clear to auscultation bilaterally    Heart: regular rate and rhythm, S1, S2 normal, no murmur, click, rub or gallop    Extremities: normal strength, tone, and muscle mass. Swelling of right knee. Assessment/Plan:   1. CAD s/p CABG x3 LIMA to LAD, RSVH to PDA:  ASA, statin, ACEi, BB.       2. Renal insufficiency: Last Creat on 4/17 was 1.5.      3.  HTN:  BB, lisinopril      4. Acute combined systolic and diastolic HF: LV EF 16% on LHC. EF improved by LAWSON in OR to 45%-50%. Repeat TTE prior to discharge showing LVEF of 35-40% with Grade 2 diastolic dysfunction. GDMT and BP management as above. (BB, ACEi) Spironolactone-monitor electrolytes.      5. Chronic dementia/acute delirium when hospitalized:  Head CT showing chronic ischemic changes. No acute findings. Calm. Distracted conversation, jumping from one topic to another. On Seroquel. I have asked him to follow up with his PCP to discuss this.        6. Hypothyroid:  synthroid    7.  Acute on chronic right knee pain: Likely OA exacerbated by fall. He has range of motion and is walking on it. I have asked him to follow up with PCP.       Pt is ready to start cardiac rehab.      Rehab - May begin   Walking: Increase daily as able  Glucometer: N/a  Antibiotic card for valves: n/a

## 2022-07-15 ENCOUNTER — TELEPHONE (OUTPATIENT)
Dept: CARDIOLOGY CLINIC | Age: 77
End: 2022-07-15

## 2022-07-15 NOTE — TELEPHONE ENCOUNTER
moose called pt. [phone] he's all over the place. he doesn't know what he needs something about a blood appt    Jamaica Cain NP  I called the patient. He is having an ultrasound and wanted to know if he could go ahead. He did not know what part of the body he is having an [place] sound. I advised him to go ahead with it.

## 2023-01-12 NOTE — Clinical Note
1/12/2023      RE: Preeti Pascual  [address]  [facility] [id number]      Preeti Pascual returns today in follow-up of metastatic biopsy of the carcinoma.    Is 57 years old with well compensated liver disease and a good performance status with progressive hepatocellular carcinoma and multiple prior treatments.  (See my note from earlier this week for full details.)  When I saw him earlier this week he had radiographic progression on what was our last plan line of therapy with gemcitabine/oxaliplatin.  He however had a marked improvement in his AFP from 24,000 down to 1200.  He had reported that his overall clinical condition was worsening and we had made initially a plan to discontinue therapy and he would return back to his home in [place] for end-of-life.  After the visit however became apparent that he and his wife were not on the same page and it was unclear how much his wife understood (this was made difficult by the fact that she had not been present for any of his visits over the last 5 years and we are unaware of her.)  I asked him to return today so we could conduct the visit together with them with a professional  as his wife's English is not very good..    I clarified with him that he clearly does wish to return home to die in [place] but wants to stay here as long as possible prior to that and his wife is in agreement with that plan.  He and his wife disagree on whether his clinical condition is improved or worsened in the last 4 weeks.  I reviewed with them that the worsening of the CT scan was quite clear, but there was almost a month delay between the baseline scan and when we actually started treatment. Its possible that progression occurred before he started therapy and it certainly plausible that he can still get a good response given the dramatic improvement in his tumor marker.  I discussed the risk that his clinical condition is fragile enough that if things decline  Mallampati: Class II - soft palate, uvula, fauces visible. ASA: Class 3 - patient with severe systemic disease. quickly he may miss his window of opportunity to be well enough to travel back to [facility].  After much discussion we landed on a plan to continue with his current chemotherapy but will be checking his alpha-fetoprotein with each cycle every 2 weeks.  If his alpha-fetoprotein starts going back up significantly or he has clinical deterioration we will stop short of waiting another 2 months to document further progression so that he can return home before he becomes too ill.    Total time by me today all spent on the above discussion as well as ordering and coordination of care was approximately 60 minutes.    Agustin Kyle MD

## 2023-02-13 ENCOUNTER — HOSPITAL ENCOUNTER (EMERGENCY)
Age: 78
Discharge: HOME | End: 2023-02-13
Attending: EMERGENCY MEDICINE
Payer: COMMERCIAL

## 2023-02-13 VITALS
TEMPERATURE: 97.6 F | SYSTOLIC BLOOD PRESSURE: 158 MMHG | OXYGEN SATURATION: 97 % | HEART RATE: 90 BPM | HEIGHT: 70 IN | DIASTOLIC BLOOD PRESSURE: 100 MMHG | BODY MASS INDEX: 23.64 KG/M2 | WEIGHT: 165.12 LBS | RESPIRATION RATE: 18 BRPM

## 2023-02-13 LAB
ALBUMIN SERPL-MCNC: 3.4 G/DL (ref 3.5–5)
ALBUMIN/GLOB SERPL: 1 (ref 1.1–2.2)
ALP SERPL-CCNC: 138 U/L (ref 45–117)
ALT SERPL-CCNC: 80 U/L (ref 12–78)
ANION GAP SERPL CALC-SCNC: 4 MMOL/L (ref 5–15)
AST SERPL-CCNC: 51 U/L (ref 15–37)
BASOPHILS # BLD: 0.1 K/UL (ref 0–0.1)
BASOPHILS NFR BLD: 1 % (ref 0–1)
BILIRUB DIRECT SERPL-MCNC: 0.2 MG/DL (ref 0–0.2)
BILIRUB SERPL-MCNC: 0.7 MG/DL (ref 0.2–1)
BUN SERPL-MCNC: 30 MG/DL (ref 6–20)
BUN/CREAT SERPL: 23 (ref 12–20)
CALCIUM SERPL-MCNC: 9.4 MG/DL (ref 8.5–10.1)
CHLORIDE SERPL-SCNC: 108 MMOL/L (ref 97–108)
CO2 SERPL-SCNC: 26 MMOL/L (ref 21–32)
COMMENT, HOLDF: NORMAL
CREAT SERPL-MCNC: 1.29 MG/DL (ref 0.7–1.3)
DIFFERENTIAL METHOD BLD: ABNORMAL
EOSINOPHIL # BLD: 0.2 K/UL (ref 0–0.4)
EOSINOPHIL NFR BLD: 4 % (ref 0–7)
ERYTHROCYTE [DISTWIDTH] IN BLOOD BY AUTOMATED COUNT: 14.6 % (ref 11.5–14.5)
GLOBULIN SER CALC-MCNC: 3.3 G/DL (ref 2–4)
GLUCOSE SERPL-MCNC: 96 MG/DL (ref 65–100)
HCT VFR BLD AUTO: 41.7 % (ref 36.6–50.3)
HGB BLD-MCNC: 13.1 G/DL (ref 12.1–17)
IMM GRANULOCYTES # BLD AUTO: 0 K/UL (ref 0–0.04)
IMM GRANULOCYTES NFR BLD AUTO: 0 % (ref 0–0.5)
LIPASE SERPL-CCNC: 96 U/L (ref 73–393)
LYMPHOCYTES # BLD: 1.2 K/UL (ref 0.8–3.5)
LYMPHOCYTES NFR BLD: 17 % (ref 12–49)
MCH RBC QN AUTO: 29.1 PG (ref 26–34)
MCHC RBC AUTO-ENTMCNC: 31.4 G/DL (ref 30–36.5)
MCV RBC AUTO: 92.7 FL (ref 80–99)
MONOCYTES # BLD: 0.8 K/UL (ref 0–1)
MONOCYTES NFR BLD: 11 % (ref 5–13)
NEUTS SEG # BLD: 4.6 K/UL (ref 1.8–8)
NEUTS SEG NFR BLD: 67 % (ref 32–75)
NRBC # BLD: 0 K/UL (ref 0–0.01)
NRBC BLD-RTO: 0 PER 100 WBC
PLATELET # BLD AUTO: 154 K/UL (ref 150–400)
PMV BLD AUTO: 12 FL (ref 8.9–12.9)
POTASSIUM SERPL-SCNC: 4 MMOL/L (ref 3.5–5.1)
PROT SERPL-MCNC: 6.7 G/DL (ref 6.4–8.2)
RBC # BLD AUTO: 4.5 M/UL (ref 4.1–5.7)
SAMPLES BEING HELD,HOLD: NORMAL
SODIUM SERPL-SCNC: 138 MMOL/L (ref 136–145)
TROPONIN I SERPL HS-MCNC: 66 NG/L (ref 0–76)
WBC # BLD AUTO: 6.9 K/UL (ref 4.1–11.1)

## 2023-02-13 PROCEDURE — 80076 HEPATIC FUNCTION PANEL: CPT

## 2023-02-13 PROCEDURE — 93005 ELECTROCARDIOGRAM TRACING: CPT

## 2023-02-13 PROCEDURE — 85025 COMPLETE CBC W/AUTO DIFF WBC: CPT

## 2023-02-13 PROCEDURE — 80048 BASIC METABOLIC PNL TOTAL CA: CPT

## 2023-02-13 PROCEDURE — 84484 ASSAY OF TROPONIN QUANT: CPT

## 2023-02-13 PROCEDURE — 83690 ASSAY OF LIPASE: CPT

## 2023-02-13 PROCEDURE — 36415 COLL VENOUS BLD VENIPUNCTURE: CPT

## 2023-02-13 PROCEDURE — 75810000275 HC EMERGENCY DEPT VISIT NO LEVEL OF CARE

## 2023-02-13 NOTE — ED TRIAGE NOTES
Pt arrives ambulatory to the ER with CC low appetite (can only eat at night), abdominal pain, nausea. [facility] did [facility] and directed to go to the ER. Pt stated here for further evaluation.      Pt denies CP, SOB, fever/chills, vomiting/diarrhea

## 2023-02-14 ENCOUNTER — APPOINTMENT (OUTPATIENT)
Dept: CT IMAGING | Age: 78
End: 2023-02-14
Attending: EMERGENCY MEDICINE
Payer: MEDICARE

## 2023-02-14 ENCOUNTER — HOSPITAL ENCOUNTER (EMERGENCY)
Age: 78
Discharge: HOME | End: 2023-02-15
Attending: EMERGENCY MEDICINE
Payer: MEDICARE

## 2023-02-14 VITALS
TEMPERATURE: 98.1 F | HEART RATE: 89 BPM | DIASTOLIC BLOOD PRESSURE: 89 MMHG | SYSTOLIC BLOOD PRESSURE: 139 MMHG | WEIGHT: 166.67 LBS | OXYGEN SATURATION: 98 % | BODY MASS INDEX: 23.91 KG/M2 | RESPIRATION RATE: 18 BRPM

## 2023-02-14 DIAGNOSIS — R10.32 ABDOMINAL PAIN, LLQ (LEFT LOWER QUADRANT): Primary | ICD-10-CM

## 2023-02-14 DIAGNOSIS — R63.0 APPETITE IMPAIRED: ICD-10-CM

## 2023-02-14 LAB
ALBUMIN SERPL-MCNC: 3.3 G/DL (ref 3.5–5)
ALBUMIN/GLOB SERPL: 1.1 (ref 1.1–2.2)
ALP SERPL-CCNC: 141 U/L (ref 45–117)
ALT SERPL-CCNC: 74 U/L (ref 12–78)
ANION GAP SERPL CALC-SCNC: 6 MMOL/L (ref 5–15)
AST SERPL-CCNC: 45 U/L (ref 15–37)
BASOPHILS # BLD: 0.1 K/UL (ref 0–0.1)
BASOPHILS NFR BLD: 1 % (ref 0–1)
BILIRUB SERPL-MCNC: 0.6 MG/DL (ref 0.2–1)
BUN SERPL-MCNC: 35 MG/DL (ref 6–20)
BUN/CREAT SERPL: 26 (ref 12–20)
CALCIUM SERPL-MCNC: 9.2 MG/DL (ref 8.5–10.1)
CHLORIDE SERPL-SCNC: 109 MMOL/L (ref 97–108)
CO2 SERPL-SCNC: 26 MMOL/L (ref 21–32)
COMMENT, HOLDF: NORMAL
COMMENT, HOLDF: NORMAL
CREAT SERPL-MCNC: 1.36 MG/DL (ref 0.7–1.3)
DIFFERENTIAL METHOD BLD: ABNORMAL
EOSINOPHIL # BLD: 0.1 K/UL (ref 0–0.4)
EOSINOPHIL NFR BLD: 2 % (ref 0–7)
ERYTHROCYTE [DISTWIDTH] IN BLOOD BY AUTOMATED COUNT: 14.6 % (ref 11.5–14.5)
GLOBULIN SER CALC-MCNC: 3 G/DL (ref 2–4)
GLUCOSE SERPL-MCNC: 119 MG/DL (ref 65–100)
HCT VFR BLD AUTO: 42.6 % (ref 36.6–50.3)
HGB BLD-MCNC: 13.4 G/DL (ref 12.1–17)
IMM GRANULOCYTES # BLD AUTO: 0 K/UL (ref 0–0.04)
IMM GRANULOCYTES NFR BLD AUTO: 0 % (ref 0–0.5)
LYMPHOCYTES # BLD: 0.9 K/UL (ref 0.8–3.5)
LYMPHOCYTES NFR BLD: 13 % (ref 12–49)
MCH RBC QN AUTO: 29.2 PG (ref 26–34)
MCHC RBC AUTO-ENTMCNC: 31.5 G/DL (ref 30–36.5)
MCV RBC AUTO: 92.8 FL (ref 80–99)
MONOCYTES # BLD: 0.7 K/UL (ref 0–1)
MONOCYTES NFR BLD: 10 % (ref 5–13)
NEUTS SEG # BLD: 5.1 K/UL (ref 1.8–8)
NEUTS SEG NFR BLD: 74 % (ref 32–75)
NRBC # BLD: 0 K/UL (ref 0–0.01)
NRBC BLD-RTO: 0 PER 100 WBC
PLATELET # BLD AUTO: 152 K/UL (ref 150–400)
PMV BLD AUTO: 12 FL (ref 8.9–12.9)
POTASSIUM SERPL-SCNC: 4 MMOL/L (ref 3.5–5.1)
PROT SERPL-MCNC: 6.3 G/DL (ref 6.4–8.2)
RBC # BLD AUTO: 4.59 M/UL (ref 4.1–5.7)
RBC MORPH BLD: ABNORMAL
SAMPLES BEING HELD,HOLD: NORMAL
SAMPLES BEING HELD,HOLD: NORMAL
SODIUM SERPL-SCNC: 141 MMOL/L (ref 136–145)
WBC # BLD AUTO: 6.9 K/UL (ref 4.1–11.1)

## 2023-02-14 PROCEDURE — 36415 COLL VENOUS BLD VENIPUNCTURE: CPT

## 2023-02-14 PROCEDURE — 74011000636 HC RX REV CODE- 636: Performed by: RADIOLOGY

## 2023-02-14 PROCEDURE — 74177 CT ABD & PELVIS W/CONTRAST: CPT

## 2023-02-14 PROCEDURE — 99285 EMERGENCY DEPT VISIT HI MDM: CPT

## 2023-02-14 PROCEDURE — 81001 URINALYSIS AUTO W/SCOPE: CPT

## 2023-02-14 PROCEDURE — 80053 COMPREHEN METABOLIC PANEL: CPT

## 2023-02-14 PROCEDURE — 85025 COMPLETE CBC W/AUTO DIFF WBC: CPT

## 2023-02-14 RX ADMIN — IOPAMIDOL 100 ML: 755 INJECTION, SOLUTION INTRAVENOUS at 20:08

## 2023-02-14 NOTE — ED TRIAGE NOTES
Pt c/o lower abdominal problems \"it doesn't feel right\" pt reports hx of bypass last year. Pt denies chest pain, SOB, fever, chills. Pt reports no appetite.

## 2023-02-15 LAB
APPEARANCE UR: CLEAR
BACTERIA URNS QL MICRO: NEGATIVE /HPF
BILIRUB UR QL: NEGATIVE
COLOR UR: ABNORMAL
EPITH CASTS URNS QL MICRO: ABNORMAL /LPF
GLUCOSE UR STRIP.AUTO-MCNC: NEGATIVE MG/DL
HGB UR QL STRIP: NEGATIVE
KETONES UR QL STRIP.AUTO: NEGATIVE MG/DL
LEUKOCYTE ESTERASE UR QL STRIP.AUTO: NEGATIVE
NITRITE UR QL STRIP.AUTO: NEGATIVE
PH UR STRIP: 6 (ref 5–8)
PROT UR STRIP-MCNC: ABNORMAL MG/DL
RBC #/AREA URNS HPF: ABNORMAL /HPF (ref 0–5)
SP GR UR REFRACTOMETRY: 1.03
UROBILINOGEN UR QL STRIP.AUTO: 0.2 EU/DL (ref 0.2–1)
WBC URNS QL MICRO: ABNORMAL /HPF (ref 0–4)

## 2023-02-15 NOTE — DISCHARGE INSTRUCTIONS
Try and eat more regularly. Drink plenty of fluids and stay well-hydrated. Follow-up with your own physician if you continue to have the symptoms or if they worsen in any way. Lab work here appears to be unremarkable. The CT scan of your abdomen does not show any acute process either.

## 2023-02-17 LAB
ATRIAL RATE: 88 BPM
CALCULATED P AXIS, ECG09: 66 DEGREES
CALCULATED R AXIS, ECG10: -3 DEGREES
CALCULATED T AXIS, ECG11: 84 DEGREES
DIAGNOSIS, 93000: NORMAL
P-R INTERVAL, ECG05: 158 MS
Q-T INTERVAL, ECG07: 382 MS
QRS DURATION, ECG06: 88 MS
QTC CALCULATION (BEZET), ECG08: 462 MS
VENTRICULAR RATE, ECG03: 88 BPM

## 2023-03-01 NOTE — ED PROVIDER NOTES
This is a 75 yo male with a history of triple bypass in the past, hypertension who presents with some abdominal discomfort in the lower abdomen for a while and just not eating. He denies any fever or chills and is somewhat non specific as to when the symptoms actually started, but they are not improving and he has had no N/V and no diarrhea or constipation. No blood in the stools is noted and no changes in urinary symptoms noted. He presents as his symptoms are not getting any better. There has been some weight loss reported. No fever or chills, sweating , SOB or chest pain is noted. Again, the abdominal discomfort is in lower abdomen. Past Medical History:   Diagnosis Date    Hypertension     Ill-defined condition     hypothyroid    S/P triple vessel bypass        Past Surgical History:   Procedure Laterality Date    HX APPENDECTOMY           Family History:   Family history unknown: Yes       Social History     Socioeconomic History    Marital status:      Spouse name: Not on file    Number of children: Not on file    Years of education: Not on file    Highest education level: Not on file   Occupational History    Not on file   Tobacco Use    Smoking status: Never    Smokeless tobacco: Never   Substance and Sexual Activity    Alcohol use: Yes    Drug use: No    Sexual activity: Not on file   Other Topics Concern    Not on file   Social History Narrative    Not on file     Social Determinants of Health     Financial Resource Strain: Not on file   Food Insecurity: Not on file   Transportation Needs: Not on file   Physical Activity: Not on file   Stress: Not on file   Social Connections: Not on file   Intimate Partner Violence: Not on file   Housing Stability: Not on file         ALLERGIES: Patient has no known allergies. Review of Systems   Constitutional:  Positive for appetite change and unexpected weight change. Negative for activity change, chills, fatigue and fever.    HENT:  Negative for ear pain, facial swelling, sore throat and trouble swallowing. Eyes:  Negative for pain, discharge and visual disturbance. Respiratory:  Negative for chest tightness, shortness of breath and wheezing. Cardiovascular:  Negative for chest pain and palpitations. Gastrointestinal:  Positive for abdominal pain (Described as a discomfort in the lower abdomen only). Negative for blood in stool, nausea and vomiting. Genitourinary:  Negative for difficulty urinating, flank pain and hematuria. Musculoskeletal:  Negative for arthralgias, joint swelling, myalgias and neck pain. Skin:  Negative for color change and rash. Neurological:  Negative for dizziness, weakness, numbness and headaches. Hematological:  Negative for adenopathy. Does not bruise/bleed easily. Psychiatric/Behavioral:  Negative for behavioral problems, confusion and sleep disturbance. All other systems reviewed and are negative. Vitals:    02/14/23 1248   BP: 139/89   Pulse: 89   Resp: 18   Temp: 98.1 °F (36.7 °C)   SpO2: 98%   Weight: 75.6 kg (166 lb 10.7 oz)            Physical Exam  Vitals and nursing note reviewed. Constitutional:       General: He is not in acute distress. Appearance: He is well-developed. He is not ill-appearing or diaphoretic. HENT:      Head: Normocephalic and atraumatic. Nose: Nose normal.      Mouth/Throat:      Mouth: Mucous membranes are moist.   Eyes:      General: No scleral icterus. Conjunctiva/sclera: Conjunctivae normal.      Pupils: Pupils are equal, round, and reactive to light. Neck:      Thyroid: No thyromegaly. Vascular: No JVD. Trachea: No tracheal deviation. Comments: No carotid bruits noted. Cardiovascular:      Rate and Rhythm: Normal rate and regular rhythm. Heart sounds: Normal heart sounds. No murmur heard. No friction rub. No gallop. Pulmonary:      Effort: Pulmonary effort is normal. No respiratory distress.       Breath sounds: Normal breath sounds. No wheezing or rales. Chest:      Chest wall: No tenderness. Abdominal:      General: Bowel sounds are normal. There is no distension. Palpations: Abdomen is soft. There is no mass. Tenderness: There is no abdominal tenderness. There is no guarding or rebound. Musculoskeletal:         General: No tenderness. Normal range of motion. Cervical back: Normal range of motion and neck supple. Lymphadenopathy:      Cervical: No cervical adenopathy. Skin:     General: Skin is warm and dry. Capillary Refill: Capillary refill takes 2 to 3 seconds. Findings: No erythema or rash. Neurological:      Mental Status: He is alert and oriented to person, place, and time. Cranial Nerves: No cranial nerve deficit. Coordination: Coordination normal.      Deep Tendon Reflexes: Reflexes are normal and symmetric. Psychiatric:         Behavior: Behavior normal.         Thought Content: Thought content normal.         Judgment: Judgment normal.        Medical Decision Making  This is a 77 yo male who presents with abdominal discomfort for a while and sx are not getting any better. Appetite poor and not eating well. No other associated symptoms as described in HPI. Patient's physical exam is unremarkable. Routine labs are ordered as is a CT scan. With the patient's symptoms or loss of appetite, some weight loss and abdominal discomfort, malignancy was a consideration. Subtle infections, UTI partial abdominal blockage also considered. If negative, will refer patient back to PCP for further evaluation and care. Amount and/or Complexity of Data Reviewed  Labs: ordered. ECG/medicine tests: ordered. Procedures    Patient's labs appear non acute. There is some abnormality noted in the AST and Alk Phos which may require further evaluation as outpatient.  The patient's CT demonstrates only some diverticulosis but no evidence of obstructions, infections or other intra abdominal abnormalities requiring more immediate further evaluation of intervention. Patient is reassured studies are non acute and advised to follow up with own MD for any continuation or worsening of symptoms. He voices understanding of instructions.

## 2023-03-08 ENCOUNTER — HOSPITAL ENCOUNTER (INPATIENT)
Age: 78
LOS: 1 days | Discharge: HOME | End: 2023-03-10
Attending: STUDENT IN AN ORGANIZED HEALTH CARE EDUCATION/TRAINING PROGRAM | Admitting: FAMILY MEDICINE
Payer: MEDICARE

## 2023-03-08 ENCOUNTER — APPOINTMENT (OUTPATIENT)
Dept: GENERAL RADIOLOGY | Age: 78
End: 2023-03-08
Attending: EMERGENCY MEDICINE
Payer: MEDICARE

## 2023-03-08 DIAGNOSIS — I50.23 ACUTE ON CHRONIC SYSTOLIC CHF (CONGESTIVE HEART FAILURE) (HCC): ICD-10-CM

## 2023-03-08 DIAGNOSIS — R77.8 ELEVATED TROPONIN: ICD-10-CM

## 2023-03-08 DIAGNOSIS — N18.9 CHRONIC KIDNEY DISEASE, UNSPECIFIED CKD STAGE: ICD-10-CM

## 2023-03-08 DIAGNOSIS — I50.9 ACUTE CONGESTIVE HEART FAILURE, UNSPECIFIED HEART FAILURE TYPE (HCC): Primary | ICD-10-CM

## 2023-03-08 LAB
ALBUMIN SERPL-MCNC: 3.3 G/DL (ref 3.5–5)
ALBUMIN/GLOB SERPL: 1 (ref 1.1–2.2)
ALP SERPL-CCNC: 216 U/L (ref 45–117)
ALT SERPL-CCNC: 105 U/L (ref 12–78)
ANION GAP SERPL CALC-SCNC: 5 MMOL/L (ref 5–15)
AST SERPL-CCNC: 95 U/L (ref 15–37)
BASOPHILS # BLD: 0.1 K/UL (ref 0–0.1)
BASOPHILS NFR BLD: 1 % (ref 0–1)
BILIRUB SERPL-MCNC: 0.8 MG/DL (ref 0.2–1)
BNP SERPL-MCNC: 9299 PG/ML
BUN SERPL-MCNC: 44 MG/DL (ref 6–20)
BUN/CREAT SERPL: 30 (ref 12–20)
CALCIUM SERPL-MCNC: 9.2 MG/DL (ref 8.5–10.1)
CHLORIDE SERPL-SCNC: 109 MMOL/L (ref 97–108)
CO2 SERPL-SCNC: 22 MMOL/L (ref 21–32)
COMMENT, HOLDF: NORMAL
CREAT SERPL-MCNC: 1.45 MG/DL (ref 0.7–1.3)
DIFFERENTIAL METHOD BLD: ABNORMAL
EOSINOPHIL # BLD: 0.2 K/UL (ref 0–0.4)
EOSINOPHIL NFR BLD: 2 % (ref 0–7)
ERYTHROCYTE [DISTWIDTH] IN BLOOD BY AUTOMATED COUNT: 15.7 % (ref 11.5–14.5)
GLOBULIN SER CALC-MCNC: 3.4 G/DL (ref 2–4)
GLUCOSE SERPL-MCNC: 89 MG/DL (ref 65–100)
HCT VFR BLD AUTO: 41.2 % (ref 36.6–50.3)
HGB BLD-MCNC: 13 G/DL (ref 12.1–17)
IMM GRANULOCYTES # BLD AUTO: 0 K/UL (ref 0–0.04)
IMM GRANULOCYTES NFR BLD AUTO: 0 % (ref 0–0.5)
LYMPHOCYTES # BLD: 1 K/UL (ref 0.8–3.5)
LYMPHOCYTES NFR BLD: 12 % (ref 12–49)
MCH RBC QN AUTO: 29.9 PG (ref 26–34)
MCHC RBC AUTO-ENTMCNC: 31.6 G/DL (ref 30–36.5)
MCV RBC AUTO: 94.7 FL (ref 80–99)
MONOCYTES # BLD: 1 K/UL (ref 0–1)
MONOCYTES NFR BLD: 11 % (ref 5–13)
NEUTS SEG # BLD: 6.2 K/UL (ref 1.8–8)
NEUTS SEG NFR BLD: 74 % (ref 32–75)
NRBC # BLD: 0 K/UL (ref 0–0.01)
NRBC BLD-RTO: 0 PER 100 WBC
PLATELET # BLD AUTO: 143 K/UL (ref 150–400)
PMV BLD AUTO: 12.6 FL (ref 8.9–12.9)
POTASSIUM SERPL-SCNC: 5.5 MMOL/L (ref 3.5–5.1)
PROT SERPL-MCNC: 6.7 G/DL (ref 6.4–8.2)
RBC # BLD AUTO: 4.35 M/UL (ref 4.1–5.7)
SAMPLES BEING HELD,HOLD: NORMAL
SODIUM SERPL-SCNC: 136 MMOL/L (ref 136–145)
TROPONIN I SERPL HS-MCNC: 90 NG/L (ref 0–57)
WBC # BLD AUTO: 8.5 K/UL (ref 4.1–11.1)

## 2023-03-08 PROCEDURE — 36415 COLL VENOUS BLD VENIPUNCTURE: CPT

## 2023-03-08 PROCEDURE — 85025 COMPLETE CBC W/AUTO DIFF WBC: CPT

## 2023-03-08 PROCEDURE — 74011250637 HC RX REV CODE- 250/637: Performed by: STUDENT IN AN ORGANIZED HEALTH CARE EDUCATION/TRAINING PROGRAM

## 2023-03-08 PROCEDURE — 74011250636 HC RX REV CODE- 250/636: Performed by: STUDENT IN AN ORGANIZED HEALTH CARE EDUCATION/TRAINING PROGRAM

## 2023-03-08 PROCEDURE — 93005 ELECTROCARDIOGRAM TRACING: CPT

## 2023-03-08 PROCEDURE — 96374 THER/PROPH/DIAG INJ IV PUSH: CPT

## 2023-03-08 PROCEDURE — 84132 ASSAY OF SERUM POTASSIUM: CPT

## 2023-03-08 PROCEDURE — 99285 EMERGENCY DEPT VISIT HI MDM: CPT

## 2023-03-08 PROCEDURE — 83880 ASSAY OF NATRIURETIC PEPTIDE: CPT

## 2023-03-08 PROCEDURE — 84484 ASSAY OF TROPONIN QUANT: CPT

## 2023-03-08 PROCEDURE — 80053 COMPREHEN METABOLIC PANEL: CPT

## 2023-03-08 PROCEDURE — 71046 X-RAY EXAM CHEST 2 VIEWS: CPT

## 2023-03-08 RX ORDER — FUROSEMIDE 10 MG/ML
20 INJECTION INTRAMUSCULAR; INTRAVENOUS ONCE
Status: COMPLETED | OUTPATIENT
Start: 2023-03-08 | End: 2023-03-08

## 2023-03-08 RX ORDER — CEPHALEXIN 500 MG/1
500 CAPSULE ORAL 3 TIMES DAILY
COMMUNITY
End: 2023-03-10

## 2023-03-08 RX ORDER — GUAIFENESIN 100 MG/5ML
324 LIQUID (ML) ORAL
Status: COMPLETED | OUTPATIENT
Start: 2023-03-08 | End: 2023-03-08

## 2023-03-08 RX ADMIN — ASPIRIN 81 MG CHEWABLE TABLET 324 MG: 81 TABLET CHEWABLE at 23:51

## 2023-03-08 RX ADMIN — FUROSEMIDE 20 MG: 10 INJECTION, SOLUTION INTRAVENOUS at 23:27

## 2023-03-09 ENCOUNTER — APPOINTMENT (OUTPATIENT)
Dept: NON INVASIVE DIAGNOSTICS | Age: 78
End: 2023-03-09
Attending: FAMILY MEDICINE
Payer: MEDICARE

## 2023-03-09 PROBLEM — R06.02 SHORTNESS OF BREATH: Status: ACTIVE | Noted: 2023-03-09

## 2023-03-09 LAB
ANION GAP SERPL CALC-SCNC: 5 MMOL/L (ref 5–15)
APPEARANCE UR: CLEAR
ATRIAL RATE: 101 BPM
BACTERIA URNS QL MICRO: NEGATIVE /HPF
BILIRUB UR QL: NEGATIVE
BUN SERPL-MCNC: 36 MG/DL (ref 6–20)
BUN/CREAT SERPL: 29 (ref 12–20)
CALCIUM SERPL-MCNC: 8.8 MG/DL (ref 8.5–10.1)
CALCULATED P AXIS, ECG09: 69 DEGREES
CALCULATED R AXIS, ECG10: 25 DEGREES
CALCULATED T AXIS, ECG11: 7 DEGREES
CHLORIDE SERPL-SCNC: 109 MMOL/L (ref 97–108)
CO2 SERPL-SCNC: 25 MMOL/L (ref 21–32)
COLOR UR: NORMAL
CREAT SERPL-MCNC: 1.23 MG/DL (ref 0.7–1.3)
DIAGNOSIS, 93000: NORMAL
ECHO AO ROOT DIAM: 3.6 CM
ECHO AO ROOT INDEX: 1.82 CM/M2
ECHO AV AREA PEAK VELOCITY: 1.1 CM2
ECHO AV AREA VTI: 1 CM2
ECHO AV AREA/BSA PEAK VELOCITY: 0.6 CM2/M2
ECHO AV AREA/BSA VTI: 0.5 CM2/M2
ECHO AV MEAN GRADIENT: 19 MMHG
ECHO AV MEAN VELOCITY: 2.1 M/S
ECHO AV PEAK GRADIENT: 29 MMHG
ECHO AV PEAK VELOCITY: 2.7 M/S
ECHO AV VELOCITY RATIO: 0.33
ECHO AV VTI: 49.4 CM
ECHO EST RA PRESSURE: 3 MMHG
ECHO LA DIAMETER INDEX: 2.88 CM/M2
ECHO LA DIAMETER: 5.7 CM
ECHO LA TO AORTIC ROOT RATIO: 1.58
ECHO LA VOL 2C: 133 ML (ref 18–58)
ECHO LA VOL 4C: 120 ML (ref 18–58)
ECHO LA VOLUME AREA LENGTH: 135 ML
ECHO LA VOLUME INDEX A2C: 67 ML/M2 (ref 16–34)
ECHO LA VOLUME INDEX A4C: 61 ML/M2 (ref 16–34)
ECHO LA VOLUME INDEX AREA LENGTH: 68 ML/M2 (ref 16–34)
ECHO LV E' LATERAL VELOCITY: 6 CM/S
ECHO LV E' SEPTAL VELOCITY: 3 CM/S
ECHO LV EJECTION FRACTION A2C: 29 %
ECHO LV EJECTION FRACTION A4C: 27 %
ECHO LV FRACTIONAL SHORTENING: 12 % (ref 28–44)
ECHO LV INTERNAL DIMENSION DIASTOLE INDEX: 2.63 CM/M2
ECHO LV INTERNAL DIMENSION DIASTOLIC: 5.2 CM (ref 4.2–5.9)
ECHO LV INTERNAL DIMENSION SYSTOLIC INDEX: 2.32 CM/M2
ECHO LV INTERNAL DIMENSION SYSTOLIC: 4.6 CM
ECHO LV IVSD: 1.3 CM (ref 0.6–1)
ECHO LV MASS 2D: 325.8 G (ref 88–224)
ECHO LV MASS INDEX 2D: 164.5 G/M2 (ref 49–115)
ECHO LV POSTERIOR WALL DIASTOLIC: 1.6 CM (ref 0.6–1)
ECHO LV RELATIVE WALL THICKNESS RATIO: 0.62
ECHO LVOT AREA: 3.1 CM2
ECHO LVOT AV VTI INDEX: 0.33
ECHO LVOT DIAM: 2 CM
ECHO LVOT MEAN GRADIENT: 2 MMHG
ECHO LVOT PEAK GRADIENT: 3 MMHG
ECHO LVOT PEAK VELOCITY: 0.9 M/S
ECHO LVOT STROKE VOLUME INDEX: 26 ML/M2
ECHO LVOT SV: 51.5 ML
ECHO LVOT VTI: 16.4 CM
ECHO MV A VELOCITY: 0.69 M/S
ECHO MV AREA PHT: 6.5 CM2
ECHO MV E DECELERATION TIME (DT): 116.7 MS
ECHO MV E VELOCITY: 1.07 M/S
ECHO MV E/A RATIO: 1.55
ECHO MV E/E' LATERAL: 17.83
ECHO MV E/E' RATIO (AVERAGED): 26.75
ECHO MV E/E' SEPTAL: 35.67
ECHO MV PRESSURE HALF TIME (PHT): 33.8 MS
ECHO MV REGURGITANT ALIASING (NYQUIST) VELOCITY: 36 CM/S
ECHO MV REGURGITANT VELOCITY PISA: 4.8 M/S
ECHO MV REGURGITANT VTIA: 135 CM
ECHO PV MAX VELOCITY: 0.5 M/S
ECHO PV PEAK GRADIENT: 1 MMHG
ECHO RIGHT VENTRICULAR SYSTOLIC PRESSURE (RVSP): 56 MMHG
ECHO RV FREE WALL PEAK S': 4 CM/S
ECHO RV TAPSE: 0.8 CM (ref 1.7–?)
ECHO TV REGURGITANT MAX VELOCITY: 3.65 M/S
ECHO TV REGURGITANT PEAK GRADIENT: 53 MMHG
EPITH CASTS URNS QL MICRO: NORMAL /LPF
ERYTHROCYTE [DISTWIDTH] IN BLOOD BY AUTOMATED COUNT: 15.4 % (ref 11.5–14.5)
GLUCOSE SERPL-MCNC: 95 MG/DL (ref 65–100)
GLUCOSE UR STRIP.AUTO-MCNC: NEGATIVE MG/DL
HCT VFR BLD AUTO: 40.3 % (ref 36.6–50.3)
HGB BLD-MCNC: 13 G/DL (ref 12.1–17)
HGB UR QL STRIP: NEGATIVE
HYALINE CASTS URNS QL MICRO: NORMAL /LPF (ref 0–5)
KETONES UR QL STRIP.AUTO: NEGATIVE MG/DL
LEUKOCYTE ESTERASE UR QL STRIP.AUTO: NEGATIVE
MAGNESIUM SERPL-MCNC: 2.2 MG/DL (ref 1.6–2.4)
MCH RBC QN AUTO: 30.2 PG (ref 26–34)
MCHC RBC AUTO-ENTMCNC: 32.3 G/DL (ref 30–36.5)
MCV RBC AUTO: 93.7 FL (ref 80–99)
NITRITE UR QL STRIP.AUTO: NEGATIVE
NRBC # BLD: 0 K/UL (ref 0–0.01)
NRBC BLD-RTO: 0 PER 100 WBC
P-R INTERVAL, ECG05: 160 MS
PH UR STRIP: 6.5 (ref 5–8)
PLATELET # BLD AUTO: 117 K/UL (ref 150–400)
PMV BLD AUTO: 12.6 FL (ref 8.9–12.9)
POTASSIUM SERPL-SCNC: 3.4 MMOL/L (ref 3.5–5.1)
POTASSIUM SERPL-SCNC: 3.8 MMOL/L (ref 3.5–5.1)
PROT UR STRIP-MCNC: NEGATIVE MG/DL
Q-T INTERVAL, ECG07: 338 MS
QRS DURATION, ECG06: 86 MS
QTC CALCULATION (BEZET), ECG08: 438 MS
RBC # BLD AUTO: 4.3 M/UL (ref 4.1–5.7)
RBC #/AREA URNS HPF: NORMAL /HPF (ref 0–5)
SODIUM SERPL-SCNC: 139 MMOL/L (ref 136–145)
SP GR UR REFRACTOMETRY: 1.01 (ref 1–1.03)
TROPONIN I SERPL HS-MCNC: 101 NG/L (ref 0–57)
UROBILINOGEN UR QL STRIP.AUTO: 0.2 EU/DL (ref 0.2–1)
VENTRICULAR RATE, ECG03: 101 BPM
WBC # BLD AUTO: 7.7 K/UL (ref 4.1–11.1)
WBC URNS QL MICRO: NORMAL /HPF (ref 0–4)

## 2023-03-09 PROCEDURE — 74011250636 HC RX REV CODE- 250/636: Performed by: FAMILY MEDICINE

## 2023-03-09 PROCEDURE — 85027 COMPLETE CBC AUTOMATED: CPT

## 2023-03-09 PROCEDURE — 74011250637 HC RX REV CODE- 250/637: Performed by: FAMILY MEDICINE

## 2023-03-09 PROCEDURE — 93306 TTE W/DOPPLER COMPLETE: CPT | Performed by: INTERNAL MEDICINE

## 2023-03-09 PROCEDURE — 96376 TX/PRO/DX INJ SAME DRUG ADON: CPT

## 2023-03-09 PROCEDURE — 96372 THER/PROPH/DIAG INJ SC/IM: CPT

## 2023-03-09 PROCEDURE — 74011000250 HC RX REV CODE- 250: Performed by: FAMILY MEDICINE

## 2023-03-09 PROCEDURE — 36415 COLL VENOUS BLD VENIPUNCTURE: CPT

## 2023-03-09 PROCEDURE — 80048 BASIC METABOLIC PNL TOTAL CA: CPT

## 2023-03-09 PROCEDURE — 99223 1ST HOSP IP/OBS HIGH 75: CPT | Performed by: INTERNAL MEDICINE

## 2023-03-09 PROCEDURE — 83735 ASSAY OF MAGNESIUM: CPT

## 2023-03-09 PROCEDURE — 81001 URINALYSIS AUTO W/SCOPE: CPT

## 2023-03-09 PROCEDURE — 74011250637 HC RX REV CODE- 250/637: Performed by: NURSE PRACTITIONER

## 2023-03-09 PROCEDURE — 65270000046 HC RM TELEMETRY

## 2023-03-09 PROCEDURE — 93306 TTE W/DOPPLER COMPLETE: CPT

## 2023-03-09 RX ORDER — ACETAMINOPHEN 650 MG/1
650 SUPPOSITORY RECTAL
Status: DISCONTINUED | OUTPATIENT
Start: 2023-03-09 | End: 2023-03-10 | Stop reason: HOSPADM

## 2023-03-09 RX ORDER — FUROSEMIDE 10 MG/ML
40 INJECTION INTRAMUSCULAR; INTRAVENOUS EVERY 12 HOURS
Status: DISCONTINUED | OUTPATIENT
Start: 2023-03-09 | End: 2023-03-10

## 2023-03-09 RX ORDER — ATORVASTATIN CALCIUM 40 MG/1
40 TABLET, FILM COATED ORAL
Status: DISCONTINUED | OUTPATIENT
Start: 2023-03-09 | End: 2023-03-10 | Stop reason: HOSPADM

## 2023-03-09 RX ORDER — POTASSIUM CHLORIDE 750 MG/1
40 TABLET, FILM COATED, EXTENDED RELEASE ORAL
Status: COMPLETED | OUTPATIENT
Start: 2023-03-09 | End: 2023-03-09

## 2023-03-09 RX ORDER — ACETAMINOPHEN 325 MG/1
650 TABLET ORAL
Status: DISCONTINUED | OUTPATIENT
Start: 2023-03-09 | End: 2023-03-10 | Stop reason: HOSPADM

## 2023-03-09 RX ORDER — ONDANSETRON 4 MG/1
4 TABLET, ORALLY DISINTEGRATING ORAL
Status: DISCONTINUED | OUTPATIENT
Start: 2023-03-09 | End: 2023-03-10 | Stop reason: HOSPADM

## 2023-03-09 RX ORDER — ONDANSETRON 2 MG/ML
4 INJECTION INTRAMUSCULAR; INTRAVENOUS
Status: DISCONTINUED | OUTPATIENT
Start: 2023-03-09 | End: 2023-03-10 | Stop reason: HOSPADM

## 2023-03-09 RX ORDER — GUAIFENESIN 100 MG/5ML
81 LIQUID (ML) ORAL DAILY
Status: DISCONTINUED | OUTPATIENT
Start: 2023-03-09 | End: 2023-03-10 | Stop reason: HOSPADM

## 2023-03-09 RX ORDER — FUROSEMIDE 10 MG/ML
20 INJECTION INTRAMUSCULAR; INTRAVENOUS EVERY 12 HOURS
Status: DISCONTINUED | OUTPATIENT
Start: 2023-03-09 | End: 2023-03-09

## 2023-03-09 RX ORDER — ENOXAPARIN SODIUM 100 MG/ML
30 INJECTION SUBCUTANEOUS EVERY 24 HOURS
Status: DISCONTINUED | OUTPATIENT
Start: 2023-03-09 | End: 2023-03-09

## 2023-03-09 RX ORDER — SODIUM CHLORIDE 0.9 % (FLUSH) 0.9 %
5-40 SYRINGE (ML) INJECTION EVERY 8 HOURS
Status: DISCONTINUED | OUTPATIENT
Start: 2023-03-09 | End: 2023-03-10 | Stop reason: HOSPADM

## 2023-03-09 RX ORDER — SODIUM CHLORIDE 0.9 % (FLUSH) 0.9 %
5-40 SYRINGE (ML) INJECTION AS NEEDED
Status: DISCONTINUED | OUTPATIENT
Start: 2023-03-09 | End: 2023-03-10 | Stop reason: HOSPADM

## 2023-03-09 RX ORDER — HYDRALAZINE HYDROCHLORIDE 20 MG/ML
20 INJECTION INTRAMUSCULAR; INTRAVENOUS
Status: DISCONTINUED | OUTPATIENT
Start: 2023-03-09 | End: 2023-03-10 | Stop reason: HOSPADM

## 2023-03-09 RX ORDER — ENOXAPARIN SODIUM 100 MG/ML
40 INJECTION SUBCUTANEOUS EVERY 24 HOURS
Status: DISCONTINUED | OUTPATIENT
Start: 2023-03-10 | End: 2023-03-10 | Stop reason: HOSPADM

## 2023-03-09 RX ADMIN — SACUBITRIL AND VALSARTAN 1 TABLET: 24; 26 TABLET, FILM COATED ORAL at 13:04

## 2023-03-09 RX ADMIN — ATORVASTATIN CALCIUM 40 MG: 40 TABLET, FILM COATED ORAL at 21:40

## 2023-03-09 RX ADMIN — ASPIRIN 81 MG CHEWABLE TABLET 81 MG: 81 TABLET CHEWABLE at 13:07

## 2023-03-09 RX ADMIN — FUROSEMIDE 40 MG: 10 INJECTION, SOLUTION INTRAMUSCULAR; INTRAVENOUS at 10:17

## 2023-03-09 RX ADMIN — SODIUM CHLORIDE, PRESERVATIVE FREE 10 ML: 5 INJECTION INTRAVENOUS at 21:40

## 2023-03-09 RX ADMIN — SODIUM CHLORIDE, PRESERVATIVE FREE 10 ML: 5 INJECTION INTRAVENOUS at 05:42

## 2023-03-09 RX ADMIN — SACUBITRIL AND VALSARTAN 1 TABLET: 24; 26 TABLET, FILM COATED ORAL at 21:40

## 2023-03-09 RX ADMIN — POTASSIUM CHLORIDE 40 MEQ: 750 TABLET, FILM COATED, EXTENDED RELEASE ORAL at 10:17

## 2023-03-09 RX ADMIN — ENOXAPARIN SODIUM 30 MG: 100 INJECTION SUBCUTANEOUS at 05:42

## 2023-03-09 NOTE — CONSULTS
[facility]                    Cardiology Care Note     [x]Initial Encounter     []Follow-up    Patient Name: Gómez Frias - QQB:[birth year] - [facility]:[id number]  Primary Cardiologist: ? Dr. Papo Navarro but has not seen in office per pt  Consulting Cardiologist: [facility] Physicians: Les Blackwell MD     Reason for encounter: CHF    HPI:   Gómez Frias is a 76 y.o. male with PMH significant for multivessel CAD s/p anteror STEMI 3/2022,CABG x3 (LIMA to LAD, RSVH to PDA, RSVG to Ramus), systolic and diastolic CHF, cardiomyopathy (EF 35-40% in 3/2022 and 4/6/2022), aortic stenosis, HTN, hypothyroid. He last saw CT surgery in 5/2022 and was to followup with Dr. Papo Navarro but reports he never followed up. [facility] c/o BLE edema, cough and SOB mostly with exertion per pt report. [facility] pt here for abnormal EKG and concern for CHF. He told ER that symptoms were going on for several weeks but tells me only for few days. He denies any history of chest pain, palpitations, dizziness, orthopnea. Upon presentation he was hypertensive (170/102) and pro BNP was 9299. His creatinine was 1.45, LFTs mildly elevated. He is unaware he has history of CM or CHF. He states when his heart medications ran out after his CABG, he did not get them refilled. He has been started on IV diuretics. CXR with small bilateral pleural effusions on presentation. SH former smoker (quit many years ago), drinks 1 crown royal drink per night, ,  lives alone (son lives next door). Last echo 4/6/22 EF 35-40%, grade II DD, akinetic apex c/w anterior infarct, no obvious thrombus, Mod AS, Aortic valve area 1.5 cm, mean gradient 20 mmHg. Subjective:  Gómez Frias reports no chest pain, denies sob but states he has not really been up walking. No palpitations.  He is in NSR with PAC, brief fun of PAT noted earlier this a.m. (<6 seconds, rate 130's). Assessment and Plan     Acute on chronic systolic CHF and possibly diastolic CHF: NYHA III   Ischemic cardiomyopathy secondary to post MI LV dysfunction following anterior wall MI  HTN: uncontrolled  Paroxysmal atrial tachcyardia (brief)  History of CAD s/p Anterior stemi,  CABG x 3   Aortic stenosis, moderate  History of hypothyroidism  Elevated LFTs  Elevated creatinine: now normalized. Dyslipidemia:    Elevated troponin (66-)  Hypokalemia: repleted by primary team.    Mr. Kaplanpresents to the hospital with acute on chronic systolic heart failure. His LV function appears to be progressively deteriorating due to post MI LV dysfunction. He has not been on any outpatient medical therapy. Currently appears to be in florid heart failure and will require IV diuretics. Potentially may need a cardiac catheterization prior to discharge since his ejection fraction is now about 20 to 25% and is worse than his ejection fraction prior to coronary bypass grafting. Also needs guideline directed medical therapy for preventing adverse LV remodeling and for heart failure with preserved ejection fraction. Will initiate Entresto. Continue IV Lasix. Needs high intensity statin therapy. Aspirin to be continued long-term. Potentially will require addition of spironolactone, Jardiance, beta-blocker based on clinical condition and blood pressures. Discussed compliance with salt restricted diet and medications. Questions answered by both patient as well as daughter. I suspect patient does not have enough insight into his clinical condition and may require significant assistance for medical care going forward.         ____________________________________________________________    Cardiac testing  03/29/22    ECHO ADULT FOLLOW-UP OR LIMITED 04/06/2022 4/6/2022    Interpretation Summary    Left Ventricle: Left ventricle size is normal. Normal wall thickness.  See diagram for wall motion findings. The EF by visual approximation is 35 - 40%. Grade II diastolic dysfunction with increased LAP. Akinetic apex consistent with recent anterior infarct. No obvious thrombus but at high risk of having apical thrombus. Aortic Valve: No regurgitation. Moderate stenosis of the aortic valve. Aortic valve area of 1.5 cm² with mean transaortic gradient of 20 mmHg. Signed by: Hayley Cai MD on 4/6/2022 12:09 PM        03/29/22    CARDIAC PROCEDURE 03/29/2022 3/29/2022    Conclusion  Cath:  Obstructive 2VD:  LM 40  LAD m100; D1 ost90  LCx p30  RCA p100  RFA manual    Likely needs CABG. NTG, heparin    Signed by: Lazarus Powell MD on 3/29/2022  1:36 AM        Most recent HS troponins:  Recent Labs     03/08/23  2337 03/08/23 2129   TROPHS 101* 90*       ECG: NSR with inferior q waves, poor R wave progression, evidence of anterior MI. Review of Systems:    [x]All other systems reviewed and all negative except as written in HPI    [] Patient unable to provide secondary to condition    Past Medical History:   Diagnosis Date    Hypertension     Ill-defined condition     hypothyroid    S/P triple vessel bypass      Past Surgical History:   Procedure Laterality Date    HX APPENDECTOMY       Social Hx:  reports that he has never smoked. He has never used smokeless tobacco. He reports current alcohol use. He reports that he does not use drugs. Family Hx: Family history is unknown by patient.   No Known Allergies       OBJECTIVE:  Wt Readings from Last 3 Encounters:   03/09/23 169 lb (76.7 kg)   02/14/23 166 lb 10.7 oz (75.6 kg)   05/09/22 161 lb (73 kg)       Intake/Output Summary (Last 24 hours) at 3/9/2023 1710  Last data filed at 3/9/2023 1259  Gross per 24 hour   Intake --   Output 3725 ml   Net -3725 ml       Physical Exam:    Vitals:   Vitals:    03/09/23 1149 03/09/23 1200 03/09/23 1358 03/09/23 1400   BP: (!) 148/121  (!) 148/121    Pulse: 89 85  88   Resp: 24      Temp: 97.7 °F (36.5 °C) SpO2: 100%      Weight:   169 lb (76.7 kg)    Height:   6' (1.829 m)      Telemetry: NSR, PACs, episode of PAT  BP (!) 148/121   Pulse 88   Temp 97.7 °F (36.5 °C)   Resp 24   Ht 6' (1.829 m)   Wt 169 lb (76.7 kg)   SpO2 100%   BMI 22.92 kg/m²   General:    Alert, cooperative, no distress. Psychiatric:    Normal Mood and affect    Eye/ENT:      Pupils equal, No asymmetry, Conjunctival pink. Able to hear voice at normal amplitude   Lungs:      Visibly symmetric chest expansion, bilateral lung crackles. Heart[de-identified]    Regular rate and rhythm, S1, S2 normal, no murmur, click, rub. Significant JVD, Normal palpable peripheral pulses. LV S3 present. Abdomen:     Soft, non-tender. Bowel sounds normal. No masses,  No      organomegaly. Extremities:   Extremities normal, atraumatic, no edema. Neurologic:   CN II-XII grossly intact. No gross focal deficits           Data Review:     Radiology:   XR Results (most recent):  Results from Hospital Encounter encounter on 03/08/23    XR CHEST PA LAT    Narrative  EXAM: XR CHEST PA LAT    INDICATION: Shortness of breath    COMPARISON: 4/5/2022    TECHNIQUE: PA and lateral chest views    FINDINGS: Mild cardiomegaly is again noted. The patient is status post CABG  procedure. The pulmonary vasculature is within normal limits. There are small bilateral pleural effusions with bilateral lower lobe  atelectasis. Degenerative changes are seen in the thoracic spine and shoulder  joints bilaterally. Impression  Small bilateral pleural effusions with bilateral lower lobe atelectasis.       Recent Labs     03/09/23 0543 03/08/23 2337 03/08/23 2129     --  136   K 3.4* 3.8 5.5*   *  --  109*   CO2 25  --  22   BUN 36*  --  44*   CREA 1.23  --  1.45*   GLU 95  --  89   CA 8.8  --  9.2     Recent Labs     03/09/23 0543 03/08/23 2129   WBC 7.7 8.5   HGB 13.0 13.0   HCT 40.3 41.2   * 143*     Recent Labs     03/08/23 2129   *     No results for input(s): CHOL, LDLC in the last 72 hours.     No lab exists for component: TGL, HDLC,  HBA1C      Current meds:    Current Facility-Administered Medications:     sodium chloride (NS) flush 5-40 mL, 5-40 mL, IntraVENous, Q8H, Lidya Moffett MD, 10 mL at 03/09/23 0542    sodium chloride (NS) flush 5-40 mL, 5-40 mL, IntraVENous, PRN, Lidya Moffett MD    acetaminophen (TYLENOL) tablet 650 mg, 650 mg, Oral, Q6H PRN **OR** acetaminophen (TYLENOL) suppository 650 mg, 650 mg, Rectal, Q6H PRN, Lidya Moffett MD    ondansetron (ZOFRAN ODT) tablet 4 mg, 4 mg, Oral, Q8H PRN **OR** ondansetron (ZOFRAN) injection 4 mg, 4 mg, IntraVENous, Q6H PRN, Lidya Moffett MD    hydrALAZINE (APRESOLINE) 20 mg/mL injection 20 mg, 20 mg, IntraVENous, Q6H PRN, Lidya Moffett MD    furosemide (LASIX) injection 40 mg, 40 mg, IntraVENous, Q12H, Wanda Nelson MD, 40 mg at 03/09/23 1017    [START ON 3/10/2023] enoxaparin (LOVENOX) injection 40 mg, 40 mg, SubCUTAneous, Q24H, Wanda Nelson MD    sacubitriL-valsartan (ENTRESTO) 24-26 mg tablet 1 Tablet, 1 Tablet, Oral, Q12H, Musa Fritz., NP, 1 Tablet at 03/09/23 1304    aspirin chewable tablet 81 mg, 81 mg, Oral, DAILY, Musa Fritz, NP, 81 mg at 03/09/23 1307    atorvastatin (LIPITOR) tablet 40 mg, 40 mg, Oral, QHS, Dara Morrison, ELI Mendez MD    [facility] Cardiology  Call center: (m) [phone]  (Y) [phone]      CC:None

## 2023-03-09 NOTE — H&P
History and Physical    Date of Service:  3/9/2023  Primary Care Provider: None  Source of information: Chart review    Chief Complaint: Leg Swelling and Shortness of Breath      History of Presenting Illness:   Caroline Epperson is a 76 y.o. male with history of hypertension, hypothyroidism, and CAD status post CABG times three who presents with who presents patient first with dyspnea, cough, and lower extremity edema. He was referred to the emergency department for elevation and concern for congestive heart failure. In the ED, he was hypertensive to 170/102. Other vital signs were stable. Labs are significant for creatinine 1.45 (b/l 1.2-1.5), and proBNP 9299       REVIEW OF SYSTEMS:  A comprehensive review of systems was negative except for that written in the History of Present Illness. Past Medical History:   Diagnosis Date    Hypertension     Ill-defined condition     hypothyroid    S/P triple vessel bypass       Past Surgical History:   Procedure Laterality Date    HX APPENDECTOMY       Prior to Admission medications    Medication Sig Start Date End Date Taking? Authorizing Provider   cephALEXin (KEFLEX) 500 mg capsule Take 500 mg by mouth three (3) times daily. Indications: \"foot infection\" per family   Yes Ana Forte MD   aspirin 81 mg chewable tablet Take 1 Tablet by mouth daily. Patient not taking: No sig reported 4/20/22   Christina Hung NP   levothyroxine (SYNTHROID) 75 mcg tablet Take 75 mcg by mouth Daily (before breakfast). Patient not taking: No sig reported    Ana Forte MD     No Known Allergies   Family History   Family history unknown: Yes      Social History:  reports that he has never smoked. He has never used smokeless tobacco. He reports current alcohol use. He reports that he does not use drugs.    Social Determinants of Health     Tobacco Use: Low Risk     Smoking Tobacco Use: Never    Smokeless Tobacco Use: Never    Passive Exposure: Not on file   Alcohol Use: Not on file   Financial Resource Strain: Not on file   Food Insecurity: Not on file   Transportation Needs: Not on file   Physical Activity: Not on file   Stress: Not on file   Social Connections: Not on file   Intimate Partner Violence: Not on file   Depression: Not at risk    PHQ-2 Score: 0   Housing Stability: Not on file        Medications were reconciled to the best of my ability given all available resources at the time of admission. Route is PO if not otherwise noted. Family and social history were personally reviewed, all pertinent and relevant details are outlined as above.     Objective:   Visit Vitals  BP (!) 144/115   Pulse 74   Temp 98 °F (36.7 °C)   Resp 15   Ht 2' 2.38\" (0.67 m)   Wt 79.4 kg (175 lb 0.7 oz)   SpO2 98%   .88 kg/m²           PHYSICAL EXAM:   General: Alert x oriented x 3, awake, no acute distress,   HEENT: PEERL, EOMI, moist mucus membranes  Neck: Supple, no JVD, no meningeal signs  Chest: Clear to auscultation bilaterally , 96% on 2Lnc  CVS: RRR, S1 S2 heard, harsh systolic murmur/rubs/gallops  Abd: Soft, non-tender, non-distended, +bowel sounds   Ext: No clubbing, no cyanosis, b/l 2+ edema  Neuro/Psych: Pleasant mood and affect, CN 2-12 grossly intact, sensory grossly within normal limit, Strength 5/5 in all extremities,  Cap refill: Brisk, less than 3 seconds  Pulses: 2+radial pulses  Skin: Warm, dry, without rashes or lesions    Data Review:   I have independently reviewed and interpreted patient's lab and all other diagnostic data    Abnormal Labs Reviewed   CBC WITH AUTOMATED DIFF - Abnormal; Notable for the following components:       Result Value    RDW 15.7 (*)     PLATELET 043 (*)     All other components within normal limits   METABOLIC PANEL, COMPREHENSIVE - Abnormal; Notable for the following components:    Potassium 5.5 (*)     Chloride 109 (*)     BUN 44 (*)     Creatinine 1.45 (*)     BUN/Creatinine ratio 30 (*)     eGFR 49 (*)     ALT (SGPT) 105 (*)     AST (SGOT) 95 (*)     Alk. phosphatase 216 (*)     Albumin 3.3 (*)     A-G Ratio 1.0 (*)     All other components within normal limits   NT-PRO BNP - Abnormal; Notable for the following components:    NT pro-BNP 9,299 (*)     All other components within normal limits   TROPONIN-HIGH SENSITIVITY - Abnormal; Notable for the following components:    Troponin-High Sensitivity 90 (*)     All other components within normal limits   TROPONIN-HIGH SENSITIVITY - Abnormal; Notable for the following components:    Troponin-High Sensitivity 101 (*)     All other components within normal limits       All Micro Results       None            IMAGING:   XR CHEST PA LAT   Final Result      Small bilateral pleural effusions with bilateral lower lobe atelectasis. ECG/ECHO:    Results for orders placed or performed during the hospital encounter of 03/08/23   EKG, 12 LEAD, INITIAL   Result Value Ref Range    Ventricular Rate 101 BPM    Atrial Rate 101 BPM    P-R Interval 160 ms    QRS Duration 86 ms    Q-T Interval 338 ms    QTC Calculation (Bezet) 438 ms    Calculated P Axis 69 degrees    Calculated R Axis 25 degrees    Calculated T Axis 7 degrees    Diagnosis       Sinus tachycardia  Inferior infarct (cited on or before 28-MAR-2022)  Anterolateral infarct (cited on or before 28-MAR-2022)  Abnormal ECG  When compared with ECG of 13-FEB-2023 15:07,  premature atrial complexes are no longer present  T wave inversion now evident in Inferior leads  T wave inversion no longer evident in Lateral leads            Notes reviewed from all clinical/nonclinical/nursing services involved in patient's clinical care. Care coordination discussions were held with appropriate clinical/nonclinical/ nursing providers based on care coordination needs. Assessment:   Given the patient's current clinical presentation, there is a high level of concern for decompensation if discharged from the emergency department.  Complex decision making was performed, which includes reviewing the patient's available past medical records, laboratory results, and imaging studies. Active Problems:    Shortness of breath (3/9/2023)        Plan:     #CAD s/p CABG  #HTN  -prn hydralazine  -needs to be started on GDMT, but will repeat BMP due to hemolyzed sample with elevated K+ this morning  -echo, consult cardiology    #hypothyroidism  -check TSH, unclear if pt. Is compliant with this    DIET: No diet orders on file   ISOLATION PRECAUTIONS: There are currently no Active Isolations  CODE STATUS: Prior   DVT PROPHYLAXIS: Lovenox  ANTICIPATED DISCHARGE: 24-48 hours. ANTICIPATED DISPOSITION: Home  EMERGENCY CONTACT/SURROGATE DECISION MAKER: Mike Postal (son)    Signed By: Gilberto Coburn MD     March 9, 2023         Please note that this dictation may have been completed with Dragon, the [employer] voice recognition software. Quite often unanticipated grammatical, syntax, homophones, and other interpretive errors are inadvertently transcribed by the computer software. Please disregard these errors. Please excuse any errors that have escaped final proofreading.

## 2023-03-09 NOTE — ED TRIAGE NOTES
Triage: Pt arrives ambulatory from [facility] where he was seen for SOB, cough, and BLE edema. Pt reportedly had an abnormal EKG there and was sent here with concern for new CHF. Pt reports symptoms have been going on for several weeks. Denies chest pain. Hx of STEMI.

## 2023-03-09 NOTE — PROGRESS NOTES
Lovenox Monitoring  Indication: DVT Prophylaxis  Recent Labs     03/09/23  0543 03/08/23  2129   HGB 13.0 13.0   * 143*   CREA 1.23 1.45*     Current Weight: 77 kg  Est. CrCl = 53 ml/min  Current Dose: 30 mg subcutaneously every 24 hours. Plan: Change to 40 mg subcutaneously every 24 hours per [facility] P&T Committee Protocol with respect to renal function. Pharmacy will continue to monitor patient daily and will make dosage adjustments based upon changing renal function.

## 2023-03-09 NOTE — ED NOTES
TRANSFER - OUT REPORT:     Mary Lou Agee  being transferred to 4e (unit) for routine progression of care       Report consisted of patients Situation, Background, Assessment and   Recommendations(SBAR). Information from the following report(s) SBAR and ED Summary was reviewed with the receiving nurse. Lines:   Peripheral IV 03/08/23 Left;Distal Forearm (Active)   Site Assessment Clean, dry, & intact 03/08/23 2127   Phlebitis Assessment 0 03/08/23 2127   Infiltration Assessment 0 03/08/23 2127   Dressing Status Clean, dry, & intact 03/08/23 2127   Dressing Type Transparent 03/08/23 2127   Hub Color/Line Status Pink 03/08/23 2127   Action Taken Blood drawn 03/08/23 2127        Opportunity for questions and clarification was provided.       Patient transported with:   O2 @ 2 liters  Registered Nurse

## 2023-03-09 NOTE — ED PROVIDER NOTES
HPI     Date of Service:  3/8/2023    Patient:  Eligio Leroy    Chief Complaint:  Leg Swelling and Shortness of Breath       HPI:  Eligio Leroy is a 76 y.o.  male with a past medical history of CAD s/p CABG, HTN (non-compliant with medications) who presents for evaluation of leg swelling and shortness of breath. Patient reports for the last several days he has been having increasing lower extremity edema of the bilateral legs and increasing shortness of breath. He notes shortness of breath is present at rest but worsens with exertion. Denies any orthopnea. No associated chest pain. He has a chronic cough which is unchanged from baseline. No other recent illness. Past Medical History:   Diagnosis Date    Hypertension     Ill-defined condition     hypothyroid    S/P triple vessel bypass        Past Surgical History:   Procedure Laterality Date    HX APPENDECTOMY           Family History:   Family history unknown: Yes       Social History     Socioeconomic History    Marital status:      Spouse name: Not on file    Number of children: Not on file    Years of education: Not on file    Highest education level: Not on file   Occupational History    Not on file   Tobacco Use    Smoking status: Never    Smokeless tobacco: Never   Substance and Sexual Activity    Alcohol use: Yes    Drug use: No    Sexual activity: Not on file   Other Topics Concern    Not on file   Social History Narrative    Not on file     Social Determinants of Health     Financial Resource Strain: Not on file   Food Insecurity: Not on file   Transportation Needs: Not on file   Physical Activity: Not on file   Stress: Not on file   Social Connections: Not on file   Intimate Partner Violence: Not on file   Housing Stability: Not on file         ALLERGIES: Patient has no known allergies. Review of Systems   Constitutional:  Negative for chills and fever. HENT:  Negative for congestion and rhinorrhea.     Eyes:  Negative for discharge and redness. Respiratory:  Positive for cough and shortness of breath. Cardiovascular:  Positive for leg swelling. Negative for chest pain. Gastrointestinal:  Negative for abdominal pain, diarrhea, nausea and vomiting. Neurological:  Negative for speech difficulty. Psychiatric/Behavioral:  Negative for agitation and confusion. Vitals:    03/08/23 2107 03/08/23 2258   BP:  (!) 156/122   Pulse:  91   Resp:  20   Temp:  98.2 °F (36.8 °C)   SpO2:  96%   Weight: 79.4 kg (175 lb 0.7 oz)    Height: 2' 2.38\" (0.67 m)             Physical Exam  Vitals and nursing note reviewed. Constitutional:       General: He is not in acute distress. Appearance: Normal appearance. He is not ill-appearing or toxic-appearing. HENT:      Head: Normocephalic and atraumatic. Eyes:      Extraocular Movements: Extraocular movements intact. Conjunctiva/sclera: Conjunctivae normal.   Cardiovascular:      Rate and Rhythm: Normal rate. Pulses: Normal pulses. Pulmonary:      Effort: Pulmonary effort is normal. Tachypnea present. No respiratory distress. Breath sounds: Examination of the right-lower field reveals decreased breath sounds. Examination of the left-lower field reveals decreased breath sounds. Decreased breath sounds present. Abdominal:      General: Abdomen is flat. Palpations: Abdomen is soft. Tenderness: There is no abdominal tenderness. Musculoskeletal:         General: Normal range of motion. Cervical back: Normal range of motion. Right lower leg: Edema present. Left lower leg: Edema present. Skin:     General: Skin is warm and dry. Capillary Refill: Capillary refill takes less than 2 seconds. Neurological:      General: No focal deficit present. Mental Status: He is alert and oriented to person, place, and time.    Psychiatric:         Mood and Affect: Mood normal.         Behavior: Behavior normal.        Medical Decision Making      DECISION MAKING:  Damir Kauffman is a 76 y.o. male who comes in as above. On examination, he does have mild tachypnea but is in no acute respiratory distress. He has diminished breath sounds at the bilateral bases. He has 1-2+ bilateral pitting edema. Differential diagnosis includes, but not limited to, acute CHF exacerbation, ACS, renal failure. CBC without leukocytosis or anemia. Mild thrombocytopenia at 143. Electrolytes notable for hyperkalemia at 5.5, there is hemolysis therefore we will redraw this and reassess. BUN and creatinine are elevated at 44 and 1.45, respectively. Mild transaminitis with an ALT of 105 and AST of 95.  proBNP is elevated at 9299 and high-sensitivity troponin 90. ECG without any significant changes when compared to 2/13/2023. Chest x-ray shows small bilateral pleural effusions. Patient will be given IV Lasix for diuresis, aspirin for cardioprotection and admitted to the hospitalist.    I discussed results with patient and daughter, they are in agreement. Patient's case discussed with the hospitalist, Dr. Martell Reyes for admission. Perfect Serve Consult for Admission  11:31 PM    ED Room Number: ER09/09  Patient Name and age:  Damir Kauffman 76 y.o.  male  Working Diagnosis: Acute congestive heart failure, unspecified heart failure type ([address]  (primary encounter diagnosis)  Chronic kidney disease, unspecified CKD stage  Elevated troponin    COVID-19 Suspicion:  no  Sepsis present:  no  Reassessment needed: no  Code Status:  Full Code  Readmission: no  Isolation Requirements:  no  Recommended Level of Care:  telemetry  Department: [facility] Adult ED - [phone]  Admitting Provider: Martell Reyes    Other:  76 y.o.  male with a past medical history of CAD s/p CABG, HTN (non-compliant with medications) presented for SOB and leg edema. On exam mild tachypnea with diminished breath sounds at the bases. There is 2+ bilateral lower extremity edema to the knees.   Chest x-ray does show small bilateral pleural effusions, BNP is elevated at 9299. Troponin is elevated at 90, no significant EKG changes when compared to previous. K is 5.5 but hemolyzed, so re-drawing. Giving IV Lasix for diuresis    Amount and/or Complexity of Data Reviewed  Labs: ordered. Decision-making details documented in ED Course. Radiology: ordered. Decision-making details documented in ED Course. ECG/medicine tests: ordered and independent interpretation performed. Decision-making details documented in ED Course. Risk  OTC drugs. Prescription drug management. Decision regarding hospitalization. ED Course as of 03/08/23 2319   Wed Mar 08, 2023   2137 EKG, 12 LEAD, INITIAL  ECG at 2108, interpreted by me: Sinus tachycardia, rate 101 bpm.  Normal axis. Normal intervals. No ST elevations. Interpretation is limited by artifact. Q waves in the inferior and anterior lateral leads which are unchanged when compared to 2/13/2023.   New T wave version in lead III. [SH]      ED Course User Index  [SH] Jonatan Juárez DO       Procedures        LABS:  Recent Results (from the past 6 hour(s))   EKG, 12 LEAD, INITIAL    Collection Time: 03/08/23  9:08 PM   Result Value Ref Range    Ventricular Rate 101 BPM    Atrial Rate 101 BPM    P-R Interval 160 ms    QRS Duration 86 ms    Q-T Interval 338 ms    QTC Calculation (Bezet) 438 ms    Calculated P Axis 69 degrees    Calculated R Axis 25 degrees    Calculated T Axis 7 degrees    Diagnosis       Sinus tachycardia  Inferior infarct (cited on or before 28-MAR-2022)  Anterolateral infarct (cited on or before 28-MAR-2022)  Abnormal ECG  When compared with ECG of 13-FEB-2023 15:07,  premature atrial complexes are no longer present  T wave inversion now evident in Inferior leads  T wave inversion no longer evident in Lateral leads     SAMPLES BEING HELD    Collection Time: 03/08/23  9:29 PM   Result Value Ref Range    SAMPLES BEING HELD 1RED, 1BLU     COMMENT Add-on orders for these samples will be processed based on acceptable specimen integrity and analyte stability, which may vary by analyte. CBC WITH AUTOMATED DIFF    Collection Time: 03/08/23  9:29 PM   Result Value Ref Range    WBC 8.5 4.1 - 11.1 K/uL    RBC 4.35 4.10 - 5.70 M/uL    HGB 13.0 12.1 - 17.0 g/dL    HCT 41.2 36.6 - 50.3 %    MCV 94.7 80.0 - 99.0 FL    MCH 29.9 26.0 - 34.0 PG    MCHC 31.6 30.0 - 36.5 g/dL    RDW 15.7 (H) 11.5 - 14.5 %    PLATELET 852 (L) 781 - 400 K/uL    MPV 12.6 8.9 - 12.9 FL    NRBC 0.0 0  WBC    ABSOLUTE NRBC 0.00 0.00 - 0.01 K/uL    NEUTROPHILS 74 32 - 75 %    LYMPHOCYTES 12 12 - 49 %    MONOCYTES 11 5 - 13 %    EOSINOPHILS 2 0 - 7 %    BASOPHILS 1 0 - 1 %    IMMATURE GRANULOCYTES 0 0.0 - 0.5 %    ABS. NEUTROPHILS 6.2 1.8 - 8.0 K/UL    ABS. LYMPHOCYTES 1.0 0.8 - 3.5 K/UL    ABS. MONOCYTES 1.0 0.0 - 1.0 K/UL    ABS. EOSINOPHILS 0.2 0.0 - 0.4 K/UL    ABS. BASOPHILS 0.1 0.0 - 0.1 K/UL    ABS. IMM. GRANS. 0.0 0.00 - 0.04 K/UL    DF AUTOMATED     METABOLIC PANEL, COMPREHENSIVE    Collection Time: 03/08/23  9:29 PM   Result Value Ref Range    Sodium 136 136 - 145 mmol/L    Potassium 5.5 (H) 3.5 - 5.1 mmol/L    Chloride 109 (H) 97 - 108 mmol/L    CO2 22 21 - 32 mmol/L    Anion gap 5 5 - 15 mmol/L    Glucose 89 65 - 100 mg/dL    BUN 44 (H) 6 - 20 MG/DL    Creatinine 1.45 (H) 0.70 - 1.30 MG/DL    BUN/Creatinine ratio 30 (H) 12 - 20      eGFR 49 (L) >60 ml/min/1.73m2    Calcium 9.2 8.5 - 10.1 MG/DL    Bilirubin, total 0.8 0.2 - 1.0 MG/DL    ALT (SGPT) 105 (H) 12 - 78 U/L    AST (SGOT) 95 (H) 15 - 37 U/L    Alk.  phosphatase 216 (H) 45 - 117 U/L    Protein, total 6.7 6.4 - 8.2 g/dL    Albumin 3.3 (L) 3.5 - 5.0 g/dL    Globulin 3.4 2.0 - 4.0 g/dL    A-G Ratio 1.0 (L) 1.1 - 2.2     NT-PRO BNP    Collection Time: 03/08/23  9:29 PM   Result Value Ref Range    NT pro-BNP 9,299 (H) <450 PG/ML   TROPONIN-HIGH SENSITIVITY    Collection Time: 03/08/23  9:29 PM   Result Value Ref Range Troponin-High Sensitivity 90 (H) 0 - 57 ng/L        IMAGING:  XR CHEST PA LAT   Final Result      Small bilateral pleural effusions with bilateral lower lobe atelectasis. Medications During Visit:  Medications   aspirin chewable tablet 324 mg (has no administration in time range)   furosemide (LASIX) injection 20 mg (20 mg IntraVENous Given 3/8/23 6998)         IMPRESSION:  1. Acute congestive heart failure, unspecified heart failure type ([facility] [address]    2. Chronic kidney disease, unspecified CKD stage    3.  Elevated troponin        DISPOSITION:  Admitted

## 2023-03-09 NOTE — PROGRESS NOTES
1726 - pt impulsive and looking for clothes to go downstairs for cafeteria. Pt confused. Daughter reports pt having difficulty sleeping at night and having a lot of confusion after cabg a year ago. Bed alarm turned on and reassured pt. Daughter at bedside.   Will update MD.

## 2023-03-09 NOTE — PROGRESS NOTES
RUR: 14%Low     GLENYS: Anticipated discharge home. Patient's family will provide transport home once medically stable. Follow-up with PCP/specialist.     Primary Contact: Isaías Haas, [phone]    *Will need 2nd IM letter prior to DC.   (1st IM given: 3/9/23)    Care Management Interventions  PCP Verified by CM: Yes ([facility] \"closest to his house\" per patient and son)  Mode of Transport at Discharge: Other (see comment) (Family)  Transition of Care Consult (CM Consult): Discharge Planning  Discharge Durable Medical Equipment: No  Physical Therapy Consult: No  Occupational Therapy Consult: No  Speech Therapy Consult: No  Support Systems: Child(jeffery)  Confirm Follow Up Transport: Family  The Plan for Transition of Care is Related to the Following Treatment Goals : Home  Discharge Location  Patient Expects to be Discharged to[de-identified] Home    Reason for Admission:  SOB                   RUR Score:   14% Low                   Plan for utilizing home health:   TBD      PCP: First and Last name:  None Per patient and son utilizes the closest [facility] near his home [facility] location) for PCP needs. Name of Practice:    Are you a current patient: Yes/No: Yes   Approximate date of last visit: Within past 6 months per patient    Can you participate in a virtual visit with your PCP: Yes                    Current Advanced Directive/Advance Care Plan: Full Code    Healthcare Decision Maker:   Click here to complete [place] including selection of the Healthcare Decision Maker Relationship (ie \"Primary\")             Primary Decision Maker: Shemar Gomes - Son - [phone]                  Transition of Care Plan:      Home     CM met with patient at bedside to introduce self and explain role. Per patient, he resides alone in a 3 story home with 7 steps to enter his primary entrance. Patient has a full flight to enter floors within his home.  Patient was independent with ADL's, IADL's and ambulation. Patient owns a RW. Patient's son lives next door and provides assistance if needed. CM verified patient's PCP, demographics and insurance. Preferred pharmacy is [facility] on [facility] [place] with no barriers obtaining needed prescriptions. Patient was lethargic throughout assessment. Patient obtained permission patient from patient for CM to contact patient's son to confirm information. CM called and spoke with son, Zhou Ashley who confirmed above information to be correct. Per patient and son utilizes the closest [facility] near his home [facility] location) for PCP needs. Per son, within past 2 weeks patient has displayed more forgetfulness and has been more lethargic. Patient's family will provide transport home once medically stable.      Avelino Millan, MSHERNAN   [phone]

## 2023-03-10 VITALS
TEMPERATURE: 98.2 F | RESPIRATION RATE: 13 BRPM | HEIGHT: 72 IN | OXYGEN SATURATION: 94 % | WEIGHT: 169 LBS | SYSTOLIC BLOOD PRESSURE: 127 MMHG | DIASTOLIC BLOOD PRESSURE: 92 MMHG | BODY MASS INDEX: 22.89 KG/M2 | HEART RATE: 85 BPM

## 2023-03-10 PROBLEM — I50.23 SYSTOLIC CHF, ACUTE ON CHRONIC (HCC): Status: ACTIVE | Noted: 2023-03-10

## 2023-03-10 LAB
ANION GAP SERPL CALC-SCNC: 7 MMOL/L (ref 5–15)
BNP SERPL-MCNC: 5273 PG/ML
BUN SERPL-MCNC: 33 MG/DL (ref 6–20)
BUN/CREAT SERPL: 27 (ref 12–20)
CALCIUM SERPL-MCNC: 8.9 MG/DL (ref 8.5–10.1)
CHLORIDE SERPL-SCNC: 108 MMOL/L (ref 97–108)
CO2 SERPL-SCNC: 25 MMOL/L (ref 21–32)
CREAT SERPL-MCNC: 1.22 MG/DL (ref 0.7–1.3)
GLUCOSE SERPL-MCNC: 103 MG/DL (ref 65–100)
MAGNESIUM SERPL-MCNC: 2.3 MG/DL (ref 1.6–2.4)
POTASSIUM SERPL-SCNC: 3.7 MMOL/L (ref 3.5–5.1)
SODIUM SERPL-SCNC: 140 MMOL/L (ref 136–145)
T4 FREE SERPL-MCNC: 1.2 NG/DL (ref 0.8–1.5)
TSH SERPL DL<=0.05 MIU/L-ACNC: 4.68 UIU/ML (ref 0.36–3.74)

## 2023-03-10 PROCEDURE — 74011000250 HC RX REV CODE- 250: Performed by: FAMILY MEDICINE

## 2023-03-10 PROCEDURE — 74011250636 HC RX REV CODE- 250/636: Performed by: INTERNAL MEDICINE

## 2023-03-10 PROCEDURE — 77030013406 HC CATH CTRL EDWD -B: Performed by: INTERNAL MEDICINE

## 2023-03-10 PROCEDURE — 74011250636 HC RX REV CODE- 250/636: Performed by: FAMILY MEDICINE

## 2023-03-10 PROCEDURE — 93461 R&L HRT ART/VENTRICLE ANGIO: CPT | Performed by: INTERNAL MEDICINE

## 2023-03-10 PROCEDURE — 77030004538 HC CATH ANGI DX MP BSC -A: Performed by: INTERNAL MEDICINE

## 2023-03-10 PROCEDURE — 4A023N8 MEASUREMENT OF CARDIAC SAMPLING AND PRESSURE, BILATERAL, PERCUTANEOUS APPROACH: ICD-10-PCS | Performed by: INTERNAL MEDICINE

## 2023-03-10 PROCEDURE — 74011250636 HC RX REV CODE- 250/636: Performed by: NURSE PRACTITIONER

## 2023-03-10 PROCEDURE — 99233 SBSQ HOSP IP/OBS HIGH 50: CPT | Performed by: INTERNAL MEDICINE

## 2023-03-10 PROCEDURE — 65270000046 HC RM TELEMETRY

## 2023-03-10 PROCEDURE — 36415 COLL VENOUS BLD VENIPUNCTURE: CPT

## 2023-03-10 PROCEDURE — 83880 ASSAY OF NATRIURETIC PEPTIDE: CPT

## 2023-03-10 PROCEDURE — C1751 CATH, INF, PER/CENT/MIDLINE: HCPCS | Performed by: INTERNAL MEDICINE

## 2023-03-10 PROCEDURE — 77030019569 HC BND COMPR RAD TERU -B: Performed by: INTERNAL MEDICINE

## 2023-03-10 PROCEDURE — 76937 US GUIDE VASCULAR ACCESS: CPT | Performed by: INTERNAL MEDICINE

## 2023-03-10 PROCEDURE — 83735 ASSAY OF MAGNESIUM: CPT

## 2023-03-10 PROCEDURE — 84443 ASSAY THYROID STIM HORMONE: CPT

## 2023-03-10 PROCEDURE — 99152 MOD SED SAME PHYS/QHP 5/>YRS: CPT | Performed by: INTERNAL MEDICINE

## 2023-03-10 PROCEDURE — 74011000636 HC RX REV CODE- 636: Performed by: INTERNAL MEDICINE

## 2023-03-10 PROCEDURE — 77030013744: Performed by: INTERNAL MEDICINE

## 2023-03-10 PROCEDURE — 80048 BASIC METABOLIC PNL TOTAL CA: CPT

## 2023-03-10 PROCEDURE — C1894 INTRO/SHEATH, NON-LASER: HCPCS | Performed by: INTERNAL MEDICINE

## 2023-03-10 PROCEDURE — 74011000250 HC RX REV CODE- 250: Performed by: INTERNAL MEDICINE

## 2023-03-10 PROCEDURE — B2111ZZ FLUOROSCOPY OF MULTIPLE CORONARY ARTERIES USING LOW OSMOLAR CONTRAST: ICD-10-PCS | Performed by: INTERNAL MEDICINE

## 2023-03-10 PROCEDURE — 77030040934 HC CATH DIAG DXTERITY MEDT -A: Performed by: INTERNAL MEDICINE

## 2023-03-10 PROCEDURE — 74011250637 HC RX REV CODE- 250/637: Performed by: NURSE PRACTITIONER

## 2023-03-10 PROCEDURE — 99153 MOD SED SAME PHYS/QHP EA: CPT | Performed by: INTERNAL MEDICINE

## 2023-03-10 PROCEDURE — 84439 ASSAY OF FREE THYROXINE: CPT

## 2023-03-10 RX ORDER — GUAIFENESIN 100 MG/5ML
81 LIQUID (ML) ORAL DAILY
Qty: 30 TABLET | Refills: 11 | Status: SHIPPED | OUTPATIENT
Start: 2023-03-10 | End: 2023-03-17 | Stop reason: SDUPTHER

## 2023-03-10 RX ORDER — SPIRONOLACTONE 25 MG/1
25 TABLET ORAL DAILY
Status: DISCONTINUED | OUTPATIENT
Start: 2023-03-10 | End: 2023-03-10 | Stop reason: HOSPADM

## 2023-03-10 RX ORDER — CARVEDILOL 6.25 MG/1
6.25 TABLET ORAL 2 TIMES DAILY WITH MEALS
Qty: 60 TABLET | Refills: 5 | Status: SHIPPED | OUTPATIENT
Start: 2023-03-11 | End: 2023-03-17 | Stop reason: SDUPTHER

## 2023-03-10 RX ORDER — MIDAZOLAM HYDROCHLORIDE 1 MG/ML
INJECTION, SOLUTION INTRAMUSCULAR; INTRAVENOUS AS NEEDED
Status: DISCONTINUED | OUTPATIENT
Start: 2023-03-10 | End: 2023-03-10 | Stop reason: HOSPADM

## 2023-03-10 RX ORDER — LIDOCAINE HYDROCHLORIDE 10 MG/ML
INJECTION INFILTRATION; PERINEURAL AS NEEDED
Status: DISCONTINUED | OUTPATIENT
Start: 2023-03-10 | End: 2023-03-10 | Stop reason: HOSPADM

## 2023-03-10 RX ORDER — FENTANYL CITRATE 50 UG/ML
INJECTION, SOLUTION INTRAMUSCULAR; INTRAVENOUS AS NEEDED
Status: DISCONTINUED | OUTPATIENT
Start: 2023-03-10 | End: 2023-03-10 | Stop reason: HOSPADM

## 2023-03-10 RX ORDER — POTASSIUM CHLORIDE 750 MG/1
20 TABLET, FILM COATED, EXTENDED RELEASE ORAL
Status: COMPLETED | OUTPATIENT
Start: 2023-03-10 | End: 2023-03-10

## 2023-03-10 RX ORDER — HEPARIN SODIUM 200 [USP'U]/100ML
INJECTION, SOLUTION INTRAVENOUS
Status: COMPLETED | OUTPATIENT
Start: 2023-03-10 | End: 2023-03-10

## 2023-03-10 RX ORDER — HEPARIN SODIUM 1000 [USP'U]/ML
INJECTION, SOLUTION INTRAVENOUS; SUBCUTANEOUS AS NEEDED
Status: DISCONTINUED | OUTPATIENT
Start: 2023-03-10 | End: 2023-03-10 | Stop reason: HOSPADM

## 2023-03-10 RX ORDER — FUROSEMIDE 10 MG/ML
40 INJECTION INTRAMUSCULAR; INTRAVENOUS 2 TIMES DAILY
Status: DISCONTINUED | OUTPATIENT
Start: 2023-03-10 | End: 2023-03-10

## 2023-03-10 RX ORDER — CARVEDILOL 6.25 MG/1
6.25 TABLET ORAL 2 TIMES DAILY WITH MEALS
Status: DISCONTINUED | OUTPATIENT
Start: 2023-03-10 | End: 2023-03-10 | Stop reason: HOSPADM

## 2023-03-10 RX ORDER — FUROSEMIDE 20 MG/1
20 TABLET ORAL DAILY
Qty: 30 TABLET | Refills: 5 | Status: SHIPPED | OUTPATIENT
Start: 2023-03-10 | End: 2023-03-17 | Stop reason: SDUPTHER

## 2023-03-10 RX ORDER — ATORVASTATIN CALCIUM 40 MG/1
40 TABLET, FILM COATED ORAL DAILY
Qty: 30 TABLET | Refills: 5 | Status: SHIPPED | OUTPATIENT
Start: 2023-03-10 | End: 2023-03-17 | Stop reason: SDUPTHER

## 2023-03-10 RX ORDER — SODIUM CHLORIDE 9 MG/ML
INJECTION, SOLUTION INTRAVENOUS
Status: COMPLETED | OUTPATIENT
Start: 2023-03-10 | End: 2023-03-10

## 2023-03-10 RX ORDER — FUROSEMIDE 20 MG/1
40 TABLET ORAL DAILY
Status: DISCONTINUED | OUTPATIENT
Start: 2023-03-11 | End: 2023-03-10 | Stop reason: HOSPADM

## 2023-03-10 RX ORDER — SODIUM CHLORIDE 0.9 % (FLUSH) 0.9 %
5-40 SYRINGE (ML) INJECTION EVERY 8 HOURS
Status: DISCONTINUED | OUTPATIENT
Start: 2023-03-10 | End: 2023-03-10 | Stop reason: HOSPADM

## 2023-03-10 RX ORDER — SPIRONOLACTONE 25 MG/1
25 TABLET ORAL DAILY
Qty: 30 TABLET | Refills: 5 | Status: SHIPPED | OUTPATIENT
Start: 2023-03-11 | End: 2023-03-17 | Stop reason: SDUPTHER

## 2023-03-10 RX ORDER — SODIUM CHLORIDE 0.9 % (FLUSH) 0.9 %
5-40 SYRINGE (ML) INJECTION AS NEEDED
Status: DISCONTINUED | OUTPATIENT
Start: 2023-03-10 | End: 2023-03-10 | Stop reason: HOSPADM

## 2023-03-10 RX ADMIN — CARVEDILOL 6.25 MG: 6.25 TABLET, FILM COATED ORAL at 17:08

## 2023-03-10 RX ADMIN — ENOXAPARIN SODIUM 40 MG: 100 INJECTION SUBCUTANEOUS at 05:16

## 2023-03-10 RX ADMIN — SACUBITRIL AND VALSARTAN 1 TABLET: 24; 26 TABLET, FILM COATED ORAL at 08:44

## 2023-03-10 RX ADMIN — ASPIRIN 81 MG CHEWABLE TABLET 81 MG: 81 TABLET CHEWABLE at 08:44

## 2023-03-10 RX ADMIN — SPIRONOLACTONE 25 MG: 25 TABLET ORAL at 16:00

## 2023-03-10 RX ADMIN — POTASSIUM CHLORIDE 20 MEQ: 750 TABLET, FILM COATED, EXTENDED RELEASE ORAL at 08:44

## 2023-03-10 RX ADMIN — FUROSEMIDE 40 MG: 10 INJECTION, SOLUTION INTRAMUSCULAR; INTRAVENOUS at 08:44

## 2023-03-10 RX ADMIN — SODIUM CHLORIDE, PRESERVATIVE FREE 10 ML: 5 INJECTION INTRAVENOUS at 05:16

## 2023-03-10 NOTE — PROGRESS NOTES
Cardiac Cath Lab Procedure Area Arrival Note:    Raúl Bryson arrived to Cardiac Cath Lab, Procedure Area. Patient identifiers verified with NAME and DATE OF BIRTH. Procedure verified with patient. Consent forms verified. Allergies verified. Patient informed of procedure and plan of care. Questions answered with review. Patient voiced understanding of procedure and plan of care. Patient on cardiac monitor, non-invasive blood pressure, SPO2 monitor. On RA and placed on O2 @ 2 lpm via NC.  IV of NS on pump at 25 ml/hr. Patient status doing well without problems. Patient is A&Ox 4. Patient reports no CP and no SOB. Patient medicated during procedure with orders obtained and verified by Dr. Suyapa Waite. Refer to patients Cardiac Cath Lab PROCEDURE REPORT for vital signs, assessment, status, and response during procedure, printed at end of case. Printed report on chart or scanned into chart. 1220 CATH LAB to RECOVERY ROOM REPORT    Procedure: LHC/RHC     MD:    The procedure was diagnostic only    Verbal Report given to Recovery Nurse on patient being transferred to Cardiac Cath Lab RR for routine post-op. Patient stable upon transfer to RR. Vitals, mental status, MAR, procedural summary discussed with recovery RN. Medication given during procedure:    Contrast:40mL                          Heparin:3,500units     Versed:1mg                                                               Fentanyl:25mcg                                                             Sheaths:    Left radial sheath pulled at 1215 pm, band at 15mL of air      Right IJ vein sheath pulled at 1216 pm, secured with band-aid.

## 2023-03-10 NOTE — PROGRESS NOTES
Discussed with Dr. Corby Burgess- pt is not candidate for Lifevest given his dementia, may get confused with the alarms on device.

## 2023-03-10 NOTE — CONSULTS
Nutrition Education    Educated on CHF MNT (2 gm Na+ diet, 2 L fluid restriction, daily weights)  Learners: Patient and Family (daughter present)  Readiness:  do not feel pt has a full understanding of information/ importance of adhering to recommendations. Daughter eager, but how family will enforce that pt follow recommendations is unclear. Method: Explanation and Handout  Response:  see readiness above. Daughter verbalized understanding, but also seems overwhelmed with how to manage and have her father follow recommendations. Contact name and number provided. Recommend follow-up with OP cardiac rehab and nutrition counseling with family present. Pt eats out for his one meal/day in the evening (steak house) and snacks on little jennyfer cakes, etc during the day. He does not cook and family afraid to have him cook d/t his forgetfulness. Pt/ daughter also report excessive coffee intake (more than 8 cups per day) and little other fluids.     Pablo Webb RD  Available via [employer]

## 2023-03-10 NOTE — PROCEDURES
Findings:  1) Normal LVEDP  2) severe native three-vessel coronary artery disease  3) patent LIMA to LAD, vein graft to obtuse marginal, vein graft to right PDA  4) normal SVR and PVR. Cardiac index of 2.2 L/min/m²      Contrast:40  cc  Access: Left radial, no issues    Recommendations  1)GDMT for heart failure with reduced ejection fraction. 2) initiate/optimize GDMT. Can be discharged later. Volume status appears to be optimized.

## 2023-03-10 NOTE — PROGRESS NOTES
TRANSFER - OUT REPORT:    Verbal report given to [facility] RN on Dominic Hernandez being transferred to Room 414 for routine progression of care       Report consisted of patients Situation, Background, Assessment and   Recommendations(SBAR). Information from the following report(s) SBAR, Procedure Summary, MAR, Recent Results, and Cardiac Rhythm NSR  was reviewed with the receiving nurse. Opportunity for questions and clarification was provided.

## 2023-03-10 NOTE — PROGRESS NOTES
Followup arranged with Dr. Carmen Oliva at [facility] next week  Future Appointments   Date Time Provider Opal Lo   3/17/2023  2:00 PM Tru Cameron MD [facility] BS AMB

## 2023-03-10 NOTE — NURSE NAVIGATOR
HEART FAILURE NURSE NAVIGATOR NOTE  [address]    Patient chart was reviewed by Heart Failure (HF) Nurse Navigators for compliance of prescribed treatment with guidelines directed medical therapy (GDMT) and HF database completed. Please, review beneath recommendations for symptomatic patients with HF with Reduced Ejection Fraction ? 40% (HFrEF)* and patients whose LVEF improved > 40% (HFimpEF)* for your consideration when taking care of this patient. Current Medical Therapy:    Name Garrison Carlos DOB 1945   LVEF 20/25%   NYHA Functional Class III   ARNi/ACEi/ARB Entresto 24-26 mg every 12 hours   Beta-blocker Not currently prescribed, see recommendations below   Aldosterone Antagonist Not currently prescribed, see recommendations below   SGLT2 inhibitor Not currently prescribed, see recommendations below   Hydralazine/Isosorbide Dinitrate    Consulting Cardiologist: Adis Handley     Recommendations:    Please, add the following GDMT for HFrEF ? 40% [Class 1] or document in discharge summary/progress note why patient cannot take the medication:  ARNi/ACEi or ARB  Beta-blockers (carvedilol, sustained-release metoprolol succinate or bisoprolol)  Aldosterone antagonists GFR > 30 and K< 5  SGLT2 inhibitor  Hydralazine/Isosorbide dinitrate for  Americans with Class III/IV symptoms  Adjust diuretic dose at discharge if hospitalized for volume overload    Consider adding the following GDMT for HFrEF ? 40%, if appropriate [Class 2b]:   Ivabradine for patients on maximally tolerated beta-blocker dose in order to achieve HR 70-80bpm  Digoxin, goal level 0.5-0.9  Polyunsaturated fatty acids  Vericuguat  For patient with hyperkalemia while on RAASi > 5.5, consider adding potassium binders (patiromer, sodium zirconium cycosilicate)    Note: the following medications may be potentially harmful in heart failure [Class 3]:  Calcium channel blockers (doxazosin, diltiazem, verapamil, nifedipine)  Antiarrhythmics (flecanide, disopyrimide, sotalol, dronedarone)  Diabetes medications (thiasolidinediones, saxagliptin, alogliptin)  NSAIDs and ARGUETA 2 inhibitors    Consider vaccinations for respiratory illnesses (flu, pneumonia, covid) [Class 2b]    For eligible patients with LVEF < 35% consider discharge with wearable defibrillation [Class 2b] and/or referral for ICD implantation [Class 1] for prevention of sudden cardiac death. Your patient is a woman in childbearing age (16-50 years). If appropriate, please,  patient to speak to provider when planning to become pregnant due to teratogenic effect of some HF medications and increased risk/potential contraindication of pregnancy [Class 1]    Due to severely reduced LVEF < 25% and/or recurrent hospitalizations this patient may benefit from referral to Advanced Heart Failure Program to assess suitability for advanced therapies, such as left-ventricular assist device, heart transplantation, palliative inotropes or palliation [Class 1]. To obtain in-patient consultation or refer to [address] [place] [address], call [phone]    Patient Education:     Teach back in heart failure education provided, including information about medical therapy, lifestyle modifications, diet and fluid restrictions, physical activity. Educational resources provided: Living with Heart Failure booklet; Signs/Symptoms magnet; Weight Calendar; [facility] flyer; Preparation for Successful Discharge Checklist.  Information provided about HF support group. Heart failure avoiding triggers on discharge instructions. Plan for Transitional Care:    Post discharge follow up phone call to be made within 48-72 hours of discharge. Patient will follow-up with PCP. Patient will follow-up with Primary Cardiologist.  Obstructive sleep apnea screening done and patient was referred to Sleep Medicine.   Referral/follow-up with Cardiac Rehabilitation. Referral/follow-up with Advanced Heart Failure Specialist.  Referral/follow-up with Palliative Care Specialist.      Heart Failure Nurse Navigator  Phone: [phone]  /  [phone]    *Recommendations listed above are based on 2022 AHA/ACC/HFSA Guideline for the Management of Heart Failure: A Report of the [facility] on Clinical Practice Guidelines. Circulation 2022; [id number]. and 2017 AHA/ACC/HRS guideline for management of patients with ventricular arrhythmias and the prevention of sudden cardiac death: A Report of the [facility] of Cardiology/American Heart Association Task Force on Clinical Practice Guidelines and the Heart Rhythm Society.  Heart Rhythm, Vol 15, No 10, October 2018 *Class of Recommendation: Class 1 (strong), Class 2a (moderate), Class 2b (weak), Class 3 (not recommended, potentially harmful)

## 2023-03-10 NOTE — PROGRESS NOTES
[facility]                    Cardiology Care Note     []Initial Encounter     [x]Follow-up    Patient Name: Caroline Epperson - RET:[id number] - LTR:[id number]  Primary Cardiologist: ? Dr. Gabbie Coombs but has not seen in office per pt  Consulting Cardiologist: [facility] Physicians: Tay Moe MD     Reason for encounter: CHF    HPI:   Caroline Epperson is a 76 y.o. male with PMH significant for multivessel CAD s/p anteror STEMI 3/2022,CABG x3 (LIMA to LAD, RSVH to PDA, RSVG to Ramus), systolic and diastolic CHF, cardiomyopathy (EF 35-40% in 3/2022 and 4/6/2022), aortic stenosis, HTN, hypothyroid. He last saw CT surgery in 5/2022 and was to followup with Dr. Gabbie Coombs but reports he never followed up. [facility] BLE edema, cough and SOB mostly with exertion per pt report. [facility] abnormal E[facility] and concern for CHF. He told ER that symptoms were going on for several weeks but tells me only for few days. He denies any history of chest pain, palpitations, dizziness, orthopnea. Upon presentation he was hypertensive (170/102) and pro BNP was 9299. His creatinine was 1.45, LFTs mildly elevated. He is unaware he has history of CM or CHF. He states when his heart medications ran out after his CABG, he did not get them refilled. He has been started on IV diuretics. CXR with small bilateral pleural effusions on presentation. SH former smoker (quit many years ago), drinks 1 crown royal drink per night, ,  lives alone (son lives next door). Last echo 4/6/22 EF 35-40%, grade II DD, akinetic apex c/w anterior infarct, no obvious thrombus, Mod AS, Aortic valve area 1.5 cm, mean gradient 20 mmHg. Subjective:  Caroline Epperson denies chest pain and SOB and he is laying nearly flat. Denies brief palpitations.  He is in NSR with PAC,  had few brief runs of atrial tachycardia with rates to 130's. Assessment and Plan     Acute on chronic systolic CHF and diastolic CHF: NYHA III   Advanced Ischemic cardiomyopathy secondary to post MI LV dysfunction following anterior wall MI  HTN: uncontrolled  Paroxysmal atrial tachcyardia (brief)  History of CAD s/p Anterior stemi,  CABG x 3   Aortic stenosis, moderate  History of hypothyroidism  Elevated LFTs  Elevated creatinine: now normalized. Dyslipidemia:    Elevated troponin (66-)  Hypokalemia: repleted by primary team.    Mr. Stuart Krishnamurthy presents to the hospital with acute on chronic systolic heart failure. His LV function appears to have progressively declined due to post MI LV dysfunction. EF is now 20-25% (from 35-40% post CABG in 4/6/2022)  He has not been on any outpatient medical therapy. Clinically appears to be well compensated now with no orthopnea. Plan to proceed with right heart catheterization and left heart catheterization along with graft angiography to ensure that there is no graft failure which could have caused progressive LV dysfunction. I discussed the risks/benefits/alternatives of the procedure with the patient. Risks include (but are not limited to) bleeding, infection,stroke,heart attack, need for emergency surgery/pericardiocentesis, need for dialysis or death. The patient 's son understands and agrees to proceed. I noted patient has enough understanding to provide consent. We will also continue optimizing his medical therapy. We will add carvedilol for better blood pressure control. Continue Entresto. Add Aldactone now that the creatinine is improving. Beatriz Manzano later as outpatient if patient demonstrates compliance to the above medications. ICD for primary prevention may be considered downstream if adequate medical compliance is ensured. Can follow-up at [place] with Dr. Melissa Andrews.     Addendum: [facility] catheterization performed today with patent grafts and normal left-sided filling pressures. Right atrial pressure is slightly elevated. Continue oral diuretics as outpatient.       ____________________________________________________________    Cardiac testing  03/29/22    ECHO ADULT FOLLOW-UP OR LIMITED 04/06/2022 4/6/2022    Interpretation Summary    Left Ventricle: Left ventricle size is normal. Normal wall thickness. See diagram for wall motion findings. The EF by visual approximation is 35 - 40%. Grade II diastolic dysfunction with increased LAP. Akinetic apex consistent with recent anterior infarct. No obvious thrombus but at high risk of having apical thrombus. Aortic Valve: No regurgitation. Moderate stenosis of the aortic valve. Aortic valve area of 1.5 cm² with mean transaortic gradient of 20 mmHg. Signed by: Penelope Burch MD on 4/6/2022 12:09 PM        03/29/22    CARDIAC PROCEDURE 03/29/2022 3/29/2022    Conclusion  Cath:  Obstructive 2VD:  LM 40  LAD m100; D1 ost90  LCx p30  RCA p100  RFA manual    Likely needs CABG. NTG, heparin    Signed by: Crystal Chapman MD on 3/29/2022  1:36 AM        Most recent HS troponins:  Recent Labs     03/08/23  2337 03/08/23  2129   TROPHS 101* 90*       ECG: NSR with inferior q waves, poor R wave progression, evidence of anterior MI. Review of Systems:    [x]All other systems reviewed and all negative except as written in HPI    [] Patient unable to provide secondary to condition    Past Medical History:   Diagnosis Date    Hypertension     Ill-defined condition     hypothyroid    S/P triple vessel bypass      Past Surgical History:   Procedure Laterality Date    HX APPENDECTOMY       Social Hx:  reports that he has never smoked. He has never used smokeless tobacco. He reports current alcohol use. He reports that he does not use drugs. Family Hx: Family history is unknown by patient.   No Known Allergies       OBJECTIVE:  Wt Readings from Last 3 Encounters:   03/09/23 76.7 kg (169 lb)   02/14/23 75.6 kg (166 lb 10.7 oz) 05/09/22 73 kg (161 lb)       Intake/Output Summary (Last 24 hours) at 3/10/2023 1031  Last data filed at 3/10/2023 0600  Gross per 24 hour   Intake --   Output 2250 ml   Net -2250 ml       Physical Exam:    Vitals:   Vitals:    03/10/23 0315 03/10/23 0353 03/10/23 0600 03/10/23 0756   BP: 127/89   (!) 153/112   Pulse: 74 76 83 90   Resp: 12   13   Temp: 98 °F (36.7 °C)   98.3 °F (36.8 °C)   SpO2: 91%   96%   Weight:       Height:         /86   Pulse 79   Temp 97.9 °F (36.6 °C)   Resp 22   Ht 6' (1.829 m)   Wt 169 lb (76.7 kg)   SpO2 94%   BMI 22.92 kg/m²   General:    Alert, cooperative, no distress. Psychiatric:    Normal Mood and affect    Eye/ENT:      Pupils equal, No asymmetry, Conjunctival pink. Able to hear voice at normal amplitude   Lungs:      Visibly symmetric chest expansion, No palpable tenderness. Clear to auscultation bilaterally. Heart[de-identified]    Regular rate and rhythm, S1, S2 normal, no murmur, click, rub. LV S4 heard no JVD, Normal palpable peripheral pulses. No cyanosis   Abdomen:     Soft, non-tender. Bowel sounds normal. No masses,  No      organomegaly. Extremities:   Extremities normal, atraumatic, no edema. Neurologic:   CN II-XII grossly intact. No gross focal deficits             Data Review:     Radiology:   XR Results (most recent):  Results from Hospital Encounter encounter on 03/08/23    XR CHEST PA LAT    Narrative  EXAM: XR CHEST PA LAT    INDICATION: Shortness of breath    COMPARISON: 4/5/2022    TECHNIQUE: PA and lateral chest views    FINDINGS: Mild cardiomegaly is again noted. The patient is status post CABG  procedure. The pulmonary vasculature is within normal limits. There are small bilateral pleural effusions with bilateral lower lobe  atelectasis. Degenerative changes are seen in the thoracic spine and shoulder  joints bilaterally. Impression  Small bilateral pleural effusions with bilateral lower lobe atelectasis.       Recent Labs 03/10/23  0215 03/09/23  0543    139   K 3.7 3.4*    109*   CO2 25 25   BUN 33* 36*   CREA 1.22 1.23   * 95   CA 8.9 8.8     Recent Labs     03/09/23  0543 03/08/23 2129   WBC 7.7 8.5   HGB 13.0 13.0   HCT 40.3 41.2   * 143*     Recent Labs     03/08/23 2129   *     No results for input(s): CHOL, LDLC in the last 72 hours. No lab exists for component: TGL, HDLC,  HBA1C      Current meds:    Current Facility-Administered Medications:     furosemide (LASIX) injection 40 mg, 40 mg, IntraVENous, BID, Callum Fritz, NP, 40 mg at 03/10/23 0844    sodium chloride (NS) flush 5-40 mL, 5-40 mL, IntraVENous, Q8H, Jenna Wilson MD, 10 mL at 03/10/23 0516    sodium chloride (NS) flush 5-40 mL, 5-40 mL, IntraVENous, PRN, Jenna Wilson MD    acetaminophen (TYLENOL) tablet 650 mg, 650 mg, Oral, Q6H PRN **OR** acetaminophen (TYLENOL) suppository 650 mg, 650 mg, Rectal, Q6H PRN, Jenna Wilson MD    ondansetron (ZOFRAN ODT) tablet 4 mg, 4 mg, Oral, Q8H PRN **OR** ondansetron (ZOFRAN) injection 4 mg, 4 mg, IntraVENous, Q6H PRN, Jenna Wilson MD    hydrALAZINE (APRESOLINE) 20 mg/mL injection 20 mg, 20 mg, IntraVENous, Q6H PRN, Jenna Wilson MD    enoxaparin (LOVENOX) injection 40 mg, 40 mg, SubCUTAneous, Q24H, Dante Stringer MD, 40 mg at 03/10/23 0516    sacubitriL-valsartan (ENTRESTO) 24-26 mg tablet 1 Tablet, 1 Tablet, Oral, Q12H, Callum Fritz, NP, 1 Tablet at 03/10/23 0844    aspirin chewable tablet 81 mg, 81 mg, Oral, DAILY, Callum Fritz., ELI, 81 mg at 03/10/23 0844    atorvastatin (LIPITOR) tablet 40 mg, 40 mg, Oral, QHS, Callum Fritz., NP, 40 mg at 03/09/23 2140    Callum Cespedes.  ELI Fritz    [facility]  Call center: (H) [phone]  (C) [phone]      CC:None

## 2023-03-10 NOTE — PROGRESS NOTES
TRANSFER - IN REPORT:    Verbal report received from Nella SCHMITT on Eusebio Abel  being received from procedure area for routine progression of care. Report consisted of patients Situation, Background, Assessment and Recommendations(SBAR). Information from the following report(s) SBAR, Procedure Summary, MAR, Recent Results, and Cardiac Rhythm NSR  was reviewed with the receiving clinician. Opportunity for questions and clarification was provided. Assessment completed upon patients arrival to [address] [place] [address] and care assumed. Cardiac Cath Lab Recovery Arrival Note:    Eusebio Abel arrived to [facility] recovery area. Patient procedure= R/LHC. Patient on cardiac monitor, non-invasive blood pressure, SPO2 monitor. On Room Air . IV  of NS on pump at 25 ml/hr. Patient status doing well without problems. Patient is A&Ox 4. Patient reports No Pain. PROCEDURE SITE CHECK:    Procedure site:without any bleeding and No Hematoma, No pain/discomfort reported at procedure site. No change in patient status. Continue to monitor patient and status.

## 2023-03-10 NOTE — PROGRESS NOTES
Transition of Care: home with followup with specialists/pcp; (resources for private duty care given to family)     Transport Plan: in car with family    RUR: 14%    Main contact is Michelle Lynne- [phone]    1st IM medicare letter given on 3/9/23    3911: noted discharge order; informed by attending that patient discharging home with followup with specialist; family requesting private duty care aids resources; resources given to family and patient by attending    CM following  Chace Jon RN    NOTE: per previous CM notes:  Per patient, he resides alone in a 3 story home with 7 steps to enter his primary entrance. Patient has a full flight to enter floors within his home. Patient was independent with ADL's, IADL's and ambulation. Patient owns a RW. Patient's son lives next door and provides assistance if needed. CM verified patient's PCP, demographics and insurance. Preferred pharmacy is [facility] on [address] in [place] with no barriers obtaining needed prescriptions. CM called and spoke with son, Valdo Nath who confirmed above information to be correct. Per patient and son utilizes the closest [facility] near his home [facility] location) for PCP needs. Per son, within past 2 weeks patient has displayed more forgetfulness and has been more lethargic. Patient's family will provide transport home once medically stable.

## 2023-03-10 NOTE — PROGRESS NOTES
TRANSFER - IN Cath Lab RR REPORT:    Verbal report received from [facility], RN(name) on Brandin Esteves  being received from [facility]) for ordered procedure      Report consisted of patients Situation, Background, Assessment and   Recommendations(SBAR). Information from the following report(s) SBAR, Kardex, and ED Summary was reviewed with the receiving nurse. Opportunity for questions and clarification was provided. Assessment completed upon patients arrival to unit and care assumed.

## 2023-03-10 NOTE — DISCHARGE INSTRUCTIONS
Download the Heart Failure Belton Amee: Search in your [employer] (Android) or [employer] ([employer]-phone): Search for- HF Belton Amee.    Bizily    HF Belton is a brand-new phone amee that helps you track daily symptoms, vitals, mood, energy level and more. You can even add your heart failure care team members to view your data and monitor your condition at home. HF Belton lets you:  Track symptoms, medications and more  Share health information with your health care team  Connect with others living with heart failure     WEIGH yourself daily in the morning. If your weight increases by 3 lbs in 24 hours or 5 lbs in 1 week, please contact Dr. Aldair Mares office (cardiologist office) to adjust your diuretics  AVOID SALTY foods.

## 2023-03-11 NOTE — DISCHARGE SUMMARY
Discharge Summary       PATIENT ID: Chava Ferrell  MRN: [id number]   YOB: 1945    DATE OF ADMISSION: 3/8/2023 10:34 PM    DATE OF DISCHARGE: 3/10/23   PRIMARY CARE PROVIDER: None     ATTENDING PHYSICIAN: JAYLON  DISCHARGING PROVIDER: Barry Simpson MD      CONSULTATIONS: IP CONSULT TO CARDIOLOGY    PROCEDURES/SURGERIES: Procedure(s):  LEFT AND RIGHT HEART CATH / [address] COURSE:   Chava Ferrell is a 76 y.o. male with history of hypertension, hypothyroidism, and CAD status post CABG times three who presents with who presents patient first with dyspnea, cough, and lower extremity edema. He was referred to the emergency department for elevation and concern for congestive heart failure. In the ED, he was hypertensive to 170/102. Other vital signs were stable. Labs are significant for creatinine 1.45 (b/l 1.2-1.5), and proBNP 9299    DISCHARGE DIAGNOSES / PLAN:      Acute on chronic systolic CHF and possibly diastolic CHF: NYHA III - medications per cardiology  Ischemic cardiomyopathy secondary to post MI LV dysfunction following anterior wall MI- EF 20-25%  HTN: controlled now on current meds  Paroxysmal atrial tachcyardia (brief)- not a candidate for anticoagulation per cardiology  History of CAD s/p Anterior stemi,  CABG x 3   Aortic stenosis, moderate  History of hypothyroidism- normal TSH this admission and not on meds at home  Elevated LFTs- stable and no tx needed  Elevated creatinine: now normalized.    Dyslipidemia:  - home on statin med  Elevated troponin (66-)- heart cath neg for MI or blockages  Hypokalemia: repleted by primary team.    PENDING TEST RESULTS:   At the time of discharge the following test results are still pending: none     ADDITIONAL CARE RECOMMENDATIONS:   Daily weights, monitor BP and HR, low salt diet     DIET: Cardiac Diet    ACTIVITY: Activity as tolerated, no driving until cleared by regular MD    EQUIPMENT needed: none    NOTIFY YOUR PHYSICIAN FOR ANY OF THE FOLLOWING:   Fever over 101 degrees for 24 hours. Chest pain, shortness of breath, fever, chills, nausea, vomiting, diarrhea, change in mentation, falling, weakness, bleeding. Severe pain or pain not relieved by medications, as well as any other signs or symptoms that you may have questions about. FOLLOW UP APPOINTMENTS:    Follow-up Information       Follow up With Specialties Details Why Willow Mckeon MD Cardiovascular Disease Physician, Interventional Cardiology Physician Follow up on 3/17/2023 2PM  at [facility] office. [address] [facility]  [phone]      None    None ([phone]) Patient stated that they have no PCP               DISCHARGE MEDICATIONS:  Discharge Medication List as of 3/10/2023  5:20 PM        START taking these medications    Details   atorvastatin (LIPITOR) 40 mg tablet Take 1 Tablet by mouth daily for 30 days. , Print, Disp-30 Tablet, R-5      carvediloL (COREG) 6.25 mg tablet Take 1 Tablet by mouth two (2) times daily (with meals). , Print, Disp-60 Tablet, R-5      furosemide (LASIX) 20 mg tablet Take 1 Tablet by mouth daily. , Print, Disp-30 Tablet, R-5      sacubitril-valsartan (ENTRESTO) 24 mg/26 mg tablet Take 1 Tablet by mouth every twelve (12) hours. , Print, Disp-60 Tablet, R-4      spironolactone (ALDACTONE) 25 mg tablet Take 1 Tablet by mouth daily. , Print, Disp-30 Tablet, R-5           CONTINUE these medications which have CHANGED    Details   aspirin 81 mg chewable tablet Take 1 Tablet by mouth daily for 30 days. , Print, Disp-30 Tablet, R-11           STOP taking these medications       cephALEXin (KEFLEX) 500 mg capsule Comments:   Reason for Stopping:         levothyroxine (SYNTHROID) 75 mcg tablet Comments:   Reason for Stopping:               DISPOSITION:    Home With:   OT  PT  HH  RN       Long term SNF/Inpatient Rehab   x Independent/assisted living Hospice    Other:     PATIENT CONDITION AT DISCHARGE:     Functional status    Poor     Deconditioned    x Independent      Cognition     Lucid    x Forgetful     Dementia      Catheters/lines (plus indication)    Mcmahan     PICC     PEG    x None      Code status   x  Full code     DNR      PHYSICAL EXAMINATION AT DISCHARGE:  Patient Vitals for the past 24 hrs:   Temp Pulse Resp BP SpO2   03/10/23 1600 -- 85 -- -- --   03/10/23 1524 98.2 °F (36.8 °C) 84 13 (!) 127/92 --   03/10/23 1413 -- 80 -- -- --   03/10/23 1351 98.4 °F (36.9 °C) 86 25 (!) 133/100 94 %   03/10/23 1333 -- 81 22 (!) 131/101 93 %   03/10/23 1316 -- 75 15 (!) 126/94 94 %   03/10/23 1304 -- 80 23 (!) 145/94 92 %   03/10/23 1245 -- 78 24 (!) 120/94 97 %   03/10/23 1230 -- 79 22 123/86 94 %   03/10/23 1224 97.9 °F (36.6 °C) 78 15 (!) 128/95 95 %   03/10/23 1215 -- 75 14 116/76 97 %   03/10/23 1128 -- 76 22 (!) 131/94 95 %   03/10/23 0756 98.3 °F (36.8 °C) 90 13 (!) 153/112 96 %        General:          Alert, cooperative, no distress, appears stated age. HEENT:           Atraumatic, anicteric sclerae, pink conjunctivae  Neck:               Supple, symmetrical  Lungs:             Clear to auscultation bilaterally. No Wheezing or Rhonchi. No rales. Chest wall:      No tenderness  No Accessory muscle use. Heart:              Regular  rhythm,  No  murmur   No edema  Abdomen:        Soft, non-tender. Not distended. Bowel sounds normal  Extremities:     No cyanosis. No clubbing,    Skin:                Not pale. Not Jaundiced  No rashes   Psych:             Not anxious or agitated.  Cognitive impairment  Neurologic:      Alert, moves all extremities, answers questions appropriately and responds to commands     Data Review:      Radiology:   XR Results (most recent):  Results from Hospital Encounter encounter on 03/08/23     XR CHEST PA LAT     Narrative  EXAM: XR CHEST PA LAT     INDICATION: Shortness of breath     COMPARISON: 4/5/2022 TECHNIQUE: PA and lateral chest views     FINDINGS: Mild cardiomegaly is again noted. The patient is status post CABG  procedure. The pulmonary vasculature is within normal limits. There are small bilateral pleural effusions with bilateral lower lobe  atelectasis. Degenerative changes are seen in the thoracic spine and shoulder  joints bilaterally. Impression  Small bilateral pleural effusions with bilateral lower lobe atelectasis. Recent Labs     03/10/23  0215 03/09/23  0543    139   K 3.7 3.4*    109*   CO2 25 25   BUN 33* 36*   CREA 1.22 1.23   * 95   CA 8.9 8.8           Recent Labs     03/09/23  0543 03/08/23 2129   WBC 7.7 8.5   HGB 13.0 13.0   HCT 40.3 41.2   * 143*          Recent Labs     03/08/23 2129   *     LEFT AND RIGHT HEART CATH / CORONARY ANGIOGRAPHY     Pre Procedure Diagnosis    Acute on chronic systolic CHF (congestive heart failure) ([facility]) [I50.23]       Indications    Acute on chronic systolic CHF (congestive heart failure) ([facility]) [I50.23 (ICD-10-CM)]     Conclusion    Findings:  1) Normal LVEDP  2) severe native three-vessel coronary artery disease  3) patent LIMA to LAD, vein graft to obtuse marginal, vein graft to right PDA  4) normal SVR and PVR. Cardiac index of 2.2 L/min/m²        Contrast:40  cc  Access: Left radial, no issues     Recommendations  1)GDMT for heart failure with reduced ejection fraction. 2) initiate/optimize GDMT. Can be discharged later. Volume status appears to be optimized. Coronary Findings    Diagnostic  Dominance: Right  Left Main   Dist LM lesion, 40% stenosed. Left Anterior Descending   Mid LAD lesion, 100% stenosed. First Diagonal Branch   1st Diag lesion, 90% stenosed. Left Circumflex   Ost Cx to Mid Cx lesion, 60% stenosed. First Obtuse Marginal Branch   1st Mrg lesion, 40% stenosed. Right Coronary Artery   Prox RCA lesion, 100% stenosed.    LIMA Graft To Dist LAD   Graft To 2nd Mrg   Graft To RPDA   Intervention    No interventions have been documented. Right Heart    Right Heart Cath Vitals - VO2 Value: 248.08 ml/min  Box Diff Value: 5.8  Hb: 14.5 %   Blood Oximetry Information - AV Hb PV: 0 gm/dL   Pressure Phase - Phase: Phase: Baseline   AO Pressures - AO Systolic: 804 mmHg  AO Diastolic: 93 mmHg  AO Mean: 44 mmHg  Heart Rate: 79 bpm   PA Pressures - PA Systolic: 40 mmHg  PA Diastolic: 18 mmHg  PA Mean: 26 mmHg   RV Pressures - RV Systolic: 43 mmHg  RV End Diastolic: 11 mmHg  RV dP/dt: 242 mmHg/sec   LV Pressures - LV Systolic: 687 mmHg  LV End Diastolic: 19 mmHg  LV dP/dt: 1212 mmHg/sec   RA Pressures - RA Wedge A Wave: 13 mmHg  RA Wedge V Wave: 14 mmHg  RA Wedge Mean: 11 mmHg   PCW Pressures - PCW A Wave: 20 mmHg  PCW V Wave: 21 mmHg  PCW Mean: 18 mmHg   Atrial Wedge Pressures - Source: Measured  RA Atrial Wedge Heart Rate: 78 bpm   Cath Pressure - FA End Diastolic Cath Pressure: 77 mmHg  PCW Source: Measured  PCW Heart Rate: 87 bpm   Cardiac Output Results - TDCO: 4.27 L/min  TDCI: 2.15 L/min/m2  Nandini C.O.: 4.28 L/min  Nandini C. I.: 2.16 L/min/m2  QSI Value: 2.16 L/min/m2   Resistance Results - PVR: 149.96 dsc-5  PVR-I: 297.62 dsc-5/m2  SVR: 616.82 dsc-5  SVR-I: 1224.16 dsc-5/m2  PVR/SVR: 0.24  TPR: 487.38 dsc-5  TPR-I: 967.27 dsc-5/m2  TVR: 822.43 dsc-5  TVR-I: 1632.22 dsc-5/m2  TPR/TVR: 0.59   Stroke Work Results - LVSW: 19.15 gm*m  LVSW-I: 9.65 gm*m/m2  RVSW: 11.16 gm*m  RVSW-I: 5.62 gm*m/m2   Valve Results - TPR/TVR: 0.59  PVR/SVR: 0.24     Link to printable coronary/vascular diagram report    Coronary/Vascular Diagrams     Coronary Diagram    Diagnostic  Dominance: Right  Intervention    Phase: Baseline    Data Systolic (mmHg) Diastolic (mmHg) Mean (mmHg) dP/dt (mmHg/sec) A Wave (mmHg) V Wave (mmHg)   RV Pressures 43    11     242        PA Pressures 40    18    26         LV Pressures 147    19     1212        AO Pressures 137    93    44         RA Pressures   11     13    14      PCW Pressures   18     20    21        Phase: Baseline    Data HR (bpm) TDCO (L/min) TDCI (L/min/m2) Nandini CI (L/min/m2)   Cardiac Output Results  4.27    2.15    2.16          CHRONIC MEDICAL DIAGNOSES:  Problem List as of 3/10/2023 Date Reviewed: 5/9/2022            Codes Class Noted - Resolved    Systolic CHF, acute on chronic ([address] ICD-10-CM: I50.23  ICD-9-CM: 428.23, 428.0  3/10/2023 - Present        Shortness of breath ICD-10-CM: R06.02  ICD-9-CM: 786.05  3/9/2023 - Present        S/P CABG x 3 ICD-10-CM: Z95.1  ICD-9-CM: V45.81  3/30/2022 - Present    Overview Signed 3/30/2022  3:45 PM by LUIS Vizcaino     On pump CORONARY ARTERY BYPASS GRAFT (CABG) X3, LIMA to LAD, RSVH to PDA, RSVG to Ramus  R GSV EVH               CAD (coronary artery disease) ICD-10-CM: I25.10  ICD-9-CM: 414.00  3/29/2022 - Present        NSTEMI (non-ST elevated myocardial infarction) [facility]) ICD-10-CM: I21.4  ICD-9-CM: 410.70  3/28/2022 - Present        Chest pain ICD-10-CM: R07.9  ICD-9-CM: 786.50  3/28/2022 - Present        Hypothyroidism ICD-10-CM: E03.9  ICD-9-CM: 244.9  3/28/2022 - Present        Hypokalemia ICD-10-CM: E87.6  ICD-9-CM: 276.8  3/28/2022 - Present        HTN (hypertension), benign ICD-10-CM: I10  ICD-9-CM: 401.1  3/28/2022 - Present           Greater than 30 minutes were spent with the patient on counseling and coordination of care    Signed:   Cam Moran MD      .

## 2023-03-13 ENCOUNTER — PATIENT OUTREACH (OUTPATIENT)
Dept: CASE MANAGEMENT | Age: 78
End: 2023-03-13

## 2023-03-13 NOTE — PROGRESS NOTES
Care Transitions Initial Call    Call within 2 business days of discharge: Yes     Patient Current Location: [place]    Patient: Yanet Goodwin Patient : 1945 MRN: [id number]    Last Discharge  Street       Date Complaint Diagnosis Description Type Department Provider    3/8/23 Leg Swelling; Shortness of Breath Acute congestive heart failure, unspecified heart failure type ([facility] [address] . .. ED to Hosp-Admission (Discharged) (ADMIT) Theresa Malhotra MD; Hue Mcfarland, ... Was this an external facility discharge? No   Challenges to be reviewed by the provider   Additional needs identified to be addressed with provider: yes     3/10/23 02:15   TSH 4.68 (H)   (H): Data is abnormally high    Does not have an ACP or HIPAA on EPIC         Method of communication with provider : none  Does not have an PCP, goes to [facility].    Discussed COVID-19 related testing which was not done at this time  Test results were not done. Patient informed of results, if available? no     Advance Care Planning:   Does patient have an Advance Directive: not on file  Wants to talk to his children fist, sent son ACP information via e mail. Inpatient Readmission Risk score: Unplanned Readmit Risk Score: 14.3    Was this a readmission? no   Patient stated reason for the admission: SOB    Patients top risk factors for readmission: HF, HTN, CAD   Interventions to address risk factors: Scheduled appointment with PCP-Patient to call and make an appt with [facility], Scheduled appointment with Specialist-Dr Cameron on 3/17/23 at 2 pm, and Assessment and support for treatment adherence and medication management-HF, HTN, CAD     Care Transition Nurse (CTN) contacted the patient by telephone to perform post hospital discharge assessment. Verified name and  with patient as identifiers. Provided introduction to self, and explanation of the CTN role.      CTN reviewed discharge instructions, medical action plan and red flags with patient who verbalized understanding. Were discharge instructions available to patient? yes. Reviewed appropriate site of care based on symptoms and resources available to patient including: PCP and Specialist. Patient given an opportunity to ask questions and does not have any further questions or concerns at this time. The patient agrees to contact the PCP office for questions related to their healthcare. Medication reconciliation was performed with patient, who verbalizes understanding of administration of home medications. Referral to Pharm D needed: no     Initial HF note    Do you have a Scale:    yes   How often do you weigh:  not daily      Daily Weight did not weigh today  Zone:(Pt Reported)  green     Echo done on: 3/9/23 20-25%  Type of HF:   HFpEF     Cardiac Device present:  None       Heart Failure Medications: ACE/ARB, Betablocker, Diuretic, Anticoagulant    Home Health/Outpatient orders at discharge: none  Durable Medical Equipment ordered at discharge: None  Was patient discharged with a pulse oximeter? no    Discussed follow-up appointments. If no appointment was previously scheduled, appointment scheduling offered: yes. Is follow up appointment scheduled within 7 days of discharge? no.   [address] follow up appointment(s):   Future Appointments   Date Time Provider Opal Lo   3/17/2023  2:00 PM Josie Cameron MD [facility]     [facility] appointment(s): Needs to make an appt with PCP at [facility]    Plan for follow-up call in 5-7 days based on severity of symptoms and risk factors. CTN provided contact information for future needs. Goals Addressed                   This Visit's Progress     Attends follow-up appointments as directed. 03/13/23   Patient PCP is at [facility], I asked if his children who have PCP's and he did not know.   I offered to have [employer] [facility] come and see patient but he stated he was going to call and make an appt with [facility] and if he couldn't get an appt would call me back and would then let [facility] come and see him. Patient has an appt with Cards on 3/17/23 at 2 pm at [facility] and he is aware, patient also let me call his son and let him know about Cards appt on 3/17/23 at 2 pm.    Patient is a [address] and has been to the [place] and seen by GI for his stomach issues, \"had to wait too long to be seen\". Monitor status of Cards and ? PCP appt on next call. Shy William MSN, RN, CCM / Care Transition Nurse / [phone]          Prevent complications post hospitalization. 03/13/23    Review current practice of taking medications and reminder systems utilized: pill boxes  Discuss challenges patient has for remembering medication dose times and assist patient in problem-solving  CTN to discuss weekly compliance with medication adherence based on med plan discussed by patient and CTN/ACM   Assess for medication side effects: dysrhythmias, anorexia, nausea, dizziness, orthostatic hypotension, vomiting, diarrhea, bradycardia, headache, visual changes, malaise, behavioral changes, increasing CHF and/or lack of response to medication.  -Medication reconciliation completed with patient, states he has a pill box. Patient to verbalize importance of daily weights in regards to managing CHF symptoms within 30 to 90 days. Patient to have working scale, and on appropriate surface (hard, flat, floor)   Patient to weigh daily in the morning in similar bedtime attire, prior to eating and after voiding, and record in log  Patient to understand that weight gain of 2-3 pounds in one day or 5 pounds in one week to call provider managing CHF  -CHF is new to patient, does have a scale, but does not weigh daily. Reviewed daily weight and what he would do if he gained 3 lbs in 1 day or 5 lbs in 1 week, call provider. Patient to follow low sodium diet/ fluid restriction as recommended by provider in 90 days.      CTN to discuss with patient current eating habits and daily salt intake during initial assessment. Patient to read labels regarding sodium content and portion control  Patient to verbalize importance of fresh fruit and vegetable options and frozen options as alternative to canned foods. Patient to verbalize ways to lower salt content in canned food by rinsing veggies and reading sodium labels.   -Patient does not know foods are on a low sodium diet, wanted to go to [facility] and to order Tammie Anuj this weekend.    -[employer] low sodium diet information. Client will prevent risk of CHF exacerbation and complication over next 29-95 days as evidence by:  Patient to report Red Flag Conditions:   a) Chest pain  b) Difficulty breathing  c) Decrease urine output  d) Vital sign changes from baseline  f)  Increased shortness of breath  g) New or increased edema  h) Increased weight gain   i) Dizziness  j) Productive Cough     Patient will monitor for signs of edema such as swelling in abdomen, thighs, lower legs, feet, and ankles. Patient/family will monitor symptoms of edema such as sleeping in a chair or with multiple pillows, decreased appetite, clothes or shoes fitting tighter. Patient to use CHF zone tool for help with symptom and self-management  -Patient denies any SOB, cough or pedal edema, is up without assist devise. Family drives to appointments. Patient to maintain/Improve baseline activity level in 90 days  Patient will balance activity and rest periods to prevent exhaustion and SOB  Patient will participate in activity of interest for 20 minutes a day. -Patient currently not exercising was riding a bike but his balance is off and can't ride bike, discussed stationary bike or walking. Patient does not have an ACP on file, wants to talk to his children first about this. I sent son Elisabet Luong information by e mail to look over for family to discuss.     Monitor SOB, cough, pedal edema, daily weight, low sodium diet,  and activity tolerance on next call, and ACP status.     Harold Closs MSN, RN, CCM / Care Transition Nurse / [phone]

## 2023-03-17 ENCOUNTER — OFFICE VISIT (OUTPATIENT)
Dept: CARDIOLOGY CLINIC | Age: 78
End: 2023-03-17

## 2023-03-17 VITALS
DIASTOLIC BLOOD PRESSURE: 82 MMHG | BODY MASS INDEX: 20.99 KG/M2 | HEART RATE: 96 BPM | WEIGHT: 155 LBS | HEIGHT: 72 IN | SYSTOLIC BLOOD PRESSURE: 110 MMHG | OXYGEN SATURATION: 98 %

## 2023-03-17 DIAGNOSIS — I50.22 CHRONIC HFREF (HEART FAILURE WITH REDUCED EJECTION FRACTION) (HCC): ICD-10-CM

## 2023-03-17 DIAGNOSIS — Z95.1 S/P CABG X 3: ICD-10-CM

## 2023-03-17 DIAGNOSIS — I47.1 ATRIAL TACHYCARDIA, PAROXYSMAL (HCC): ICD-10-CM

## 2023-03-17 DIAGNOSIS — I25.119 CORONARY ARTERY DISEASE INVOLVING NATIVE CORONARY ARTERY OF NATIVE HEART WITH ANGINA PECTORIS (HCC): Primary | ICD-10-CM

## 2023-03-17 RX ORDER — SPIRONOLACTONE 25 MG/1
25 TABLET ORAL DAILY
Qty: 90 TABLET | Refills: 3 | Status: SHIPPED | OUTPATIENT
Start: 2023-03-17

## 2023-03-17 RX ORDER — CARVEDILOL 6.25 MG/1
6.25 TABLET ORAL 2 TIMES DAILY WITH MEALS
Qty: 180 TABLET | Refills: 3 | Status: SHIPPED | OUTPATIENT
Start: 2023-03-17

## 2023-03-17 RX ORDER — FUROSEMIDE 20 MG/1
20 TABLET ORAL DAILY
Qty: 90 TABLET | Refills: 3 | Status: SHIPPED | OUTPATIENT
Start: 2023-03-17

## 2023-03-17 RX ORDER — ATORVASTATIN CALCIUM 40 MG/1
40 TABLET, FILM COATED ORAL DAILY
Qty: 90 TABLET | Refills: 3 | Status: SHIPPED | OUTPATIENT
Start: 2023-03-17

## 2023-03-17 RX ORDER — GUAIFENESIN 100 MG/5ML
81 LIQUID (ML) ORAL DAILY
Qty: 90 TABLET | Refills: 3 | Status: SHIPPED | OUTPATIENT
Start: 2023-03-17

## 2023-03-17 NOTE — PATIENT INSTRUCTIONS
Tennille Gonzalez MD  Address: [facility]  Hours:   Closed ? Opens 8? AM Mon  Phone: [phone]    Austen Pink MD  Address: [address]  Phone: [phone]

## 2023-03-17 NOTE — PROGRESS NOTES
Paige Fields MD., [facility]    [address] [place], [address] [facility]    Office [phone],AZB [phone]           Montrell Shirley is here for a f/u office visit. Primary care physician:  None    CC - as documented in EMR    Dear Dr. Sloan Lazo had the pleasure of seeing Ms. Montrell Shirley in the office today. Assessment:     CAD-s/p CABG 3/30/2022-LIMA to LAD, SVG to PDA, SVG to RI; 3/10/7565-HXO-tmlxaw grafts  Ischemic cardiomyopathy/chronic HFrEF  Hyperlipidemia  History of paroxysmal atrial tachycardia  Moderate aortic stenosis  Noncompliance with doctor visits/medications    Plan:      Patient has not followed up with cardiology since his bypass surgery. He was admitted to [facility] 3/9/2023 for HFrEF exacerbation. His echo post CABG showed EF of 35 to 40%. His echo recently showed a decline in EF to 20 to 25%. Patient was not on any heart failure medications. He was started on GDMT. Patient is accompanied by his daughter. His son does his pillbox. Occasionally patient has been missing doses. Also has been having cognitive issues per daughter. Advised to follow-up with PCP. Patient currently does not have a PCP. Advised compliance with medications  Patient is clinically euvolemic  Aggressive cardiovascular risk factor modification. Follow-up 3 months/earlier as needed. Will need limited echo-if EF has not improved on GDMT, will need AICD evaluation. Patient understands the plan. All questions were answered to the patient's satisfaction. I appreciate the opportunity to be involved in [facility]. See note below for details. Please do not hesitate to contact us with questions or concerns. Paige Fields MD      Cardiac Testing/ Procedures: A. Cardiac Cath/PCI: 3/10/23 ( Dr Jeb Rhodes)  1) Normal LVEDP  2) severe native three-vessel coronary artery disease  3) patent LIMA to LAD, vein graft to obtuse marginal, vein graft to right PDA  4) normal SVR and PVR. Cardiac index of 2.2 L/min/m²        Contrast:40  cc  Access: Left radial, no issues     Recommendations  1)GDMT for heart failure with reduced ejection fraction. 2) initiate/optimize GDMT. Can be discharged later. Volume status appears to be optimized. 3/28/22  R Radial - 6 F sheath  LCA - JL4; RCA - 3DRC  Sig R SCV tortuousity     L Main: Large; Distal 40%     LAD: Med; Mid 100% - L to L collaterals present     LCflex: Med; Mid 40%; OM1 - med; MLI     RCA: Subselective inj - Prox 100% ; L to R collaterals filling distal vessel noted     LVEDP: valve not crossed     LVEF: not assessed     PCI: none           Specimens Removed : None     Devices implanted : None     Complications: None     Closure Device: R Radial - TR band     Rec: CABG vs  of LAD. D/w Dr Guerline Mann - transfer to [facility] to hospitalist service and he will consult and decide on revascularisation options       B.ECHO/LAWSON: 3/9/23    Left Ventricle: Moderately reduced left ventricular systolic function with a visually estimated EF of 20 - 25%. Left ventricle size is normal. Moderately increased wall thickness. Severe hypokinesis of the following segments: mid anterior, mid anteroseptal and mid inferolateral. Akinesis of the following segments: apical anterior, apical lateral, apical septal and apical inferior. Akinesis of the apex. Grade III diastolic dysfunction with increased LAP. Aortic Valve: Moderately calcified cusp. Sclerosis of the aortic valve cusp. Moderate stenosis of the aortic valve. AV mean gradient is 19 mmHg. AV area by continuity VTI is 1.1 cm2 and may be underestimated    Mitral Valve: Mild annular calcification of the mitral valve. Mild to moderate regurgitation. Left Atrium: Left atrium is mildly dilated. Aorta: Normal sized ascending aorta. Mildly dilated aortic root. 4/6/22  Left Ventricle: Left ventricle size is normal. Normal wall thickness. See diagram for wall motion findings.  The EF by visual approximation is 35 - 40%. Grade II diastolic dysfunction with increased LAP. Akinetic apex consistent with recent anterior infarct. No obvious thrombus but at high risk of having apical thrombus. Aortic Valve: No regurgitation. Moderate stenosis of the aortic valve. Aortic valve area of 1.5 cm² with mean transaortic gradient of 20 mmHg. 3/28/22    Left Ventricle: Left ventricle size is normal. Moderately increased wall thickness. See diagram for wall motion findings. Moderately reduced left ventricular systolic function with a visually estimated EF of 35 - 40%. Aortic Valve: Valve structure is normal. Moderately calcified cusp. Sclerosis of the aortic valve cusp. Mitral Valve: Mild annular calcification of the mitral valve. Left Atrium: Left atrium is mildly dilated. Aorta: Normal sized ascending aorta. Mildly dilated aortic root. C.StressNuclear/Stress ECHO/Stress test:    D.Vascular: 3/29/22  Consistent with mild, less than 50 percent, stenosis of the right internal carotid artery. Consistent with minimal, low end of 0-49 percent, stenosis of the left internal carotid artery. Vertebral arteries are patent with antegrade flow. 3/29/22 - Nml GARETH    E. EP:    F. Miscellaneous: 3/30/22 Dr Cleopatra Berg  On pump CORONARY ARTERY BYPASS GRAFT (CABG) X3, LIMA to LAD, RSVH to PDA, RSVG to Ramus    History:     Tiara Allen is a 76 y.o. male who returns for follow up visit. No CP/dyspnea/swelling LE/palpitaitons    ROS:  (bold if positive, if negative)           Medications:       Current Outpatient Medications   Medication Sig Dispense    aspirin 81 mg chewable tablet Take 1 Tablet by mouth daily for 30 days. 30 Tablet    atorvastatin (LIPITOR) 40 mg tablet Take 1 Tablet by mouth daily for 30 days. 30 Tablet    carvediloL (COREG) 6.25 mg tablet Take 1 Tablet by mouth two (2) times daily (with meals). 60 Tablet    furosemide (LASIX) 20 mg tablet Take 1 Tablet by mouth daily.  [address] [address] Tablet    sacubitril-valsartan (ENTRESTO) 24 mg/26 mg tablet Take 1 Tablet by mouth every twelve (12) hours. 60 Tablet    spironolactone (ALDACTONE) 25 mg tablet Take 1 Tablet by mouth daily. 30 Tablet     No current facility-administered medications for this visit. Family History of CAD:    No    Social History:  Current  Smoker  No    Physical Exam:     Visit Vitals  /82 (BP 1 Location: Left upper arm, BP Patient Position: Supine)   Pulse 96   Ht 6' (1.829 m)   Wt 155 lb (70.3 kg)   SpO2 98%   BMI 21.02 kg/m²          Gen: Well-developed, well-nourished, in no acute distress  Neck: Supple,No JVD, No Carotid Bruit,   Resp: No accessory muscle use, Clear breath sounds, No rales or rhonchi  Card: Regular Rate,Rythm,Normal S1, S2,   Abd:  Soft, BS+,   MSK: No cyanosis  Skin: No rashes    Neuro: moving all four extremities , follows commands appropriately  Psych:  Good insight, oriented to person, place , alert, Nml Affect  LE: No edema    EKG: Sinus rhythm,  anterior/ inferior MI-age-indeterminate      Medication Side Effects and Warnings were discussed with patient: yes  Patient Labs were reviewed and/or requested:  yes  Patient Past Records were reviewed and/or requested: yes    Total time :        mins    ATTENTION:   This medical record was transcribed using an electronic medical records/speech recognition system. Although proofread, it may and can contain electronic, spelling and other errors. Corrections may be executed at a later time. Please feel free to contact us for any clarifications as needed.       Jaqui Hendrickson MD

## 2023-03-17 NOTE — PROGRESS NOTES
Chief Complaint   Patient presents with    Hospital Follow Up     3/8/23 sob    CHF    Cardiomyopathy    Hypertension     Vitals:    03/17/23 1417   BP: 110/82   BP 1 Location: Left upper arm   BP Patient Position: Supine   Pulse: 96   Height: 6' (1.829 m)   Weight: 155 lb (70.3 kg)   SpO2: 98%       Chest pain denied     SOB - yes     Palpitations denied     Swelling in hands/feet denied     Dizziness denied     Recent hospital stays denied     Refills denied

## 2023-03-20 ENCOUNTER — PATIENT OUTREACH (OUTPATIENT)
Dept: CASE MANAGEMENT | Age: 78
End: 2023-03-20

## 2023-03-20 NOTE — PROGRESS NOTES
Care Transitions Follow Up Call    Patient Current Location: [address] [place] [address] to be reviewed by the provider   Additional needs identified to be addressed with provider: yes  Not weighing, no PCP yet, not sure if on low sodium diet, no ACP           Method of communication with provider : none    Care Transition Nurse (CTN) contacted the patient by telephone to follow up. Verified name and  with patient as identifiers. Addressed changes since last contact: none  Follow up appointment completed? yes. Was follow up appointment scheduled within 7 days of discharge? no.     Advance Care Planning:   Does patient have an Advance Directive:  health care decision makers updated and currently not on file; education provided     CTN reviewed discharge instructions, medical action plan and red flags with patient and discussed any barriers to care and/or understanding of plan of care after discharge. Discussed appropriate site of care based on symptoms and resources available to patient including: PCP and Specialist. The patient agrees to contact the PCP office for questions related to their healthcare. [facility] follow up appointment(s):   Future Appointments   Date Time Provider Opal Lo   [facility]   2023  3:40 PM Tammie Cameron MD [facility] BS [facility]     Non-[facility] follow up appointment(s):     CTN provided contact information for future needs. Plan for follow-up call in 5-7 days based on severity of symptoms and risk factors. Goals Addressed                   This Visit's Progress     Attends follow-up appointments as directed. On track     23   Patient PCP is at [facility], I asked if his children who have PCP's and he did not know.   I offered to have [facility] come and see patient but he stated he was going to call and make an appt with [facility] and if he couldn't get an appt would call me back and would then let [facility] come and see him. Patient has an appt with Cards on 3/17/23 at 2 pm at [facility] and he is aware, patient also let me call his son and let him know about Cards appt on 3/17/23 at 2 pm.    Patient is a [address] and has been to the [place] and seen by GI for his stomach issues, \"had to wait too long to be seen\". Monitor status of Cards and ? PCP appt on next call. Renetta Be MSN, RN, CCM / Care Transition Nurse / [phone]     03/20/23   Patient did attend his Cards appt as scheduled, has not found a PCP that I can seen was recommended 2 by Cards. I have sent these 2 names and dress and # to patient e mail [email or web address]. Monitor status of PCP appt on next call. Renetta Be MSN, RN, CCM / Care Transition Nurse / [phone]            Prevent complications post hospitalization. 03/13/23    Review current practice of taking medications and reminder systems utilized: pill boxes  Discuss challenges patient has for remembering medication dose times and assist patient in problem-solving  CTN to discuss weekly compliance with medication adherence based on med plan discussed by patient and CTN/ACM   Assess for medication side effects: dysrhythmias, anorexia, nausea, dizziness, orthostatic hypotension, vomiting, diarrhea, bradycardia, headache, visual changes, malaise, behavioral changes, increasing CHF and/or lack of response to medication.  -Medication reconciliation completed with patient, states he has a pill box. Patient to verbalize importance of daily weights in regards to managing CHF symptoms within 30 to 90 days. Patient to have working scale, and on appropriate surface (hard, flat, floor)   Patient to weigh daily in the morning in similar bedtime attire, prior to eating and after voiding, and record in log  Patient to understand that weight gain of 2-3 pounds in one day or 5 pounds in one week to call provider managing CHF  -CHF is new to patient, does have a scale, but does not weigh daily. Reviewed daily weight and what he would do if he gained 3 lbs in 1 day or 5 lbs in 1 week, call provider. Patient to follow low sodium diet/ fluid restriction as recommended by provider in 90 days. CTN to discuss with patient current eating habits and daily salt intake during initial assessment. Patient to read labels regarding sodium content and portion control  Patient to verbalize importance of fresh fruit and vegetable options and frozen options as alternative to canned foods. Patient to verbalize ways to lower salt content in canned food by rinsing veggies and reading sodium labels.   -Patient does not know foods are on a low sodium diet, wanted to go to [facility] and to order Caitlyn Khat this weekend.    -Sent [employer] low sodium diet information. Client will prevent risk of CHF exacerbation and complication over next 96-17 days as evidence by:  Patient to report Red Flag Conditions:   a) Chest pain  b) Difficulty breathing  c) Decrease urine output  d) Vital sign changes from baseline  f)  Increased shortness of breath  g) New or increased edema  h) Increased weight gain   i) Dizziness  j) Productive Cough     Patient will monitor for signs of edema such as swelling in abdomen, thighs, lower legs, feet, and ankles. Patient/family will monitor symptoms of edema such as sleeping in a chair or with multiple pillows, decreased appetite, clothes or shoes fitting tighter. Patient to use CHF zone tool for help with symptom and self-management  -Patient denies any SOB, cough or pedal edema, is up without assist devise. Family drives to appointments. Patient to maintain/Improve baseline activity level in 90 days  Patient will balance activity and rest periods to prevent exhaustion and SOB  Patient will participate in activity of interest for 20 minutes a day.   -Patient currently not exercising was riding a bike but his balance is off and can't ride bike, discussed stationary bike or walking. Patient does not have an ACP on file, wants to talk to his children first about this. I sent son Mark Cardenas information by e mail to look over for family to discuss. Monitor SOB, cough, pedal edema, daily weight, low sodium diet,  and activity tolerance on next call, and ACP status. Lucia KAISER, RN, CCM / Care Transition Nurse / [phone]       03/13/23 1:44pm  Patient has questions about [facility], would they take him to Dr Solorio and what the cost is and who pays or visit. Explained they just come the 1 time they do not take to Dr garcia, gave him # to [facility] [employer] to see if the has transport in his coverage. [phone] and [phone]. Still planning on getting family to take [facility] to [facility] when they see [facility] on 3/17/23. Lucia KAISER, RN, CCM / Care Transition Nurse / [phone]       03/20/23   Patient is driving so quick call, denies any SOB, cough or pedal edema. Has not weighed. Reviewed that he needs to weigh daily. Patient does not have an ACP, I sent ACP to son via e-mail last call. Now I have sent patient ACP information via his e mail [email or web address] as requested. Monitor SOB, cough, pedal edema, daily weight, low sodium diet, ACP status.     Lucia KAISER, RN, CCM / Care Transition Nurse / [phone]

## 2023-03-27 ENCOUNTER — PATIENT OUTREACH (OUTPATIENT)
Dept: CASE MANAGEMENT | Age: 78
End: 2023-03-27

## 2023-03-27 NOTE — PROGRESS NOTES
Care Transitions Follow Up Call    Patient Current Location: [place]    Challenges to be reviewed by the provider   Additional needs identified to be addressed with provider: no  none         Method of communication with provider : none    Care Transition Nurse (CTN) contacted the patient by telephone to follow up. Verified name and  with patient as identifiers. Addressed changes since last contact: none  Follow up appointment completed? yes. Was follow up appointment scheduled within 7 days of discharge? no.     Advance Care Planning:   Does patient have an Advance Directive:  currently not on file; education provided     CTN reviewed discharge instructions, medical action plan and red flags with patient and discussed any barriers to care and/or understanding of plan of care after discharge. Discussed appropriate site of care based on symptoms and resources available to patient including: PCP and Specialist. The patient agrees to contact the PCP office for questions related to their healthcare. [facility] follow up appointment(s):   Future Appointments   Date Time Provider Opal Lo   2023  2:30 PM NYA GERMAN [facility]   2023  3:40 PM Kerrie Jean MD [facility]     [facility] follow up appointment(s): Dr Javi Mckeon 3/28/23    CTN provided contact information for future needs. Plan for follow-up call in 5-7 days based on severity of symptoms and risk factors. Goals Addressed                   This Visit's Progress     Attends follow-up appointments as directed. On track     23   Patient PCP is at [facility], I asked if his children who have PCP's and he did not know. I offered to have [facility] come and see patient but he stated he was going to call and make an appt with [facility] and if he couldn't get an appt would call me back and would then let [facility] come and see him.   Patient has an appt with Cards on 3/17/23 at 2 pm at [facility] and he is aware, patient also let me call his son and let him know about Cards appt on 3/17/23 at 2 pm.    Patient is a [employer] and has been to the [place] and seen by GI for his stomach issues, \"had to wait too long to be seen\". Patient called back his appt on 3/28 at 10 am.  Monitor status of Cards and ? PCP appt on next call. Rakan KAISER, RN, CCM / Care Transition Nurse / [phone]     03/20/23   Patient did attend his Cards appt as scheduled, has not found a PCP that I can seen was recommended 2 by Cards. I have sent these 2 names and dress and # to patient e mail [email or web address]. Monitor status of PCP appt on next call. Rakan KAISER, RN, CCM / Care Transition Nurse / [phone]     03/27/23   Patient states he called Dr Pedro Montesinos and [facility] and they are booked out until Oct and Nov.     Patient states he has an appt with a new PCP this Am at 8am and he forgot, then he told his daughter it was Wednesday. Patient  also has an appt with ortho but does not know when that is. [facility] I can't see what it is for. Patient daughter Ward Duncan also go on phone and was asking about an appt with a Dr to help their father with his memory, explained patient would need to see PCP for this first.  Monitor status of PCP appt, Ortho appt, who he is seeing on 4/14 at the [place], daughter is calling to find out who [facility]. Rakan KAISER, RN, CCM / Care Transition Nurse / [phone]              Prevent complications post hospitalization.         03/13/23    Review current practice of taking medications and reminder systems utilized: pill boxes  Discuss challenges patient has for remembering medication dose times and assist patient in problem-solving  CTN to discuss weekly compliance with medication adherence based on med plan discussed by patient and CTN/ACM   Assess for medication side effects: dysrhythmias, anorexia, nausea, dizziness, orthostatic hypotension, vomiting, diarrhea, bradycardia, headache, visual changes, malaise, behavioral changes, increasing CHF and/or lack of response to medication.  -Medication reconciliation completed with patient, states he has a pill box. Patient to verbalize importance of daily weights in regards to managing CHF symptoms within 30 to 90 days. Patient to have working scale, and on appropriate surface (hard, flat, floor)   Patient to weigh daily in the morning in similar bedtime attire, prior to eating and after voiding, and record in log  Patient to understand that weight gain of 2-3 pounds in one day or 5 pounds in one week to call provider managing CHF  -CHF is new to patient, does have a scale, but does not weigh daily. Reviewed daily weight and what he would do if he gained 3 lbs in 1 day or 5 lbs in 1 week, call provider. Patient to follow low sodium diet/ fluid restriction as recommended by provider in 90 days. CTN to discuss with patient current eating habits and daily salt intake during initial assessment. Patient to read labels regarding sodium content and portion control  Patient to verbalize importance of fresh fruit and vegetable options and frozen options as alternative to canned foods. Patient to verbalize ways to lower salt content in canned food by rinsing veggies and reading sodium labels.   -Patient does not know foods are on a low sodium diet, wanted to go to [employer] and to order Alec Bing this weekend.    -Sent [facility] low sodium diet information.      Client will prevent risk of CHF exacerbation and complication over next 40-40 days as evidence by:  Patient to report Red Flag Conditions:   a) Chest pain  b) Difficulty breathing  c) Decrease urine output  d) Vital sign changes from baseline  f)  Increased shortness of breath  g) New or increased edema  h) Increased weight gain   i) Dizziness  j) Productive Cough     Patient will monitor for signs of edema such as swelling in abdomen, thighs, lower legs, feet, and ankles. Patient/family will monitor symptoms of edema such as sleeping in a chair or with multiple pillows, decreased appetite, clothes or shoes fitting tighter. Patient to use CHF zone tool for help with symptom and self-management  -Patient denies any SOB, cough or pedal edema, is up without assist devise. Family drives to appointments. Patient to maintain/Improve baseline activity level in 90 days  Patient will balance activity and rest periods to prevent exhaustion and SOB  Patient will participate in activity of interest for 20 minutes a day. -Patient currently not exercising was riding a bike but his balance is off and can't ride bike, discussed stationary bike or walking. Patient does not have an ACP on file, wants to talk to his children first about this. I sent son Carlos Marquez information by e mail to look over for family to discuss. Monitor SOB, cough, pedal edema, daily weight, low sodium diet,  and activity tolerance on next call, and ACP status. Maty KAISER, RN, CCM / Care Transition Nurse / [phone]       03/13/23 1:44pm  Patient has questions about [facility], would they take him to Dr Solorio and what the cost is and who pays or visit. Explained they just come the 1 time they do not take to Dr garcia, gave him # to [facility] [employer] to see if the has transport in his coverage. [phone] and [phone]. Still planning on getting [facility]/17/23. Maty KAISER, RN, CCM / Care Transition Nurse / [phone]       03/20/23   Patient is driving so quick call, denies any SOB, cough or pedal edema. Has not weighed. Reviewed that he needs to weigh daily. Patient does not have an ACP, I sent ACP to son via e-mail last call. Now I have sent patient ACP information via his e mail [email or web address] as requested. Monitor SOB, cough, pedal edema, daily weight, low sodium diet, ACP status.     Maty KAISER, RN, CCM / Care Transition Nurse / [phone]      03/27/23   Patient denies any SOB cough or pedal edema, has not weighed today, weighed while we were on the phone 156.5 lb. Reviewed why he wants to weigh first thing in Am and write it down, reviewed what he would do if he gained more than 3 lbs in 1 week or 5 lbs in 1 week call provider. Daughter also states she told her father multiple times to weight daily and write it down. Daughter states she takes her father grocery shopping and she gets low sodium food but then he goes out to eat and that is not low sodium, reviewed what high sodium food does to his body to patient. Patient could not find the ACP information I had sent, sent again to patient and daughter Albert So at [email or web address]  Patient states he like to move all the time, walking is too boring so he ran and now his knee's hurt. Encouraged just to walk until see by Ortho. Monitor SOB, cough, pedal edema, walking/running?, low sodium diet, daily weight., knee pain.     Shavon Barillas MSN, RN, CCM / Care Transition Nurse / [phone]

## 2023-04-01 ENCOUNTER — APPOINTMENT (OUTPATIENT)
Dept: GENERAL RADIOLOGY | Age: 78
DRG: 306 | End: 2023-04-01
Attending: STUDENT IN AN ORGANIZED HEALTH CARE EDUCATION/TRAINING PROGRAM
Payer: MEDICARE

## 2023-04-01 ENCOUNTER — OFFICE VISIT (OUTPATIENT)
Dept: EMERGENCY DEPT | Age: 78
DRG: 306 | End: 2023-04-01
Attending: EMERGENCY MEDICINE
Payer: MEDICARE

## 2023-04-01 ENCOUNTER — APPOINTMENT (OUTPATIENT)
Dept: NON INVASIVE DIAGNOSTICS | Age: 78
DRG: 306 | End: 2023-04-01
Attending: STUDENT IN AN ORGANIZED HEALTH CARE EDUCATION/TRAINING PROGRAM
Payer: MEDICARE

## 2023-04-01 ENCOUNTER — APPOINTMENT (OUTPATIENT)
Dept: CT IMAGING | Age: 78
DRG: 306 | End: 2023-04-01
Attending: STUDENT IN AN ORGANIZED HEALTH CARE EDUCATION/TRAINING PROGRAM
Payer: MEDICARE

## 2023-04-01 ENCOUNTER — APPOINTMENT (OUTPATIENT)
Dept: VASCULAR SURGERY | Age: 78
DRG: 306 | End: 2023-04-01
Attending: STUDENT IN AN ORGANIZED HEALTH CARE EDUCATION/TRAINING PROGRAM
Payer: MEDICARE

## 2023-04-01 ENCOUNTER — HOSPITAL ENCOUNTER (INPATIENT)
Age: 78
LOS: 1 days | Discharge: HOME | DRG: 306 | End: 2023-04-02
Attending: STUDENT IN AN ORGANIZED HEALTH CARE EDUCATION/TRAINING PROGRAM | Admitting: HOSPITALIST
Payer: MEDICARE

## 2023-04-01 DIAGNOSIS — R57.0 CARDIOGENIC SHOCK (HCC): Primary | ICD-10-CM

## 2023-04-01 DIAGNOSIS — I25.10 CORONARY ARTERY DISEASE INVOLVING NATIVE CORONARY ARTERY OF NATIVE HEART WITHOUT ANGINA PECTORIS: ICD-10-CM

## 2023-04-01 DIAGNOSIS — R55 SYNCOPE AND COLLAPSE: ICD-10-CM

## 2023-04-01 DIAGNOSIS — I50.23 SYSTOLIC CHF, ACUTE ON CHRONIC (HCC): ICD-10-CM

## 2023-04-01 DIAGNOSIS — Z95.1 S/P CABG X 3: ICD-10-CM

## 2023-04-01 PROBLEM — E87.5 HYPERKALEMIA: Status: ACTIVE | Noted: 2023-04-01

## 2023-04-01 LAB
ALBUMIN SERPL-MCNC: 3.2 G/DL (ref 3.5–5)
ALBUMIN/GLOB SERPL: 1.1 (ref 1.1–2.2)
ALP SERPL-CCNC: 127 U/L (ref 45–117)
ALT SERPL-CCNC: 31 U/L (ref 12–78)
ANION GAP SERPL CALC-SCNC: 2 MMOL/L (ref 5–15)
AST SERPL-CCNC: 46 U/L (ref 15–37)
BASOPHILS # BLD: 0.1 K/UL (ref 0–0.1)
BASOPHILS NFR BLD: 1 % (ref 0–1)
BILIRUB SERPL-MCNC: 0.6 MG/DL (ref 0.2–1)
BNP SERPL-MCNC: 2696 PG/ML
BUN SERPL-MCNC: 31 MG/DL (ref 6–20)
BUN/CREAT SERPL: 18 (ref 12–20)
CALCIUM SERPL-MCNC: 8.7 MG/DL (ref 8.5–10.1)
CHLORIDE SERPL-SCNC: 109 MMOL/L (ref 97–108)
CO2 SERPL-SCNC: 27 MMOL/L (ref 21–32)
COMMENT, HOLDF: NORMAL
CREAT SERPL-MCNC: 1.69 MG/DL (ref 0.7–1.3)
DIFFERENTIAL METHOD BLD: NORMAL
ECHO AO ARCH DIAM: 3 CM
ECHO AO ASC DIAM: 3.9 CM
ECHO AO ASCENDING AORTA INDEX: 2.04 CM/M2
ECHO AR MAX VEL PISA: 2.7 M/S
ECHO AV AREA PEAK VELOCITY: 0.7 CM2
ECHO AV AREA VTI: 0.6 CM2
ECHO AV AREA/BSA PEAK VELOCITY: 0.4 CM2/M2
ECHO AV AREA/BSA VTI: 0.3 CM2/M2
ECHO AV MEAN GRADIENT: 16 MMHG
ECHO AV MEAN VELOCITY: 1.9 M/S
ECHO AV PEAK GRADIENT: 28 MMHG
ECHO AV PEAK VELOCITY: 2.7 M/S
ECHO AV REGURGITANT PHT: 471.9 MILLISECOND
ECHO AV VELOCITY RATIO: 0.3
ECHO AV VTI: 59.4 CM
ECHO LA DIAMETER INDEX: 1.99 CM/M2
ECHO LA DIAMETER: 3.8 CM
ECHO LA VOL 2C: 52 ML (ref 18–58)
ECHO LA VOL 4C: 43 ML (ref 18–58)
ECHO LA VOL BP: 48 ML (ref 18–58)
ECHO LA VOL/BSA BIPLANE: 25 ML/M2 (ref 16–34)
ECHO LA VOLUME AREA LENGTH: 51 ML
ECHO LA VOLUME INDEX A2C: 27 ML/M2 (ref 16–34)
ECHO LA VOLUME INDEX A4C: 23 ML/M2 (ref 16–34)
ECHO LA VOLUME INDEX AREA LENGTH: 27 ML/M2 (ref 16–34)
ECHO LV E' LATERAL VELOCITY: 5 CM/S
ECHO LV E' SEPTAL VELOCITY: 3 CM/S
ECHO LV EDV A2C: 160 ML
ECHO LV EDV A4C: 200 ML
ECHO LV EDV BP: 178 ML (ref 67–155)
ECHO LV EDV INDEX A4C: 105 ML/M2
ECHO LV EDV INDEX BP: 93 ML/M2
ECHO LV EDV NDEX A2C: 84 ML/M2
ECHO LV EJECTION FRACTION A2C: 35 %
ECHO LV EJECTION FRACTION A4C: 38 %
ECHO LV EJECTION FRACTION BIPLANE: 36 % (ref 55–100)
ECHO LV ESV A2C: 104 ML
ECHO LV ESV A4C: 125 ML
ECHO LV ESV BP: 114 ML (ref 22–58)
ECHO LV ESV INDEX A2C: 54 ML/M2
ECHO LV ESV INDEX A4C: 65 ML/M2
ECHO LV ESV INDEX BP: 60 ML/M2
ECHO LV FRACTIONAL SHORTENING: 19 % (ref 28–44)
ECHO LV INTERNAL DIMENSION DIASTOLE INDEX: 2.72 CM/M2
ECHO LV INTERNAL DIMENSION DIASTOLIC: 5.2 CM (ref 4.2–5.9)
ECHO LV INTERNAL DIMENSION SYSTOLIC INDEX: 2.2 CM/M2
ECHO LV INTERNAL DIMENSION SYSTOLIC: 4.2 CM
ECHO LV IVSD: 0.6 CM (ref 0.6–1)
ECHO LV MASS 2D: 101.7 G (ref 88–224)
ECHO LV MASS INDEX 2D: 53.3 G/M2 (ref 49–115)
ECHO LV POSTERIOR WALL DIASTOLIC: 0.6 CM (ref 0.6–1)
ECHO LV RELATIVE WALL THICKNESS RATIO: 0.23
ECHO LVOT AREA: 2.5 CM2
ECHO LVOT AV VTI INDEX: 0.25
ECHO LVOT DIAM: 1.8 CM
ECHO LVOT MEAN GRADIENT: 1 MMHG
ECHO LVOT PEAK GRADIENT: 2 MMHG
ECHO LVOT PEAK VELOCITY: 0.8 M/S
ECHO LVOT STROKE VOLUME INDEX: 20 ML/M2
ECHO LVOT SV: 38.2 ML
ECHO LVOT VTI: 15 CM
ECHO MV A VELOCITY: 0.9 M/S
ECHO MV E DECELERATION TIME (DT): 188.1 MS
ECHO MV E VELOCITY: 0.92 M/S
ECHO MV E/A RATIO: 1.02
ECHO MV E/E' LATERAL: 18.4
ECHO MV E/E' RATIO (AVERAGED): 24.53
ECHO MV E/E' SEPTAL: 30.67
ECHO MV REGURGITANT PEAK GRADIENT: 104 MMHG
ECHO MV REGURGITANT PEAK VELOCITY: 5.1 M/S
ECHO PV MAX VELOCITY: 0.8 M/S
ECHO PV PEAK GRADIENT: 2 MMHG
ECHO RV FREE WALL PEAK S': 5 CM/S
ECHO RV INTERNAL DIMENSION: 4 CM
ECHO RV TAPSE: 0.9 CM (ref 1.7–?)
ECHO TV REGURGITANT MAX VELOCITY: 2.28 M/S
ECHO TV REGURGITANT PEAK GRADIENT: 21 MMHG
EOSINOPHIL # BLD: 0.2 K/UL (ref 0–0.4)
EOSINOPHIL NFR BLD: 3 % (ref 0–7)
ERYTHROCYTE [DISTWIDTH] IN BLOOD BY AUTOMATED COUNT: 14.5 % (ref 11.5–14.5)
GLOBULIN SER CALC-MCNC: 3 G/DL (ref 2–4)
GLUCOSE BLD STRIP.AUTO-MCNC: 98 MG/DL (ref 65–117)
GLUCOSE SERPL-MCNC: 136 MG/DL (ref 65–100)
HCT VFR BLD AUTO: 44.5 % (ref 36.6–50.3)
HGB BLD-MCNC: 14.3 G/DL (ref 12.1–17)
IMM GRANULOCYTES # BLD AUTO: 0 K/UL (ref 0–0.04)
IMM GRANULOCYTES NFR BLD AUTO: 0 % (ref 0–0.5)
LYMPHOCYTES # BLD: 1.3 K/UL (ref 0.8–3.5)
LYMPHOCYTES NFR BLD: 17 % (ref 12–49)
MCH RBC QN AUTO: 30.4 PG (ref 26–34)
MCHC RBC AUTO-ENTMCNC: 32.1 G/DL (ref 30–36.5)
MCV RBC AUTO: 94.5 FL (ref 80–99)
MONOCYTES # BLD: 0.9 K/UL (ref 0–1)
MONOCYTES NFR BLD: 12 % (ref 5–13)
NEUTS SEG # BLD: 4.9 K/UL (ref 1.8–8)
NEUTS SEG NFR BLD: 67 % (ref 32–75)
NRBC # BLD: 0 K/UL (ref 0–0.01)
NRBC BLD-RTO: 0 PER 100 WBC
PLATELET # BLD AUTO: 187 K/UL (ref 150–400)
PMV BLD AUTO: 12.1 FL (ref 8.9–12.9)
POTASSIUM SERPL-SCNC: 5.4 MMOL/L (ref 3.5–5.1)
PROT SERPL-MCNC: 6.2 G/DL (ref 6.4–8.2)
RBC # BLD AUTO: 4.71 M/UL (ref 4.1–5.7)
SAMPLES BEING HELD,HOLD: NORMAL
SERVICE CMNT-IMP: NORMAL
SODIUM SERPL-SCNC: 138 MMOL/L (ref 136–145)
TROPONIN I SERPL HS-MCNC: 36 NG/L (ref 0–76)
WBC # BLD AUTO: 7.4 K/UL (ref 4.1–11.1)

## 2023-04-01 PROCEDURE — 96365 THER/PROPH/DIAG IV INF INIT: CPT

## 2023-04-01 PROCEDURE — 70450 CT HEAD/BRAIN W/O DYE: CPT

## 2023-04-01 PROCEDURE — 93971 EXTREMITY STUDY: CPT

## 2023-04-01 PROCEDURE — 83880 ASSAY OF NATRIURETIC PEPTIDE: CPT

## 2023-04-01 PROCEDURE — 65610000006 HC RM INTENSIVE CARE

## 2023-04-01 PROCEDURE — 74011250636 HC RX REV CODE- 250/636: Performed by: STUDENT IN AN ORGANIZED HEALTH CARE EDUCATION/TRAINING PROGRAM

## 2023-04-01 PROCEDURE — 74011000250 HC RX REV CODE- 250: Performed by: STUDENT IN AN ORGANIZED HEALTH CARE EDUCATION/TRAINING PROGRAM

## 2023-04-01 PROCEDURE — 99285 EMERGENCY DEPT VISIT HI MDM: CPT

## 2023-04-01 PROCEDURE — 80053 COMPREHEN METABOLIC PANEL: CPT

## 2023-04-01 PROCEDURE — 93306 TTE W/DOPPLER COMPLETE: CPT | Performed by: SPECIALIST

## 2023-04-01 PROCEDURE — 93306 TTE W/DOPPLER COMPLETE: CPT

## 2023-04-01 PROCEDURE — 74011000250 HC RX REV CODE- 250: Performed by: HOSPITALIST

## 2023-04-01 PROCEDURE — 82962 GLUCOSE BLOOD TEST: CPT

## 2023-04-01 PROCEDURE — 99223 1ST HOSP IP/OBS HIGH 75: CPT | Performed by: SPECIALIST

## 2023-04-01 PROCEDURE — 85025 COMPLETE CBC W/AUTO DIFF WBC: CPT

## 2023-04-01 PROCEDURE — 94762 N-INVAS EAR/PLS OXIMTRY CONT: CPT

## 2023-04-01 PROCEDURE — 84484 ASSAY OF TROPONIN QUANT: CPT

## 2023-04-01 PROCEDURE — 71045 X-RAY EXAM CHEST 1 VIEW: CPT

## 2023-04-01 PROCEDURE — 93005 ELECTROCARDIOGRAM TRACING: CPT

## 2023-04-01 PROCEDURE — 36415 COLL VENOUS BLD VENIPUNCTURE: CPT

## 2023-04-01 RX ORDER — POLYETHYLENE GLYCOL 3350 17 G/17G
17 POWDER, FOR SOLUTION ORAL DAILY PRN
Status: DISCONTINUED | OUTPATIENT
Start: 2023-04-01 | End: 2023-04-02 | Stop reason: HOSPADM

## 2023-04-01 RX ORDER — ENOXAPARIN SODIUM 100 MG/ML
40 INJECTION SUBCUTANEOUS DAILY
Status: DISCONTINUED | OUTPATIENT
Start: 2023-04-02 | End: 2023-04-02 | Stop reason: HOSPADM

## 2023-04-01 RX ORDER — ACETAMINOPHEN 325 MG/1
650 TABLET ORAL
Status: DISCONTINUED | OUTPATIENT
Start: 2023-04-01 | End: 2023-04-02 | Stop reason: HOSPADM

## 2023-04-01 RX ORDER — SODIUM CHLORIDE 0.9 % (FLUSH) 0.9 %
5-40 SYRINGE (ML) INJECTION EVERY 8 HOURS
Status: DISCONTINUED | OUTPATIENT
Start: 2023-04-01 | End: 2023-04-02 | Stop reason: HOSPADM

## 2023-04-01 RX ORDER — CARVEDILOL 6.25 MG/1
6.25 TABLET ORAL 2 TIMES DAILY WITH MEALS
Status: DISCONTINUED | OUTPATIENT
Start: 2023-04-02 | End: 2023-04-02 | Stop reason: HOSPADM

## 2023-04-01 RX ORDER — SODIUM CHLORIDE 0.9 % (FLUSH) 0.9 %
5-40 SYRINGE (ML) INJECTION AS NEEDED
Status: DISCONTINUED | OUTPATIENT
Start: 2023-04-01 | End: 2023-04-02 | Stop reason: HOSPADM

## 2023-04-01 RX ORDER — GUAIFENESIN 100 MG/5ML
81 LIQUID (ML) ORAL DAILY
Status: DISCONTINUED | OUTPATIENT
Start: 2023-04-02 | End: 2023-04-02 | Stop reason: HOSPADM

## 2023-04-01 RX ORDER — ACETAMINOPHEN 650 MG/1
650 SUPPOSITORY RECTAL
Status: DISCONTINUED | OUTPATIENT
Start: 2023-04-01 | End: 2023-04-02 | Stop reason: HOSPADM

## 2023-04-01 RX ORDER — ONDANSETRON 2 MG/ML
4 INJECTION INTRAMUSCULAR; INTRAVENOUS
Status: DISCONTINUED | OUTPATIENT
Start: 2023-04-01 | End: 2023-04-02 | Stop reason: HOSPADM

## 2023-04-01 RX ORDER — ATORVASTATIN CALCIUM 20 MG/1
40 TABLET, FILM COATED ORAL DAILY
Status: DISCONTINUED | OUTPATIENT
Start: 2023-04-02 | End: 2023-04-02 | Stop reason: HOSPADM

## 2023-04-01 RX ORDER — NOREPINEPHRINE BITARTRATE/D5W 8 MG/250ML
.5-16 PLASTIC BAG, INJECTION (ML) INTRAVENOUS
Status: DISCONTINUED | OUTPATIENT
Start: 2023-04-01 | End: 2023-04-02

## 2023-04-01 RX ORDER — ONDANSETRON 4 MG/1
4 TABLET, ORALLY DISINTEGRATING ORAL
Status: DISCONTINUED | OUTPATIENT
Start: 2023-04-01 | End: 2023-04-02 | Stop reason: HOSPADM

## 2023-04-01 RX ADMIN — SODIUM CHLORIDE, PRESERVATIVE FREE 10 ML: 5 INJECTION INTRAVENOUS at 21:44

## 2023-04-01 RX ADMIN — SODIUM CHLORIDE 1000 ML: 9 INJECTION, SOLUTION INTRAVENOUS at 14:49

## 2023-04-01 RX ADMIN — Medication 4 MCG/MIN: at 14:45

## 2023-04-01 RX ADMIN — SODIUM CHLORIDE 1000 ML: 9 INJECTION, SOLUTION INTRAVENOUS at 14:30

## 2023-04-01 NOTE — PROGRESS NOTES
Called to ED for possible intubation. Upon arrival in room patient alert and oriented, saturation 95% on room air. Patient placed on nasal cannula for diaphoresis. Intubation on hold.

## 2023-04-01 NOTE — ED NOTES
Patient noted to be alert and oriented x 3 on this assessment, titrating off Levophed, BP stabilized, noted to be on cardiac monitoring continuously, Echo and Venous Doppler completed.

## 2023-04-01 NOTE — ED NOTES
Verbal and Bedside shift change report given to Nelly Fernández (oncoming nurse) by Aislinn Seaman (offgoing nurse). Report included the following information SBAR, Kardex, ED Summary, Intake/Output, MAR and Recent Results.

## 2023-04-01 NOTE — CONSULTS
[facility] [place]  Cardiology Care Note                  [x]Initial visit     []Established visit     Kobi Frost MD,  [address], [place], [address] [facility]  Phone [phone]; Fax [phone]  Mobile [phone]   Voice Mail [phone]      Patient Name: Shlomo Haines - GYR:3/36/2016 - NT  Primary Cardiologist: Trey Norman MD  Consulting Cardiologist: Tyra Damian. Tiana Frost MD   2023  2:10 PM    Reason for initial visit: Dizziness and nausea diaphoretic and slow speech     HPI:     Mr. Marshal Shrestha is a 75-year-old gentleman who was seen in the emergency room for dizziness. In the lobby it was noted that he had a syncopal episode. He was hypotensive with some slurred speech at the time. His initial blood pressure was 60/30 and IV bolus of 2 L of saline was administered. ECG showed some flipped T waves in 1 and aVL. He was seen by my partner on 3/9/2023 Dr. Damaso Moser for CHF with bilateral lower extremity edema and elevated proBNP of 9299. At that time he ran out of his heart medicines and did not get them refilled. His EF at that time was 20-25%. he has a past medical history significant for CAD status post anterior ST elevation MI in 3/22 subsequently requiring CABG x3 with LIMA to the LAD saphenous vein graft to the PDA and saphenous vein graft to the ramus. He also has a history of cardiomyopathy EF 35-40%, hypertension. He was taking Coreg 6.25 twice daily, Lasix 20 mg a day sacubitril--valsartan 24 mg / 26 mg and spironolactone 25 mg. BUN was 31 and creatinine is 1.69, proBNP was 2696 down from 5273 when admitted on 3/10/2023 and troponins were 36. CT of head was negative. Chest x-ray mild edema resolution of pleural effusions. His cardiologist is Dr. Matt Zavala who he last saw on 3/17/2023.       He is a poor historian and states he was coming to the hospital not to be seen in the emergency room but to have his medicines adjusted in our office. I asked him did he know it was Saturday and he said he did and did not did not think that the office was closed. He also was concerned about the side effects of his medicines. This makes me somewhat apprehensive regarding my confidence that he is taking his medicines correctly. His symptoms did resolve in the emergency room and his blood pressure has stabilized. SUBJECTIVE:       Cardiac risk factors: dyslipidemia, sedentary life style, male gender, hypertension. Assessment and Plan     1) hypotension  -Blood pressure has improved with fluid bolus there was an improvement in his creatinine from 1.69-1.28 now with fluid boluses and a decline in his BUN. 2) CAD status post CABG approximately 1 year ago 3 vessels 2022  3) history of anterior MI  -His troponins on this admission were within normal limits  4) ischemic cardiomyopathy  -He is clearly not in heart failure at this time although he has a slight bump in his proBNP of 2696 has been as high as 9299 in 2023  -He states he is taking his Coreg at 6.25 mg twice a day and Lasix 20 mg a day and the Entresto 24 mg / 26 mg as well as spironolactone 25 mg.  5) dyslipidemia  -His last LDL was noted to be 138 and is not at goal  -LDL should be under 70 and it is listed that he is taking atorvastatin at 40 mg a day which makes me concerned that he may not be taking this medicine. 6) PAT    It would be nice at his discharge to have a home health nurse follow him up and ensure that he is taking his medicines appropriately. ____________________________________________________________    Cardiac work up to date:  No specialty comments available. Most recent HS troponins:  Recent Labs     23  1512   TROPHS 36     EC2023-ECG shows sinus rhythm with a rate of 60 with left axis deviation and LVH with inferior infarct and anterolateral infarct.   Review of Systems    [x]All other systems reviewed and all negative except as written in HPI    [] Patient unable to provide secondary to condition         Past Medical History:   Diagnosis Date    Hypertension     Ill-defined condition     hypothyroid    S/P triple vessel bypass      Past Surgical History:   Procedure Laterality Date    HX APPENDECTOMY       Social Hx:  reports that he has quit smoking. His smoking use included cigarettes. He has never used smokeless tobacco. He reports current alcohol use of about 1.0 standard drink per week. He reports that he does not use drugs. Family Hx: Family history is unknown by patient. No Known Allergies         Prior to Admission Medications   Prescriptions Last Dose Informant Patient Reported? Taking?   aspirin 81 mg chewable tablet   No No   Sig: Take 1 Tablet by mouth daily. atorvastatin (LIPITOR) 40 mg tablet   No No   Sig: Take 1 Tablet by mouth daily. carvediloL (COREG) 6.25 mg tablet   No No   Sig: Take 1 Tablet by mouth two (2) times daily (with meals). furosemide (LASIX) 20 mg tablet   No No   Sig: Take 1 Tablet by mouth daily. sacubitril-valsartan (ENTRESTO) 24 mg/26 mg tablet   No No   Sig: Take 1 Tablet by mouth every twelve (12) hours. spironolactone (ALDACTONE) 25 mg tablet   No No   Sig: Take 1 Tablet by mouth daily.       Facility-Administered Medications: None         OBJECTIVE:  Wt Readings from Last 3 Encounters:   04/01/23 154 lb 15.7 oz (70.3 kg)   03/17/23 155 lb (70.3 kg)   03/09/23 169 lb (76.7 kg)       Intake/Output Summary (Last 24 hours) at 4/1/2023 1859  Last data filed at 4/1/2023 1602  Gross per 24 hour   Intake 2008.74 ml   Output --   Net 2008.74 ml           Physical Exam      Vitals:   Vitals:    04/01/23 1745 04/01/23 1755 04/01/23 1800 04/01/23 1815   BP: (!) 136/94 132/85 119/79 119/77   Pulse: 79 78 76 73   Resp: 29 22 13 20   Temp:       SpO2: 98% 100% 99% 98%   Weight:       Height:             General:    Alert, cooperative, no distress, is alert to location and date   Neck:   Supple, no carotid bruit and no JVD. Back:   Clear to auscultation anteriorly   Heart[de-identified]    Regular rate and rhythm, S1, S2 normal, no murmur, click, rub or gallop. Abdomen:     Soft, non-tender. Bowel sounds normal.    Extremities:   Extremities normal, atraumatic, no cyanosis or edema. Skin:   Skin color normal. No rashes or lesions on visible areas   Neurologic:   Alert, Moves all extremities. Data Review:     Radiology:   XR Results (most recent):  Results from Hospital Encounter encounter on 04/01/23    XR CHEST PORT    Narrative  EXAM:  XR CHEST PORT    INDICATION: CVA    COMPARISON: 3/8/2023    TECHNIQUE: portable chest AP view    FINDINGS: The patient is status post median sternotomy and CABG. The heart is  mildly enlarged, but unchanged. There is mild edema. The pulmonary vasculature  is within normal limits. The lungs and pleural spaces are clear. Severe degenerative changes are seen in  the bilateral shoulders. Impression  Unchanged cardiomegaly and mild edema. Interval resolution of previously seen  pleural effusions. CT Results (most recent):  Results from Hospital Encounter encounter on 04/01/23    CT CODE NEURO HEAD WO CONTRAST    Narrative  EXAM: CT CODE NEURO HEAD WO CONTRAST    INDICATION: Code Stroke    COMPARISON: 674. CONTRAST: None. TECHNIQUE: Unenhanced CT of the head was performed using 5 mm images. Brain and  bone windows were generated. Coronal and sagittal reformats. CT dose reduction  was achieved through use of a standardized protocol tailored for this  examination and automatic exposure control for dose modulation. FINDINGS:  There is mild atrophy and compensatory dilatation of ventricles. . There is  moderate white matter disease include chronic small vessel ischemic disease. .  There is no intracranial hemorrhage, extra-axial collection, or mass effect. The  basilar cisterns are open.  No CT evidence of acute infarct. The bone windows demonstrate no abnormalities. The visualized portions of the  paranasal sinuses and mastoid air cells are clear. The patient is status post  right lens surgery. Impression  No evidence of acute process. MRI Results (most recent):  Results from [facility] encounter on 07/20/09    MRI BRAIN W/O CONT    Narrative  Final Report      ICD Codes / Adm. Diagnosis:    /   FEVER CONFUSION  Examination:  BRAIN WO CON  - [id number] - Jul 22 2009  1:55PM  Accession No:  [id number]  Reason:  CONFUSION    REPORT:  INDICATION:  See above    COMPARISON:  07/20/2009 head CT    TECHNIQUE:  MR imaging of the brain was performed with sagittal T1, axial  T1, T2, FLAIR, GRE, DWI/ADC, coronal T2.    FINDINGS:    The ventricles are midline without hydrocephalus. There is no acute intra  or extra-axial fluid collection. There is mild chronic microvascular  ischemic disease within the periventricular and subcortical regions of the  bilateral frontoparietal regions. There is no acute infarction. The major  intracranial vascular flow-voids are patent. IMPRESSION:  Mild chronic microvascular ischemic disease with no acute infarction. Interpreting/Reading Doctor: Jia Murry ([id number])  Transcribed:  on 07/22/2009  Approved: Jia Murry ([id number])  07/22/2009        Distribution:  Attending Doctor: Nicolette Harris  Alternate Doctor: Nicolette Harris        No results for input(s): CPK, TROIQ in the last 72 hours. No lab exists for component: CKQMB, CPKMB, BMPP  Recent Labs     04/01/23  1512      K 5.4*   *   CO2 27   BUN 31*   CREA 1.69*   *   CA 8.7     Recent Labs     04/01/23  1512   WBC 7.4   HGB 14.3   HCT 44.5        Recent Labs     04/01/23  1512   *     No results for input(s): CHOL, LDLC in the last 72 hours. No lab exists for component: TGL, HDLC,  HBA1C  No results for input(s): CRP, TSH, TSHEXT in the last 72 hours.     No lab exists for component: ESR        Current meds:    Current Facility-Administered Medications:     NOREPINephrine (LEVOPHED) 8 mg in 5% dextrose 250mL (32 mcg/mL) infusion, 0.5-16 mcg/min, IntraVENous, TITRATE, Cassia DO Peng, Stopped at 04/01/23 1602    Current Outpatient Medications:     aspirin 81 mg chewable tablet, Take 1 Tablet by mouth daily. , Disp: 90 Tablet, Rfl: 3    atorvastatin (LIPITOR) 40 mg tablet, Take 1 Tablet by mouth daily. , Disp: 90 Tablet, Rfl: 3    carvediloL (COREG) 6.25 mg tablet, Take 1 Tablet by mouth two (2) times daily (with meals). , Disp: 180 Tablet, Rfl: 3    furosemide (LASIX) 20 mg tablet, Take 1 Tablet by mouth daily. , Disp: 90 Tablet, Rfl: 3    sacubitril-valsartan (ENTRESTO) 24 mg/26 mg tablet, Take 1 Tablet by mouth every twelve (12) hours. , Disp: 180 Tablet, Rfl: 3    spironolactone (ALDACTONE) 25 mg tablet, Take 1 Tablet by mouth daily. , Disp: 90 Tablet, Rfl: Shefali Escalona MD  [facility] [address] [address]  [facility]  [phone]      I have discussed the diagnosis with the patient and the intended plan as seen in the above orders. Questions were answered concerning future plans. I have discussed medication side effects and warnings with the patient as well. Paige Coy is in agreement to the plan listed above and wishes to proceed. he  was instructed not to smoke, eat heart healthy diet  and to exercise. Thank you for the consult and the opportunity to be involved in the care of Paige Coy. Karmen Walker MD    ATTENTION:   This medical record was transcribed using an electronic medical records/speech recognition system. Although proofread, it may and can contain electronic, spelling and other errors. Corrections may be executed at a later time. Please feel free to contact us for any clarifications as needed.

## 2023-04-01 NOTE — H&P
[facility]  [address] [facility] [address]  [phone]    Hospitalist Admission Note      NAME:  Angy Young   :   1945   MRN:  [id number]     PCP:  Allyssa Reynaga MD     Date/Time of service:  2023 6:04 PM          Subjective:     CHIEF COMPLAINT: Dizziness and nausea    HISTORY OF PRESENT ILLNESS:     Mr. Shawna Nolan is a 76 y.o.  male who presented to the Emergency Department complaining of dizziness and not feeling well. Patient is alert awake oriented x3. He told me that he is looking for a job and he used to be a  in the past.  He told me that he was not feeling well and he drove to the ER to just check that if he is taking the right medications. He brought all the medication levels with him. While he was sitting in the waiting room he felt really bad and had a syncopal episode. He was brought to the ED room and his blood pressure was low at that time. He also had a slurred speech. He was diaphoretic and had some nausea. He did not look well. His blood pressure was 60/30. He was given 2 L of IV fluid bolus and he was started on Levophed. He had a bedside ultrasound done which showed ejection fraction of 10 to 15%. After fluid boluses patient had an formal echocardiogram done and it was reviewed by the cardiologist.  He became more lucid after fluid boluses and being on Levophed. Tele neuro was also consulted. ER doctor talked to cardiology on-call and he was told that his ejection fraction actually is better than what it looked like on bedside ultrasound. Patient denies any nausea vomiting diarrhea constipation. No fever or chills. Patient denies any chest pain. He denies any focal weakness or numbness.       Past Medical History:   Diagnosis Date    Hypertension     Ill-defined condition     hypothyroid    S/P triple vessel bypass         Past Surgical History:   Procedure Laterality Date    HX APPENDECTOMY         Social History     Tobacco Use    Smoking status: Former     Types: Cigarettes    Smokeless tobacco: Never   Substance Use Topics    Alcohol use: Yes     Alcohol/week: 1.0 standard drink     Types: 1 Shots of liquor per week        Family History   Family history unknown: Yes          No Known Allergies     Prior to Admission medications    Medication Sig Start Date End Date Taking? Authorizing Provider   aspirin 81 mg chewable tablet Take 1 Tablet by mouth daily. 3/17/23   Yulia Damon MD   atorvastatin (LIPITOR) 40 mg tablet Take 1 Tablet by mouth daily. 3/17/23   Yulia Damon MD   carvediloL (COREG) 6.25 mg tablet Take 1 Tablet by mouth two (2) times daily (with meals). 3/17/23   Yulia Damon MD   furosemide (LASIX) 20 mg tablet Take 1 Tablet by mouth daily. 3/17/23   Yulia Damon MD   sacubitril-valsartan (ENTRESTO) 24 mg/26 mg tablet Take 1 Tablet by mouth every twelve (12) hours. 3/17/23   Yulia Damon MD   spironolactone (ALDACTONE) 25 mg tablet Take 1 Tablet by mouth daily.  3/17/23   Yulia Damon MD       Review of Systems:  (bold if positive, if negative)    Gen:  Eyes:  ENT:  CVS:  Pulm:  GI:  :  MS:  Skin:  Psych:  Endo:  Hem:  Renal:  Neuro:        Objective:      VITALS:    Vital signs reviewed; most recent are:    Visit Vitals  BP (!) 130/100   Pulse 77   Temp 97.8 °F (36.6 °C)   Resp 22   Ht 6' (1.829 m)   Wt 70.3 kg (154 lb 15.7 oz)   SpO2 100%   BMI 21.02 kg/m²     SpO2 Readings from Last 6 Encounters:   04/01/23 100%   03/17/23 98%   03/10/23 94%   02/14/23 98%   05/09/22 96%   04/20/22 (P) 94%    O2 Flow Rate (L/min): 3 l/min     Intake/Output Summary (Last 24 hours) at 4/1/2023 2001  Last data filed at 4/1/2023 1602  Gross per 24 hour   Intake 2008.74 ml   Output --   Net 2008.74 ml        Exam:     Physical Exam:    Gen:  Well-developed, well-nourished, in no acute distress  HEENT:  Pink conjunctivae, PERRL, hearing intact to voice, moist mucous membranes  Neck:  Supple, without masses, thyroid non-tender  Resp:  No accessory muscle use, clear breath sounds without wheezes rales or rhonchi  Card: Q6-E5 normal, systolic murmur positive  Abd:  Soft, non-tender, non-distended, normoactive bowel sounds are present, no mass  Lymph:  No cervical or inguinal adenopathy  Musc:  No cyanosis or clubbing  Skin:  No rashes or ulcers, skin turgor is good  Neuro:  Cranial nerves are grossly intact, no focal motor weakness, follows commands appropriately  Psych:  Good insight, oriented to person, place and time, alert     Labs:    Recent Labs     04/01/23  1512   WBC 7.4   HGB 14.3   HCT 44.5        Recent Labs     04/01/23  1512      K 5.4*   *   CO2 27   *   BUN 31*   CREA 1.69*   CA 8.7   ALB 3.2*   TBILI 0.6   ALT 31     Lab Results   Component Value Date/Time    Glucose (POC) 98 04/01/2023 03:14 PM    Glucose (POC) 108 04/02/2022 09:13 PM     No results for input(s): PH, PCO2, PO2, HCO3, FIO2 in the last 72 hours. No results for input(s): INR, INREXT, INREXT in the last 72 hours. All Micro Results       None            I have reviewed previous records       Assessment and Plan:      Principal Problem:    Syncope (4/1/2023)    Active Problems:    CAD (coronary artery disease) (3/29/2022)      S/P CABG x 3 (3/30/2022)      Overview: On pump CORONARY ARTERY BYPASS GRAFT (CABG) X3, LIMA to LAD, RSVH to PDA,       RSVG to Ramus      R GSV EVH            Systolic CHF, acute on chronic ([facility] [address] (3/10/2023)      Hyperkalemia (4/1/2023)       Plan:    1. Syncope and collapse-due to hypotension. He is little dehydrated his creatinine is 1.69. His creatinine was 1.22 on 3/10/2023.    2.  Acute on chronic CKD stage III-gentle hydration. 3.  Chronic systolic congestive heart failure NYHA III-his echocardiogram showed an ejection fraction of 30 to 35%. Patient is on Lipitor, Coreg, Lasix, Entresto and Aldactone at home.     4. Hyperlipidemia-continue statins. 5.  Hyperkalemia-hold Entresto and spironolactone. Holding Lasix for now. 6.  Hypotension-I think all his symptoms were related to high potassium levels. He luckily drove to the ER on his own and syncopized in the ER lobby. When the patient arrived to the ER his potassium was probably higher than 5.4 (because he was given fluid boluses prior to getting the blood draw) which caused his blood pressure to go low, he was bradycardic as well with a heart rate of 51 and his bedside ultrasound also showed low ejection fraction. But the echo done after fluid boluses showed higher ejection fraction around 30%. He is off Levophed at this time. His troponin are negative. I will trend the troponin still. 7.  Moderate aortic stenosis     8. Hypothyroidism-history of. He is not on any medications at this time. His TSH was normal.      9.  Coronary artery disease-patient had a CABG done in the past.  Patient had a cardiac cath done on 3/10/2023 and did not get any stents put in. Cardiac cath report is below:    Findings:  1) Normal LVEDP  2) severe native three-vessel coronary artery disease  3) patent LIMA to LAD, vein graft to obtuse marginal, vein graft to right PDA  4) normal SVR and PVR. Cardiac index of 2.2 L/min/m²     Recommendations  1)GDMT for heart failure with reduced ejection fraction. 2) initiate/optimize GDMT. Can be discharged later. Volume status appears to be optimized. 10.  DVT prophylaxis-local heparin subcu. Patient is a full code. Telemetry reviewed:   normal sinus rhythm    Risk of deterioration: high      Total time spent with patient: 79 Minutes I personally reviewed chart, notes, data and current medications in the medical record. I have personally examined and treated the patient at bedside during this period.  To assist coordination of care and communication with nursing and staff, this note may be preliminary early in the day, but finalized by end of the day.                  Care Plan discussed with: Patient    Discussed:  Code Status and Care Plan       ___________________________________________________    Attending Physician: Nanci Collins MD

## 2023-04-01 NOTE — ED TRIAGE NOTES
Patient arrives to the ED via POV with complaints of dizziness and nausea that started PTA. Patient originally came to the ER for medication change. Patient diaphoretic with slow speech. MD called to bedside.

## 2023-04-01 NOTE — ED PROVIDER NOTES
38-year-old male presents ED for evaluation of dizziness and not feeling well. Presented to the front lobby and felt unwell about passed out. Patient sat in the chair and was had a syncopal episode. Was brought back to ED room immediately. Was found to be hypotensive was having some slurred speech. Full HPI and review of systems were not able to be completely evaluated given the acuity of the patient's symptoms. Nausea     Dizziness  Associated symptoms include nausea. Pertinent negatives include no shortness of breath and no chest pain. Past Medical History:   Diagnosis Date    Hypertension     Ill-defined condition     hypothyroid    S/P triple vessel bypass        Past Surgical History:   Procedure Laterality Date    HX APPENDECTOMY           Family History:   Family history unknown: Yes       Social History     Socioeconomic History    Marital status:      Spouse name: Not on file    Number of children: Not on file    Years of education: Not on file    Highest education level: Not on file   Occupational History    Not on file   Tobacco Use    Smoking status: Former     Types: Cigarettes    Smokeless tobacco: Never   Substance and Sexual Activity    Alcohol use: Yes     Alcohol/week: 1.0 standard drink     Types: 1 Shots of liquor per week    Drug use: No    Sexual activity: Not on file   Other Topics Concern    Not on file   Social History Narrative    Not on file     Social Determinants of Health     Financial Resource Strain: Not on file   Food Insecurity: Not on file   Transportation Needs: Not on file   Physical Activity: Not on file   Stress: Not on file   Social Connections: Not on file   Intimate Partner Violence: Not on file   Housing Stability: Not on file         ALLERGIES: Patient has no known allergies. Review of Systems   Constitutional:  Positive for diaphoresis. Respiratory:  Negative for shortness of breath. Cardiovascular:  Negative for chest pain. Gastrointestinal:  Positive for nausea. Neurological:  Positive for dizziness. Vitals:    04/01/23 1625 04/01/23 1630 04/01/23 1634 04/01/23 1700   BP: 111/75 122/81  91/66   Pulse: 74 70  70   Resp: 17 14  19   Temp:  97.8 °F (36.6 °C)     SpO2: 100% 100% 100% 96%   Weight:       Height:                Physical Exam  Vitals and nursing note reviewed. Constitutional:       Appearance: He is ill-appearing and diaphoretic. HENT:      Head: Normocephalic. Mouth/Throat:      Mouth: Mucous membranes are moist.   Cardiovascular:      Rate and Rhythm: Regular rhythm. Tachycardia present. Pulses: Normal pulses. Heart sounds: Normal heart sounds. Pulmonary:      Effort: Pulmonary effort is normal.      Breath sounds: Normal breath sounds. Abdominal:      General: Abdomen is flat. Bowel sounds are normal.      Palpations: Abdomen is soft. Neurological:      Mental Status: He is lethargic. Cranial Nerves: Dysarthria present. No facial asymmetry. Sensory: No sensory deficit. Motor: No weakness. Comments: Alert, moving all extremities        Medical Decision Making  Differential diagnoses includes stroke, ACS, exacerbation of heart failure, electrolyte abnormality, sepsis. Amount and/or Complexity of Data Reviewed  Labs: ordered. Radiology: ordered. ECG/medicine tests: ordered. Risk  Prescription drug management. Decision regarding hospitalization. ED Course as of 04/01/23 1709   Sat Apr 01, 2023   1445 Patient hypotensive 60/30. Have started IV fluid bolus 2 L of saline, starting vasopressors. Bedside ultrasound shows decreased EF around 10 to 15% with some left ventricular wall abnormality. ECG shows some flipped T waves in 1 aVL and lateral leads suggestive of cardiac ischemia. Spoke to teleneurology regarding patient's symptoms.   Attempting to contact cardiology. [WG]   1451 ECG at 1426 shows flipped T waves in 1 aVL, rate of 60, no STEMI but concerning for ischemia. [WG]   1452 ECG performed at 1426 shows sinus rhythm, rate of 60, again flipped T waves in 1 aVL, does not meet STEMI criteria. [WG]   Jasmyn 52 Spoke to Dr BLAIR [facility] cardiology regarding patient. Discussed patient's presentation, recent discharge summary. He is requested a formal echo be performed and to call back with any abnormal results. [WG]   1515 Patient is more lucid now, blood pressure 112/75 on Levophed. Patient now reports having some pain in his right medial calf. Will do ultrasound DVT. Echo tech at bedside [WG]   1521 Reviewed patient's chart. Patient has left and right heart cath performed on 3/10/2023 showing severe native 3 vessel disease, significant for GDMT for heart failure with reduced ejection fraction, I do not see in his notes where he had any stents placed or had a CABG. [WG]   1536 Spoke to Dr Micha Alcazar, patient currently getting bedside echo. Will call her to evaluate when stable to assess. [phone] [WG]   7154 Have called, teleneuro to see patient, now back from CT and currently stable. [WG]   56 Spoke to Dr Yessi Freeman regarding patient he has looked the patient's medical records and patient vital signs currently stable. Reports that patient has increased ejection fraction better than it was previously on echo. With admission will see in consultation. [WG]      ED Course User Index  [WG] Julian Beasley, DO       Procedures      Perfect Serve Consult for Admission  4:56 PM    ED Room Number: ER10/10  Patient Name and age:  Fern De 76 y.o.  male  Working Diagnosis:   1. Cardiogenic shock ([address]    2. Syncope and collapse        COVID-19 Suspicion:  no  Sepsis present:  no  Reassessment needed: no  Code Status:  Full Code  Readmission: no  Isolation Requirements:  no  Recommended Level of Care:  ICU  Department:[facility] ED - [phone]  Other:   This is a 64-year-old male who presents to ED from waiting room for dizziness and nausea starting prior to arrival.  Patient was brought back to room was found to be severely hypotensive diaphoretic and with slurred speech. Additionally was activated as a stroke alert, IV access was obtained and patient's blood pressure was found to be 60/42.  2 large-bore IVs were started and to boluses of normal saline 1 L were started. Patient was started on peripheral Levophed. I called and spoke to cardiologist who is requested formal echo which has been completed reports not back yet. But bedside echo EF appeared to be 15 to 20% per POCUS. Patient is currently being weaned on Levophed and blood pressure stable right now 122/81. Patient is a lot more alert now. Patient with labs showed an elevated BNP which is chronic, cardiac troponin 36. This is down from previous in March where it was 101. Patient had some concerning ECG findings with a flipped T waves in the lateral and high lateral positions concerning for acute ischemia. Patient denied any chest pain although patient appeared to be in cardiogenic shock with hypotension diaphoresis and decreased EF. Patient has some mild renal dysfunction, chest x-ray shows unchanged cardiomegaly and mild edema. Patient was finally stable enough to be was taken to CT suite for CT head imaging. This shows no evidence of acute process. DVT ultrasound was reported done given patient reports some pain in the right extremity after having a cardiac catheterization several weeks ago. There is no DVT or thrombus phlebitis. She does have a vascular mass in the right proximal calf. Patient was finally seen by teleneurology although I do not believe that this is stroke. Patient will require further evaluation management likely for his cardiogenic shock. Sheri Reyes DO has spent 65 minutes of critical care time involved in lab review, consultations with specialist and documentation.   During this entire length of time I was immediately available to the patient. Critical Care: The reason for providing this level of medical care for this critically ill patient was due a critical illness that impaired one or more vital organ systems such that there was a high probability of imminent or life threatening deterioration in the patients condition. This care involved high complexity decision making to assess, manipulate, and support vital system functions, to treat this degreee vital organ system failure and to prevent further life threatening deterioration of the patients condition.

## 2023-04-02 VITALS
WEIGHT: 154.98 LBS | TEMPERATURE: 97.9 F | HEIGHT: 72 IN | SYSTOLIC BLOOD PRESSURE: 140 MMHG | RESPIRATION RATE: 27 BRPM | BODY MASS INDEX: 20.99 KG/M2 | OXYGEN SATURATION: 99 % | HEART RATE: 73 BPM | DIASTOLIC BLOOD PRESSURE: 94 MMHG

## 2023-04-02 LAB
ANION GAP SERPL CALC-SCNC: 2 MMOL/L (ref 5–15)
ATRIAL RATE: 57 BPM
BUN SERPL-MCNC: 28 MG/DL (ref 6–20)
BUN/CREAT SERPL: 22 (ref 12–20)
CALCIUM SERPL-MCNC: 8.3 MG/DL (ref 8.5–10.1)
CALCULATED P AXIS, ECG09: -15 DEGREES
CALCULATED R AXIS, ECG10: -56 DEGREES
CALCULATED T AXIS, ECG11: 104 DEGREES
CHLORIDE SERPL-SCNC: 112 MMOL/L (ref 97–108)
CO2 SERPL-SCNC: 28 MMOL/L (ref 21–32)
CREAT SERPL-MCNC: 1.28 MG/DL (ref 0.7–1.3)
DIAGNOSIS, 93000: NORMAL
ERYTHROCYTE [DISTWIDTH] IN BLOOD BY AUTOMATED COUNT: 14.4 % (ref 11.5–14.5)
GLUCOSE SERPL-MCNC: 128 MG/DL (ref 65–100)
HCT VFR BLD AUTO: 40.7 % (ref 36.6–50.3)
HGB BLD-MCNC: 13.2 G/DL (ref 12.1–17)
MCH RBC QN AUTO: 30.6 PG (ref 26–34)
MCHC RBC AUTO-ENTMCNC: 32.4 G/DL (ref 30–36.5)
MCV RBC AUTO: 94.4 FL (ref 80–99)
NRBC # BLD: 0 K/UL (ref 0–0.01)
NRBC BLD-RTO: 0 PER 100 WBC
P-R INTERVAL, ECG05: 160 MS
PLATELET # BLD AUTO: 147 K/UL (ref 150–400)
PMV BLD AUTO: 11.9 FL (ref 8.9–12.9)
POTASSIUM SERPL-SCNC: 4 MMOL/L (ref 3.5–5.1)
Q-T INTERVAL, ECG07: 466 MS
QRS DURATION, ECG06: 88 MS
QTC CALCULATION (BEZET), ECG08: 453 MS
RBC # BLD AUTO: 4.31 M/UL (ref 4.1–5.7)
SODIUM SERPL-SCNC: 142 MMOL/L (ref 136–145)
TROPONIN I SERPL HS-MCNC: 38 NG/L (ref 0–76)
VENTRICULAR RATE, ECG03: 57 BPM
WBC # BLD AUTO: 7.1 K/UL (ref 4.1–11.1)

## 2023-04-02 PROCEDURE — 74011250636 HC RX REV CODE- 250/636: Performed by: HOSPITALIST

## 2023-04-02 PROCEDURE — 80048 BASIC METABOLIC PNL TOTAL CA: CPT

## 2023-04-02 PROCEDURE — 85027 COMPLETE CBC AUTOMATED: CPT

## 2023-04-02 PROCEDURE — 74011250637 HC RX REV CODE- 250/637: Performed by: HOSPITALIST

## 2023-04-02 PROCEDURE — 99233 SBSQ HOSP IP/OBS HIGH 50: CPT | Performed by: SPECIALIST

## 2023-04-02 PROCEDURE — 84484 ASSAY OF TROPONIN QUANT: CPT

## 2023-04-02 PROCEDURE — 74011000250 HC RX REV CODE- 250: Performed by: HOSPITALIST

## 2023-04-02 PROCEDURE — 74011250637 HC RX REV CODE- 250/637: Performed by: INTERNAL MEDICINE

## 2023-04-02 PROCEDURE — 99223 1ST HOSP IP/OBS HIGH 75: CPT | Performed by: SPECIALIST

## 2023-04-02 PROCEDURE — 36415 COLL VENOUS BLD VENIPUNCTURE: CPT

## 2023-04-02 RX ORDER — FUROSEMIDE 20 MG/1
20 TABLET ORAL AS NEEDED
Qty: 90 TABLET | Refills: 3 | Status: SHIPPED
Start: 2023-04-02 | End: 2023-05-02

## 2023-04-02 RX ADMIN — SODIUM CHLORIDE, PRESERVATIVE FREE 10 ML: 5 INJECTION INTRAVENOUS at 06:12

## 2023-04-02 RX ADMIN — ENOXAPARIN SODIUM 40 MG: 100 INJECTION SUBCUTANEOUS at 09:34

## 2023-04-02 RX ADMIN — CARVEDILOL 6.25 MG: 6.25 TABLET, FILM COATED ORAL at 09:34

## 2023-04-02 RX ADMIN — ATORVASTATIN CALCIUM 40 MG: 20 TABLET, FILM COATED ORAL at 09:34

## 2023-04-02 RX ADMIN — SACUBITRIL AND VALSARTAN 1 TABLET: 24; 26 TABLET, FILM COATED ORAL at 09:34

## 2023-04-02 RX ADMIN — ASPIRIN 81 MG: 81 TABLET, CHEWABLE ORAL at 09:34

## 2023-04-02 NOTE — PROGRESS NOTES
[facility]  Cardiology Care Note                  []Initial visit     [x]Established visit     Kobi Lares MD, [address], [place], [address] [facility]  Phone [phone]; Fax [phone]  Mobile [phone]   Voice Mail [phone]      Patient Name: Lamonte Quintero - QDZ:[birth year] - DAMEON:[id number]  Primary Cardiologist: Dawood Bass MD  Consulting Cardiologist: Neville Howell. Raquel Lares MD   4/1/2023  2:10 PM    Reason for initial visit: Dizziness and nausea diaphoretic and slow speech     HPI:     Mr. Gabriele Griffiths is a 78-year-old gentleman who was seen in the emergency room for dizziness. In the lobby it was noted that he had a syncopal episode. He was hypotensive with some slurred speech at the time. His initial blood pressure was 60/30 and IV bolus of 2 L of saline was administered. ECG showed some flipped T waves in 1 and aVL. He was seen by my partner on 3/9/2023 Dr. Clive Lanes for CHF with bilateral lower extremity edema and elevated proBNP of 9299. At that time he ran out of his heart medicines and did not get them refilled. His EF at that time was 20-25%. he has a past medical history significant for CAD status post anterior ST elevation MI in 3/22 subsequently requiring CABG x3 with LIMA to the LAD saphenous vein graft to the PDA and saphenous vein graft to the ramus. He also has a history of cardiomyopathy EF 35-40%, hypertension. He was taking Coreg 6.25 twice daily, Lasix 20 mg a day sacubitril--valsartan 24 mg / 26 mg and spironolactone 25 mg. BUN was 31 and creatinine is 1.69, proBNP was 2696 down from 5273 when admitted on 3/10/2023 and troponins were 36. CT of head was negative. Chest x-ray mild edema resolution of pleural effusions. His cardiologist is Dr. Jose Bernstein who he last saw on 3/17/2023.       He is a poor historian and states he was coming to the hospital not to be seen in the emergency room but to have his medicines adjusted in our office. I asked him did he know it was Saturday and he said he did and did not did not think that the office was closed. He also was concerned about the side effects of his medicines. This makes me somewhat apprehensive regarding my confidence that he is taking his medicines correctly. His symptoms did resolve in the emergency room and his blood pressure has stabilized. SUBJECTIVE:      He looks great this am. Walking around and ready for home. Cardiac risk factors: dyslipidemia, sedentary life style, male gender, hypertension. Assessment and Plan     1) hypotension  -Blood pressure has improved with fluid bolus there was an improvement in his creatinine from 1.69-1.28 now with fluid boluses and a decline in his BUN. -would give lasix only as needed and have follow up in the office. I have informed my nurse to arrange appointment with Jose Elias Mandujano. 2) CAD status post CABG approximately 1 year ago 3 vessels March 2022  3) history of anterior MI  -His troponins on this admission were within normal limits  4) ischemic cardiomyopathy  -He is clearly not in heart failure at this time although he has a slight bump in his proBNP of 2696 has been as high as 9299 in March 8, 2023  -He states he is taking his Coreg at 6.25 mg twice a day and Lasix 20 mg a day and the Entresto 24 mg / 26 mg as well as spironolactone 25 mg.  5) dyslipidemia  -His last LDL was noted to be 138 and is not at goal  -LDL should be under 70 and it is listed that he is taking atorvastatin at 40 mg a day which makes me concerned that he may not be taking this medicine. 6) PAT    It would be nice at his discharge to have a home health nurse follow him up and ensure that he is taking his medicines appropriately. ____________________________________________________________    Cardiac work up to date:  No specialty comments available.     Most recent HS troponins:  Recent Labs     23  0319 23  1512   TROPHS 38 36     EC2023-ECG shows sinus rhythm with a rate of 60 with left axis deviation and LVH with inferior infarct and anterolateral infarct. Review of Systems    [x]All other systems reviewed and all negative except as written in HPI    [] Patient unable to provide secondary to condition         Past Medical History:   Diagnosis Date    Hypertension     Ill-defined condition     hypothyroid    S/P triple vessel bypass      Past Surgical History:   Procedure Laterality Date    HX APPENDECTOMY       Social Hx:  reports that he has quit smoking. His smoking use included cigarettes. He has never used smokeless tobacco. He reports current alcohol use of about 1.0 standard drink per week. He reports that he does not use drugs. Family Hx: Family history is unknown by patient. No Known Allergies         Prior to Admission Medications   Prescriptions Last Dose Informant Patient Reported? Taking?   aspirin 81 mg chewable tablet   No No   Sig: Take 1 Tablet by mouth daily. atorvastatin (LIPITOR) 40 mg tablet   No No   Sig: Take 1 Tablet by mouth daily. carvediloL (COREG) 6.25 mg tablet   No No   Sig: Take 1 Tablet by mouth two (2) times daily (with meals). furosemide (LASIX) 20 mg tablet   No No   Sig: Take 1 Tablet by mouth daily. sacubitril-valsartan (ENTRESTO) 24 mg/26 mg tablet   No No   Sig: Take 1 Tablet by mouth every twelve (12) hours. spironolactone (ALDACTONE) 25 mg tablet   No No   Sig: Take 1 Tablet by mouth daily.       Facility-Administered Medications: None         OBJECTIVE:  Wt Readings from Last 3 Encounters:   23 154 lb 15.7 oz (70.3 kg)   23 155 lb (70.3 kg)   23 169 lb (76.7 kg)       Intake/Output Summary (Last 24 hours) at 2023 1139  Last data filed at 2023 0400  Gross per 24 hour   Intake 2658.74 ml   Output 1550 ml   Net 1108.74 ml           Physical Exam      Vitals:   Vitals:    23 1392 04/02/23 0803 04/02/23 0902 04/02/23 1002   BP: 119/85 125/73 126/82 122/88   Pulse: 88 79 76 74   Resp: 17 14 19 18   Temp:       SpO2: 99% 97% 96% 99%   Weight:       Height:             General:    Alert, cooperative, no distress, is alert to location and date   Neck:   Supple, no carotid bruit and no JVD. Back:   Clear to auscultation anteriorly   Heart[de-identified]    Regular rate and rhythm, S1, S2 normal, no murmur, click, rub or gallop. Abdomen:     Soft, non-tender. Bowel sounds normal.    Extremities:   Extremities normal, atraumatic, no cyanosis or edema. Skin:   Skin color normal. No rashes or lesions on visible areas   Neurologic:   Alert, Moves all extremities. Data Review:     Radiology:   XR Results (most recent):  Results from Hospital Encounter encounter on 04/01/23    XR CHEST PORT    Narrative  EXAM:  XR CHEST PORT    INDICATION: CVA    COMPARISON: 3/8/2023    TECHNIQUE: portable chest AP view    FINDINGS: The patient is status post median sternotomy and CABG. The heart is  mildly enlarged, but unchanged. There is mild edema. The pulmonary vasculature  is within normal limits. The lungs and pleural spaces are clear. Severe degenerative changes are seen in  the bilateral shoulders. Impression  Unchanged cardiomegaly and mild edema. Interval resolution of previously seen  pleural effusions. CT Results (most recent):  Results from Hospital Encounter encounter on 04/01/23    CT CODE NEURO HEAD WO CONTRAST    Narrative  EXAM: CT CODE NEURO HEAD WO CONTRAST    INDICATION: Code Stroke    COMPARISON: 924. CONTRAST: None. TECHNIQUE: Unenhanced CT of the head was performed using 5 mm images. Brain and  bone windows were generated. Coronal and sagittal reformats. CT dose reduction  was achieved through use of a standardized protocol tailored for this  examination and automatic exposure control for dose modulation.     FINDINGS:  There is mild atrophy and compensatory dilatation of ventricles. . There is  moderate white matter disease include chronic small vessel ischemic disease. .  There is no intracranial hemorrhage, extra-axial collection, or mass effect. The  basilar cisterns are open. No CT evidence of acute infarct. The bone windows demonstrate no abnormalities. The visualized portions of the  paranasal sinuses and mastoid air cells are clear. The patient is status post  right lens surgery. Impression  No evidence of acute process. MRI Results (most recent):  Results from [place] encounter on 07/20/09    MRI BRAIN W/O CONT    Narrative  Final Report      ICD Codes / Adm. Diagnosis:    /   FEVER CONFUSION  Examination:  BRAIN WO CON  - [id number] - Jul 22 2009  1:55PM  Accession No:  [id number]  Reason:  CONFUSION    REPORT:  INDICATION:  See above    COMPARISON:  07/20/2009 head CT    TECHNIQUE:  MR imaging of the brain was performed with sagittal T1, axial  T1, T2, FLAIR, GRE, DWI/ADC, coronal T2.    FINDINGS:    The ventricles are midline without hydrocephalus. There is no acute intra  or extra-axial fluid collection. There is mild chronic microvascular  ischemic disease within the periventricular and subcortical regions of the  bilateral frontoparietal regions. There is no acute infarction. The major  intracranial vascular flow-voids are patent. IMPRESSION:  Mild chronic microvascular ischemic disease with no acute infarction. Interpreting/Reading Doctor: Oxana Melendez ([id number])  Transcribed:  on 07/22/2009  Approved: Oxana Melendez ([id number])  07/22/2009        Distribution:  Attending Doctor: Alvarez Agee  Alternate Doctor: Alvarez Agee        No results for input(s): CPK, TROIQ in the last 72 hours.     No lab exists for component: CKQMB, CPKMB, BMPP  Recent Labs     04/02/23 0319 04/01/23  1512    138   K 4.0 5.4*   * 109*   CO2 28 27   BUN 28* 31*   CREA 1.28 1.69*   * 136*   CA 8.3* 8.7     Recent Labs     04/02/23 0319 04/01/23  1512 WBC 7.1 7.4   HGB 13.2 14.3   HCT 40.7 44.5   * 187     Recent Labs     04/01/23  1512   *     No results for input(s): CHOL, LDLC in the last 72 hours. No lab exists for component: TGL, HDLC,  HBA1C  No results for input(s): CRP, TSH, TSHEXT, TSHEXT in the last 72 hours. No lab exists for component: ESR        Current meds:    Current Facility-Administered Medications:     sacubitriL-valsartan (ENTRESTO) 24-26 mg tablet 1 Tablet, 1 Tablet, Oral, Q12H, Renae Mahmood MD, 1 Tablet at 04/02/23 2764    aspirin chewable tablet 81 mg, 81 mg, Oral, DAILY, Agustín Ortiz MD, 81 mg at 04/02/23 0934    atorvastatin (LIPITOR) tablet 40 mg, 40 mg, Oral, DAILY, Agustín Ortiz MD, 40 mg at 04/02/23 0934    carvediloL (COREG) tablet 6.25 mg, 6.25 mg, Oral, BID WITH MEALS, Agustín Blum MD, 6.25 mg at 04/02/23 0934    sodium chloride (NS) flush 5-40 mL, 5-40 mL, IntraVENous, Q8H, Agustín Ortiz MD, 10 mL at 04/02/23 0612    sodium chloride (NS) flush 5-40 mL, 5-40 mL, IntraVENous, PRN, Agustín Blum MD    acetaminophen (TYLENOL) tablet 650 mg, 650 mg, Oral, Q6H PRN **OR** acetaminophen (TYLENOL) suppository 650 mg, 650 mg, Rectal, Q6H PRN, Agustín Ortiz MD    polyethylene glycol (MIRALAX) packet 17 g, 17 g, Oral, DAILY PRN, Agustín rOtiz MD    ondansetron (ZOFRAN ODT) tablet 4 mg, 4 mg, Oral, Q8H PRN **OR** ondansetron (ZOFRAN) injection 4 mg, 4 mg, IntraVENous, Q6H PRN, Agustín Blum MD    enoxaparin (LOVENOX) injection 40 mg, 40 mg, SubCUTAneous, DAILY, Agustín Blum MD, 40 mg at 04/02/23 [address], MD  [facility] [address], [address] [address]  [place]  [phone]      I have discussed the diagnosis with the patient and the intended plan as seen in the above orders. Questions were answered concerning future plans.   I have discussed medication side effects and warnings with the patient as well.    Yanet Goodwin is in agreement to the plan listed above and wishes to proceed. he  was instructed not to smoke, eat heart healthy diet  and to exercise. Thank you for the consult and the opportunity to be involved in the care of Yanet Goodwin. Akanksha Gupta MD    ATTENTION:   This medical record was transcribed using an electronic medical records/speech recognition system. Although proofread, it may and can contain electronic, spelling and other errors. Corrections may be executed at a later time. Please feel free to contact us for any clarifications as needed.

## 2023-04-02 NOTE — DISCHARGE SUMMARY
Hospitalist Discharge Summary     Patient ID:  Angelita Norwood  [id number]  28 y.o.  1945    Admit date: 4/1/2023    Discharge date and time: 4/2/2023    Admission Diagnoses: Syncope [R55]    Discharge Diagnoses:    Principal Problem:    Syncope (4/1/2023)    Active Problems:    CAD (coronary artery disease) (3/29/2022)      S/P CABG x 3 (3/30/2022)      Overview: On pump CORONARY ARTERY BYPASS GRAFT (CABG) X3, LIMA to LAD, RSVH to PDA,       RSVG to Ramus      R GSV EVH            Systolic CHF, acute on chronic ([address] (3/10/2023)      Hyperkalemia (4/1/2023)           Hospital Course:   Mr. Renan Bearden is a 76 y.o.  male who presented to the Emergency Department complaining of dizziness and not feeling well. He told me that he was not feeling well and he drove to the ER to just check that if he is taking the right medications. While he was sitting in the waiting room he felt really bad and had a syncopal episode. He was brought to the ED room and his blood pressure was low at that time. He also had a slurred speech. He was diaphoretic and had some nausea. He did not look well. His blood pressure was 60/30. He was given 2 L of IV fluid bolus and he was started on Levophed. He was admitted for further evaluation and treatment of the following:    #Syncope: Likely driven by hypovolemia in s/o aortic stenosis. He responded well to fluid resuscitation. Spironolactone was discontinue and furosemide was changed to PRN dosing    #Aortic Stenosis: Severe by area. Suspect low flow given his EF. He is active for his age so TAVR would be reasonable    #Ischemic HFrEF: Echo with EF30-35%. Stable. Med changes as above; continue carvedilol, entresto    #CAD: 3v dz. S/p CABG 3/2022. Doubt this to have been ischemic syncopal event      Imaging  04/01/23    ECHO ADULT COMPLETE 04/01/2023 4/1/2023    Interpretation Summary    Left Ventricle:  Moderately reduced left ventricular systolic function with a visually estimated EF of 30 - 35%. Left ventricle size is normal. LVIDd is 5.2 cm. Normal wall thickness. IVSd is 0.6 cm. LVPWd is 0.6 cm. See diagram for wall motion findings. Abnormal diastolic function. Aortic Valve: Moderately calcified cusp. Appears to be tricuspid Mild regurgitation. Severe stenosis of the aortic valve. AV mean gradient is 16 mmHg. AV area by continuity VTI is 0.6 cm2. The previous echo earlier in the month indicated that the aortic valve area is 1.1 cm² and the gradient was 19. Mitral Valve: Mildly thickened leaflet. Mild to moderate regurgitation. Technical qualifiers: Echo study was technically difficult due to patient's heart rhythm. Signed by: Camden Paris MD on 4/1/2023  7:35 PM       PCP: Vj Cuellar MD     Consults: Cardiology    Condition of patient at discharge: good and improved    Discharge Exam:    Physical Exam:    Gen: Well-developed, well-nourished, in no acute distress  HEENT:  Pink conjunctivae, PERRL, hearing intact to voice, moist mucous membranes  Neck: Supple, without masses, thyroid non-tender  Resp: No accessory muscle use, clear breath sounds without wheezes rales or rhonchi  Card: No murmurs, normal S1, S2 without thrills, bruits or peripheral edema  Abd:  Soft, non-tender, non-distended, normoactive bowel sounds are present, no palpable organomegaly and no detectable hernias  Lymph:  No cervical or inguinal adenopathy  Musc: No cyanosis or clubbing  Skin: No rashes or ulcers, skin turgor is good  Neuro:  Cranial nerves are grossly intact, no focal motor weakness, follows commands appropriately  Psych:  Good insight, oriented to person, place and time, alert          Disposition: home    Patient Instructions:   Current Discharge Medication List        CONTINUE these medications which have CHANGED    Details   furosemide (LASIX) 20 mg tablet Take 1 Tablet by mouth as needed (Weight gain, shortness of breath) for up to 30 days.   Qty: 90 Tablet, Refills: 3 CONTINUE these medications which have NOT CHANGED    Details   aspirin 81 mg chewable tablet Take 1 Tablet by mouth daily. Qty: 90 Tablet, Refills: 3      atorvastatin (LIPITOR) 40 mg tablet Take 1 Tablet by mouth daily. Qty: 90 Tablet, Refills: 3      carvediloL (COREG) 6.25 mg tablet Take 1 Tablet by mouth two (2) times daily (with meals). Qty: 180 Tablet, Refills: 3      sacubitril-valsartan (ENTRESTO) 24 mg/26 mg tablet Take 1 Tablet by mouth every twelve (12) hours. Qty: 180 Tablet, Refills: 3           STOP taking these medications       spironolactone (ALDACTONE) 25 mg tablet Comments:   Reason for Stopping:             Activity: Activity as tolerated  Diet: Regular Diet  Wound Care: None needed    Follow-up with Venkatesh Anderson MD in 1 week. Follow-up tests/labs    - TAVR    Approximate time spent in patient care on day of discharge: 40min    Signed:   Anirudh Canas MD  4/2/2023  1:56 PM

## 2023-04-02 NOTE — PROGRESS NOTES
4/2/2023 3:45 PM Met with pt for initial assessment. Charted address and phone numbers confirmed. Reason for Readmission:       1. Cardiogenic shock ([address]    2. Syncope and collapse    3. Coronary artery disease involving native coronary artery of native heart without angina pectoris    4. S/P CABG x 3    5. Systolic CHF, acute on chronic [facility])        Past Medical History:   Diagnosis Date    Hypertension     Ill-defined condition     hypothyroid    S/P triple vessel bypass             Pt was recently at [facility] from 3/8-3/10 and underwent a left and right heart cath. Pt was discharge home with family support and outpatient follow up. RUR Score/Risk Level:   17%/ HIGH      PCP: First and Last name:  Kvng Ferreira MD     Name of Practice:    Are you a current patient: Yes/No: No   Approximate date of last visit: 3/28   Can you participate in a virtual visit with your PCP:     Is a Care Conference indicated:     No      Did you attend your follow up appointment (s): If not, why not:  Yes   Pt saw Cardiologist Dr. Bruno Laura on 3/17 and his PCP Yoel Ding on 3/28. Resources/supports as identified by patient/family: Supportive children         Top Challenges facing patient (as identified by patient/family and CM): Finances/Medication cost?   No concerns, uses the [facility] at [facility] for Medications    Transportation     No concerns, pt can drive but has contacted family to come pick him up at discharge   Support system or lack thereof? No concerns, supportive children     Living arrangements? No concerns, lives alone in a 3 story home in [place], [place]. There are 7 steps to enter and a flight of stairs to his bedroom. Pt's step son lives next door who can assist if needed. Self-care/ADLs/Cognition? Pt reported he was independent with adls and driving prior to admission. Pt owns a rw.             Current Advanced Directive/Advance Care Plan:  Confirmed [address] Decision Maker:       Primary Decision Maker: Angel Rodriguez - Son - [phone]    Primary Decision Maker: Ninoska Chávez - Daughter - [phone]    Primary Decision Maker: Christie Booker - [phone]    Click here to [address] including selection of the Healthcare Decision Maker Relationship (ie \"Primary\")  Today we documented Decision Maker(s) consistent with Legal Next of Kin hierarchy. Plan for utilizing home health:   No history, not indicated at this time. Pt is also outside of the [facility]. Transition of Care Plan:    Based on readmission, the patient's previous Plan of Care   has been evaluated and/or modified. The current Transition of Care Plan is: return home with family assistance and outpatient follow up. ANTONIA Gomez    Care Management Interventions  PCP Verified by CM:  Yes Mallory Clarke)  Last Visit to PCP: 03/28/23  MyChart Signup: No  Discharge Durable Medical Equipment: No  Physical Therapy Consult: No  Occupational Therapy Consult: No  Speech Therapy Consult: No  Support Systems: Child(jeffery)  Discharge Location  Patient Expects to be Discharged to[de-identified] Home with family assistance  Readmission Assessment  Number of days since last admission?: 8-30 days  Previous disposition: Home with Family  Who is being interviewed?: Patient  What was the patient's/caregiver's perception as to why they think they needed to return back to the hospital?: Other (Comment) (Needed to see his Cardiologist but office was closed)  Did you visit your Primary Care Physician after you left the hospital, before you returned this time?: Yes  Did you see a specialist, such as Cardiac, Pulmonary, Orthopedic Physician, etc. after you left the hospital?: Yes  Who advised the patient to return to the hospital?: Self-referral  Does the patient report anything that got in the way of taking their medications?: No  In our efforts to provide the best possible care to you and others like you, can you think of anything that we could have done to help you after you left the hospital the first time, so that you might not have needed to return so soon?: Other (Comment) (None)

## 2023-04-02 NOTE — PROGRESS NOTES
Problem: Falls - Risk of  Goal: *Absence of Falls  Description: Document Joel Christians Fall Risk and appropriate interventions in the flowsheet.   Outcome: Progressing Towards Goal  Note: Fall Risk Interventions:                                Problem: Patient Education: Go to Patient Education Activity  Goal: Patient/Family Education  Outcome: Progressing Towards Goal

## 2023-04-02 NOTE — DISCHARGE INSTRUCTIONS
HOSPITALIST DISCHARGE INSTRUCTIONS  NAME: Angelita Norwood   :  1945   MRN:  [id number]     Date/Time:  2023 1:53 PM    ADMIT DATE: 2023     DISCHARGE DATE: 2023     ADMITTING DIAGNOSIS:  Syncope (passing out)  Dehydration  RADHA  Aortic Stenosis  Congestive Heart Failure  Hyperkalemia    DISCHARGE DIAGNOSIS:  You were admitted for evaluation and treatment of the above. You likely passed out due to dehydration. You were found to have a narrowed aortic valve that predisposes you to syncope when you are dehydrated. You received IV fluids with good recovery. You should STOP taking spironolactone (which dehydrates you and raises you potassium) and only take furosemide as needed for weight gain > 3lb or for shortness of breath. You should not take furosemide every day. Follow up with Dr. Hiwot Jarvis to monitor your symptoms and further explore aortic valve replacement. MEDICATIONS:    It is important that you take the medication exactly as they are prescribed. Keep your medication in the bottles provided by the pharmacist and keep a list of the medication names, dosages, and times to be taken in your wallet. Do not take other medications without consulting your doctor. If you experience any of the following symptoms then please call your primary care physician or return to the emergency room if you cannot get hold of your doctor:  Fever, chills, nausea, vomiting, diarrhea, change in mentation, falling, bleeding, shortness of breath    Follow Up:  Please call and set up an appointment to see them in 1 week. Choco Champion, [address] [facility] [address] [address],[address]  [facility]  [phone]              Information obtained by :  I understand that if any problems occur once I am at home I am to contact my physician. I understand and acknowledge receipt of the instructions indicated above. [de-identified] or R.N.'s Signature                                                                  Date/Time                                                                                                                                              Patient or Representative Signature                                                          Date/Time  My Heart Failure Action Plan   If you notice changes in your heart failure symptoms, follow your heart failure action plan.   Acting quickly will help you to feel better and stay out of the hospital.      Danger Zone - Seek Help:  My symptoms:   Sudden, severe shortness of breath   Develop new chest pain/tightness/ heaviness   Sweating, weakness or fainting  What to do:  Call 911 NOW  ______________________________________________________________________    Caution Zone - I do not feel well:  Weight up by 3 lb in one day or 5 lb in a week   Swelling in ankles, legs or abdomen   Hard to breath when active or at night   Need to use more pillows at night   Constant cough or wheeze   Very tired  Weight down by 3 lb   Dry mouth/skin   Dizziness, Low blood pressure   What to do:  Call your Primary Care Physician for guidance  Matt Raza  {No department listed for PCP}   Call your Cardiologist for guidance  Call Jesus Arriaga at [phone]  ________________________________________________________________  Clear Zone - I feel well:   My symptoms:   Weight on target range, no weight gain over 2 lb   Little or no swelling   Breathing is easy, no shortness of breath   What to do:  Keep taking my pills  Keep doing my daily checks -  weight, swelling and breathing   Keep eating a healthy, low salt diet  Keep making changes to improve my health  Attend all follow up appointments as scheduled

## 2023-04-02 NOTE — PROGRESS NOTES
Bedside and Verbal shift change report given to Hao Mcbride RN (oncoming nurse) by Joe Mackenzie RN (offgoing nurse). Report included the following information SBAR, Kardex, MAR, and Cardiac Rhythm sinus rhythm . 0700: Pt resting in bed. No c/o pain. Instructed pt to call for assistance before getting out of bed. Call light in reach. Reviewed discharge instructions, medications, and follow-up appointments with patient and pt's daughter. IVs removed. Reviewed My Heart Failure Action Plan with patient and patient's daughter. Daughter at bedside to take patient home.

## 2023-04-02 NOTE — ED NOTES
TRANSFER - OUT REPORT:    Verbal report given to Coleen(name) on Sohan Naranjo  being transferred to ICU(unit) for routine progression of care       Report consisted of patients Situation, Background, Assessment and   Recommendations(SBAR). Information from the following report(s) SBAR, Kardex, ED Summary, MAR, and Recent Results was reviewed with the receiving nurse. Lines:   Peripheral IV 04/01/23 Right Antecubital (Active)   Site Assessment Clean, dry, & intact 04/01/23 1602   Phlebitis Assessment 0 04/01/23 1602   Infiltration Assessment 0 04/01/23 1602   Dressing Status Clean, dry, & intact 04/01/23 1602       Peripheral IV 04/01/23 Left Wrist (Active)   Site Assessment Clean, dry, & intact 04/01/23 1602   Phlebitis Assessment 0 04/01/23 1602   Infiltration Assessment 0 04/01/23 1602   Dressing Status Clean, dry, & intact 04/01/23 1602       Peripheral IV 04/01/23 Left Antecubital (Active)   Site Assessment Clean, dry, & intact 04/01/23 1602   Phlebitis Assessment 0 04/01/23 1602   Infiltration Assessment 0 04/01/23 1602   Dressing Status Clean, dry, & intact 04/01/23 1602   Hub Color/Line Status Green 04/01/23 1450        Opportunity for questions and clarification was provided.       Patient transported with:   Registered Nurse

## 2023-04-02 NOTE — PROGRESS NOTES
.. TRANSFER - IN REPORT:    Verbal report received from Vijaya(name) on Miranda Reed  being received from ED(unit) for routine progression of care      Report consisted of patients Situation, Background, Assessment and   Recommendations(SBAR). Information from the following report(s) SBAR, Kardex, ED Summary, and MAR was reviewed with the receiving nurse. Opportunity for questions and clarification was provided. Assessment completed upon patients arrival to unit and care assumed.

## 2023-04-03 LAB
ATRIAL RATE: 57 BPM
ATRIAL RATE: 60 BPM
CALCULATED P AXIS, ECG09: -15 DEGREES
CALCULATED P AXIS, ECG09: 0 DEGREES
CALCULATED R AXIS, ECG10: -56 DEGREES
CALCULATED R AXIS, ECG10: -63 DEGREES
CALCULATED T AXIS, ECG11: 104 DEGREES
CALCULATED T AXIS, ECG11: 99 DEGREES
DIAGNOSIS, 93000: NORMAL
DIAGNOSIS, 93000: NORMAL
P-R INTERVAL, ECG05: 160 MS
P-R INTERVAL, ECG05: 164 MS
Q-T INTERVAL, ECG07: 466 MS
Q-T INTERVAL, ECG07: 468 MS
QRS DURATION, ECG06: 88 MS
QRS DURATION, ECG06: 92 MS
QTC CALCULATION (BEZET), ECG08: 453 MS
QTC CALCULATION (BEZET), ECG08: 468 MS
VENTRICULAR RATE, ECG03: 57 BPM
VENTRICULAR RATE, ECG03: 60 BPM

## 2023-04-04 ENCOUNTER — PATIENT OUTREACH (OUTPATIENT)
Dept: CASE MANAGEMENT | Age: 78
End: 2023-04-04

## 2023-04-04 NOTE — PROGRESS NOTES
Care Transitions Follow Up Call    Patient Current Location: [address] [place] [address] to be reviewed by the provider   Additional needs identified to be addressed with provider: yes  Pt needs PCP hospital follow up, reports one on 2023           Method of communication with provider : none    Care Transition Nurse (CTN) contacted the patient by telephone to follow up after admission on 2023. Verified name and  with patient as identifiers. Patients top risk factors for readmission: medical condition-HF      Interventions to address risk factors: Obtained and reviewed discharge summary and/or continuity of care documents    [facility] follow up appointment(s):   Future Appointments   Date Time Provider Opal Lo   2023  2:30 PM NYA GERMAN BS AMB   2023  3:40 PM Mike Cameron MD CAV BS AMB     Non-[facility] follow up appointment(s): Purvi Joe 2023 per pt    CTN provided contact information for future needs. Plan for follow-up call in 5-7 days based on severity of symptoms and risk factors. Plan for next call:  review symptoms and follow up appts       Goals Addressed                   This Visit's Progress     Attends follow-up appointments as directed. 23   Patient PCP is at [facility], I asked if his children who have PCP's and he did not know. I offered to have [facility] come and see patient but he stated he was going to call and make an appt with [facility] and if he couldn't get an appt would call me back and would then let [facility] come and see him. Patient has an appt with Cards on 3/17/23 at 2 pm at [facility] and he is aware, patient also let me call his son and let him know about Cards appt on 3/17/23 at 2 pm.    Patient is a [employer] and has been to the [place] and seen by GI for his stomach issues, \"had to wait too long to be seen\". Patient called back his appt on 3/28 at 10 am.  Monitor status of Cards and ? PCP appt on next call.       Lavelle Ryan Sonam KAISER, RN, CCM / Care Transition Nurse / [phone]     03/20/23   Patient did attend his Cards appt as scheduled, has not found a PCP that I can seen was recommended 2 by Cards. I have sent these 2 names and dress and # to patient e mail [email or web address]. Monitor status of PCP appt on next call. Niru KAISER, RN, CCM / Care Transition Nurse / [phone]     03/27/23   Patient states he called Dr Yenny Marroquin and [place] and they are booked out until Oct and Nov.     Patient states he has an appt with a new PCP this Am at 8am and he forgot, then he told his daughter it was Wednesday. Patient  also has an appt with ortho but does not know when that is. [facility] but I can't see what it is for. Patient daughter Taty Adkins also go on phone and was asking about an appt with a Dr to help their father with his memory, explained patient would need to see PCP for this first.  Monitor status of PCP appt, Ortho appt, who he is seeing on 4/14 at the [place], daughter is calling to find out who [facility]. Niru KAISER, RN, [facility] / Care Transition Nurse / [phone]     4/4/2023  Pt reports that he has a follow up with Yaneth Pretty on 4/6/2023 and reports this is his PCP. Pt not sure about about [facility] but thinks that is who he sees. Erika Fonseca RN [facility], Care Transitions Nurse, [phone]              Prevent complications post hospitalization.         03/13/23    Review current practice of taking medications and reminder systems utilized: pill boxes  Discuss challenges patient has for remembering medication dose times and assist patient in problem-solving  CTN to discuss weekly compliance with medication adherence based on med plan discussed by patient and CTN/ACM   Assess for medication side effects: dysrhythmias, anorexia, nausea, dizziness, orthostatic hypotension, vomiting, diarrhea, bradycardia, headache, visual changes, malaise, behavioral changes, increasing CHF and/or lack of response to medication.  -Medication reconciliation completed with patient, states he has a pill box. Patient to verbalize importance of daily weights in regards to managing CHF symptoms within 30 to 90 days. Patient to have working scale, and on appropriate surface (hard, flat, floor)   Patient to weigh daily in the morning in similar bedtime attire, prior to eating and after voiding, and record in log  Patient to understand that weight gain of 2-3 pounds in one day or 5 pounds in one week to call provider managing CHF  -CHF is new to patient, does have a scale, but does not weigh daily. Reviewed daily weight and what he would do if he gained 3 lbs in 1 day or 5 lbs in 1 week, call provider. Patient to follow low sodium diet/ fluid restriction as recommended by provider in 90 days. CTN to discuss with patient current eating habits and daily salt intake during initial assessment. Patient to read labels regarding sodium content and portion control  Patient to verbalize importance of fresh fruit and vegetable options and frozen options as alternative to canned foods. Patient to verbalize ways to lower salt content in canned food by rinsing veggies and reading sodium labels.   -Patient does not know foods are on a low sodium diet, wanted to go to [place] and to order [employer] this weekend.    -Sent [employer] low sodium diet information. Client will prevent risk of CHF exacerbation and complication over next 59-24 days as evidence by:  Patient to report Red Flag Conditions:   a) Chest pain  b) Difficulty breathing  c) Decrease urine output  d) Vital sign changes from baseline  f)  Increased shortness of breath  g) New or increased edema  h) Increased weight gain   i) Dizziness  j) Productive Cough     Patient will monitor for signs of edema such as swelling in abdomen, thighs, lower legs, feet, and ankles.        Patient/family will monitor symptoms of edema such as sleeping in a chair or with multiple pillows, decreased appetite, clothes or shoes fitting tighter. Patient to use CHF zone tool for help with symptom and self-management  -Patient denies any SOB, cough or pedal edema, is up without assist devise. Family drives to appointments. Patient to maintain/Improve baseline activity level in 90 days  Patient will balance activity and rest periods to prevent exhaustion and SOB  Patient will participate in activity of interest for 20 minutes a day. -Patient currently not exercising was riding a bike but his balance is off and can't ride bike, discussed stationary bike or walking. Patient does not have an ACP on file, wants to talk to his children first about this. I sent son Sarah Hernandez information by e mail to look over for family to discuss. Monitor SOB, cough, pedal edema, daily weight, low sodium diet,  and activity tolerance on next call, and ACP status. Tania KAISER, RN, CCM / Care Transition Nurse / [phone]       03/13/23 1:44pm  Patient has questions about [facility], would they take him to Dr Solorio and what the cost is and who pays or visit. Explained they just come the 1 time they do not take to Dr garcia, gave him # to [facility] [employer] to see if the has transport in his coverage. [phone] and [phone]. Still planning on getting [facility] on [facility]/17/23. Tania KAISER, RN, CCM / Care Transition Nurse / [phone]       03/20/23   Patient is driving so quick call, denies any SOB, cough or pedal edema. Has not weighed. Reviewed that he needs to weigh daily. Patient does not have an ACP, I sent ACP to son via e-mail last call. Now I have sent patient ACP information via his e mail [email or web address] as requested. Monitor SOB, cough, pedal edema, daily weight, low sodium diet, ACP status.     Tania KAISER, RN, CCM / Care Transition Nurse / [phone]      03/27/23   Patient denies any SOB cough or pedal edema, has not weighed today, weighed while we were on the phone 156.5 lb. Reviewed why he wants to weigh first thing in Am and write it down, reviewed what he would do if he gained more than 3 lbs in 1 week or 5 lbs in 1 week call provider. Daughter also states she told her father multiple times to weight daily and write it down. Daughter states she takes her father grocery shopping and she gets low sodium food but then he goes out to eat and that is not low sodium, reviewed what high sodium food does to his body to patient. Patient could not find the ACP information I had sent, sent again to patient and daughter Luz Maria Paz at [email or web address]  Patient states he like to move all the time, walking is too boring so he ran and now his knee's hurt. Encouraged just to walk until see by Ortho. Monitor SOB, cough, pedal edema, walking/running?, low sodium diet, daily weight., knee pain. Zenaida KAISER, RN, [facility] / Care Transition Nurse / [phone]     4/4/2023   Called pt's son after leaving  on pt's phone. Ms. Sandra Zapata states that he has spoken with the pt today and will call pt and then I should call pt as he will alert pt that I am trying to reach him. Marilyn Walsh RN [address] [facility], [place], Care Transitions Nurse, [phone]   Pt returned my call and confirmed that he went to the ED and passed out. Pt states that one medication was stopped and one he is to take for swollen legs as needed. Pt will call me once he is home and we can review medications. Marilyn Walsh RN [address] [facility], [place], Care Transitions Nurse, [phone]     4/4/2023  Pt returned my call and we performed medication reconciliation. Pt has stopped appropriate medication and will take lasix prn. Pt has other medications. Pt reminded to not drive if he is feeling funny. Pt reports that he drove to the hospital to check on medications and then he fell to the floor. Pt reminded to call 911 if he feels this way again.   Pt reports that he will follow up with PCP on 4/6/2023 or sooner and has an appt for car repair work on 4/6/2023. Will call pt in one week to follow up on appts and symptoms.   Josemanuel Venegas RN [address] [facility], Care Transitions Nurse, [phone]

## 2023-04-04 NOTE — PROGRESS NOTES
Care Transitions Follow Up Call    Patient Current Location: [address] [place] [address] to be reviewed by the provider   Additional needs identified to be addressed with provider: yes  Spironolactone was stopped, lasix as needed per [facility] discharge. Pt to call me once he is home to review medications and appts. Method of communication with provider : none    Care Transition Nurse (CTN) contacted the patient by telephone to follow up after admission on 2023. Verified name and  with patient as identifiers. Addressed changes since last contact:  pt admitted to [facility] 2023 to 2023       Patients top risk factors for readmission: medical condition-HF    Interventions to address risk factors:  pt to call me once he is home to review discharge and medications    [address] [facility] follow up appointment(s):   Future Appointments   Date Time Provider Opal Lo   2023  2:30 PM NYA GERMAN CAV BS AMB   2023  3:40 PM Colleen Cameron MD [facility]     Non[facility] follow up appointment(s): unk    CTN provided contact information for future needs. Plan for follow-up call in 1-2 days based on severity of symptoms and risk factors. Plan for next call:  review medications, daily wts, symptoms       Goals Addressed                   This Visit's Progress     Prevent complications post hospitalization.         23    Review current practice of taking medications and reminder systems utilized: pill boxes  Discuss challenges patient has for remembering medication dose times and assist patient in problem-solving  CTN to discuss weekly compliance with medication adherence based on med plan discussed by patient and CTN/ACM   Assess for medication side effects: dysrhythmias, anorexia, nausea, dizziness, orthostatic hypotension, vomiting, diarrhea, bradycardia, headache, visual changes, malaise, behavioral changes, increasing CHF and/or lack of response to medication.  -Medication reconciliation completed with patient, states he has a pill box. Patient to verbalize importance of daily weights in regards to managing CHF symptoms within 30 to 90 days. Patient to have working scale, and on appropriate surface (hard, flat, floor)   Patient to weigh daily in the morning in similar bedtime attire, prior to eating and after voiding, and record in log  Patient to understand that weight gain of 2-3 pounds in one day or 5 pounds in one week to call provider managing CHF  -CHF is new to patient, does have a scale, but does not weigh daily. Reviewed daily weight and what he would do if he gained 3 lbs in 1 day or 5 lbs in 1 week, call provider. Patient to follow low sodium diet/ fluid restriction as recommended by provider in 90 days. CTN to discuss with patient current eating habits and daily salt intake during initial assessment. Patient to read labels regarding sodium content and portion control  Patient to verbalize importance of fresh fruit and vegetable options and frozen options as alternative to canned foods. Patient to verbalize ways to lower salt content in canned food by rinsing veggies and reading sodium labels.   -Patient does not know foods are on a low sodium diet, wanted to go to [employer] and to order [employer] this weekend.    -Sent [employer] low sodium diet information. Client will prevent risk of CHF exacerbation and complication over next 47-72 days as evidence by:  Patient to report Red Flag Conditions:   a) Chest pain  b) Difficulty breathing  c) Decrease urine output  d) Vital sign changes from baseline  f)  Increased shortness of breath  g) New or increased edema  h) Increased weight gain   i) Dizziness  j) Productive Cough     Patient will monitor for signs of edema such as swelling in abdomen, thighs, lower legs, feet, and ankles.        Patient/family will monitor symptoms of edema such as sleeping in a chair or with multiple pillows, decreased appetite, clothes or shoes fitting tighter. Patient to use CHF zone tool for help with symptom and self-management  -Patient denies any SOB, cough or pedal edema, is up without assist devise. Family drives to appointments. Patient to maintain/Improve baseline activity level in 90 days  Patient will balance activity and rest periods to prevent exhaustion and SOB  Patient will participate in activity of interest for 20 minutes a day. -Patient currently not exercising was riding a bike but his balance is off and can't ride bike, discussed stationary bike or walking. Patient does not have an ACP on file, wants to talk to his children first about this. I sent son Mary Scott information by e mail to look over for family to discuss. Monitor SOB, cough, pedal edema, daily weight, low sodium diet,  and activity tolerance on next call, and ACP status. Yarelis KAISER, RN, CCM / Care Transition Nurse / [phone]       03/13/23 1:44pm  Patient has questions about [facility], would they take him to Dr Solorio and what the cost is and who pays or visit. Explained they just come the 1 time they do not take to Dr garcia, gave him # to [facility] [employer] to see if the has transport in his coverage. [phone] and [phone]. Still planning on getting [facility] 3/[facility]/23. Yarelis KAISER, RN, CCM / Care Transition Nurse / [phone]       03/20/23   Patient is driving so quick call, denies any SOB, cough or pedal edema. Has not weighed. Reviewed that he needs to weigh daily. Patient does not have an ACP, I sent ACP to son via e-mail last call. Now I have sent patient ACP information via his e mail [email or web address] as requested. Monitor SOB, cough, pedal edema, daily weight, low sodium diet, ACP status.     Yarelis KAISER, RN, CCM / Care Transition Nurse / [phone]      03/27/23   Patient denies any SOB cough or pedal edema, has not weighed today, weighed while we were on the phone 156.5 lb. Reviewed why he wants to weigh first thing in Am and write it down, reviewed what he would do if he gained more than 3 lbs in 1 week or 5 lbs in 1 week call provider. Daughter also states she told her father multiple times to weight daily and write it down. Daughter states she takes her father grocery shopping and she gets low sodium food but then he goes out to eat and that is not low sodium, reviewed what high sodium food does to his body to patient. Patient could not find the ACP information I had sent, sent again to patient and daughter Jackson Shah at [email or web address]  Patient states he like to move all the time, walking is too boring so he ran and now his knee's hurt. Encouraged just to walk until see by Ortho. Monitor SOB, cough, pedal edema, walking/running?, low sodium diet, daily weight., knee pain. Shy KAISER, RN, [facility] / Care Transition Nurse / [phone]     4/4/2023   Called pt's son after leaving  on pt's phone. Ms. Kaylan Bucio states that he has spoken with the pt today and will call pt and then I should call pt as he will alert pt that I am trying to reach him. Eleazar Garcia RN [address] [facility], Care Transitions Nurse, [phone]   Pt returned my call and confirmed that he went to the ED and passed out. Pt states that one medication was stopped and one he is to take for swollen legs as needed. Pt will call me once he is home and we can review medications.  Eleazar Garcia RN [address] [facility], Care Transitions Nurse, [phone]

## 2023-04-18 ENCOUNTER — PATIENT OUTREACH (OUTPATIENT)
Dept: CASE MANAGEMENT | Age: 78
End: 2023-04-18

## 2023-04-18 NOTE — PROGRESS NOTES
Care Transitions Outreach Attempt    Attempted to reach patient for transitions of care follow up. Unable to reach patient. Patient: Jean-Pierre Obando Patient : 1945 MRN: [id number]    Last Discharge  Street       Date Complaint Diagnosis Description Type Department Provider    23 Nausea; Dizziness Cardiogenic shock ([facility] [address] . .. ED to Hosp-Admission (Discharged) (ADMIT) Heber Jackson MD; Linda Chambers. Renzo Marquez                 Noted following upcoming appointments from discharge chart review:   [facility] follow up appointment(s):   Future Appointments   Date Time Provider Opal Lo   2023  1:40 PM Lou Rojas MD [facility] AMB   2023  9:40 AM Brenda Cameron MD [facility] AMB     Non-[facility] follow up appointment(s): [email or web address]

## 2023-04-19 ENCOUNTER — PATIENT OUTREACH (OUTPATIENT)
Dept: CASE MANAGEMENT | Age: 78
End: 2023-04-19

## 2023-04-19 NOTE — PROGRESS NOTES
Patient has graduated from the Transitions of Care Coordination  program on 4/19/23. Patient/family has the ability to self-manage at this time Care management goals have been completed. Patient was not referred to the [facility] team for further management. Goals Addressed                   This Visit's Progress     COMPLETED: Attends follow-up appointments as directed. 03/13/23   Patient PCP is at [facility], I asked if his children who have PCP's and he did not know. I offered to have [facility] come and see patient but he stated he was going to call and make an appt with [facility] and if he couldn't get an appt would call me back and would then let [facility] come and see him. Patient has an appt with Cards on 3/17/23 at 2 pm at [facility] and he is aware, patient also let me call his son and let him know about Cards appt on 3/17/23 at 2 pm.    Patient is a [address] and has been to the [place] and seen by GI for his stomach issues, \"had to wait too long to be seen\". Patient called back his appt on 3/28 at 10 am.  Monitor status of Cards and ? PCP appt on next call. Candelaria Stewart MSN, RN, CCM / Care Transition Nurse / [phone]     03/20/23   Patient did attend his Cards appt as scheduled, has not found a PCP that I can seen was recommended 2 by Cards. I have sent these 2 names and dress and # to patient e mail [email or web address]. Monitor status of PCP appt on next call. Candelaria KAISER, RN, CCM / Care Transition Nurse / [phone]     03/27/23   Patient states he called Dr Ashley Liu and Roshan and they are booked out until Oct and Nov.     Patient states he has an appt with a new PCP this Am at 8am and he forgot, then he told his daughter it was Wednesday. Patient  also has an appt with ortho but does not know when that is. [facility] I can't see what it is for.    Patient daughter Otis Acuna also go on phone and was asking about an appt with a Dr to help their father with his memory, explained patient would need to see PCP for this first.  Monitor status of PCP appt, Ortho appt, who he is seeing on 4/14 at the [place], daughter is calling to find out [facility]. Gabriel KAISER, RN, CCM / Care Transition Nurse / [phone]     4/4/2023  Pt reports that he has a follow up with Shawnee Hillman on 4/6/2023 and reports this is his PCP. Pt not sure about about Kitty Casillas but thinks that is who he sees. Funmi Titus RN [address] [facility], Care Transitions Nurse, [phone]     4/18/2023  Pt attended follow up appt with cardiology on 4/13/2023 with Dr. Kitty Casillas. Funmi Titus RN [address] [facility], Care Transitions Nurse, [phone]     04/19/23   Per patient seen by PCP on 4/18/23. Gabriel KAISER, RN, CCM / Care Transition Nurse / [phone]              COMPLETED: Prevent complications post hospitalization. 03/13/23    Review current practice of taking medications and reminder systems utilized: pill boxes  Discuss challenges patient has for remembering medication dose times and assist patient in problem-solving  CTN to discuss weekly compliance with medication adherence based on med plan discussed by patient and CTN/ACM   Assess for medication side effects: dysrhythmias, anorexia, nausea, dizziness, orthostatic hypotension, vomiting, diarrhea, bradycardia, headache, visual changes, malaise, behavioral changes, increasing CHF and/or lack of response to medication.  -Medication reconciliation completed with patient, states he has a pill box. Patient to verbalize importance of daily weights in regards to managing CHF symptoms within 30 to 90 days.    Patient to have working scale, and on appropriate surface (hard, flat, floor)   Patient to weigh daily in the morning in similar bedtime attire, prior to eating and after voiding, and record in log  Patient to understand that weight gain of 2-3 pounds in one day or 5 pounds in one week to call provider managing CHF  -CHF is new to patient, does have a scale, but does not weigh daily. Reviewed daily weight and what he would do if he gained 3 lbs in 1 day or 5 lbs in 1 week, call provider. Patient to follow low sodium diet/ fluid restriction as recommended by provider in 90 days. CTN to discuss with patient current eating habits and daily salt intake during initial assessment. Patient to read labels regarding sodium content and portion control  Patient to verbalize importance of fresh fruit and vegetable options and frozen options as alternative to canned foods. Patient to verbalize ways to lower salt content in canned food by rinsing veggies and reading sodium labels.   -Patient does not know foods are on a low sodium diet, wanted to go to [employer] and to order [employer] this weekend.    -Sent [employer]oyo low sodium diet information. Client will prevent risk of CHF exacerbation and complication over next 07-24 days as evidence by:  Patient to report Red Flag Conditions:   a) Chest pain  b) Difficulty breathing  c) Decrease urine output  d) Vital sign changes from baseline  f)  Increased shortness of breath  g) New or increased edema  h) Increased weight gain   i) Dizziness  j) Productive Cough     Patient will monitor for signs of edema such as swelling in abdomen, thighs, lower legs, feet, and ankles. Patient/family will monitor symptoms of edema such as sleeping in a chair or with multiple pillows, decreased appetite, clothes or shoes fitting tighter. Patient to use CHF zone tool for help with symptom and self-management  -Patient denies any SOB, cough or pedal edema, is up without assist devise. Family drives to appointments. Patient to maintain/Improve baseline activity level in 90 days  Patient will balance activity and rest periods to prevent exhaustion and SOB  Patient will participate in activity of interest for 20 minutes a day.   -Patient currently not exercising was riding a bike but his balance is off and can't ride bike, discussed stationary bike or walking. Patient does not have an ACP on file, wants to talk to his children first about this. I sent son Hanna Farmer information by e mail to look over for family to discuss. Monitor SOB, cough, pedal edema, daily weight, low sodium diet,  and activity tolerance on next call, and ACP status. Javed KAISER, RN, CCM / Care Transition Nurse / [phone]       03/13/23 1:44pm  Patient has questions about [facility], would they take him to Dr Solorio and what the cost is and who pays or visit. Explained they just come the 1 time they do not take to Dr garcia, gave him # to [facility] to see if the has transport in his coverage. [phone] and [phone]. Still planning on getting [facility] on 3/17/23. Javed KAISER, RN, CCM / Care Transition Nurse / [phone]       03/20/23   Patient is driving so quick call, denies any SOB, cough or pedal edema. Has not weighed. Reviewed that he needs to weigh daily. Patient does not have an ACP, I sent ACP to son via e-mail last call. Now I have sent patient ACP information via his e mail [email or web address] as requested. Monitor SOB, cough, pedal edema, daily weight, low sodium diet, ACP status. Javed KAISER, RN, CCM / Care Transition Nurse / [phone]      03/27/23   Patient denies any SOB cough or pedal edema, has not weighed today, weighed while we were on the phone 156.5 lb. Reviewed why he wants to weigh first thing in Am and write it down, reviewed what he would do if he gained more than 3 lbs in 1 week or 5 lbs in 1 week call provider. Daughter also states she told her father multiple times to weight daily and write it down. Daughter states she takes her father grocery shopping and she gets low sodium food but then he goes out to eat and that is not low sodium, reviewed what high sodium food does to his body to patient.   Patient could not find the ACP information I had sent, sent again to patient and daughter Ela Benjamin at [email or web address]  Patient states he like to move all the time, walking is too boring so he ran and now his knee's hurt. Encouraged just to walk until see by Ortho. Monitor SOB, cough, pedal edema, walking/running?, low sodium diet, daily weight., knee pain. Tahir Terry MSN, RN, [facility] / Care Transition Nurse / [phone]     4/4/2023   Called pt's son after leaving  on pt's phone. Ms. Onelia Zamorano states that he has spoken with the pt today and will call pt and then I should call pt as he will alert pt that I am trying to reach him. Cyndi Carlisle RN [address] [facility], Care Transitions Nurse, [phone]   Pt returned my call and confirmed that he went to the ED and passed out. Pt states that one medication was stopped and one he is to take for swollen legs as needed. Pt will call me once he is home and we can review medications. Cyndi Carlisle RN [address] [facility], Care Transitions Nurse, [phone]     4/4/2023  Pt returned my call and we performed medication reconciliation. Pt has stopped appropriate medication and will take lasix prn. Pt has other medications. Pt reminded to not drive if he is feeling funny. Pt reports that he drove to the hospital to check on medications and then he fell to the floor. Pt reminded to call 911 if he feels this way again. Pt reports that he will follow up with PCP on 4/6/2023 or sooner and has an appt for car repair work on 4/6/2023. Will call pt in one week to follow up on appts and symptoms. Cyndi Carlisle RN [address] [facility], Care Transitions Nurse, [phone]     4/11/2023  Pt reports that he is only taking lasix when legs are swelling and he reports no swelling and no lasix. Pt reports that he is feeling well. Pt reports no longer feeling cold. BP between 100 to 120. No swelling  Pt reports that he is receiving numerous emails and there is some family issues.   Pt is asking who is on his email or [employer]. Fernando Bass RN [address] [facility], Care Transitions Nurse, [phone]     4/18/2023  Left voicemail stating my name, CTN with [facility], date and time of call, and return telephone number. Fernando Bass RN [address] [facility], Care Transitions Nurse, [phone]     04/19/23   Patient denies any further syncope, SOB or cough. Weight today was 155 lbs. Patient states he is eating and drinking well. Patient is upset that he is unable to drive, he has many things he needs to do and needs transportation for this. Patient has not received his Holter Monitor ordered on 4/13, is to to a 5-7 day time frame to get, tomorrow is day 7. Patient continues to work on getting MyChart set up, daughter works night shift and he is going to get her to help him when she wakes up. Bree KAISER, RN, [facility] / Care Transition Nurse / [phone]                 Patient has Care Transition Nurse's contact information for any further questions, concerns, or needs.   Patients upcoming visits:    Future Appointments   Date Time Provider Opal Lo   6/28/2023  1:40 PM Lou Rojas MD [facility] AMB   7/25/2023  9:40 AM Amber Cameron MD [facility] AMB

## 2023-04-20 NOTE — PROGRESS NOTES
Physician Progress Note      Jhon Araiza  CSN #:                  [id number]  :                       1945  ADMIT DATE:       2023 2:10 PM  Oanh Chiu DATE:        2023 5:32 PM  RESPONDING  PROVIDER #:        Melany Murrell MD          [facility]    This patient admitted on 2023-2023- for Syncope and hypotension. The medical record currently has conflicting documentation regarding the acuity of the CHF and if possible, needs to be reviewed and clarified please. The H&P notes Chronic  systolic CHF. Cards was consulted and notes \" He is clearly not in heart failure at this time although he has a slight bum in his BNP. \"  Discharge summary on the  Active problem list has \" Acute on Chronic systolic CHF\"      If possible, can you please clarify the acuity of the CHF and please document in the discharge summary:    The medical record reflects the following:  Risk Factors: CAD, Known Ischemic cardiomyopathy, Severe aortic stenosis, CKD  Clinical Indicators: Pt presented to ER with dizziness and not feeling well. While in ER waiting room pt had syncope with documented Systolic bp in the 58/60-OERPU EF of 30-35%, BNP @ 2698, Cards consulted and notes \"he is clearly not in heart failure at this time although he has a slight bump in his BNP, Creat @ 1.69 on admission and improved to 1.28 at discharge.  CXR with unchanged cardiomegaly and mild edema Interval resolution of previously seen pleural effusions  Treatment: PT was given IVF in the ER and IV levophed briefly, Levophed was stopped at the time of the admission,  Spironolactone was d/c and furosemide was changed to pRN dosing    Thank you  Josemanuel Pineda, MICHAELN,RN, CPHQ, CCDS, SMART  Options provided:  -- Chronic systolic CHF is confirmed and Acute on Chronic systolic CHF is ruled out  -- Acute on chronic systolic CHF is confirmed and Chronic systolic CHF is  ruled out  -- Other - I will add my own diagnosis  -- Disagree - Not applicable / Not valid  -- Disagree - Clinically unable to determine / Unknown  -- Refer to Clinical Documentation Reviewer    PROVIDER RESPONSE TEXT:    After study, Chronic systolic CHF is confirmed and Acute on chronic systolic CHF ruled out. Query created by: Rachel Camacho on 4/20/2023 2:35 PM      QUERY TEXT:    Good Afternoon    This patient admitted on 04/01/2023-04/02/2023 for syncope and hypotension. The ER provider has documented a diagnosis of Cardiogenic Shock, however this diagnosis was dropped from the rest of the documentation and if possible, this needs to be reviewed and clarified. If possible, please document in the progress notes and discharge summary if was: The medical record reflects the following:  Risk Factors: CAD Severe aortic stenosis, CHF , ischemic cardiomyopathy MI in past  Clinical Indicators: Pt presented to ER with c/o dizziness and not feeling well. Pt had syncope episode while waiting in the ER waiting room. BP noted 60/30- He was given 2 liters of IVF bolus in the ER and started on IV Levophed. Treatment: Pt admitted to ICU, MX IV boluses, Briefly on IV levophed, Cards consulted, spironolactone was d/c and furosemide was changed to PRN dosing only. Thank you  Randi Burks [facility] CPQH, CCDS, SMART  Options provided:  -- Cardiogenic shock POA is confirmed after study  -- Cardiogenic shock POA is ruled out after study. Hypotension without shock  -- Other - I will add my own diagnosis  -- Disagree - Not applicable / Not valid  -- Disagree - Clinically unable to determine / Unknown  -- Refer to Clinical Documentation Reviewer    PROVIDER RESPONSE TEXT:    Cardiogenic shock is ruled out after study.  Hypotension without shock    Query created by: Rachel Camacho on 4/20/2023 2:42 PM      Electronically signed by:  Karina Umana MD 4/20/2023 3:25 PM

## 2023-05-11 DIAGNOSIS — I48.91 ATRIAL FIBRILLATION, UNSPECIFIED TYPE (HCC): Primary | ICD-10-CM

## 2023-06-16 ENCOUNTER — ANCILLARY PROCEDURE (OUTPATIENT)
Age: 78
End: 2023-06-16
Payer: MEDICARE

## 2023-06-16 DIAGNOSIS — R55 SYNCOPE AND COLLAPSE: ICD-10-CM

## 2023-06-16 PROCEDURE — 93229 REMOTE 30 DAY ECG TECH SUPP: CPT

## 2023-06-16 PROCEDURE — 93228 REMOTE 30 DAY ECG REV/REPORT: CPT | Performed by: INTERNAL MEDICINE

## 2023-06-19 ENCOUNTER — TELEPHONE (OUTPATIENT)
Age: 78
End: 2023-06-19

## 2023-06-19 ENCOUNTER — CLINICAL DOCUMENTATION (OUTPATIENT)
Age: 78
End: 2023-06-19

## 2023-06-19 NOTE — TELEPHONE ENCOUNTER
Patient's daughter calling to r/s his Dr. Dayton Cobb appt to the end of July    She can be reached at [phone]    [facility]

## 2023-06-19 NOTE — PROGRESS NOTES
Event Monitor 05/07/2023 - 06/10/2023.     Min HR 49 bpm   Max  bpm    NSVT - 4 beat run and 6 beat run    PVC <1%  PAC < 1%    Next appt 7/25/23

## 2023-06-28 ENCOUNTER — OFFICE VISIT (OUTPATIENT)
Age: 78
End: 2023-06-28
Payer: MEDICARE

## 2023-06-28 VITALS
SYSTOLIC BLOOD PRESSURE: 118 MMHG | RESPIRATION RATE: 14 BRPM | BODY MASS INDEX: 21.84 KG/M2 | WEIGHT: 161.22 LBS | OXYGEN SATURATION: 99 % | DIASTOLIC BLOOD PRESSURE: 70 MMHG | HEART RATE: 70 BPM | HEIGHT: 72 IN

## 2023-06-28 DIAGNOSIS — I21.4 NSTEMI (NON-ST ELEVATED MYOCARDIAL INFARCTION) (HCC): ICD-10-CM

## 2023-06-28 DIAGNOSIS — I50.23 SYSTOLIC CHF, ACUTE ON CHRONIC (HCC): Primary | ICD-10-CM

## 2023-06-28 DIAGNOSIS — I10 HTN (HYPERTENSION), BENIGN: ICD-10-CM

## 2023-06-28 DIAGNOSIS — Z95.1 S/P CABG X 3: ICD-10-CM

## 2023-06-28 DIAGNOSIS — I25.10 CORONARY ARTERY DISEASE INVOLVING NATIVE CORONARY ARTERY OF NATIVE HEART WITHOUT ANGINA PECTORIS: ICD-10-CM

## 2023-06-28 PROCEDURE — 3078F DIAST BP <80 MM HG: CPT | Performed by: INTERNAL MEDICINE

## 2023-06-28 PROCEDURE — 1036F TOBACCO NON-USER: CPT | Performed by: INTERNAL MEDICINE

## 2023-06-28 PROCEDURE — 99205 OFFICE O/P NEW HI 60 MIN: CPT | Performed by: INTERNAL MEDICINE

## 2023-06-28 PROCEDURE — G8420 CALC BMI NORM PARAMETERS: HCPCS | Performed by: INTERNAL MEDICINE

## 2023-06-28 PROCEDURE — 3074F SYST BP LT 130 MM HG: CPT | Performed by: INTERNAL MEDICINE

## 2023-06-28 PROCEDURE — 93005 ELECTROCARDIOGRAM TRACING: CPT | Performed by: INTERNAL MEDICINE

## 2023-06-28 PROCEDURE — 93010 ELECTROCARDIOGRAM REPORT: CPT | Performed by: INTERNAL MEDICINE

## 2023-06-28 PROCEDURE — G8427 DOCREV CUR MEDS BY ELIG CLIN: HCPCS | Performed by: INTERNAL MEDICINE

## 2023-06-28 PROCEDURE — 1123F ACP DISCUSS/DSCN MKR DOCD: CPT | Performed by: INTERNAL MEDICINE

## 2023-07-21 ENCOUNTER — OFFICE VISIT (OUTPATIENT)
Age: 78
End: 2023-07-21
Payer: MEDICARE

## 2023-07-21 VITALS
BODY MASS INDEX: 21.56 KG/M2 | DIASTOLIC BLOOD PRESSURE: 68 MMHG | OXYGEN SATURATION: 98 % | WEIGHT: 159.2 LBS | HEART RATE: 66 BPM | HEIGHT: 72 IN | SYSTOLIC BLOOD PRESSURE: 116 MMHG

## 2023-07-21 DIAGNOSIS — Z95.1 S/P CABG X 3: ICD-10-CM

## 2023-07-21 DIAGNOSIS — I50.23 SYSTOLIC CHF, ACUTE ON CHRONIC (HCC): ICD-10-CM

## 2023-07-21 DIAGNOSIS — I35.0 SEVERE AORTIC STENOSIS: ICD-10-CM

## 2023-07-21 DIAGNOSIS — I21.4 NSTEMI (NON-ST ELEVATED MYOCARDIAL INFARCTION) (HCC): Primary | ICD-10-CM

## 2023-07-21 DIAGNOSIS — I25.10 CORONARY ARTERY DISEASE INVOLVING NATIVE CORONARY ARTERY OF NATIVE HEART WITHOUT ANGINA PECTORIS: ICD-10-CM

## 2023-07-21 DIAGNOSIS — I10 HTN (HYPERTENSION), BENIGN: ICD-10-CM

## 2023-07-21 PROCEDURE — 99215 OFFICE O/P EST HI 40 MIN: CPT | Performed by: INTERNAL MEDICINE

## 2023-07-21 ASSESSMENT — PATIENT HEALTH QUESTIONNAIRE - PHQ9
1. LITTLE INTEREST OR PLEASURE IN DOING THINGS: 0
SUM OF ALL RESPONSES TO PHQ QUESTIONS 1-9: 0
2. FEELING DOWN, DEPRESSED OR HOPELESS: 0
SUM OF ALL RESPONSES TO PHQ QUESTIONS 1-9: 0
SUM OF ALL RESPONSES TO PHQ9 QUESTIONS 1 & 2: 0

## 2023-08-11 ENCOUNTER — OFFICE VISIT (OUTPATIENT)
Age: 78
End: 2023-08-11
Payer: MEDICARE

## 2023-08-11 VITALS
SYSTOLIC BLOOD PRESSURE: 130 MMHG | HEIGHT: 72 IN | BODY MASS INDEX: 21.54 KG/M2 | DIASTOLIC BLOOD PRESSURE: 70 MMHG | OXYGEN SATURATION: 97 % | HEART RATE: 69 BPM | WEIGHT: 159 LBS

## 2023-08-11 DIAGNOSIS — Z87.898 HISTORY OF DIZZINESS: ICD-10-CM

## 2023-08-11 DIAGNOSIS — I10 HTN (HYPERTENSION), BENIGN: ICD-10-CM

## 2023-08-11 DIAGNOSIS — Z95.1 PRESENCE OF AORTOCORONARY BYPASS GRAFT: ICD-10-CM

## 2023-08-11 DIAGNOSIS — I25.5 ISCHEMIC CARDIOMYOPATHY: ICD-10-CM

## 2023-08-11 DIAGNOSIS — I25.10 CORONARY ARTERY DISEASE INVOLVING NATIVE CORONARY ARTERY OF NATIVE HEART WITHOUT ANGINA PECTORIS: Primary | ICD-10-CM

## 2023-08-11 DIAGNOSIS — I50.22 CHRONIC SYSTOLIC (CONGESTIVE) HEART FAILURE (HCC): ICD-10-CM

## 2023-08-11 DIAGNOSIS — I35.0 NONRHEUMATIC AORTIC VALVE STENOSIS: ICD-10-CM

## 2023-08-11 PROCEDURE — 99214 OFFICE O/P EST MOD 30 MIN: CPT | Performed by: INTERNAL MEDICINE

## 2023-08-11 PROCEDURE — 1036F TOBACCO NON-USER: CPT | Performed by: INTERNAL MEDICINE

## 2023-08-11 PROCEDURE — 1123F ACP DISCUSS/DSCN MKR DOCD: CPT | Performed by: INTERNAL MEDICINE

## 2023-08-11 PROCEDURE — 3075F SYST BP GE 130 - 139MM HG: CPT | Performed by: INTERNAL MEDICINE

## 2023-08-11 PROCEDURE — 3078F DIAST BP <80 MM HG: CPT | Performed by: INTERNAL MEDICINE

## 2023-08-11 PROCEDURE — G8420 CALC BMI NORM PARAMETERS: HCPCS | Performed by: INTERNAL MEDICINE

## 2023-08-11 PROCEDURE — G8427 DOCREV CUR MEDS BY ELIG CLIN: HCPCS | Performed by: INTERNAL MEDICINE

## 2023-08-14 ENCOUNTER — ANCILLARY PROCEDURE (OUTPATIENT)
Age: 78
End: 2023-08-14
Payer: MEDICARE

## 2023-08-14 VITALS
DIASTOLIC BLOOD PRESSURE: 70 MMHG | SYSTOLIC BLOOD PRESSURE: 130 MMHG | HEIGHT: 72 IN | BODY MASS INDEX: 21.54 KG/M2 | WEIGHT: 159 LBS

## 2023-08-14 DIAGNOSIS — I35.0 SEVERE AORTIC STENOSIS: ICD-10-CM

## 2023-08-14 PROCEDURE — 93306 TTE W/DOPPLER COMPLETE: CPT

## 2023-08-15 LAB
ECHO AO ROOT DIAM: 3.6 CM
ECHO AO ROOT INDEX: 1.87 CM/M2
ECHO AV AREA PEAK VELOCITY: 1.2 CM2
ECHO AV AREA VTI: 1.2 CM2
ECHO AV AREA/BSA PEAK VELOCITY: 0.6 CM2/M2
ECHO AV AREA/BSA VTI: 0.6 CM2/M2
ECHO AV MEAN GRADIENT: 27 MMHG
ECHO AV MEAN VELOCITY: 2.4 M/S
ECHO AV PEAK GRADIENT: 46 MMHG
ECHO AV PEAK VELOCITY: 3.4 M/S
ECHO AV VELOCITY RATIO: 0.29
ECHO AV VTI: 71.8 CM
ECHO BSA: 1.91 M2
ECHO EST RA PRESSURE: 3 MMHG
ECHO LA DIAMETER INDEX: 2.85 CM/M2
ECHO LA DIAMETER: 5.5 CM
ECHO LA TO AORTIC ROOT RATIO: 1.53
ECHO LA VOL 2C: 110 ML (ref 18–58)
ECHO LA VOL 4C: 117 ML (ref 18–58)
ECHO LA VOL BP: 116 ML (ref 18–58)
ECHO LA VOL/BSA BIPLANE: 60 ML/M2 (ref 16–34)
ECHO LA VOLUME AREA LENGTH: 121 ML
ECHO LA VOLUME INDEX A2C: 57 ML/M2 (ref 16–34)
ECHO LA VOLUME INDEX A4C: 61 ML/M2 (ref 16–34)
ECHO LA VOLUME INDEX AREA LENGTH: 63 ML/M2 (ref 16–34)
ECHO LV E' LATERAL VELOCITY: 4 CM/S
ECHO LV E' SEPTAL VELOCITY: 5 CM/S
ECHO LV EDV A2C: 195 ML
ECHO LV EDV A4C: 158 ML
ECHO LV EDV BP: 186 ML (ref 67–155)
ECHO LV EDV INDEX A4C: 82 ML/M2
ECHO LV EDV INDEX BP: 96 ML/M2
ECHO LV EDV NDEX A2C: 101 ML/M2
ECHO LV EJECTION FRACTION A2C: 41 %
ECHO LV EJECTION FRACTION A4C: 33 %
ECHO LV EJECTION FRACTION BIPLANE: 36 % (ref 55–100)
ECHO LV ESV A2C: 116 ML
ECHO LV ESV A4C: 106 ML
ECHO LV ESV BP: 119 ML (ref 22–58)
ECHO LV ESV INDEX A2C: 60 ML/M2
ECHO LV ESV INDEX A4C: 55 ML/M2
ECHO LV ESV INDEX BP: 62 ML/M2
ECHO LV FRACTIONAL SHORTENING: 25 % (ref 28–44)
ECHO LV INTERNAL DIMENSION DIASTOLE INDEX: 2.49 CM/M2
ECHO LV INTERNAL DIMENSION DIASTOLIC: 4.8 CM (ref 4.2–5.9)
ECHO LV INTERNAL DIMENSION SYSTOLIC INDEX: 1.87 CM/M2
ECHO LV INTERNAL DIMENSION SYSTOLIC: 3.6 CM
ECHO LV IVSD: 1.3 CM (ref 0.6–1)
ECHO LV MASS 2D: 318.8 G (ref 88–224)
ECHO LV MASS INDEX 2D: 165.2 G/M2 (ref 49–115)
ECHO LV POSTERIOR WALL DIASTOLIC: 1.8 CM (ref 0.6–1)
ECHO LV RELATIVE WALL THICKNESS RATIO: 0.75
ECHO LVOT AREA: 4.2 CM2
ECHO LVOT AV VTI INDEX: 0.29
ECHO LVOT DIAM: 2.3 CM
ECHO LVOT MEAN GRADIENT: 2 MMHG
ECHO LVOT PEAK GRADIENT: 4 MMHG
ECHO LVOT PEAK VELOCITY: 1 M/S
ECHO LVOT STROKE VOLUME INDEX: 44.3 ML/M2
ECHO LVOT SV: 85.5 ML
ECHO LVOT VTI: 20.6 CM
ECHO MV A VELOCITY: 1.07 M/S
ECHO MV AREA PHT: 3.1 CM2
ECHO MV E DECELERATION TIME (DT): 245.1 MS
ECHO MV E VELOCITY: 1.15 M/S
ECHO MV E/A RATIO: 1.07
ECHO MV E/E' LATERAL: 28.75
ECHO MV E/E' RATIO (AVERAGED): 25.88
ECHO MV E/E' SEPTAL: 23
ECHO MV PRESSURE HALF TIME (PHT): 71.1 MS
ECHO RIGHT VENTRICULAR SYSTOLIC PRESSURE (RVSP): 13 MMHG
ECHO RV FREE WALL PEAK S': 9 CM/S
ECHO RV TAPSE: 1.8 CM (ref 1.7–?)
ECHO TV REGURGITANT MAX VELOCITY: 1.57 M/S
ECHO TV REGURGITANT PEAK GRADIENT: 10 MMHG

## 2023-08-23 ENCOUNTER — TELEPHONE (OUTPATIENT)
Age: 78
End: 2023-08-23

## 2023-08-24 NOTE — TELEPHONE ENCOUNTER
Spoke with daughter Paula Us. 2 patient identifiers used. She was calling to discuss EF results. Let daughter know Per Dr. Elizabeth Cordova Message:      Ejection fraction is 35-40% with moderate aortic stenosis(not severe yet)    Daughter would like to make sure that this means patient does not need ICD. Let daughter know I would talk with Dr. Ravindra Fernandez and let her know.

## 2023-08-25 NOTE — TELEPHONE ENCOUNTER
YURI Conn - NP       Doesn't need ICD based on this echo        Attempted to reach patient by telephone. HIPAA compliant message was left for return call.

## 2023-11-20 ENCOUNTER — OFFICE VISIT (OUTPATIENT)
Age: 78
End: 2023-11-20
Payer: MEDICARE

## 2023-11-20 VITALS
HEIGHT: 72 IN | OXYGEN SATURATION: 98 % | DIASTOLIC BLOOD PRESSURE: 96 MMHG | HEART RATE: 95 BPM | WEIGHT: 170 LBS | SYSTOLIC BLOOD PRESSURE: 150 MMHG | BODY MASS INDEX: 23.03 KG/M2

## 2023-11-20 DIAGNOSIS — I50.22 CHRONIC SYSTOLIC (CONGESTIVE) HEART FAILURE (HCC): ICD-10-CM

## 2023-11-20 DIAGNOSIS — I10 HTN (HYPERTENSION), BENIGN: ICD-10-CM

## 2023-11-20 DIAGNOSIS — I35.0 NONRHEUMATIC AORTIC VALVE STENOSIS: ICD-10-CM

## 2023-11-20 DIAGNOSIS — Z95.1 S/P CABG X 3: ICD-10-CM

## 2023-11-20 DIAGNOSIS — I25.5 ISCHEMIC CARDIOMYOPATHY: ICD-10-CM

## 2023-11-20 DIAGNOSIS — I25.10 CORONARY ARTERY DISEASE INVOLVING NATIVE CORONARY ARTERY OF NATIVE HEART WITHOUT ANGINA PECTORIS: Primary | ICD-10-CM

## 2023-11-20 PROCEDURE — 1123F ACP DISCUSS/DSCN MKR DOCD: CPT | Performed by: INTERNAL MEDICINE

## 2023-11-20 PROCEDURE — G8427 DOCREV CUR MEDS BY ELIG CLIN: HCPCS | Performed by: INTERNAL MEDICINE

## 2023-11-20 PROCEDURE — 3080F DIAST BP >= 90 MM HG: CPT | Performed by: INTERNAL MEDICINE

## 2023-11-20 PROCEDURE — G8484 FLU IMMUNIZE NO ADMIN: HCPCS | Performed by: INTERNAL MEDICINE

## 2023-11-20 PROCEDURE — G8420 CALC BMI NORM PARAMETERS: HCPCS | Performed by: INTERNAL MEDICINE

## 2023-11-20 PROCEDURE — 1036F TOBACCO NON-USER: CPT | Performed by: INTERNAL MEDICINE

## 2023-11-20 PROCEDURE — 3077F SYST BP >= 140 MM HG: CPT | Performed by: INTERNAL MEDICINE

## 2023-11-20 PROCEDURE — 99214 OFFICE O/P EST MOD 30 MIN: CPT | Performed by: INTERNAL MEDICINE

## 2023-11-20 RX ORDER — LEVOTHYROXINE SODIUM 0.03 MG/1
25 TABLET ORAL DAILY
COMMUNITY

## 2023-11-20 NOTE — PROGRESS NOTES
Neal Wakefield MD., [facility]      [address] [facility] [place]      Office [phone],[facility] [phone]                 León Membreno is here for a f/u office visit. Primary care physician:   Anaya Rosas MD      CC - as documented in EMR      Dear Dr. Anaya Rosas MD      I had the pleasure of seeing Ms. León Membreno in the office today. Assessment:        CAD-s/p CABG 3/30/2022-LIMA to LAD, SVG to PDA, SVG to RI; 3/10/3321-ZUX-xxcfdx grafts     Ischemic cardiomyopathy/chronic HFrEF    Hyperlipidemia     History of paroxysmal atrial tachycardia    Moderate aortic stenosis-saw Dr. Marshall Johnson on 7/21/2023-\"Reji Kidd is seen for evaluation of his aortic valve disease. There is a concern t that he may have low-flow low gradient severe aortic stenosis. However after reviewing the echocardiogram I feel that this is extremely unlikely. His mean gradients are less than 20 and peak transfer to velocity less than 3 m/s\"   4/1/23 admission to [facility] for syncope attributed to low blood pressures /medication    History of noncompliance with doctor visits/medications          Plan:         14-day event monitor 5/2023-no significant arrhythmias. 4 beat/6 beat run of NSVT. EF 30 to 35%. Has seen Dr. Maurice Lee -was recommended AICD. However repeat EF on echo was 35 to 40%. .      Patient's son does patient's pillbox    Because of his history of dizziness/presyncope/syncope-currently not driving. However, patient wants to drive. Patient has seen neurology. Since his EF is improved and he has not any further episodes of syncope, and Dr. Marshall Johnson has advised follow-up echoes for his aortic stenosis, he has no current indication from a cardiac standpoint to get his driving license. We will also get approval from his PCP as well as neurology. Advised compliance with medications     Patient is clinically euvolemic. Takes Lasix on a as needed basis. NAUSEA/PAIN

## 2023-11-28 ENCOUNTER — TELEPHONE (OUTPATIENT)
Age: 78
End: 2023-11-28

## 2023-11-28 NOTE — TELEPHONE ENCOUNTER
Pt's daughter Feli is calling because she received an e-mail today at 12:16 pm with no subject, as a PDF and was scared to open it because she wasn't sure if nurse had sent it. Would like a call back. This is in reference to getting an e-mail for pt to start back driving.     Pt's daughter ph # [phone]

## 2024-01-16 ENCOUNTER — TELEPHONE (OUTPATIENT)
Age: 79
End: 2024-01-16

## 2024-01-16 NOTE — TELEPHONE ENCOUNTER
Pt stated that he received a customer medical report form to take with him to [employer]. DM wants the forms faxed from the office.     [employer] Fax # [phone]

## 2024-02-20 DIAGNOSIS — I25.5 ISCHEMIC CARDIOMYOPATHY: ICD-10-CM

## 2024-02-20 DIAGNOSIS — I25.10 CORONARY ARTERY DISEASE INVOLVING NATIVE CORONARY ARTERY OF NATIVE HEART WITHOUT ANGINA PECTORIS: Primary | ICD-10-CM

## 2024-02-20 DIAGNOSIS — I50.22 CHRONIC SYSTOLIC (CONGESTIVE) HEART FAILURE (HCC): ICD-10-CM

## 2024-03-25 ENCOUNTER — OFFICE VISIT (OUTPATIENT)
Age: 79
End: 2024-03-25
Payer: COMMERCIAL

## 2024-03-25 VITALS
SYSTOLIC BLOOD PRESSURE: 130 MMHG | HEART RATE: 71 BPM | BODY MASS INDEX: 22.75 KG/M2 | OXYGEN SATURATION: 97 % | HEIGHT: 72 IN | DIASTOLIC BLOOD PRESSURE: 80 MMHG | WEIGHT: 168 LBS

## 2024-03-25 DIAGNOSIS — I50.22 CHRONIC SYSTOLIC (CONGESTIVE) HEART FAILURE (HCC): ICD-10-CM

## 2024-03-25 DIAGNOSIS — I25.5 ISCHEMIC CARDIOMYOPATHY: ICD-10-CM

## 2024-03-25 DIAGNOSIS — Z95.1 S/P CABG X 3: ICD-10-CM

## 2024-03-25 DIAGNOSIS — I25.10 CORONARY ARTERY DISEASE INVOLVING NATIVE CORONARY ARTERY OF NATIVE HEART WITHOUT ANGINA PECTORIS: ICD-10-CM

## 2024-03-25 DIAGNOSIS — E78.2 MIXED HYPERLIPIDEMIA: Primary | ICD-10-CM

## 2024-03-25 PROCEDURE — 3075F SYST BP GE 130 - 139MM HG: CPT | Performed by: INTERNAL MEDICINE

## 2024-03-25 PROCEDURE — G8427 DOCREV CUR MEDS BY ELIG CLIN: HCPCS | Performed by: INTERNAL MEDICINE

## 2024-03-25 PROCEDURE — 1123F ACP DISCUSS/DSCN MKR DOCD: CPT | Performed by: INTERNAL MEDICINE

## 2024-03-25 PROCEDURE — 3079F DIAST BP 80-89 MM HG: CPT | Performed by: INTERNAL MEDICINE

## 2024-03-25 PROCEDURE — 99214 OFFICE O/P EST MOD 30 MIN: CPT | Performed by: INTERNAL MEDICINE

## 2024-03-25 PROCEDURE — 1036F TOBACCO NON-USER: CPT | Performed by: INTERNAL MEDICINE

## 2024-03-25 PROCEDURE — G8420 CALC BMI NORM PARAMETERS: HCPCS | Performed by: INTERNAL MEDICINE

## 2024-03-25 PROCEDURE — G8484 FLU IMMUNIZE NO ADMIN: HCPCS | Performed by: INTERNAL MEDICINE

## 2024-03-25 RX ORDER — CARVEDILOL 6.25 MG/1
6.25 TABLET ORAL 2 TIMES DAILY WITH MEALS
Qty: 180 TABLET | Refills: 3 | Status: SHIPPED | OUTPATIENT
Start: 2024-03-25

## 2024-03-25 RX ORDER — ASPIRIN 81 MG/1
81 TABLET, CHEWABLE ORAL DAILY
Qty: 90 TABLET | Refills: 3 | Status: SHIPPED | OUTPATIENT
Start: 2024-03-25 | End: 2025-04-10

## 2024-03-25 RX ORDER — ATORVASTATIN CALCIUM 40 MG/1
40 TABLET, FILM COATED ORAL DAILY
Qty: 90 TABLET | Refills: 3 | Status: SHIPPED | OUTPATIENT
Start: 2024-03-25 | End: 2025-04-10

## 2024-03-25 RX ORDER — FUROSEMIDE 20 MG/1
20 TABLET ORAL DAILY PRN
Qty: 90 TABLET | Refills: 3 | Status: SHIPPED | OUTPATIENT
Start: 2024-03-25

## 2024-03-25 NOTE — PROGRESS NOTES
Yvon King MD., [facility]      [address],[facility],[place] [address]      Office [phone],Fax [phone]                 Reji Zhao is here for a f/u office visit.      Primary care physician:   Fina Grewal MD      CC - as documented in [id number]      Dear Fina Guevara MD      I had the pleasure of seeing Ms.Johannes Zhao in the office today.             Assessment:        CAD-s/p CABG 3/30/2022-LIMA to LAD, SVG to PDA, SVG to RI; 3/10/7098-JCE-dxdhxx grafts     Ischemic cardiomyopathy/chronic HFrEF    Hyperlipidemia     History of paroxysmal atrial tachycardia    Moderate aortic stenosis-saw Dr. Kong on 7/21/2023-\"Reji Zhao is seen for evaluation of his aortic valve disease.  There is a concern t that he may have low-flow low gradient severe aortic stenosis.  However after reviewing the echocardiogram I feel that this is extremely unlikely.  His mean gradients are less than 20 and peak transfer to velocity less than 3 m/s\"   4/1/23 admission to [facility] for syncope attributed to low blood pressures /medication    History of noncompliance with doctor visits/medications          Plan:         14-day event monitor 5/2023-no significant arrhythmias.  4 beat/6 beat run of NSVT.     EF 30 to 35%.  Has seen Dr. Thompson -was recommended AICD.  However repeat EF on echo was 35 to 40%..      Patient's son does patient's pillbox    Because of his history of dizziness/presyncope/syncope-currently not driving.  However, patient wants to drive.  Patient has seen neurology.  Since his EF is improved and he has not any further episodes of syncope, and Dr. Kong has advised follow-up echoes for his aortic stenosis, he has no contraindication from a cardiac standpoint to get his driving license.  We will also get approval from his PCP as well as neurology.  Patient states that he is having a difficult time getting his license reinstated.   Advised compliance with

## 2024-03-25 NOTE — PROGRESS NOTES
Chief Complaint   Patient presents with    Coronary Artery Disease    Congestive Heart Failure    Other     HLD, AORTIC STENOSIS    Follow-up     Vitals:    03/25/24 1419   BP: 130/80   Site: Left Upper Arm   Position: Sitting   Cuff Size: Medium Adult   Pulse: 71   SpO2: 97%   Weight: 76.2 kg (168 lb)   Height: 1.829 m (6')      /80 (Site: Left Upper Arm, Position: Sitting, Cuff Size: Medium Adult)   Pulse 71   Ht 1.829 m (6')   Wt 76.2 kg (168 lb)   SpO2 97%   BMI 22.78 kg/m²

## 2024-04-04 ENCOUNTER — TELEPHONE (OUTPATIENT)
Age: 79
End: 2024-04-04

## 2024-04-04 NOTE — TELEPHONE ENCOUNTER
FYI-patient will be faxing a re-instatement form from [employer]mv. The last one in February was not complete.

## 2024-04-15 ENCOUNTER — TELEPHONE (OUTPATIENT)
Age: 79
End: 2024-04-15

## 2024-04-15 NOTE — TELEPHONE ENCOUNTER
Left HIPAA compliant VM that pt has been rescheduled and to call back to confirm    Future Appointments   Date Time Provider Department Center   5/31/2024  2:40 PM Gwendolyn Kong MD [facility] AMB   9/12/2024  2:40 PM Yvon King MD [facility] AMB

## 2024-04-16 NOTE — TELEPHONE ENCOUNTER
Spoke with patient.  DMV papers need additional information per DM[employer].  Notified patient I will call him on Friday for .  Understanding expressed.

## 2024-05-29 ENCOUNTER — TELEPHONE (OUTPATIENT)
Age: 79
End: 2024-05-29

## 2024-09-20 DIAGNOSIS — I50.22 CHRONIC SYSTOLIC (CONGESTIVE) HEART FAILURE (HCC): Primary | ICD-10-CM

## 2024-09-20 DIAGNOSIS — I25.10 CORONARY ARTERY DISEASE INVOLVING NATIVE CORONARY ARTERY OF NATIVE HEART WITHOUT ANGINA PECTORIS: ICD-10-CM

## 2024-09-20 DIAGNOSIS — E78.2 MIXED HYPERLIPIDEMIA: ICD-10-CM

## 2024-09-20 DIAGNOSIS — I25.5 ISCHEMIC CARDIOMYOPATHY: ICD-10-CM

## 2024-09-25 ENCOUNTER — OFFICE VISIT (OUTPATIENT)
Age: 79
End: 2024-09-25
Payer: MEDICARE

## 2024-09-25 VITALS
DIASTOLIC BLOOD PRESSURE: 70 MMHG | HEIGHT: 72 IN | RESPIRATION RATE: 16 BRPM | OXYGEN SATURATION: 98 % | BODY MASS INDEX: 20.59 KG/M2 | SYSTOLIC BLOOD PRESSURE: 110 MMHG | WEIGHT: 152 LBS | HEART RATE: 75 BPM

## 2024-09-25 DIAGNOSIS — I25.10 CORONARY ARTERY DISEASE INVOLVING NATIVE CORONARY ARTERY OF NATIVE HEART WITHOUT ANGINA PECTORIS: Primary | ICD-10-CM

## 2024-09-25 DIAGNOSIS — I35.0 SEVERE AORTIC STENOSIS: ICD-10-CM

## 2024-09-25 PROCEDURE — 93010 ELECTROCARDIOGRAM REPORT: CPT | Performed by: INTERNAL MEDICINE

## 2024-09-25 PROCEDURE — 1036F TOBACCO NON-USER: CPT | Performed by: INTERNAL MEDICINE

## 2024-09-25 PROCEDURE — G8420 CALC BMI NORM PARAMETERS: HCPCS | Performed by: INTERNAL MEDICINE

## 2024-09-25 PROCEDURE — G8428 CUR MEDS NOT DOCUMENT: HCPCS | Performed by: INTERNAL MEDICINE

## 2024-09-25 PROCEDURE — 1123F ACP DISCUSS/DSCN MKR DOCD: CPT | Performed by: INTERNAL MEDICINE

## 2024-09-25 PROCEDURE — 99214 OFFICE O/P EST MOD 30 MIN: CPT | Performed by: INTERNAL MEDICINE

## 2024-09-25 PROCEDURE — 3078F DIAST BP <80 MM HG: CPT | Performed by: INTERNAL MEDICINE

## 2024-09-25 PROCEDURE — 3074F SYST BP LT 130 MM HG: CPT | Performed by: INTERNAL MEDICINE

## 2024-09-25 PROCEDURE — 93005 ELECTROCARDIOGRAM TRACING: CPT | Performed by: INTERNAL MEDICINE

## 2024-09-25 ASSESSMENT — PATIENT HEALTH QUESTIONNAIRE - PHQ9
SUM OF ALL RESPONSES TO PHQ QUESTIONS 1-9: 0
1. LITTLE INTEREST OR PLEASURE IN DOING THINGS: NOT AT ALL
2. FEELING DOWN, DEPRESSED OR HOPELESS: NOT AT ALL
SUM OF ALL RESPONSES TO PHQ9 QUESTIONS 1 & 2: 0
SUM OF ALL RESPONSES TO PHQ QUESTIONS 1-9: 0

## 2024-10-10 DIAGNOSIS — I25.10 CORONARY ARTERY DISEASE INVOLVING NATIVE CORONARY ARTERY OF NATIVE HEART WITHOUT ANGINA PECTORIS: ICD-10-CM

## 2024-10-10 DIAGNOSIS — E78.2 MIXED HYPERLIPIDEMIA: ICD-10-CM

## 2024-10-10 DIAGNOSIS — I25.5 ISCHEMIC CARDIOMYOPATHY: ICD-10-CM

## 2024-10-10 DIAGNOSIS — I50.22 CHRONIC SYSTOLIC (CONGESTIVE) HEART FAILURE (HCC): ICD-10-CM

## 2024-10-10 DIAGNOSIS — I10 HTN (HYPERTENSION), BENIGN: Primary | ICD-10-CM

## 2024-10-10 DIAGNOSIS — I50.22 CHRONIC SYSTOLIC (CONGESTIVE) HEART FAILURE (HCC): Primary | ICD-10-CM

## 2024-10-10 RX ORDER — CARVEDILOL 6.25 MG/1
6.25 TABLET ORAL 2 TIMES DAILY WITH MEALS
Qty: 180 TABLET | Refills: 3 | Status: SHIPPED | OUTPATIENT
Start: 2024-10-10

## 2024-10-10 RX ORDER — CARVEDILOL 6.25 MG/1
6.25 TABLET ORAL 2 TIMES DAILY WITH MEALS
Qty: 180 TABLET | Refills: 3 | Status: SHIPPED | OUTPATIENT
Start: 2024-10-10 | End: 2024-10-10 | Stop reason: SDUPTHER

## 2024-10-10 RX ORDER — CARVEDILOL 6.25 MG/1
6.25 TABLET ORAL 2 TIMES DAILY WITH MEALS
Qty: 14 TABLET | Refills: 0 | Status: SHIPPED | OUTPATIENT
Start: 2024-10-10 | End: 2024-10-10 | Stop reason: SDUPTHER

## 2025-01-03 ENCOUNTER — TELEPHONE (OUTPATIENT)
Age: 80
End: 2025-01-03

## 2025-01-03 NOTE — TELEPHONE ENCOUNTER
Last few days taken has taken his furosemide and swelling seems to be improved some. Recently he started a new job where he is standing more.   Left foot greater than the right.    Wt yesterday 154lbs, Wed 154lbs, Tues 155lbs, Mon 149lbs 152.5lbs  Sun.     Patient has no SOB.  Will utilize his prn furosemide for edema or weight gain until her is seen Fri for his echo fup. If he worsens he will return call.

## 2025-01-09 ENCOUNTER — ANCILLARY PROCEDURE (OUTPATIENT)
Age: 80
End: 2025-01-09
Payer: MEDICARE

## 2025-01-09 VITALS
BODY MASS INDEX: 21.45 KG/M2 | HEART RATE: 65 BPM | DIASTOLIC BLOOD PRESSURE: 76 MMHG | SYSTOLIC BLOOD PRESSURE: 136 MMHG | WEIGHT: 158.4 LBS | HEIGHT: 72 IN

## 2025-01-09 DIAGNOSIS — I35.0 SEVERE AORTIC STENOSIS: ICD-10-CM

## 2025-01-09 PROCEDURE — C8929 TTE W OR WO FOL WCON,DOPPLER: HCPCS | Performed by: INTERNAL MEDICINE

## 2025-01-09 RX ADMIN — PERFLUTREN 10 ML: 6.52 INJECTION, SUSPENSION INTRAVENOUS at 09:20

## 2025-01-10 ENCOUNTER — OFFICE VISIT (OUTPATIENT)
Age: 80
End: 2025-01-10
Payer: MEDICARE

## 2025-01-10 VITALS
DIASTOLIC BLOOD PRESSURE: 78 MMHG | SYSTOLIC BLOOD PRESSURE: 130 MMHG | BODY MASS INDEX: 21.26 KG/M2 | HEART RATE: 70 BPM | WEIGHT: 157 LBS | HEIGHT: 72 IN | OXYGEN SATURATION: 97 %

## 2025-01-10 DIAGNOSIS — Z91.199 NON-COMPLIANCE: ICD-10-CM

## 2025-01-10 DIAGNOSIS — I35.0 NONRHEUMATIC AORTIC VALVE STENOSIS: ICD-10-CM

## 2025-01-10 DIAGNOSIS — I25.5 ISCHEMIC CARDIOMYOPATHY: ICD-10-CM

## 2025-01-10 DIAGNOSIS — R60.9 EDEMA, UNSPECIFIED TYPE: ICD-10-CM

## 2025-01-10 DIAGNOSIS — E78.2 MIXED HYPERLIPIDEMIA: ICD-10-CM

## 2025-01-10 DIAGNOSIS — Z95.1 S/P CABG X 3: ICD-10-CM

## 2025-01-10 DIAGNOSIS — Z87.898 HISTORY OF DIZZINESS: ICD-10-CM

## 2025-01-10 DIAGNOSIS — I25.10 CORONARY ARTERY DISEASE INVOLVING NATIVE CORONARY ARTERY OF NATIVE HEART WITHOUT ANGINA PECTORIS: Primary | ICD-10-CM

## 2025-01-10 LAB
ECHO AO ASC DIAM: 3.5 CM
ECHO AO ASCENDING AORTA INDEX: 1.81 CM/M2
ECHO AO ROOT DIAM: 3.6 CM
ECHO AO ROOT INDEX: 1.87 CM/M2
ECHO AV AREA PEAK VELOCITY: 1 CM2
ECHO AV AREA VTI: 0.9 CM2
ECHO AV AREA/BSA PEAK VELOCITY: 0.5 CM2/M2
ECHO AV AREA/BSA VTI: 0.5 CM2/M2
ECHO AV MEAN GRADIENT: 42 MMHG
ECHO AV MEAN VELOCITY: 3.2 M/S
ECHO AV PEAK GRADIENT: 72 MMHG
ECHO AV PEAK VELOCITY: 4.1 M/S
ECHO AV VELOCITY RATIO: 0.27
ECHO AV VTI: 101 CM
ECHO BSA: 1.91 M2
ECHO EST RA PRESSURE: 15 MMHG
ECHO IVC PROX: 2.2 CM
ECHO LA DIAMETER INDEX: 2.75 CM/M2
ECHO LA DIAMETER: 5.3 CM
ECHO LA TO AORTIC ROOT RATIO: 1.47
ECHO LA VOL A-L A2C: 113 ML (ref 18–58)
ECHO LA VOL A-L A4C: 129 ML (ref 18–58)
ECHO LA VOL MOD A2C: 107 ML (ref 18–58)
ECHO LA VOL MOD A4C: 121 ML (ref 18–58)
ECHO LA VOLUME AREA LENGTH: 125 ML
ECHO LA VOLUME INDEX A-L A2C: 59 ML/M2 (ref 16–34)
ECHO LA VOLUME INDEX A-L A4C: 67 ML/M2 (ref 16–34)
ECHO LA VOLUME INDEX AREA LENGTH: 65 ML/M2 (ref 16–34)
ECHO LA VOLUME INDEX MOD A2C: 55 ML/M2 (ref 16–34)
ECHO LA VOLUME INDEX MOD A4C: 63 ML/M2 (ref 16–34)
ECHO LV E' LATERAL VELOCITY: 3.66 CM/S
ECHO LV E' SEPTAL VELOCITY: 4.93 CM/S
ECHO LV EDV A2C: 215 ML
ECHO LV EDV A4C: 213 ML
ECHO LV EDV BP: 229 ML (ref 67–155)
ECHO LV EDV INDEX A4C: 110 ML/M2
ECHO LV EDV INDEX BP: 119 ML/M2
ECHO LV EDV NDEX A2C: 111 ML/M2
ECHO LV EF PHYSICIAN: 40 %
ECHO LV EJECTION FRACTION A2C: 43 %
ECHO LV EJECTION FRACTION A4C: 45 %
ECHO LV EJECTION FRACTION BIPLANE: 45 % (ref 55–100)
ECHO LV ESV A2C: 122 ML
ECHO LV ESV A4C: 118 ML
ECHO LV ESV BP: 126 ML (ref 22–58)
ECHO LV ESV INDEX A2C: 63 ML/M2
ECHO LV ESV INDEX A4C: 61 ML/M2
ECHO LV ESV INDEX BP: 65 ML/M2
ECHO LV FRACTIONAL SHORTENING: 31 % (ref 28–44)
ECHO LV INTERNAL DIMENSION DIASTOLE INDEX: 2.8 CM/M2
ECHO LV INTERNAL DIMENSION DIASTOLIC: 5.4 CM (ref 4.2–5.9)
ECHO LV INTERNAL DIMENSION SYSTOLIC INDEX: 1.92 CM/M2
ECHO LV INTERNAL DIMENSION SYSTOLIC: 3.7 CM
ECHO LV IVSD: 1.2 CM (ref 0.6–1)
ECHO LV MASS 2D: 279.8 G (ref 88–224)
ECHO LV MASS INDEX 2D: 145 G/M2 (ref 49–115)
ECHO LV POSTERIOR WALL DIASTOLIC: 1.3 CM (ref 0.6–1)
ECHO LV RELATIVE WALL THICKNESS RATIO: 0.48
ECHO LVOT AREA: 3.8 CM2
ECHO LVOT AV VTI INDEX: 0.24
ECHO LVOT DIAM: 2.2 CM
ECHO LVOT MEAN GRADIENT: 3 MMHG
ECHO LVOT PEAK GRADIENT: 5 MMHG
ECHO LVOT PEAK VELOCITY: 1.1 M/S
ECHO LVOT STROKE VOLUME INDEX: 47 ML/M2
ECHO LVOT SV: 90.8 ML
ECHO LVOT VTI: 23.9 CM
ECHO MV A VELOCITY: 0.85 M/S
ECHO MV AREA PHT: 3.8 CM2
ECHO MV AREA VTI: 2.4 CM2
ECHO MV E DECELERATION TIME (DT): 200.8 MS
ECHO MV E VELOCITY: 1.24 M/S
ECHO MV E/A RATIO: 1.46
ECHO MV E/E' LATERAL: 33.88
ECHO MV E/E' RATIO (AVERAGED): 29.52
ECHO MV E/E' SEPTAL: 25.15
ECHO MV LVOT VTI INDEX: 1.56
ECHO MV MAX VELOCITY: 1.3 M/S
ECHO MV MEAN GRADIENT: 2 MMHG
ECHO MV MEAN VELOCITY: 0.7 M/S
ECHO MV PEAK GRADIENT: 7 MMHG
ECHO MV PRESSURE HALF TIME (PHT): 58.2 MS
ECHO MV VTI: 37.3 CM
ECHO RA AREA 4C: 24.6 CM2
ECHO RA END SYSTOLIC VOLUME APICAL 4 CHAMBER INDEX BSA: 40 ML/M2
ECHO RA VOLUME: 77 ML
ECHO RIGHT VENTRICULAR SYSTOLIC PRESSURE (RVSP): 64 MMHG
ECHO RV INTERNAL DIMENSION: 4.8 CM
ECHO RV TAPSE: 1.7 CM (ref 1.7–?)
ECHO TV REGURGITANT MAX VELOCITY: 3.49 M/S
ECHO TV REGURGITANT PEAK GRADIENT: 49 MMHG

## 2025-01-10 PROCEDURE — 1123F ACP DISCUSS/DSCN MKR DOCD: CPT

## 2025-01-10 PROCEDURE — G8427 DOCREV CUR MEDS BY ELIG CLIN: HCPCS

## 2025-01-10 PROCEDURE — 3078F DIAST BP <80 MM HG: CPT

## 2025-01-10 PROCEDURE — 1126F AMNT PAIN NOTED NONE PRSNT: CPT

## 2025-01-10 PROCEDURE — 99214 OFFICE O/P EST MOD 30 MIN: CPT

## 2025-01-10 PROCEDURE — 3075F SYST BP GE 130 - 139MM HG: CPT

## 2025-01-10 PROCEDURE — 1159F MED LIST DOCD IN RCRD: CPT

## 2025-01-10 PROCEDURE — G8420 CALC BMI NORM PARAMETERS: HCPCS

## 2025-01-10 PROCEDURE — 1160F RVW MEDS BY RX/DR IN RCRD: CPT

## 2025-01-10 PROCEDURE — 1036F TOBACCO NON-USER: CPT

## 2025-01-10 NOTE — PROGRESS NOTES
had concerns including Coronary Artery Disease and Hypertension.    Vitals:    01/10/25 1312   BP: 130/78   Site: Left Upper Arm   Position: Sitting   Pulse: 70   SpO2: 97%   Weight: 71.2 kg (157 lb)   Height: 1.829 m (6')        Chest pain No    Refills No        1. Have you been to the ER, urgent care clinic since your last visit? No       Hospitalized since your last visit? No       Where?        Date?  
with patient: yes   Patient Labs were reviewed and/or requested:  yes   Patient Past Records were reviewed and/or requested: yes      Total time :        mins      ATTENTION:    This medical record was transcribed using an electronic medical records/speech recognition system.  Although proofread, it may and can contain electronic, spelling and other errors.  Corrections may be executed at a later time.  Please feel free to  contact us for any clarifications as needed.      Kera Franco, YURI - NP

## 2025-01-13 ENCOUNTER — TELEPHONE (OUTPATIENT)
Age: 80
End: 2025-01-13

## 2025-01-13 NOTE — TELEPHONE ENCOUNTER
Telephone call made to Feli on PHI. Two patient identifiers verified. Changed appt with Dr. King to May (pt seeing Dr. Kong in March). Flei verbalizes understanding and has no further questions.    Future Appointments   Date Time Provider Department Center   3/7/2025  3:20 PM Gwendolyn Kong MD [facility]   5/8/2025  3:40 PM Yvon King MD [facility]

## 2025-03-21 ENCOUNTER — TELEPHONE (OUTPATIENT)
Age: 80
End: 2025-03-21

## 2025-03-21 DIAGNOSIS — I25.10 CORONARY ARTERY DISEASE INVOLVING NATIVE CORONARY ARTERY OF NATIVE HEART WITHOUT ANGINA PECTORIS: ICD-10-CM

## 2025-03-21 DIAGNOSIS — E78.2 MIXED HYPERLIPIDEMIA: ICD-10-CM

## 2025-03-21 DIAGNOSIS — I50.22 CHRONIC SYSTOLIC (CONGESTIVE) HEART FAILURE: Primary | ICD-10-CM

## 2025-03-21 DIAGNOSIS — I25.5 ISCHEMIC CARDIOMYOPATHY: ICD-10-CM

## 2025-03-21 DIAGNOSIS — I50.23 SYSTOLIC CHF, ACUTE ON CHRONIC (HCC): ICD-10-CM

## 2025-03-21 DIAGNOSIS — I25.5 ISCHEMIC CARDIOMYOPATHY: Primary | ICD-10-CM

## 2025-03-21 NOTE — TELEPHONE ENCOUNTER
Requested Prescriptions     Signed Prescriptions Disp Refills    sacubitril-valsartan (ENTRESTO) 24-26 MG per tablet 180 tablet 3     Sig: Take 1 tablet by mouth in the morning and 1 tablet in the evening.     Authorizing Provider: MADINA TAM     Ordering User: GEGE RUIZ A     Verbal order for refill per Dr Tam

## 2025-03-21 NOTE — TELEPHONE ENCOUNTER
Patient's daughter is calling because her father has been out of his Entresto for a month.Patient needs a refill.Patient normally get this medicine sent by mail order.    Patient daughter would like to come by and  some samples today until his prescription can get refill.      Pharmacy:  [facility] - [place], [place] - [address] - P [phone] - F [phone]     Feli (daughter) [phone]

## 2025-04-28 DIAGNOSIS — I25.10 CORONARY ARTERY DISEASE INVOLVING NATIVE CORONARY ARTERY OF NATIVE HEART WITHOUT ANGINA PECTORIS: ICD-10-CM

## 2025-04-28 DIAGNOSIS — E78.2 MIXED HYPERLIPIDEMIA: ICD-10-CM

## 2025-04-28 DIAGNOSIS — I25.5 ISCHEMIC CARDIOMYOPATHY: ICD-10-CM

## 2025-04-28 DIAGNOSIS — I50.22 CHRONIC SYSTOLIC (CONGESTIVE) HEART FAILURE (HCC): ICD-10-CM

## 2025-04-28 RX ORDER — ATORVASTATIN CALCIUM 40 MG/1
40 TABLET, FILM COATED ORAL DAILY
Qty: 90 TABLET | Refills: 3 | Status: SHIPPED | OUTPATIENT
Start: 2025-04-28 | End: 2026-05-14

## 2025-05-08 ENCOUNTER — OFFICE VISIT (OUTPATIENT)
Age: 80
End: 2025-05-08
Payer: MEDICARE

## 2025-05-08 VITALS
SYSTOLIC BLOOD PRESSURE: 150 MMHG | HEART RATE: 67 BPM | BODY MASS INDEX: 20.99 KG/M2 | HEIGHT: 72 IN | OXYGEN SATURATION: 98 % | DIASTOLIC BLOOD PRESSURE: 88 MMHG | WEIGHT: 155 LBS

## 2025-05-08 DIAGNOSIS — I35.0 NONRHEUMATIC AORTIC VALVE STENOSIS: ICD-10-CM

## 2025-05-08 DIAGNOSIS — I25.10 CORONARY ARTERY DISEASE INVOLVING NATIVE CORONARY ARTERY OF NATIVE HEART WITHOUT ANGINA PECTORIS: Primary | ICD-10-CM

## 2025-05-08 DIAGNOSIS — Z95.1 S/P CABG X 3: ICD-10-CM

## 2025-05-08 DIAGNOSIS — I50.22 CHRONIC SYSTOLIC (CONGESTIVE) HEART FAILURE (HCC): ICD-10-CM

## 2025-05-08 DIAGNOSIS — I25.5 ISCHEMIC CARDIOMYOPATHY: ICD-10-CM

## 2025-05-08 DIAGNOSIS — I50.23 SYSTOLIC CHF, ACUTE ON CHRONIC (HCC): ICD-10-CM

## 2025-05-08 DIAGNOSIS — E78.2 MIXED HYPERLIPIDEMIA: ICD-10-CM

## 2025-05-08 PROCEDURE — 3079F DIAST BP 80-89 MM HG: CPT | Performed by: INTERNAL MEDICINE

## 2025-05-08 PROCEDURE — 99214 OFFICE O/P EST MOD 30 MIN: CPT | Performed by: INTERNAL MEDICINE

## 2025-05-08 PROCEDURE — 1036F TOBACCO NON-USER: CPT | Performed by: INTERNAL MEDICINE

## 2025-05-08 PROCEDURE — G8427 DOCREV CUR MEDS BY ELIG CLIN: HCPCS | Performed by: INTERNAL MEDICINE

## 2025-05-08 PROCEDURE — 1159F MED LIST DOCD IN RCRD: CPT | Performed by: INTERNAL MEDICINE

## 2025-05-08 PROCEDURE — 1123F ACP DISCUSS/DSCN MKR DOCD: CPT | Performed by: INTERNAL MEDICINE

## 2025-05-08 PROCEDURE — G8420 CALC BMI NORM PARAMETERS: HCPCS | Performed by: INTERNAL MEDICINE

## 2025-05-08 PROCEDURE — 3077F SYST BP >= 140 MM HG: CPT | Performed by: INTERNAL MEDICINE

## 2025-05-08 PROCEDURE — 1160F RVW MEDS BY RX/DR IN RCRD: CPT | Performed by: INTERNAL MEDICINE

## 2025-05-08 PROCEDURE — G2211 COMPLEX E/M VISIT ADD ON: HCPCS | Performed by: INTERNAL MEDICINE

## 2025-05-08 NOTE — PROGRESS NOTES
Primary Cardiologist:  Dr. Fernando King MD          Reji Zhao is here for a f/u office visit.      Primary care physician:   Fina Grewal MD      CC - as documented in EMR      Dear Fina Guevara MD      I had the pleasure of seeing Ms.Johannes Zhao in the office today.             Assessment:        CAD-s/p CABG 3/30/2022-LIMA to LAD, SVG to PDA, SVG to RI; 3/10/8435-UVH-rgkddm grafts     Ischemic cardiomyopathy/chronic HFrEF    Hyperlipidemia     History of paroxysmal atrial tachycardia    Moderate aortic stenosis-saw Dr. Kong on 7/21/2023-\"There is a concern t that he may have low-flow low gradient severe aortic stenosis.  However after reviewing the echocardiogram I feel that this is extremely unlikely.  His mean gradients are less than 20 and peak transfer to velocity less than 3 m/s\"  Repeat echo 1/2025-aortic valve mean gradient 42 mmHg.  Scheduled to see Dr. Kong 5/16/2025    Hypertension    History of noncompliance with doctor visits/medications           Plan:         4/2023 EF 30 to 35%.  Has seen Dr. Thompson -was recommended AICD.  EF on echo 8/2023 was 35 to 40%.   Echo 1/9/25 EF 35 to 40%, mod MR, Mild to moderate TR. The estimated RVSP is 64 mmHg.  Moderate to severe aortic stenosis-aortic valve mean gradient 42 mmHg    Cont lasix PRN  Compression socks if he can wear them     Patient is clinically euvolemic today.  Takes Lasix on a as needed basis.     Continue GDMT for cardiomyopathy.  Has not tolerated spironolactone secondary to hyperkalemia    [facility]    Blood pressure elevated in the office today.  Advised to monitor blood pressure/maintain log.    LDL 77 3/2023  Lipid panel/CMP  Weight and BP stable. Denies dizziness  Follow-up 6 months/earlier as needed  Aggressive cardiovascular risk factor modification.    He will continue to follow-up with Dr. Kong for his aortic stenosis.        Patient

## 2025-05-08 NOTE — PROGRESS NOTES
Chief Complaint   Patient presents with    Other    Coronary Artery Disease     ICMP, HLD, Hx ATACH     Vitals:    05/08/25 1527 05/08/25 1538   BP: (!) 150/88 (!) 150/88   BP Site: Left Upper Arm    Patient Position: Sitting    BP Cuff Size: Medium Adult    Pulse: 67    SpO2: 98%    Weight: 70.3 kg (155 lb)    Height: 1.829 m (6')       BP (!) 150/88   Pulse 67   Ht 1.829 m (6')   Wt 70.3 kg (155 lb)   SpO2 98%   BMI 21.02 kg/m²

## 2025-08-25 ENCOUNTER — TELEPHONE (OUTPATIENT)
Age: 80
End: 2025-08-25

## 2025-08-25 DIAGNOSIS — I10 HTN (HYPERTENSION), BENIGN: ICD-10-CM

## 2025-08-25 DIAGNOSIS — E78.2 MIXED HYPERLIPIDEMIA: ICD-10-CM

## 2025-08-25 DIAGNOSIS — I50.22 CHRONIC SYSTOLIC (CONGESTIVE) HEART FAILURE (HCC): ICD-10-CM

## 2025-08-25 DIAGNOSIS — I25.10 CORONARY ARTERY DISEASE INVOLVING NATIVE CORONARY ARTERY OF NATIVE HEART WITHOUT ANGINA PECTORIS: ICD-10-CM

## 2025-08-25 DIAGNOSIS — I25.5 ISCHEMIC CARDIOMYOPATHY: ICD-10-CM

## 2025-08-25 RX ORDER — ASPIRIN 81 MG/1
81 TABLET, CHEWABLE ORAL DAILY
Qty: 90 TABLET | Refills: 3 | Status: SHIPPED | OUTPATIENT
Start: 2025-08-25 | End: 2026-09-10

## 2025-08-25 RX ORDER — CARVEDILOL 6.25 MG/1
6.25 TABLET ORAL 2 TIMES DAILY WITH MEALS
Qty: 180 TABLET | Refills: 3 | Status: SHIPPED | OUTPATIENT
Start: 2025-08-25

## 2025-08-29 ENCOUNTER — OFFICE VISIT (OUTPATIENT)
Age: 80
End: 2025-08-29
Payer: MEDICARE

## 2025-08-29 VITALS
BODY MASS INDEX: 20.32 KG/M2 | HEIGHT: 72 IN | OXYGEN SATURATION: 97 % | HEART RATE: 80 BPM | SYSTOLIC BLOOD PRESSURE: 110 MMHG | DIASTOLIC BLOOD PRESSURE: 80 MMHG | WEIGHT: 150 LBS

## 2025-08-29 DIAGNOSIS — I10 HTN (HYPERTENSION), BENIGN: ICD-10-CM

## 2025-08-29 DIAGNOSIS — I50.22 CHRONIC SYSTOLIC (CONGESTIVE) HEART FAILURE (HCC): ICD-10-CM

## 2025-08-29 DIAGNOSIS — E78.2 MIXED HYPERLIPIDEMIA: ICD-10-CM

## 2025-08-29 DIAGNOSIS — I50.23 SYSTOLIC CHF, ACUTE ON CHRONIC (HCC): ICD-10-CM

## 2025-08-29 DIAGNOSIS — I35.0 NONRHEUMATIC AORTIC VALVE STENOSIS: ICD-10-CM

## 2025-08-29 DIAGNOSIS — I25.5 ISCHEMIC CARDIOMYOPATHY: ICD-10-CM

## 2025-08-29 DIAGNOSIS — I25.10 CORONARY ARTERY DISEASE INVOLVING NATIVE CORONARY ARTERY OF NATIVE HEART WITHOUT ANGINA PECTORIS: Primary | ICD-10-CM

## 2025-08-29 PROCEDURE — G8427 DOCREV CUR MEDS BY ELIG CLIN: HCPCS | Performed by: INTERNAL MEDICINE

## 2025-08-29 PROCEDURE — 99214 OFFICE O/P EST MOD 30 MIN: CPT | Performed by: INTERNAL MEDICINE

## 2025-08-29 PROCEDURE — G8420 CALC BMI NORM PARAMETERS: HCPCS | Performed by: INTERNAL MEDICINE

## 2025-08-29 PROCEDURE — 3074F SYST BP LT 130 MM HG: CPT | Performed by: INTERNAL MEDICINE

## 2025-08-29 PROCEDURE — 4004F PT TOBACCO SCREEN RCVD TLK: CPT | Performed by: INTERNAL MEDICINE

## 2025-08-29 PROCEDURE — 3079F DIAST BP 80-89 MM HG: CPT | Performed by: INTERNAL MEDICINE

## 2025-08-29 PROCEDURE — 1123F ACP DISCUSS/DSCN MKR DOCD: CPT | Performed by: INTERNAL MEDICINE

## 2025-08-29 PROCEDURE — 1159F MED LIST DOCD IN RCRD: CPT | Performed by: INTERNAL MEDICINE

## 2025-08-29 RX ORDER — LEVOTHYROXINE SODIUM 25 UG/1
25 TABLET ORAL DAILY
Qty: 90 TABLET | Refills: 0 | Status: SHIPPED | OUTPATIENT
Start: 2025-08-29

## 2025-09-02 ENCOUNTER — TELEPHONE (OUTPATIENT)
Age: 80
End: 2025-09-02

## 2025-09-02 DIAGNOSIS — I35.0 SEVERE AORTIC STENOSIS: Primary | ICD-10-CM

## 2025-09-04 ENCOUNTER — TELEPHONE (OUTPATIENT)
Age: 80
End: 2025-09-04

## 2025-09-04 DIAGNOSIS — I35.0 SEVERE AORTIC STENOSIS: Primary | ICD-10-CM

## (undated) DEVICE — ANGIOGRAPHIC CATHETER: Brand: IMPULSE™

## (undated) DEVICE — ANGIOGRAPHY KIT

## (undated) DEVICE — PRESSURE MONITORING SET: Brand: TRUWAVE

## (undated) DEVICE — INTENDED FOR TISSUE SEPARATION, AND OTHER PROCEDURES THAT REQUIRE A SHARP SURGICAL BLADE TO PUNCTURE OR CUT.: Brand: BARD-PARKER ® CARBON RIB-BACK BLADES

## (undated) DEVICE — HI-TORQUE VERSACORE MODIFIED J GUIDE WIRE SYSTEM 260 CM: Brand: HI-TORQUE VERSACORE

## (undated) DEVICE — Device

## (undated) DEVICE — BANDAGE COMPR ELASTIC 5 YDX6 IN

## (undated) DEVICE — SUT PROL 4-0 30IN SH1 DA BLU --

## (undated) DEVICE — WASTE KIT - ST MARY: Brand: MEDLINE INDUSTRIES, INC.

## (undated) DEVICE — TBG INSUFFLATION LUER LOCK: Brand: MEDLINE INDUSTRIES, INC.

## (undated) DEVICE — SUTURE PROL SZ 7-0 L24IN NONABSORBABLE BLU L8MM BV175-6 3/8 8735H

## (undated) DEVICE — CUSTOM KT PTCA INFL DEV K05 00052M

## (undated) DEVICE — TR BAND RADIAL ARTERY COMPRESSION DEVICE: Brand: TR BAND

## (undated) DEVICE — BAG RED 3PLY 2MIL 30X40 IN

## (undated) DEVICE — 6FR THERMODILUTION BALLOON CATHETER SWAN (ARROW)

## (undated) DEVICE — TTL1LYR 16FR10ML 100%SIL TMPST TR: Brand: MEDLINE

## (undated) DEVICE — SPECIAL PROCEDURE DRAPE 32" X 34": Brand: SPECIAL PROCEDURE DRAPE

## (undated) DEVICE — SUTURE PROL SZ 8-0 L24IN NONABSORBABLE BLU L6.5MM BV130-5 8732H

## (undated) DEVICE — Device: Brand: JELCO

## (undated) DEVICE — CATHETER KIT JL4 JR4 5FR 100CM 145 PGTL DXTERITY

## (undated) DEVICE — LUER-LOK 360°: Brand: CONNECTA, LUER-LOK

## (undated) DEVICE — DRSG BORDR MPLX HEEL 8.7X9.1IN --

## (undated) DEVICE — SYS VSL HARV HEMOPRO2 VASOVIEW -- HARV SYS MINIMUM ORDER 5/EA

## (undated) DEVICE — GOWN,SIRUS,NONRNF,SETINSLV,XL,20/CS: Brand: MEDLINE

## (undated) DEVICE — SUTURE S STL SZ 6 L18IN NONABSORBABLE SIL L48MM V-40 1/2 M649G

## (undated) DEVICE — CATH 5F 100CM JL35 -- DXTERITY

## (undated) DEVICE — OPEN HEART A- RICHMOND: Brand: MEDLINE INDUSTRIES, INC.

## (undated) DEVICE — PACK PROCEDURE SURG HRT CATH

## (undated) DEVICE — KIT MFLD ISOLATN NACL CNTRST PRT TBNG SPIK W/ PRSS TRNSDUC

## (undated) DEVICE — 40418 TRENDELENBURG ONE-STEP ARM PROTECTORS LARGE (1 PAIR): Brand: 40418 TRENDELENBURG ONE-STEP ARM PROTECTORS LARGE (1 PAIR)

## (undated) DEVICE — SUT PROL 7-0 24IN BV1 DA BLU --

## (undated) DEVICE — PROVE COVER: Brand: UNBRANDED

## (undated) DEVICE — KIT COR DIAG CATH ANGIO 5FR CURVES JL 4.0 100CM JR 4.0

## (undated) DEVICE — CATHETER IV 14GA L2IN POLYUR STR ORNG HUB SFTY RADPQ DISP

## (undated) DEVICE — CONNECTOR DRNGE 3/8 1/2X3/16X3/16IN BASE L5MM ARM L10-13MM

## (undated) DEVICE — PLEDGET SUT SFT OVL 3 8X5 16IN

## (undated) DEVICE — SUTURE VCRL SZ 0 L18IN ABSRB VLT L40MM CT 1/2 CIR J752D

## (undated) DEVICE — ROSEN CURVED WIRE GUIDE: Brand: ROSEN

## (undated) DEVICE — GLIDESHEATH SLENDER ACCESS KIT: Brand: GLIDESHEATH SLENDER

## (undated) DEVICE — 3M™ TEGADERM™ TRANSPARENT FILM DRESSING FRAME STYLE, 1626W, 4 IN X 4-3/4 IN (10 CM X 12 CM), 50/CT 4CT/CASE: Brand: 3M™ TEGADERM™

## (undated) DEVICE — TIDISHIELD TRANSPORT, CONTAINMENT COVER FOR BACK TABLE 4'6" (1.37M) TO 8' (2.43M) IN LENGTH: Brand: TIDISHIELD

## (undated) DEVICE — KIT MED IMAG CNTRST AGNT W/ IOPAMIDOL REUSE

## (undated) DEVICE — KIT HND CTRL 3 W STPCOCK ROT END 54IN PREM HI PRSS TBNG AT

## (undated) DEVICE — GLOVE ORANGE PI 8   MSG9080

## (undated) DEVICE — CANNULA PERF 15FR L12.5IN RG STPCOCK WIREWOUND BODY

## (undated) DEVICE — 1000ML,PRESSURE INFUSER W/STOPCOCK: Brand: MEDLINE

## (undated) DEVICE — BLADE OPHTH W1.5MM 15DEG ORNG HNDL SHRP SHRP DEL FOR CATRCT

## (undated) DEVICE — SYR 3ML LL TIP 1/10ML GRAD --

## (undated) DEVICE — TRANSFER PK BLD PROD 300ML --

## (undated) DEVICE — CATHETER ANGIO L100CM DIA5FR MP A CRV COR INSLIDE ADD DBL

## (undated) DEVICE — SYR 50ML LR LCK 1ML GRAD NSAF --

## (undated) DEVICE — KIT AT-X65 ANGIOTOUCH HAND CONTROLLER

## (undated) DEVICE — CO-SET DELIVERY SYSTEM FOR 123 ROOM TEMPATURE INJECTATE: Brand: CO-SET+

## (undated) DEVICE — CATH 5F 100CM JR40 -- DXTERITY

## (undated) DEVICE — INTENDED TO STANDARDIZE OR CAMERAS TO ALLOW FOR THE USE OF THE OR CAMERA COVER: Brand: ASPEN® O.R. CAMERA COVER

## (undated) DEVICE — SUPPORT WRST AD W3.5XL9IN DIA14.5IN ART SFT ADJ HK AND LOOP

## (undated) DEVICE — KIT BLWR MISTER 5P 15L W/ TBNG SET IRRIG MIST TO IMPROVE

## (undated) DEVICE — WRAP SURG W1.31XL1.34M CARD FOR PT 165-172CM THERMOWRP

## (undated) DEVICE — SOL INJ SOD CL 0.9% 500ML BG --

## (undated) DEVICE — HEART CATH-SFMC: Brand: MEDLINE INDUSTRIES, INC.

## (undated) DEVICE — CANNULA SUCT L8.5IN DIA20FR VENT CONN 3/8IN ART MTL CRV TIP

## (undated) DEVICE — SUT SLK 0 30IN CT1 BLK --

## (undated) DEVICE — DRAIN,WOUND,ROUND,24FR,5/16",FULL-FLUTED: Brand: MEDLINE

## (undated) DEVICE — DERMABOND SKIN ADH 0.7ML -- DERMABOND ADVANCED 12/BX

## (undated) DEVICE — SOLUTION IV 1000ML PH 7.4 INJ NRMSOL R

## (undated) DEVICE — GLIDESHEATH SLENDER STAINLESS STEEL KIT: Brand: GLIDESHEATH SLENDER

## (undated) DEVICE — 6 FOOT DISPOSABLE EXTENSION CABLE WITH SAFE CONNECT / SCREW-DOWN

## (undated) DEVICE — CATH DIAG WR5 EXPO 5FRX100CM -- EXPO

## (undated) DEVICE — OPEN HEART B-RICHMOND: Brand: MEDLINE INDUSTRIES, INC.

## (undated) DEVICE — COPILOT BLEEDBACK CONTROL VALVE: Brand: COPILOT

## (undated) DEVICE — CATH GUID COR JL4.0 6FR 100CM -- LAUNCHER

## (undated) DEVICE — 72" ARTERIAL PRESSURE TUBING: Brand: ICU MEDICAL

## (undated) DEVICE — PINNACLE INTRODUCER SHEATH: Brand: PINNACLE

## (undated) DEVICE — SUTURE MCRYL SZ 3-0 L27IN ABSRB UD L24MM PS-1 3/8 CIR PRIM Y936H

## (undated) DEVICE — SUT PROL 3-0 30IN SH1 DA BLU --

## (undated) DEVICE — SOLUTION IV 1000ML 140MEQ/L SOD 5MEQ/L K 3MEQ/L MG 27MEQ/L

## (undated) DEVICE — BANDAGE COMPR M W6INXL10YD WHT BGE VELC E MTRX HK AND LOOP

## (undated) DEVICE — KIT,ANTI FOG,W/SPONGE & FLUID,SOFT PACK: Brand: MEDLINE

## (undated) DEVICE — SYR 10ML LUER LOK 1/5ML GRAD --

## (undated) DEVICE — SEALANT KT FIBRIN HEMSTAT 4ML -- TISSEEL

## (undated) DEVICE — DRESSING SIL W4XL5IN ANTIBACT GELLING FBR CYTOFORM

## (undated) DEVICE — SUT PROL 6-0 24IN BV1 DA BLU --